# Patient Record
Sex: MALE | Race: WHITE | Employment: OTHER | URBAN - NONMETROPOLITAN AREA
[De-identification: names, ages, dates, MRNs, and addresses within clinical notes are randomized per-mention and may not be internally consistent; named-entity substitution may affect disease eponyms.]

---

## 2016-12-27 LAB
ALBUMIN SERPL-MCNC: 4.3 G/DL
ALP BLD-CCNC: 92 U/L
ALT SERPL-CCNC: 24 U/L
AST SERPL-CCNC: 22 U/L
BILIRUB SERPL-MCNC: 0.5 MG/DL (ref 0.1–1.4)
BUN BLDV-MCNC: 36 MG/DL
CALCIUM SERPL-MCNC: NORMAL MG/DL
CHLORIDE BLD-SCNC: 101 MMOL/L
CHOLESTEROL, TOTAL: 170 MG/DL
CHOLESTEROL/HDL RATIO: NORMAL
CO2: NORMAL MMOL/L
CREAT SERPL-MCNC: 2.2 MG/DL
GFR CALCULATED: NORMAL
GLUCOSE BLD-MCNC: 127 MG/DL
HBA1C MFR BLD: 7.9 %
HDLC SERPL-MCNC: 36 MG/DL (ref 35–70)
LDL CHOLESTEROL CALCULATED: 97 MG/DL (ref 0–160)
POTASSIUM SERPL-SCNC: 5.1 MMOL/L
SODIUM BLD-SCNC: 140 MMOL/L
TOTAL PROTEIN: NORMAL
TRIGL SERPL-MCNC: 184 MG/DL
VLDLC SERPL CALC-MCNC: NORMAL MG/DL

## 2017-01-11 PROBLEM — D64.9 ANEMIA: Status: ACTIVE | Noted: 2017-01-11

## 2017-01-11 PROBLEM — R80.9 PROTEINURIA: Status: ACTIVE | Noted: 2017-01-11

## 2017-01-17 LAB
LEFT VENTRICULAR EJECTION FRACTION HIGH VALUE: 45 %
LEFT VENTRICULAR EJECTION FRACTION MODE: NORMAL
LV EF: 40 %

## 2017-01-19 ENCOUNTER — HOSPITAL ENCOUNTER (OUTPATIENT)
Dept: ULTRASOUND IMAGING | Age: 72
Discharge: HOME | End: 2017-01-19
Attending: FAMILY MEDICINE | Admitting: FAMILY MEDICINE

## 2017-01-19 DIAGNOSIS — N64.59 OTHER SIGNS AND SYMPTOMS IN BREAST: ICD-10-CM

## 2017-01-19 DIAGNOSIS — N64.59 ABNORMAL BREAST EXAM: ICD-10-CM

## 2017-01-19 DIAGNOSIS — N64.4 BREAST PAIN, RIGHT: ICD-10-CM

## 2017-01-26 ENCOUNTER — TELEPHONE (OUTPATIENT)
Dept: CARDIOLOGY CLINIC | Age: 72
End: 2017-01-26

## 2017-01-26 ENCOUNTER — PROCEDURE VISIT (OUTPATIENT)
Dept: CARDIOLOGY CLINIC | Age: 72
End: 2017-01-26

## 2017-01-26 ENCOUNTER — OFFICE VISIT (OUTPATIENT)
Dept: CARDIOLOGY CLINIC | Age: 72
End: 2017-01-26

## 2017-01-26 VITALS
SYSTOLIC BLOOD PRESSURE: 120 MMHG | HEIGHT: 68 IN | HEART RATE: 84 BPM | BODY MASS INDEX: 27.58 KG/M2 | WEIGHT: 182 LBS | DIASTOLIC BLOOD PRESSURE: 62 MMHG

## 2017-01-26 DIAGNOSIS — N18.30 CKD (CHRONIC KIDNEY DISEASE) STAGE 3, GFR 30-59 ML/MIN (HCC): ICD-10-CM

## 2017-01-26 DIAGNOSIS — Z78.9 STATIN INTOLERANCE: ICD-10-CM

## 2017-01-26 DIAGNOSIS — Z95.1 S/P CABG X 3: ICD-10-CM

## 2017-01-26 DIAGNOSIS — N18.32 CHRONIC KIDNEY DISEASE (CKD) STAGE G3B/A3, MODERATELY DECREASED GLOMERULAR FILTRATION RATE (GFR) BETWEEN 30-44 ML/MIN/1.73 SQUARE METER AND ALBUMINURIA CREATININE RATIO GREATER THAN 300 MG/G (HCC): ICD-10-CM

## 2017-01-26 DIAGNOSIS — I25.10 CORONARY ARTERY DISEASE INVOLVING NATIVE CORONARY ARTERY OF NATIVE HEART WITHOUT ANGINA PECTORIS: ICD-10-CM

## 2017-01-26 DIAGNOSIS — I73.9 CLAUDICATION (HCC): Primary | ICD-10-CM

## 2017-01-26 DIAGNOSIS — E78.2 MIXED HYPERLIPIDEMIA: ICD-10-CM

## 2017-01-26 DIAGNOSIS — I48.0 PAF (PAROXYSMAL ATRIAL FIBRILLATION) (HCC): ICD-10-CM

## 2017-01-26 DIAGNOSIS — I10 ESSENTIAL HYPERTENSION: ICD-10-CM

## 2017-01-26 DIAGNOSIS — I73.9 PVD (PERIPHERAL VASCULAR DISEASE) (HCC): ICD-10-CM

## 2017-01-26 DIAGNOSIS — R07.89 OTHER CHEST PAIN: ICD-10-CM

## 2017-01-26 DIAGNOSIS — R07.89 OTHER CHEST PAIN: Primary | ICD-10-CM

## 2017-01-26 PROBLEM — R80.9 PROTEINURIA: Status: RESOLVED | Noted: 2017-01-11 | Resolved: 2017-01-26

## 2017-01-26 PROBLEM — D64.9 ANEMIA: Status: RESOLVED | Noted: 2017-01-11 | Resolved: 2017-01-26

## 2017-01-26 PROCEDURE — G8598 ASA/ANTIPLAT THER USED: HCPCS | Performed by: INTERNAL MEDICINE

## 2017-01-26 PROCEDURE — G8484 FLU IMMUNIZE NO ADMIN: HCPCS | Performed by: INTERNAL MEDICINE

## 2017-01-26 PROCEDURE — 93000 ELECTROCARDIOGRAM COMPLETE: CPT | Performed by: INTERNAL MEDICINE

## 2017-01-26 PROCEDURE — 99214 OFFICE O/P EST MOD 30 MIN: CPT | Performed by: INTERNAL MEDICINE

## 2017-01-26 PROCEDURE — 1123F ACP DISCUSS/DSCN MKR DOCD: CPT | Performed by: INTERNAL MEDICINE

## 2017-01-26 PROCEDURE — G8420 CALC BMI NORM PARAMETERS: HCPCS | Performed by: INTERNAL MEDICINE

## 2017-01-26 PROCEDURE — 3017F COLORECTAL CA SCREEN DOC REV: CPT | Performed by: INTERNAL MEDICINE

## 2017-01-26 PROCEDURE — 1036F TOBACCO NON-USER: CPT | Performed by: INTERNAL MEDICINE

## 2017-01-26 PROCEDURE — 4040F PNEUMOC VAC/ADMIN/RCVD: CPT | Performed by: INTERNAL MEDICINE

## 2017-01-26 PROCEDURE — 93924 LWR XTR VASC STDY BILAT: CPT | Performed by: INTERNAL MEDICINE

## 2017-01-26 PROCEDURE — G8427 DOCREV CUR MEDS BY ELIG CLIN: HCPCS | Performed by: INTERNAL MEDICINE

## 2017-01-26 RX ORDER — AMLODIPINE BESYLATE 5 MG/1
5 TABLET ORAL DAILY
COMMUNITY
End: 2017-06-26 | Stop reason: SDUPTHER

## 2017-01-27 ENCOUNTER — TELEPHONE (OUTPATIENT)
Dept: CARDIOLOGY CLINIC | Age: 72
End: 2017-01-27

## 2017-01-27 DIAGNOSIS — I73.9 PVD (PERIPHERAL VASCULAR DISEASE) (HCC): Primary | ICD-10-CM

## 2017-01-27 DIAGNOSIS — I25.10 CORONARY ARTERY DISEASE INVOLVING NATIVE CORONARY ARTERY OF NATIVE HEART WITHOUT ANGINA PECTORIS: ICD-10-CM

## 2017-01-27 DIAGNOSIS — I73.9 CLAUDICATION (HCC): ICD-10-CM

## 2017-01-31 ENCOUNTER — TELEPHONE (OUTPATIENT)
Dept: CARDIOLOGY CLINIC | Age: 72
End: 2017-01-31

## 2017-02-10 ENCOUNTER — HOSPITAL ENCOUNTER (OUTPATIENT)
Dept: OTHER | Age: 72
Discharge: HOME | End: 2017-02-10
Attending: INTERNAL MEDICINE | Admitting: INTERNAL MEDICINE

## 2017-02-10 ENCOUNTER — TELEPHONE (OUTPATIENT)
Dept: CARDIOLOGY CLINIC | Age: 72
End: 2017-02-10

## 2017-02-10 LAB
ANION GAP SERPL CALCULATED.3IONS-SCNC: 5 MMOL/L (ref 4–16)
BUN BLDV-MCNC: 41 MG/DL (ref 6–23)
CALCIUM SERPL-MCNC: 9.1 MG/DL (ref 8.3–10.6)
CHLORIDE BLD-SCNC: 103 MMOL/L (ref 99–110)
CO2: 33 MMOL/L (ref 21–32)
CREAT SERPL-MCNC: 2.3 MG/DL (ref 0.9–1.3)
GFR AFRICAN AMERICAN: 34 ML/MIN/1.73M2
GFR NON-AFRICAN AMERICAN: 28 ML/MIN/1.73M2
GLUCOSE BLD-MCNC: 139 MG/DL (ref 70–140)
POTASSIUM SERPL-SCNC: 4.7 MMOL/L (ref 3.5–5.1)
SODIUM BLD-SCNC: 141 MMOL/L (ref 135–145)

## 2017-02-13 ENCOUNTER — HOSPITAL ENCOUNTER (OUTPATIENT)
Dept: OTHER | Age: 72
Discharge: HOME | End: 2017-02-13
Attending: INTERNAL MEDICINE | Admitting: INTERNAL MEDICINE

## 2017-02-13 LAB
ANION GAP SERPL CALCULATED.3IONS-SCNC: 11 MMOL/L (ref 4–16)
BUN BLDV-MCNC: 47 MG/DL (ref 6–23)
CALCIUM SERPL-MCNC: 9.1 MG/DL (ref 8.3–10.6)
CHLORIDE BLD-SCNC: 99 MMOL/L (ref 99–110)
CO2: 29 MMOL/L (ref 21–32)
CREAT SERPL-MCNC: 2.5 MG/DL (ref 0.9–1.3)
GFR AFRICAN AMERICAN: 31 ML/MIN/1.73M2
GFR NON-AFRICAN AMERICAN: 26 ML/MIN/1.73M2
GLUCOSE BLD-MCNC: 285 MG/DL (ref 70–140)
POTASSIUM SERPL-SCNC: 4.5 MMOL/L (ref 3.5–5.1)
SODIUM BLD-SCNC: 139 MMOL/L (ref 135–145)

## 2017-02-15 ENCOUNTER — TELEPHONE (OUTPATIENT)
Dept: CARDIOLOGY CLINIC | Age: 72
End: 2017-02-15

## 2017-02-17 ENCOUNTER — OFFICE VISIT (OUTPATIENT)
Dept: CARDIOLOGY CLINIC | Age: 72
End: 2017-02-17

## 2017-02-17 VITALS
WEIGHT: 190 LBS | HEART RATE: 72 BPM | BODY MASS INDEX: 28.79 KG/M2 | SYSTOLIC BLOOD PRESSURE: 126 MMHG | RESPIRATION RATE: 16 BRPM | HEIGHT: 68 IN | DIASTOLIC BLOOD PRESSURE: 60 MMHG

## 2017-02-17 DIAGNOSIS — I42.9 CARDIOMYOPATHY (HCC): ICD-10-CM

## 2017-02-17 DIAGNOSIS — I48.0 PAF (PAROXYSMAL ATRIAL FIBRILLATION) (HCC): ICD-10-CM

## 2017-02-17 DIAGNOSIS — I73.9 PVD (PERIPHERAL VASCULAR DISEASE) (HCC): ICD-10-CM

## 2017-02-17 DIAGNOSIS — R06.02 SOB (SHORTNESS OF BREATH) ON EXERTION: ICD-10-CM

## 2017-02-17 DIAGNOSIS — E78.2 MIXED HYPERLIPIDEMIA: ICD-10-CM

## 2017-02-17 DIAGNOSIS — E11.8 TYPE 2 DIABETES MELLITUS WITH COMPLICATION, WITHOUT LONG-TERM CURRENT USE OF INSULIN (HCC): ICD-10-CM

## 2017-02-17 DIAGNOSIS — Z95.1 S/P CABG X 3: ICD-10-CM

## 2017-02-17 DIAGNOSIS — N18.30 CKD (CHRONIC KIDNEY DISEASE) STAGE 3, GFR 30-59 ML/MIN (HCC): ICD-10-CM

## 2017-02-17 DIAGNOSIS — Z78.9 STATIN INTOLERANCE: ICD-10-CM

## 2017-02-17 DIAGNOSIS — I25.10 CORONARY ARTERY DISEASE INVOLVING NATIVE CORONARY ARTERY OF NATIVE HEART WITHOUT ANGINA PECTORIS: Primary | ICD-10-CM

## 2017-02-17 PROCEDURE — G8598 ASA/ANTIPLAT THER USED: HCPCS | Performed by: INTERNAL MEDICINE

## 2017-02-17 PROCEDURE — 99214 OFFICE O/P EST MOD 30 MIN: CPT | Performed by: INTERNAL MEDICINE

## 2017-02-17 PROCEDURE — G8420 CALC BMI NORM PARAMETERS: HCPCS | Performed by: INTERNAL MEDICINE

## 2017-02-17 PROCEDURE — 4040F PNEUMOC VAC/ADMIN/RCVD: CPT | Performed by: INTERNAL MEDICINE

## 2017-02-17 PROCEDURE — 3017F COLORECTAL CA SCREEN DOC REV: CPT | Performed by: INTERNAL MEDICINE

## 2017-02-17 PROCEDURE — 1123F ACP DISCUSS/DSCN MKR DOCD: CPT | Performed by: INTERNAL MEDICINE

## 2017-02-17 PROCEDURE — G8427 DOCREV CUR MEDS BY ELIG CLIN: HCPCS | Performed by: INTERNAL MEDICINE

## 2017-02-17 PROCEDURE — G8484 FLU IMMUNIZE NO ADMIN: HCPCS | Performed by: INTERNAL MEDICINE

## 2017-02-17 PROCEDURE — 1036F TOBACCO NON-USER: CPT | Performed by: INTERNAL MEDICINE

## 2017-02-17 PROCEDURE — 3045F PR MOST RECENT HEMOGLOBIN A1C LEVEL 7.0-9.0%: CPT | Performed by: INTERNAL MEDICINE

## 2017-02-23 ENCOUNTER — HOSPITAL ENCOUNTER (OUTPATIENT)
Dept: OTHER | Age: 72
Discharge: HOME | End: 2017-02-23
Attending: INTERNAL MEDICINE | Admitting: INTERNAL MEDICINE

## 2017-02-23 LAB
ANION GAP SERPL CALCULATED.3IONS-SCNC: 11 MMOL/L (ref 4–16)
BUN BLDV-MCNC: 43 MG/DL (ref 6–23)
CALCIUM SERPL-MCNC: 9.3 MG/DL (ref 8.3–10.6)
CHLORIDE BLD-SCNC: 104 MMOL/L (ref 99–110)
CO2: 26 MMOL/L (ref 21–32)
CREAT SERPL-MCNC: 2 MG/DL (ref 0.9–1.3)
GFR AFRICAN AMERICAN: 40 ML/MIN/1.73M2
GFR NON-AFRICAN AMERICAN: 33 ML/MIN/1.73M2
GLUCOSE BLD-MCNC: 167 MG/DL (ref 70–140)
POTASSIUM SERPL-SCNC: 4.3 MMOL/L (ref 3.5–5.1)
SODIUM BLD-SCNC: 141 MMOL/L (ref 135–145)

## 2017-03-08 PROBLEM — N18.4 CHRONIC KIDNEY DISEASE (CKD) STAGE G4/A3, SEVERELY DECREASED GLOMERULAR FILTRATION RATE (GFR) BETWEEN 15-29 ML/MIN/1.73 SQUARE METER AND ALBUMINURIA CREATININE RATIO GREATER THAN 300 MG/G (HCC): Status: ACTIVE | Noted: 2017-03-08

## 2017-03-08 PROBLEM — I25.10 CAD (CORONARY ARTERY DISEASE): Status: ACTIVE | Noted: 2017-03-08

## 2017-03-08 PROBLEM — E11.9 DM (DIABETES MELLITUS) (HCC): Status: ACTIVE | Noted: 2017-03-08

## 2017-03-15 ENCOUNTER — TELEPHONE (OUTPATIENT)
Dept: CARDIOLOGY CLINIC | Age: 72
End: 2017-03-15

## 2017-03-30 ENCOUNTER — OFFICE VISIT (OUTPATIENT)
Dept: CARDIOLOGY CLINIC | Age: 72
End: 2017-03-30

## 2017-03-30 VITALS
HEART RATE: 78 BPM | RESPIRATION RATE: 16 BRPM | WEIGHT: 192 LBS | SYSTOLIC BLOOD PRESSURE: 128 MMHG | BODY MASS INDEX: 29.1 KG/M2 | OXYGEN SATURATION: 98 % | DIASTOLIC BLOOD PRESSURE: 54 MMHG | HEIGHT: 68 IN

## 2017-03-30 DIAGNOSIS — I25.10 CORONARY ARTERY DISEASE INVOLVING NATIVE CORONARY ARTERY OF NATIVE HEART WITHOUT ANGINA PECTORIS: ICD-10-CM

## 2017-03-30 DIAGNOSIS — Z95.1 S/P CABG X 3: ICD-10-CM

## 2017-03-30 DIAGNOSIS — E78.2 MIXED HYPERLIPIDEMIA: ICD-10-CM

## 2017-03-30 DIAGNOSIS — I42.9 CARDIOMYOPATHY (HCC): ICD-10-CM

## 2017-03-30 DIAGNOSIS — I48.0 PAF (PAROXYSMAL ATRIAL FIBRILLATION) (HCC): Primary | ICD-10-CM

## 2017-03-30 DIAGNOSIS — Z78.9 STATIN INTOLERANCE: ICD-10-CM

## 2017-03-30 DIAGNOSIS — N18.4 CHRONIC KIDNEY DISEASE (CKD) STAGE G4/A3, SEVERELY DECREASED GLOMERULAR FILTRATION RATE (GFR) BETWEEN 15-29 ML/MIN/1.73 SQUARE METER AND ALBUMINURIA CREATININE RATIO GREATER THAN 300 MG/G (HCC): ICD-10-CM

## 2017-03-30 PROCEDURE — G8484 FLU IMMUNIZE NO ADMIN: HCPCS | Performed by: INTERNAL MEDICINE

## 2017-03-30 PROCEDURE — G8427 DOCREV CUR MEDS BY ELIG CLIN: HCPCS | Performed by: INTERNAL MEDICINE

## 2017-03-30 PROCEDURE — G8420 CALC BMI NORM PARAMETERS: HCPCS | Performed by: INTERNAL MEDICINE

## 2017-03-30 PROCEDURE — 4040F PNEUMOC VAC/ADMIN/RCVD: CPT | Performed by: INTERNAL MEDICINE

## 2017-03-30 PROCEDURE — 1036F TOBACCO NON-USER: CPT | Performed by: INTERNAL MEDICINE

## 2017-03-30 PROCEDURE — 1123F ACP DISCUSS/DSCN MKR DOCD: CPT | Performed by: INTERNAL MEDICINE

## 2017-03-30 PROCEDURE — 99214 OFFICE O/P EST MOD 30 MIN: CPT | Performed by: INTERNAL MEDICINE

## 2017-03-30 PROCEDURE — G8598 ASA/ANTIPLAT THER USED: HCPCS | Performed by: INTERNAL MEDICINE

## 2017-03-30 PROCEDURE — 3017F COLORECTAL CA SCREEN DOC REV: CPT | Performed by: INTERNAL MEDICINE

## 2017-05-22 RX ORDER — APIXABAN 2.5 MG/1
TABLET, FILM COATED ORAL
Qty: 60 TABLET | Refills: 6 | Status: SHIPPED | OUTPATIENT
Start: 2017-05-22 | End: 2017-11-27 | Stop reason: SDUPTHER

## 2017-05-29 ENCOUNTER — HOSPITAL ENCOUNTER (OUTPATIENT)
Dept: GENERAL RADIOLOGY | Age: 72
Discharge: HOME | End: 2017-05-29
Attending: FAMILY MEDICINE | Admitting: FAMILY MEDICINE

## 2017-05-29 ENCOUNTER — HOSPITAL ENCOUNTER (OUTPATIENT)
Dept: OTHER | Age: 72
Discharge: HOME | End: 2017-05-29
Attending: INTERNAL MEDICINE | Admitting: INTERNAL MEDICINE

## 2017-05-29 DIAGNOSIS — R07.89 OTHER CHEST PAIN: ICD-10-CM

## 2017-05-29 LAB
ALBUMIN SERPL-MCNC: 3.8 GM/DL (ref 3.4–5)
ANION GAP SERPL CALCULATED.3IONS-SCNC: 14 MMOL/L (ref 4–16)
BUN BLDV-MCNC: 34 MG/DL (ref 6–23)
CALCIUM SERPL-MCNC: 9.7 MG/DL (ref 8.3–10.6)
CHLORIDE BLD-SCNC: 104 MMOL/L (ref 99–110)
CO2: 24 MMOL/L (ref 21–32)
CREAT SERPL-MCNC: 1.8 MG/DL (ref 0.9–1.3)
GFR AFRICAN AMERICAN: 45 ML/MIN/1.73M2
GFR NON-AFRICAN AMERICAN: 37 ML/MIN/1.73M2
GLUCOSE BLD-MCNC: 231 MG/DL (ref 70–140)
PHOSPHORUS: 4.2 MG/DL (ref 2.5–4.9)
POTASSIUM SERPL-SCNC: 4.3 MMOL/L (ref 3.5–5.1)
SODIUM BLD-SCNC: 142 MMOL/L (ref 135–145)

## 2017-05-30 LAB
CREATININE URINE: 117.8 MG/DL (ref 39–259)
PROT/CREAT RATIO, UR: 0.6
URINE TOTAL PROTEIN: 71.7 MG/DL

## 2017-05-31 LAB — PARATHYROID HORMONE INTACT: 24

## 2017-06-15 PROBLEM — I25.10 CAD (CORONARY ARTERY DISEASE): Status: ACTIVE | Noted: 2017-06-15

## 2017-06-15 PROBLEM — N18.32 CHRONIC KIDNEY DISEASE (CKD) STAGE G3B/A3, MODERATELY DECREASED GLOMERULAR FILTRATION RATE (GFR) BETWEEN 30-44 ML/MIN/1.73 SQUARE METER AND ALBUMINURIA CREATININE RATIO GREATER THAN 300 MG/G (HCC): Status: ACTIVE | Noted: 2017-06-15

## 2017-06-26 RX ORDER — AMLODIPINE BESYLATE 5 MG/1
5 TABLET ORAL 2 TIMES DAILY
Qty: 30 TABLET | Refills: 1 | Status: SHIPPED | OUTPATIENT
Start: 2017-06-26 | End: 2017-07-31 | Stop reason: SDUPTHER

## 2017-07-06 ENCOUNTER — TELEPHONE (OUTPATIENT)
Dept: CARDIOLOGY CLINIC | Age: 72
End: 2017-07-06

## 2017-07-06 ENCOUNTER — OFFICE VISIT (OUTPATIENT)
Dept: CARDIOLOGY CLINIC | Age: 72
End: 2017-07-06

## 2017-07-06 VITALS
HEART RATE: 72 BPM | DIASTOLIC BLOOD PRESSURE: 66 MMHG | HEIGHT: 68 IN | RESPIRATION RATE: 16 BRPM | WEIGHT: 196 LBS | SYSTOLIC BLOOD PRESSURE: 128 MMHG | BODY MASS INDEX: 29.7 KG/M2

## 2017-07-06 DIAGNOSIS — I10 ESSENTIAL HYPERTENSION: ICD-10-CM

## 2017-07-06 DIAGNOSIS — Z95.1 S/P CABG X 3: ICD-10-CM

## 2017-07-06 DIAGNOSIS — Z78.9 STATIN INTOLERANCE: ICD-10-CM

## 2017-07-06 DIAGNOSIS — E78.2 MIXED HYPERLIPIDEMIA: ICD-10-CM

## 2017-07-06 DIAGNOSIS — I48.0 PAF (PAROXYSMAL ATRIAL FIBRILLATION) (HCC): ICD-10-CM

## 2017-07-06 DIAGNOSIS — R06.02 SOB (SHORTNESS OF BREATH) ON EXERTION: Primary | ICD-10-CM

## 2017-07-06 DIAGNOSIS — E78.5 DYSLIPIDEMIA: ICD-10-CM

## 2017-07-06 DIAGNOSIS — R00.2 PALPITATIONS: ICD-10-CM

## 2017-07-06 DIAGNOSIS — I25.10 CORONARY ARTERY DISEASE INVOLVING NATIVE CORONARY ARTERY OF NATIVE HEART WITHOUT ANGINA PECTORIS: ICD-10-CM

## 2017-07-06 DIAGNOSIS — I42.9 CARDIOMYOPATHY (HCC): ICD-10-CM

## 2017-07-06 PROCEDURE — G8419 CALC BMI OUT NRM PARAM NOF/U: HCPCS | Performed by: INTERNAL MEDICINE

## 2017-07-06 PROCEDURE — 99214 OFFICE O/P EST MOD 30 MIN: CPT | Performed by: INTERNAL MEDICINE

## 2017-07-06 PROCEDURE — G8598 ASA/ANTIPLAT THER USED: HCPCS | Performed by: INTERNAL MEDICINE

## 2017-07-06 PROCEDURE — 1123F ACP DISCUSS/DSCN MKR DOCD: CPT | Performed by: INTERNAL MEDICINE

## 2017-07-06 PROCEDURE — G8427 DOCREV CUR MEDS BY ELIG CLIN: HCPCS | Performed by: INTERNAL MEDICINE

## 2017-07-06 PROCEDURE — 1036F TOBACCO NON-USER: CPT | Performed by: INTERNAL MEDICINE

## 2017-07-06 PROCEDURE — 4040F PNEUMOC VAC/ADMIN/RCVD: CPT | Performed by: INTERNAL MEDICINE

## 2017-07-06 PROCEDURE — 3017F COLORECTAL CA SCREEN DOC REV: CPT | Performed by: INTERNAL MEDICINE

## 2017-07-06 RX ORDER — SENNA PLUS 8.6 MG/1
1 TABLET ORAL DAILY
COMMUNITY
End: 2017-09-28

## 2017-07-10 ENCOUNTER — TELEPHONE (OUTPATIENT)
Dept: CARDIOLOGY CLINIC | Age: 72
End: 2017-07-10

## 2017-07-10 ENCOUNTER — HOSPITAL ENCOUNTER (OUTPATIENT)
Dept: OTHER | Age: 72
Discharge: HOME | End: 2017-07-10
Attending: INTERNAL MEDICINE | Admitting: INTERNAL MEDICINE

## 2017-07-10 LAB
ANION GAP SERPL CALCULATED.3IONS-SCNC: 18 MMOL/L (ref 4–16)
BACTERIA: NEGATIVE /HPF
BILIRUBIN URINE: NEGATIVE MG/DL
BLOOD, URINE: NEGATIVE
BUN BLDV-MCNC: 37 MG/DL (ref 6–23)
CALCIUM SERPL-MCNC: 10.3 MG/DL (ref 8.3–10.6)
CHLORIDE BLD-SCNC: 102 MMOL/L (ref 99–110)
CLARITY: CLEAR
CO2: 24 MMOL/L (ref 21–32)
COLOR: YELLOW
CREAT SERPL-MCNC: 2 MG/DL (ref 0.9–1.3)
CREATININE URINE: 100.7 MG/DL (ref 39–259)
GFR AFRICAN AMERICAN: 40 ML/MIN/1.73M2
GFR NON-AFRICAN AMERICAN: 33 ML/MIN/1.73M2
GLUCOSE BLD-MCNC: 172 MG/DL (ref 70–140)
GLUCOSE, URINE: 50 MG/DL
KETONES, URINE: NEGATIVE MG/DL
LEUKOCYTE ESTERASE, URINE: NEGATIVE
NITRITE URINE, QUANTITATIVE: NEGATIVE
PH, URINE: 5 (ref 5–8)
PHOSPHORUS: 3.9 MG/DL (ref 2.5–4.9)
POTASSIUM SERPL-SCNC: 4 MMOL/L (ref 3.5–5.1)
PROT/CREAT RATIO, UR: 0.5
PROTEIN UA: 100 MG/DL
RBC URINE: 1 /HPF (ref 0–3)
SODIUM BLD-SCNC: 144 MMOL/L (ref 135–145)
SPECIFIC GRAVITY UA: 1.01 (ref 1–1.03)
URINE TOTAL PROTEIN: 52.1 MG/DL
UROBILINOGEN, URINE: NORMAL MG/DL (ref 0.2–1)
WBC UA: 1 /HPF (ref 0–2)

## 2017-07-11 ENCOUNTER — TELEPHONE (OUTPATIENT)
Dept: CARDIOLOGY CLINIC | Age: 72
End: 2017-07-11

## 2017-07-31 RX ORDER — AMLODIPINE BESYLATE 5 MG/1
TABLET ORAL
Qty: 30 TABLET | Refills: 6 | Status: SHIPPED | OUTPATIENT
Start: 2017-07-31 | End: 2017-11-01 | Stop reason: SDUPTHER

## 2017-08-21 ENCOUNTER — TELEPHONE (OUTPATIENT)
Dept: CARDIOLOGY CLINIC | Age: 72
End: 2017-08-21

## 2017-09-14 ENCOUNTER — OFFICE VISIT (OUTPATIENT)
Dept: CARDIOLOGY CLINIC | Age: 72
End: 2017-09-14

## 2017-09-14 VITALS
HEIGHT: 68 IN | DIASTOLIC BLOOD PRESSURE: 70 MMHG | SYSTOLIC BLOOD PRESSURE: 138 MMHG | BODY MASS INDEX: 29.7 KG/M2 | WEIGHT: 196 LBS | RESPIRATION RATE: 16 BRPM | HEART RATE: 72 BPM

## 2017-09-14 DIAGNOSIS — I73.9 PVD (PERIPHERAL VASCULAR DISEASE) (HCC): ICD-10-CM

## 2017-09-14 DIAGNOSIS — E08.22 DIABETES MELLITUS DUE TO UNDERLYING CONDITION WITH STAGE 3 CHRONIC KIDNEY DISEASE, UNSPECIFIED LONG TERM INSULIN USE STATUS: ICD-10-CM

## 2017-09-14 DIAGNOSIS — S22.23XK CLOSED STERNAL MANUBRIAL DISSOCIATION FRACTURE WITH NONUNION: ICD-10-CM

## 2017-09-14 DIAGNOSIS — I48.0 PAF (PAROXYSMAL ATRIAL FIBRILLATION) (HCC): Primary | ICD-10-CM

## 2017-09-14 DIAGNOSIS — N18.30 CKD (CHRONIC KIDNEY DISEASE) STAGE 3, GFR 30-59 ML/MIN (HCC): ICD-10-CM

## 2017-09-14 DIAGNOSIS — N18.3 DIABETES MELLITUS DUE TO UNDERLYING CONDITION WITH STAGE 3 CHRONIC KIDNEY DISEASE, UNSPECIFIED LONG TERM INSULIN USE STATUS: ICD-10-CM

## 2017-09-14 DIAGNOSIS — I25.10 CORONARY ARTERY DISEASE INVOLVING NATIVE CORONARY ARTERY OF NATIVE HEART WITHOUT ANGINA PECTORIS: ICD-10-CM

## 2017-09-14 DIAGNOSIS — Z78.9 STATIN INTOLERANCE: ICD-10-CM

## 2017-09-14 DIAGNOSIS — I10 ESSENTIAL HYPERTENSION: ICD-10-CM

## 2017-09-14 DIAGNOSIS — N18.4 CHRONIC KIDNEY DISEASE (CKD) STAGE G4/A3, SEVERELY DECREASED GLOMERULAR FILTRATION RATE (GFR) BETWEEN 15-29 ML/MIN/1.73 SQUARE METER AND ALBUMINURIA CREATININE RATIO GREATER THAN 300 MG/G (HCC): ICD-10-CM

## 2017-09-14 PROBLEM — N18.32 CHRONIC KIDNEY DISEASE (CKD) STAGE G3B/A3, MODERATELY DECREASED GLOMERULAR FILTRATION RATE (GFR) BETWEEN 30-44 ML/MIN/1.73 SQUARE METER AND ALBUMINURIA CREATININE RATIO GREATER THAN 300 MG/G (HCC): Status: RESOLVED | Noted: 2017-06-15 | Resolved: 2017-09-14

## 2017-09-14 PROCEDURE — 3017F COLORECTAL CA SCREEN DOC REV: CPT | Performed by: INTERNAL MEDICINE

## 2017-09-14 PROCEDURE — 99214 OFFICE O/P EST MOD 30 MIN: CPT | Performed by: INTERNAL MEDICINE

## 2017-09-14 PROCEDURE — G8427 DOCREV CUR MEDS BY ELIG CLIN: HCPCS | Performed by: INTERNAL MEDICINE

## 2017-09-14 PROCEDURE — 93000 ELECTROCARDIOGRAM COMPLETE: CPT | Performed by: INTERNAL MEDICINE

## 2017-09-14 PROCEDURE — 4040F PNEUMOC VAC/ADMIN/RCVD: CPT | Performed by: INTERNAL MEDICINE

## 2017-09-14 PROCEDURE — 1036F TOBACCO NON-USER: CPT | Performed by: INTERNAL MEDICINE

## 2017-09-14 PROCEDURE — G8417 CALC BMI ABV UP PARAM F/U: HCPCS | Performed by: INTERNAL MEDICINE

## 2017-09-14 PROCEDURE — G8598 ASA/ANTIPLAT THER USED: HCPCS | Performed by: INTERNAL MEDICINE

## 2017-09-14 PROCEDURE — 3046F HEMOGLOBIN A1C LEVEL >9.0%: CPT | Performed by: INTERNAL MEDICINE

## 2017-09-14 PROCEDURE — 1123F ACP DISCUSS/DSCN MKR DOCD: CPT | Performed by: INTERNAL MEDICINE

## 2017-09-15 ENCOUNTER — HOSPITAL ENCOUNTER (OUTPATIENT)
Dept: GENERAL RADIOLOGY | Age: 72
Discharge: HOME | End: 2017-09-15
Attending: INTERNAL MEDICINE | Admitting: INTERNAL MEDICINE

## 2017-09-15 DIAGNOSIS — N18.4 CHRONIC KIDNEY DISEASE, STAGE IV (SEVERE) (HCC): ICD-10-CM

## 2017-09-15 DIAGNOSIS — I48.0 AF (PAROXYSMAL ATRIAL FIBRILLATION) (HCC): ICD-10-CM

## 2017-09-25 ENCOUNTER — HOSPITAL ENCOUNTER (OUTPATIENT)
Dept: OTHER | Age: 72
Discharge: HOME | End: 2017-09-25
Attending: FAMILY MEDICINE | Admitting: FAMILY MEDICINE

## 2017-09-25 LAB
ALBUMIN SERPL-MCNC: 4.1 GM/DL (ref 3.4–5)
ALP BLD-CCNC: 102 IU/L (ref 40–129)
ALT SERPL-CCNC: 9 U/L (ref 10–40)
ANION GAP SERPL CALCULATED.3IONS-SCNC: 13 MMOL/L (ref 4–16)
AST SERPL-CCNC: 13 IU/L (ref 15–37)
BACTERIA: ABNORMAL /HPF
BILIRUB SERPL-MCNC: 0.6 MG/DL (ref 0–1)
BILIRUBIN URINE: NEGATIVE MG/DL
BLOOD, URINE: NEGATIVE
BUN BLDV-MCNC: 33 MG/DL (ref 6–23)
CALCIUM SERPL-MCNC: 9.8 MG/DL (ref 8.3–10.6)
CAST TYPE: ABNORMAL /HPF
CHLORIDE BLD-SCNC: 106 MMOL/L (ref 99–110)
CHOLESTEROL: 146 MG/DL
CLARITY: CLEAR
CO2: 26 MMOL/L (ref 21–32)
COLOR: YELLOW
CREAT SERPL-MCNC: 1.9 MG/DL (ref 0.9–1.3)
CREATININE URINE: 102.5 MG/DL (ref 39–259)
CRYSTAL TYPE: ABNORMAL /HPF
EPITHELIAL CELLS, UA: ABNORMAL /HPF
ESTIMATED AVERAGE GLUCOSE: 166 MG/DL
GFR AFRICAN AMERICAN: 42 ML/MIN/1.73M2
GFR NON-AFRICAN AMERICAN: 35 ML/MIN/1.73M2
GLUCOSE BLD-MCNC: 128 MG/DL (ref 70–140)
GLUCOSE, URINE: NEGATIVE MG/DL
HBA1C MFR BLD: 7.4 % (ref 4.2–6.3)
HDLC SERPL-MCNC: 31 MG/DL
KETONES, URINE: NEGATIVE MG/DL
LDL CHOLESTEROL DIRECT: 78 MG/DL
LEUKOCYTE ESTERASE, URINE: NEGATIVE
MICROALBUMIN/CREAT 24H UR: 23.2 MG/DL
MICROALBUMIN/CREAT UR-RTO: 226.3 MG/G CREAT (ref 0–30)
MUCUS: NEGATIVE HPF
NITRITE URINE, QUANTITATIVE: NEGATIVE
PH, URINE: 5 (ref 5–8)
PHOSPHORUS: 4 MG/DL (ref 2.5–4.9)
POTASSIUM SERPL-SCNC: 4.2 MMOL/L (ref 3.5–5.1)
PROTEIN UA: ABNORMAL MG/DL
RBC URINE: ABNORMAL /HPF (ref 0–3)
SODIUM BLD-SCNC: 145 MMOL/L (ref 135–145)
SPECIFIC GRAVITY UA: 1.02 (ref 1–1.03)
TOTAL PROTEIN: 6.8 GM/DL (ref 6.4–8.2)
TRIGL SERPL-MCNC: 198 MG/DL
UROBILINOGEN, URINE: 0.2 MG/DL (ref 0.2–1)
VOLUME, (UVOL): 12 ML (ref 10–12)
WBC UA: ABNORMAL /HPF (ref 0–2)

## 2017-09-28 ENCOUNTER — HOSPITAL ENCOUNTER (OUTPATIENT)
Dept: PREADMISSION TESTING | Age: 72
Discharge: HOME | End: 2017-09-28
Attending: SURGERY | Admitting: SURGERY

## 2017-09-28 VITALS
DIASTOLIC BLOOD PRESSURE: 71 MMHG | BODY MASS INDEX: 31.74 KG/M2 | HEART RATE: 78 BPM | RESPIRATION RATE: 16 BRPM | OXYGEN SATURATION: 99 % | TEMPERATURE: 97.9 F | HEIGHT: 66 IN | WEIGHT: 197.5 LBS | SYSTOLIC BLOOD PRESSURE: 149 MMHG

## 2017-09-28 LAB
ANION GAP SERPL CALCULATED.3IONS-SCNC: 13 MMOL/L (ref 4–16)
APTT: 29.1 SECONDS (ref 21.2–33)
BACTERIA: ABNORMAL /HPF
BASOPHILS ABSOLUTE: 0.1 K/CU MM
BASOPHILS RELATIVE PERCENT: 1.4 % (ref 0–1)
BILIRUBIN URINE: NEGATIVE MG/DL
BLOOD, URINE: NEGATIVE
BUN BLDV-MCNC: 37 MG/DL (ref 6–23)
CHLORIDE BLD-SCNC: 100 MMOL/L (ref 99–110)
CLARITY: CLEAR
CO2: 25 MMOL/L (ref 21–32)
COLOR: YELLOW
CREAT SERPL-MCNC: 2 MG/DL (ref 0.9–1.3)
DIFFERENTIAL TYPE: ABNORMAL
EKG ATRIAL RATE: 82 BPM
EKG DIAGNOSIS: NORMAL
EKG P AXIS: 89 DEGREES
EKG P-R INTERVAL: 162 MS
EKG Q-T INTERVAL: 402 MS
EKG QRS DURATION: 124 MS
EKG QTC CALCULATION (BAZETT): 469 MS
EKG R AXIS: -43 DEGREES
EKG T AXIS: -21 DEGREES
EKG VENTRICULAR RATE: 82 BPM
EOSINOPHILS ABSOLUTE: 0.2 K/CU MM
EOSINOPHILS RELATIVE PERCENT: 3.5 % (ref 0–3)
GFR AFRICAN AMERICAN: 40 ML/MIN/1.73M2
GFR NON-AFRICAN AMERICAN: 33 ML/MIN/1.73M2
GLUCOSE BLD-MCNC: 268 MG/DL (ref 70–140)
GLUCOSE, URINE: 150 MG/DL
HCT VFR BLD CALC: 34.8 % (ref 42–52)
HEMOGLOBIN: 12.2 GM/DL (ref 13.5–18)
HYALINE CASTS: 0 /LPF
IMMATURE NEUTROPHIL %: 0.5 % (ref 0–0.43)
INR BLD: 1.03 INDEX
KETONES, URINE: NEGATIVE MG/DL
LEUKOCYTE ESTERASE, URINE: NEGATIVE
LYMPHOCYTES ABSOLUTE: 1.3 K/CU MM
LYMPHOCYTES RELATIVE PERCENT: 19.2 % (ref 24–44)
MCH RBC QN AUTO: 32.1 PG (ref 27–31)
MCHC RBC AUTO-ENTMCNC: 35.1 % (ref 32–36)
MCV RBC AUTO: 91.6 FL (ref 78–100)
MONOCYTES ABSOLUTE: 0.7 K/CU MM
MONOCYTES RELATIVE PERCENT: 10 % (ref 0–4)
MUCUS: ABNORMAL HPF
NITRITE URINE, QUANTITATIVE: NEGATIVE
NUCLEATED RBC %: 0 %
PDW BLD-RTO: 13.8 % (ref 11.7–14.9)
PH, URINE: 5 (ref 5–8)
PLATELET # BLD: 160 K/CU MM (ref 140–440)
PMV BLD AUTO: 8.9 FL (ref 7.5–11.1)
POTASSIUM SERPL-SCNC: 4.3 MMOL/L (ref 3.5–5.1)
PROTEIN UA: 100 MG/DL
PROTHROMBIN TIME: 11.7 SECONDS (ref 9.12–12.5)
RBC # BLD: 3.8 M/CU MM (ref 4.6–6.2)
RBC URINE: <1 /HPF (ref 0–3)
SEGMENTED NEUTROPHILS ABSOLUTE COUNT: 4.3 K/CU MM
SEGMENTED NEUTROPHILS RELATIVE PERCENT: 65.4 % (ref 36–66)
SODIUM BLD-SCNC: 138 MMOL/L (ref 135–145)
SPECIFIC GRAVITY UA: 1.01 (ref 1–1.03)
SQUAMOUS EPITHELIAL: <1 /HPF
TOTAL IMMATURE NEUTOROPHIL: 0.03 K/CU MM
TOTAL NUCLEATED RBC: 0 K/CU MM
TRICHOMONAS: ABNORMAL /HPF
UROBILINOGEN, URINE: NORMAL MG/DL (ref 0.2–1)
WBC # BLD: 6.6 K/CU MM (ref 4–10.5)
WBC UA: <1 /HPF (ref 0–2)

## 2017-09-28 RX ORDER — CEFAZOLIN SODIUM 2 G/100ML
2 INJECTION, SOLUTION INTRAVENOUS ONCE
Status: CANCELLED | OUTPATIENT
Start: 2017-10-03

## 2017-09-28 ASSESSMENT — PAIN SCALES - GENERAL: PAINLEVEL_OUTOF10: 0

## 2017-11-01 RX ORDER — AMLODIPINE BESYLATE 5 MG/1
TABLET ORAL
Qty: 60 TABLET | Refills: 6 | Status: SHIPPED | OUTPATIENT
Start: 2017-11-01 | End: 2018-06-11 | Stop reason: SDUPTHER

## 2017-11-27 RX ORDER — APIXABAN 2.5 MG/1
TABLET, FILM COATED ORAL
Qty: 60 TABLET | Refills: 6 | Status: SHIPPED | OUTPATIENT
Start: 2017-11-27 | End: 2018-07-23 | Stop reason: SDUPTHER

## 2017-12-18 ENCOUNTER — TELEPHONE (OUTPATIENT)
Dept: CARDIOLOGY CLINIC | Age: 72
End: 2017-12-18

## 2017-12-18 NOTE — TELEPHONE ENCOUNTER
Chart is reviewed. Patient had Holter in January 2017 had frequent PACs. If he is asymptomatic I would not repeat it and continue current medications and follow-up as scheduled.

## 2017-12-18 NOTE — TELEPHONE ENCOUNTER
Patient's home care nurse called to report he has been noticing his heart skipping beats off and on. She states his vitals are good:  HR 69 and irregular, /64. Patient has noted occasional symptoms of dizziness, weakness and feeling like a lump in his throat. Nurse states he is very compliant to his medications; home care nurse sets up his meds for him each week. Patient has a follow up OV already scheduled w/Dr. Valentin Brock on 1/4/18 @ 9:00 am, which is the first day that Dr. Valentin Brock is back in this office. Will let Dr. Michelle Nolan know that patient's nurse called to report these symptoms (covering for Dr. Valentin Brock) in case patient needs any testing prior to appt or any other recommendations.

## 2018-01-01 ENCOUNTER — HOSPITAL ENCOUNTER (OUTPATIENT)
Dept: OTHER | Age: 73
Discharge: HOME | End: 2018-01-01
Attending: INTERNAL MEDICINE | Admitting: INTERNAL MEDICINE

## 2018-01-01 LAB
ANION GAP SERPL CALCULATED.3IONS-SCNC: 13 MMOL/L (ref 4–16)
BUN BLDV-MCNC: 30 MG/DL (ref 6–23)
CALCIUM SERPL-MCNC: 9.3 MG/DL (ref 8.3–10.6)
CHLORIDE BLD-SCNC: 105 MMOL/L (ref 99–110)
CO2: 27 MMOL/L (ref 21–32)
CREAT SERPL-MCNC: 1.8 MG/DL (ref 0.9–1.3)
CREATININE URINE: 93 MG/DL (ref 39–259)
GFR AFRICAN AMERICAN: 45 ML/MIN/1.73M2
GFR NON-AFRICAN AMERICAN: 37 ML/MIN/1.73M2
GLUCOSE BLD-MCNC: 187 MG/DL (ref 70–99)
MAGNESIUM: 1.9 MG/DL (ref 1.8–2.4)
PHOSPHORUS: 4.2 MG/DL (ref 2.5–4.9)
POTASSIUM SERPL-SCNC: 5 MMOL/L (ref 3.5–5.1)
PROT/CREAT RATIO, UR: 0.5
SODIUM BLD-SCNC: 145 MMOL/L (ref 135–145)
URINE TOTAL PROTEIN: 47.8 MG/DL

## 2018-01-04 ENCOUNTER — OFFICE VISIT (OUTPATIENT)
Dept: CARDIOLOGY CLINIC | Age: 73
End: 2018-01-04

## 2018-01-04 VITALS
RESPIRATION RATE: 16 BRPM | WEIGHT: 197 LBS | HEART RATE: 88 BPM | SYSTOLIC BLOOD PRESSURE: 146 MMHG | DIASTOLIC BLOOD PRESSURE: 72 MMHG | BODY MASS INDEX: 29.86 KG/M2 | HEIGHT: 68 IN

## 2018-01-04 DIAGNOSIS — E08.22 DIABETES MELLITUS DUE TO UNDERLYING CONDITION WITH STAGE 3 CHRONIC KIDNEY DISEASE, UNSPECIFIED LONG TERM INSULIN USE STATUS: ICD-10-CM

## 2018-01-04 DIAGNOSIS — E78.2 MIXED HYPERLIPIDEMIA: ICD-10-CM

## 2018-01-04 DIAGNOSIS — I25.10 CORONARY ARTERY DISEASE INVOLVING NATIVE CORONARY ARTERY OF NATIVE HEART WITHOUT ANGINA PECTORIS: ICD-10-CM

## 2018-01-04 DIAGNOSIS — N18.4 CHRONIC KIDNEY DISEASE (CKD) STAGE G4/A3, SEVERELY DECREASED GLOMERULAR FILTRATION RATE (GFR) BETWEEN 15-29 ML/MIN/1.73 SQUARE METER AND ALBUMINURIA CREATININE RATIO GREATER THAN 300 MG/G (HCC): ICD-10-CM

## 2018-01-04 DIAGNOSIS — I10 ESSENTIAL HYPERTENSION: ICD-10-CM

## 2018-01-04 DIAGNOSIS — E78.5 DYSLIPIDEMIA: ICD-10-CM

## 2018-01-04 DIAGNOSIS — Z78.9 STATIN INTOLERANCE: ICD-10-CM

## 2018-01-04 DIAGNOSIS — S22.23XK CLOSED STERNAL MANUBRIAL DISSOCIATION FRACTURE WITH NONUNION: ICD-10-CM

## 2018-01-04 DIAGNOSIS — N18.3 DIABETES MELLITUS DUE TO UNDERLYING CONDITION WITH STAGE 3 CHRONIC KIDNEY DISEASE, UNSPECIFIED LONG TERM INSULIN USE STATUS: ICD-10-CM

## 2018-01-04 DIAGNOSIS — I48.0 PAF (PAROXYSMAL ATRIAL FIBRILLATION) (HCC): ICD-10-CM

## 2018-01-04 DIAGNOSIS — Z95.1 S/P CABG X 3: Primary | ICD-10-CM

## 2018-01-04 PROCEDURE — 1036F TOBACCO NON-USER: CPT | Performed by: INTERNAL MEDICINE

## 2018-01-04 PROCEDURE — 1123F ACP DISCUSS/DSCN MKR DOCD: CPT | Performed by: INTERNAL MEDICINE

## 2018-01-04 PROCEDURE — 99214 OFFICE O/P EST MOD 30 MIN: CPT | Performed by: INTERNAL MEDICINE

## 2018-01-04 PROCEDURE — G8417 CALC BMI ABV UP PARAM F/U: HCPCS | Performed by: INTERNAL MEDICINE

## 2018-01-04 PROCEDURE — G8484 FLU IMMUNIZE NO ADMIN: HCPCS | Performed by: INTERNAL MEDICINE

## 2018-01-04 PROCEDURE — G8598 ASA/ANTIPLAT THER USED: HCPCS | Performed by: INTERNAL MEDICINE

## 2018-01-04 PROCEDURE — G8427 DOCREV CUR MEDS BY ELIG CLIN: HCPCS | Performed by: INTERNAL MEDICINE

## 2018-01-04 PROCEDURE — 3017F COLORECTAL CA SCREEN DOC REV: CPT | Performed by: INTERNAL MEDICINE

## 2018-01-04 PROCEDURE — 4040F PNEUMOC VAC/ADMIN/RCVD: CPT | Performed by: INTERNAL MEDICINE

## 2018-01-04 RX ORDER — CARVEDILOL 12.5 MG/1
12.5 TABLET ORAL 2 TIMES DAILY WITH MEALS
Qty: 60 TABLET | Refills: 3 | Status: SHIPPED | OUTPATIENT
Start: 2018-01-04 | End: 2018-05-07 | Stop reason: SDUPTHER

## 2018-01-04 RX ORDER — TRAZODONE HYDROCHLORIDE 50 MG/1
50 TABLET ORAL NIGHTLY
COMMUNITY
End: 2018-07-16 | Stop reason: DRUGHIGH

## 2018-01-04 NOTE — PROGRESS NOTES
CARDIOLOGY  NOTE  Chief Complaint: Chest pain ,  He has history of coronary artery disease and afib     HPI:   Breana Montague is a 67y.o. year old who has history as noted below. He does not walk much . He says he gets palpitations once in a while . Few weeks back it was a lot. He has tenderness at the surgical site and scar area of the sternum . He still has back pain which limits him. No shortness of breath. He  is limited by leg pain. He has lot of discomfort when he rolls in bed or laughs as he hears and feels a pop in his chest. Leg pain and muscle pain is better after holding statins . Over all he is much better than before surgery and since his last visit. His grand daughter had a sudden death while sleeping.     Current Outpatient Prescriptions   Medication Sig Dispense Refill    traZODone (DESYREL) 50 MG tablet Take 50 mg by mouth nightly      Carboxymeth-Glycerin-Polysorb (REFRESH OPTIVE ADVANCED) 0.5-1-0.5 % SOLN Apply to eye      carvedilol (COREG) 12.5 MG tablet Take 1 tablet by mouth 2 times daily (with meals) 60 tablet 3    alirocumab (PRALUENT) 75 MG/ML SOPN injection pen Inject 1 mL into the skin every 14 days 2 pen 3    ELIQUIS 2.5 MG TABS tablet TAKE ONE TABLET TWO TIMES A DAY 60 tablet 6    amLODIPine (NORVASC) 5 MG tablet TAKE 1 TABLET BY MOUTH 2 TIMES DAILY 60 tablet 6    chlorthalidone (HYGROTON) 25 MG tablet TAKE 1 TABLET BY MOUTH DAILY 30 tablet 3    Sennosides (SENOKOT PO) Take by mouth daily Over The Counter      Acetaminophen (TYLENOL ARTHRITIS PAIN PO) Take 650 mg by mouth as needed      ALBUTEROL IN Inhale into the lungs as needed      Multiple Vitamins-Minerals (ICAPS PO) Take by mouth every morning Over The Counter      UNABLE TO FIND Apply topically as needed Over The Counter \"Icy Hot Menthol Cream\"      SITagliptin (JANUVIA) 50 MG tablet Take 50 mg by mouth every morning       losartan (COZAAR) 100 MG tablet Take 1 tablet by mouth daily Body mass index is 29.95 kg/m². Vitals:    01/04/18 0905   BP: (!) 146/72   Pulse: 88   Resp: 16     Repeat BP was 138/66    General Appearance:  No distress, conversant  Constitutional:  Well developed, Well nourished, No acute distress, Non-toxic appearance. HENT:  Normocephalic, Atraumatic, Bilateral external ears normal, Oropharynx moist, No oral exudates, Nose normal. Neck- Normal range of motion, No tenderness, Supple, No stridor,no apical-carotid delay  Eyes:  PERRL, EOMI, Conjunctiva normal, No discharge. Respiratory:  Normal breath sounds, No respiratory distress, No wheezing, No chest tenderness. ,no use of accessory muscles,   Cardiovascular: (PMI) apex non displaced,no lifts no thrills,S1 and S2 audible, No added heart sounds, No signs of ankle edema, or volume overload, No evidence of JVD, , right side chest pain is reproducible gets worse on pushing forward  GI:  Bowel sounds normal, Soft, No tenderness, No masses, No gross visceromegaly   :  No costovertebral angle tenderness   Musculoskeletal:  No edema, no tenderness, no deformities.  Back- no tenderness  Integument:  Well hydrated, no rash   Lymphatic:  No lymphadenopathy noted   Neurologic:  Alert & oriented x 3, CN 2-12 normal, normal motor function, normal sensory function, no focal deficits noted   Psychiatric:  Speech and behavior appropriate       Medical decision making and Data review:  DATA:  Lab Results   Component Value Date    TROPONINT <0.010 09/24/2016     BNP:    Lab Results   Component Value Date    PROBNP 5,743 (H) 02/11/2016     PT/INR:  No results found for: [facility]  Lab Results   Component Value Date    LABA1C 7.0 (H) 10/04/2017    LABA1C 7.4 (H) 09/25/2017     Lab Results   Component Value Date    CHOL 146 09/25/2017    TRIG 198 (H) 09/25/2017    HDL 31 (L) 09/25/2017    LDLCALC 97 12/27/2016    LDLDIRECT 78 09/25/2017     Lab Results   Component Value Date    ALT 9 (L) 09/25/2017    AST 13 (L) 09/25/2017     Echo Sept 2016:  Left ventricle function is mildly reduced with EF 45%, mild tricuspid regurgitation. Left ventricular hypertrophy    Holter 2017:  sinus rhythm, some supraventricular ectopy and PVCs recorded, no A. Fib    Arterial doppler Jan 2017          Native arteries in the examined lower extremity(ies) are patent, with no    elevated velocities. No aneurysms are noted.    Bilateral ABIs are normal.           All labs, medications and tests reviewed by myself including data and history from outside source , patient and available family . Assessment & Plan:    1. S/P CABG x 3    2. Coronary artery disease involving native coronary artery of native heart without angina pectoris    3. PAF (paroxysmal atrial fibrillation) ([facility] [address]    4. Essential hypertension    5. 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 Diabetes mellitus due to underlying condition with stage 3 chronic kidney disease, unspecified long term insulin use status ([facility])    7. Statin intolerance    8. Mixed hyperlipidemia    9. Dyslipidemia    10. Chronic kidney disease (CKD) stage G4/A3, severely decreased glomerular filtration rate (GFR) between 15-29 mL/min/1.73 square meter and albuminuria creatinine ratio greater than 300 mg/g ([facility])           CAD (coronary artery disease)  He is s/p CABG in 10/16 with 1. MVR with # 28 CG ring and  CABG X 3 withLIMA to LAD,SVG to PDA,SVG to Cx. 3.LA appendage resection and  With Epi and endocardial MAZE . He has no angina , continue aspirin. He has some sternal site pain probabaly due to sternum not healing we will get cxr for sternal fracture treat conservative Rx     PAF (paroxysmal atrial fibrillation) ([facility])   We can stop amiodarone He is s/p MAZE and atrial appendage removal . He is on  eliquis. Holter did not show any evidence of atrial fibrillation 48 hours that he was waiting it. Lizy Jennings  He still has some palpitations which could be ventricular ectopy/PVC   He is status post atrial appendage resection. That still leaves him at risk of embolic events if he goes into A. Fib so continue eliquis at 2.5 mg whicgh is lower dose but he has had nose bleeds in past and her is s/p appendage resection so I am ok with lower dose . Increase coreg to 12.5 mg bid    aldactone gave him breast pain. Essential hypertension  Blood pressure is well controlled  On repeat it was lower but first read was higher today . He is tolerating his meds . He checks it and keeps a log . Doing well. 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 He feels it pop when he sleeps or when he is laughing or taking deep breath . Seen CT surgery , recommended conservative management     PVD (peripheral vascular disease) ([address]  Ultrasound of legs in Jan 2017 showed no significant blockage , we will ask him to see Ortho/ spine / rehab physical therapy      Dyslipidemia :  Gilberto Walton had lab work recently,  Lipid profile was reviewed with patient. He is getting a lot of leg pain which got better after holding pitavastatin. He could not tolerate lipitor . He cannot tolerate statins , He is tolerating  Repatha, check lpid panel       Counseled extensively and medication compliance urged. We discussed that for the  prevention of ASCVD our  goal is aggressive risk modification. Patient is encouraged to get an exercise bike and do some cardiovascular exercise Various goals were discussed and questions answered. Continue current medications. Appropriate prescriptions are addressed and refills ordered. Questions answered and patient verbalizes understanding. Call for any problems, questions, or concerns. Continue all other medications of all above medical condition listed as is. Return in about 6 months (around 7/4/2018).

## 2018-01-08 ENCOUNTER — TELEPHONE (OUTPATIENT)
Dept: CARDIOLOGY CLINIC | Age: 73
End: 2018-01-08

## 2018-01-08 ENCOUNTER — HOSPITAL ENCOUNTER (OUTPATIENT)
Dept: OTHER | Age: 73
Discharge: HOME | End: 2018-01-08
Attending: INTERNAL MEDICINE | Admitting: INTERNAL MEDICINE

## 2018-01-08 DIAGNOSIS — I25.118 CORONARY ARTERY DISEASE OF NATIVE HEART WITH STABLE ANGINA PECTORIS, UNSPECIFIED VESSEL OR LESION TYPE (HCC): Primary | ICD-10-CM

## 2018-01-08 LAB
ALBUMIN SERPL-MCNC: 4 GM/DL (ref 3.4–5)
ALP BLD-CCNC: 120 IU/L (ref 40–129)
ALT SERPL-CCNC: 10 U/L (ref 10–40)
ANION GAP SERPL CALCULATED.3IONS-SCNC: 11 MMOL/L (ref 4–16)
AST SERPL-CCNC: 12 IU/L (ref 15–37)
BILIRUB SERPL-MCNC: 0.5 MG/DL (ref 0–1)
BUN BLDV-MCNC: 31 MG/DL (ref 6–23)
CALCIUM SERPL-MCNC: 9.8 MG/DL (ref 8.3–10.6)
CHLORIDE BLD-SCNC: 104 MMOL/L (ref 99–110)
CHOLESTEROL: 156 MG/DL
CO2: 28 MMOL/L (ref 21–32)
CREAT SERPL-MCNC: 1.9 MG/DL (ref 0.9–1.3)
GFR AFRICAN AMERICAN: 42 ML/MIN/1.73M2
GFR NON-AFRICAN AMERICAN: 35 ML/MIN/1.73M2
GLUCOSE BLD-MCNC: 139 MG/DL (ref 70–99)
HDLC SERPL-MCNC: 36 MG/DL
LDL CHOLESTEROL DIRECT: 95 MG/DL
POTASSIUM SERPL-SCNC: 5.3 MMOL/L (ref 3.5–5.1)
SODIUM BLD-SCNC: 143 MMOL/L (ref 135–145)
TOTAL PROTEIN: 6.7 GM/DL (ref 6.4–8.2)
TRIGL SERPL-MCNC: 168 MG/DL

## 2018-01-08 RX ORDER — LOSARTAN POTASSIUM 100 MG/1
50 TABLET ORAL DAILY
Qty: 90 TABLET | Refills: 3 | Status: SHIPPED | OUTPATIENT
Start: 2018-01-08 | End: 2018-07-16 | Stop reason: DRUGHIGH

## 2018-01-08 NOTE — TELEPHONE ENCOUNTER
I called the patient due to the BMP results explained to the patient that his potasium is a bit high and Dr. Rashaun Salazar wants to decrease the losartan to 50mg daily, I advised the patient that he cut the pill in half he has 100 mg tablets. Advised the patient that I mailed an order for him to repeat the labs in 4-5 days. Patient verbalized understanding.       Notes Recorded by Gio Hernandez MD on 1/8/2018 at 3:10 PM EST  Potassium is elevated, reduce losartan to 50 mg daily, repeat BMP in 4-5 days     Ref Range & Units 10:08 7d ago    Sodium 135 - 145 MMOL/L 143  145     Potassium 3.5 - 5.1 MMOL/L 5.3   5.0     Chloride 99 - 110 mMol/L 104  105     CO2 21 - 32 MMOL/L 28  27     BUN 6 - 23 MG/DL 31   30      CREATININE 0.9 - 1.3 MG/DL 1.9   1.8      Glucose 70 - 99 MG/   187      Calcium 8.3 - 10.6 MG/DL 9.8  9.3     Alb 3.4 - 5.0 GM/DL 4.0      Total Protein 6.4 - 8.2 GM/DL 6.7      Total Bilirubin 0.0 - 1.0 MG/DL 0.5      ALT 10 - 40 U/L 10      AST 15 - 37 IU/L 12       Alkaline Phosphatase 40 - 129 IU/L 120      GFR Non-African American >60 mL/min/1.73m2 35   37      GFR African American >60 mL/min/1.73m2 42   45      Anion Gap 4 - 16 11  13

## 2018-01-10 PROBLEM — R80.9 PROTEINURIA: Status: ACTIVE | Noted: 2018-01-10

## 2018-01-15 ENCOUNTER — HOSPITAL ENCOUNTER (OUTPATIENT)
Dept: OTHER | Age: 73
Discharge: HOME | End: 2018-01-15
Attending: INTERNAL MEDICINE | Admitting: INTERNAL MEDICINE

## 2018-01-15 LAB
ANION GAP SERPL CALCULATED.3IONS-SCNC: 15 MMOL/L (ref 4–16)
BUN BLDV-MCNC: 33 MG/DL (ref 6–23)
CALCIUM SERPL-MCNC: 9.5 MG/DL (ref 8.3–10.6)
CHLORIDE BLD-SCNC: 102 MMOL/L (ref 99–110)
CO2: 24 MMOL/L (ref 21–32)
CREAT SERPL-MCNC: 1.8 MG/DL (ref 0.9–1.3)
GFR AFRICAN AMERICAN: 45 ML/MIN/1.73M2
GFR NON-AFRICAN AMERICAN: 37 ML/MIN/1.73M2
GLUCOSE BLD-MCNC: 209 MG/DL (ref 70–99)
POTASSIUM SERPL-SCNC: 5 MMOL/L (ref 3.5–5.1)
SODIUM BLD-SCNC: 141 MMOL/L (ref 135–145)

## 2018-01-29 ENCOUNTER — HOSPITAL ENCOUNTER (OUTPATIENT)
Dept: OTHER | Age: 73
Discharge: HOME | End: 2018-01-29
Attending: FAMILY MEDICINE | Admitting: FAMILY MEDICINE

## 2018-01-29 LAB
ANION GAP SERPL CALCULATED.3IONS-SCNC: 8 MMOL/L (ref 4–16)
BASOPHILS ABSOLUTE: 0.1 K/CU MM
BASOPHILS RELATIVE PERCENT: 0.4 % (ref 0–1)
BUN BLDV-MCNC: 38 MG/DL (ref 6–23)
CALCIUM SERPL-MCNC: 9.5 MG/DL (ref 8.3–10.6)
CHLORIDE BLD-SCNC: 101 MMOL/L (ref 99–110)
CHOLESTEROL: 124 MG/DL
CO2: 33 MMOL/L (ref 21–32)
CREAT SERPL-MCNC: 1.9 MG/DL (ref 0.9–1.3)
DIFFERENTIAL TYPE: ABNORMAL
EOSINOPHILS ABSOLUTE: 0 K/CU MM
EOSINOPHILS RELATIVE PERCENT: 0.2 % (ref 0–3)
ESTIMATED AVERAGE GLUCOSE: 154 MG/DL
GFR AFRICAN AMERICAN: 42 ML/MIN/1.73M2
GFR NON-AFRICAN AMERICAN: 35 ML/MIN/1.73M2
GLUCOSE BLD-MCNC: 186 MG/DL (ref 70–99)
HBA1C MFR BLD: 7 % (ref 4.2–6.3)
HCT VFR BLD CALC: 39.3 % (ref 42–52)
HDLC SERPL-MCNC: 41 MG/DL
HEMOGLOBIN: 13.2 GM/DL (ref 13.5–18)
IMMATURE NEUTROPHIL %: 0.7 % (ref 0–0.43)
LDL CHOLESTEROL DIRECT: 56 MG/DL
LYMPHOCYTES ABSOLUTE: 1 K/CU MM
LYMPHOCYTES RELATIVE PERCENT: 8.2 % (ref 24–44)
MCH RBC QN AUTO: 30.8 PG (ref 27–31)
MCHC RBC AUTO-ENTMCNC: 33.6 % (ref 32–36)
MCV RBC AUTO: 91.8 FL (ref 78–100)
MONOCYTES ABSOLUTE: 1.1 K/CU MM
MONOCYTES RELATIVE PERCENT: 8.7 % (ref 0–4)
PDW BLD-RTO: 14.5 % (ref 11.7–14.9)
PLATELET # BLD: 172 K/CU MM (ref 140–440)
PMV BLD AUTO: 9.4 FL (ref 7.5–11.1)
POTASSIUM SERPL-SCNC: 4.5 MMOL/L (ref 3.5–5.1)
RBC # BLD: 4.28 M/CU MM (ref 4.6–6.2)
SEGMENTED NEUTROPHILS ABSOLUTE COUNT: 10 K/CU MM
SEGMENTED NEUTROPHILS RELATIVE PERCENT: 81.8 % (ref 36–66)
SODIUM BLD-SCNC: 142 MMOL/L (ref 135–145)
TOTAL IMMATURE NEUTOROPHIL: 0.08 K/CU MM
TRIGL SERPL-MCNC: 139 MG/DL
WBC # BLD: 12.2 K/CU MM (ref 4–10.5)

## 2018-02-27 ENCOUNTER — TELEPHONE (OUTPATIENT)
Dept: CARDIOLOGY CLINIC | Age: 73
End: 2018-02-27

## 2018-03-05 ENCOUNTER — TELEPHONE (OUTPATIENT)
Dept: CARDIOLOGY CLINIC | Age: 73
End: 2018-03-05

## 2018-03-05 NOTE — TELEPHONE ENCOUNTER
Called back and spoke w/patient's home health nurse Adine Denver); advised patient Dr. Estela Lamar approved patient may hold his Eliquis for 48 hours prior to extraction and should restart as soon as possible after this procedure ( as soon as dentist/oral surgeon approves). Kiya verbalized understanding.

## 2018-03-27 ENCOUNTER — TELEPHONE (OUTPATIENT)
Dept: CARDIOLOGY CLINIC | Age: 73
End: 2018-03-27

## 2018-05-07 RX ORDER — CARVEDILOL 12.5 MG/1
12.5 TABLET ORAL 2 TIMES DAILY WITH MEALS
Qty: 60 TABLET | Refills: 5 | Status: SHIPPED | OUTPATIENT
Start: 2018-05-07 | End: 2018-08-02 | Stop reason: ALTCHOICE

## 2018-05-29 ENCOUNTER — HOSPITAL ENCOUNTER (OUTPATIENT)
Dept: OTHER | Age: 73
Discharge: HOME | End: 2018-05-29
Attending: FAMILY MEDICINE | Admitting: FAMILY MEDICINE

## 2018-05-29 LAB
ALBUMIN SERPL-MCNC: 3.9 GM/DL (ref 3.4–5)
ALP BLD-CCNC: 109 IU/L (ref 40–129)
ALT SERPL-CCNC: 7 U/L (ref 10–40)
ANION GAP SERPL CALCULATED.3IONS-SCNC: 10 MMOL/L (ref 4–16)
AST SERPL-CCNC: 10 IU/L (ref 15–37)
BASOPHILS ABSOLUTE: 0.1 K/CU MM
BASOPHILS RELATIVE PERCENT: 1 % (ref 0–1)
BILIRUB SERPL-MCNC: 0.3 MG/DL (ref 0–1)
BUN BLDV-MCNC: 45 MG/DL (ref 6–23)
CALCIUM SERPL-MCNC: 9.1 MG/DL (ref 8.3–10.6)
CHLORIDE BLD-SCNC: 105 MMOL/L (ref 99–110)
CHOLESTEROL: 115 MG/DL
CO2: 29 MMOL/L (ref 21–32)
CREAT SERPL-MCNC: 2.1 MG/DL (ref 0.9–1.3)
DIFFERENTIAL TYPE: ABNORMAL
EOSINOPHILS ABSOLUTE: 0.2 K/CU MM
EOSINOPHILS RELATIVE PERCENT: 4.8 % (ref 0–3)
ESTIMATED AVERAGE GLUCOSE: 131 MG/DL
GFR AFRICAN AMERICAN: 38 ML/MIN/1.73M2
GFR NON-AFRICAN AMERICAN: 31 ML/MIN/1.73M2
GLUCOSE BLD-MCNC: 178 MG/DL (ref 70–99)
HBA1C MFR BLD: 6.2 % (ref 4.2–6.3)
HCT VFR BLD CALC: 37.9 % (ref 42–52)
HDLC SERPL-MCNC: 28 MG/DL
HEMOGLOBIN: 12.6 GM/DL (ref 13.5–18)
IMMATURE NEUTROPHIL %: 0.4 % (ref 0–0.43)
LDL CHOLESTEROL DIRECT: 59 MG/DL
LYMPHOCYTES ABSOLUTE: 1 K/CU MM
LYMPHOCYTES RELATIVE PERCENT: 19.5 % (ref 24–44)
MCH RBC QN AUTO: 31 PG (ref 27–31)
MCHC RBC AUTO-ENTMCNC: 33.2 % (ref 32–36)
MCV RBC AUTO: 93.3 FL (ref 78–100)
MONOCYTES ABSOLUTE: 0.5 K/CU MM
MONOCYTES RELATIVE PERCENT: 10.3 % (ref 0–4)
PDW BLD-RTO: 13.7 % (ref 11.7–14.9)
PLATELET # BLD: 130 K/CU MM (ref 140–440)
PMV BLD AUTO: 9.2 FL (ref 7.5–11.1)
POTASSIUM SERPL-SCNC: 4.3 MMOL/L (ref 3.5–5.1)
RBC # BLD: 4.06 M/CU MM (ref 4.6–6.2)
SEGMENTED NEUTROPHILS ABSOLUTE COUNT: 3.2 K/CU MM
SEGMENTED NEUTROPHILS RELATIVE PERCENT: 64 % (ref 36–66)
SODIUM BLD-SCNC: 144 MMOL/L (ref 135–145)
TOTAL CK: 47 IU/L (ref 38–174)
TOTAL IMMATURE NEUTOROPHIL: 0.02 K/CU MM
TOTAL PROTEIN: 6.1 GM/DL (ref 6.4–8.2)
TRIGL SERPL-MCNC: 173 MG/DL
WBC # BLD: 5 K/CU MM (ref 4–10.5)

## 2018-06-11 RX ORDER — AMLODIPINE BESYLATE 5 MG/1
TABLET ORAL
Qty: 60 TABLET | Refills: 6 | Status: SHIPPED | OUTPATIENT
Start: 2018-06-11 | End: 2018-08-23 | Stop reason: DRUGHIGH

## 2018-07-16 ENCOUNTER — NURSE ONLY (OUTPATIENT)
Dept: CARDIOLOGY CLINIC | Age: 73
End: 2018-07-16

## 2018-07-16 ENCOUNTER — OFFICE VISIT (OUTPATIENT)
Dept: CARDIOLOGY CLINIC | Age: 73
End: 2018-07-16

## 2018-07-16 VITALS
BODY MASS INDEX: 29.4 KG/M2 | WEIGHT: 194 LBS | DIASTOLIC BLOOD PRESSURE: 70 MMHG | HEIGHT: 68 IN | RESPIRATION RATE: 16 BRPM | SYSTOLIC BLOOD PRESSURE: 136 MMHG | HEART RATE: 76 BPM

## 2018-07-16 DIAGNOSIS — Z95.1 S/P CABG X 3: Primary | ICD-10-CM

## 2018-07-16 DIAGNOSIS — R00.2 PALPITATIONS: ICD-10-CM

## 2018-07-16 DIAGNOSIS — I10 ESSENTIAL HYPERTENSION: ICD-10-CM

## 2018-07-16 DIAGNOSIS — E11.8 TYPE 2 DIABETES MELLITUS WITH COMPLICATION, WITHOUT LONG-TERM CURRENT USE OF INSULIN (HCC): ICD-10-CM

## 2018-07-16 DIAGNOSIS — I48.0 PAF (PAROXYSMAL ATRIAL FIBRILLATION) (HCC): ICD-10-CM

## 2018-07-16 DIAGNOSIS — I48.0 PAF (PAROXYSMAL ATRIAL FIBRILLATION) (HCC): Primary | ICD-10-CM

## 2018-07-16 PROCEDURE — 1123F ACP DISCUSS/DSCN MKR DOCD: CPT | Performed by: INTERNAL MEDICINE

## 2018-07-16 PROCEDURE — 2022F DILAT RTA XM EVC RTNOPTHY: CPT | Performed by: INTERNAL MEDICINE

## 2018-07-16 PROCEDURE — 93225 XTRNL ECG REC<48 HRS REC: CPT | Performed by: INTERNAL MEDICINE

## 2018-07-16 PROCEDURE — 1036F TOBACCO NON-USER: CPT | Performed by: INTERNAL MEDICINE

## 2018-07-16 PROCEDURE — G8417 CALC BMI ABV UP PARAM F/U: HCPCS | Performed by: INTERNAL MEDICINE

## 2018-07-16 PROCEDURE — 99214 OFFICE O/P EST MOD 30 MIN: CPT | Performed by: INTERNAL MEDICINE

## 2018-07-16 PROCEDURE — 3017F COLORECTAL CA SCREEN DOC REV: CPT | Performed by: INTERNAL MEDICINE

## 2018-07-16 PROCEDURE — 93000 ELECTROCARDIOGRAM COMPLETE: CPT | Performed by: INTERNAL MEDICINE

## 2018-07-16 PROCEDURE — 1101F PT FALLS ASSESS-DOCD LE1/YR: CPT | Performed by: INTERNAL MEDICINE

## 2018-07-16 PROCEDURE — 4040F PNEUMOC VAC/ADMIN/RCVD: CPT | Performed by: INTERNAL MEDICINE

## 2018-07-16 PROCEDURE — 3044F HG A1C LEVEL LT 7.0%: CPT | Performed by: INTERNAL MEDICINE

## 2018-07-16 PROCEDURE — G8427 DOCREV CUR MEDS BY ELIG CLIN: HCPCS | Performed by: INTERNAL MEDICINE

## 2018-07-16 PROCEDURE — G8598 ASA/ANTIPLAT THER USED: HCPCS | Performed by: INTERNAL MEDICINE

## 2018-07-16 RX ORDER — TRAZODONE HYDROCHLORIDE 100 MG/1
100 TABLET ORAL NIGHTLY
Status: ON HOLD | COMMUNITY
End: 2022-01-01 | Stop reason: HOSPADM

## 2018-07-16 RX ORDER — LOSARTAN POTASSIUM 100 MG/1
50 TABLET ORAL DAILY
Qty: 30 TABLET | Refills: 3 | Status: SHIPPED | OUTPATIENT
Start: 2018-07-16 | End: 2018-12-05 | Stop reason: DRUGHIGH

## 2018-07-16 RX ORDER — ONDANSETRON 4 MG/1
4 TABLET, ORALLY DISINTEGRATING ORAL EVERY 8 HOURS PRN
COMMUNITY
End: 2021-01-14 | Stop reason: ALTCHOICE

## 2018-07-16 RX ORDER — LOSARTAN POTASSIUM 100 MG/1
100 TABLET ORAL DAILY
COMMUNITY
End: 2018-07-16 | Stop reason: SDUPTHER

## 2018-07-16 NOTE — ASSESSMENT & PLAN NOTE
Patient reports palpitations and irregular heartbeat and missed beats. Obtain a 48-hour Holter monitor to evaluate it further.

## 2018-07-16 NOTE — PATIENT INSTRUCTIONS
Decrease Losartan to 50 mg daily and obtain 48 hour Holter to evaluate dizzy spells. Continue other current cardiovascular medications which have been reviewed and discussed individually with you. Primary/secondary prevention is the goal by aggressive risk modification, healthy and therapeutic life style changes for cardiovascular risk reduction. Various goals are discussed and questions answered. Appropriate prescriptions if needed on this visit are addressed. After visit summery is provided. Questions answered and patient verbalizes understanding. Follow up in office in 2-3 weeks, sooner if needed.

## 2018-07-16 NOTE — PROGRESS NOTES
(WITH MEALS) 5/7/18  Yes Elaine Lea MD   chlorthalidone (HYGROTON) 25 MG tablet TAKE 1 TABLET BY MOUTH DAILY 2/6/18  Yes Kemal Zendejas MD   Menthol-Camphor ([address]) 16-11 % CREA Apply topically   Yes Historical Provider, MD   Carboxymeth-Glycerin-Polysorb (REFRESH OPTIVE ADVANCED) 0.5-1-0.5 % SOLN Apply to eye   Yes Historical Provider, MD   ELIQUIS 2.5 MG TABS tablet TAKE ONE TABLET TWO TIMES A DAY 11/27/17  Yes Elaine Lea MD   Sennosides (SENOKOT PO) Take by mouth daily Over The Counter   Yes Historical Provider, MD   Acetaminophen (TYLENOL ARTHRITIS PAIN PO) Take 650 mg by mouth as needed   Yes Historical Provider, MD   ALBUTEROL IN Inhale into the lungs as needed   Yes Historical Provider, MD   Multiple Vitamins-Minerals (ICAPS PO) Take by mouth every morning Over The Counter   Yes Historical Provider, MD   SITagliptin (JANUVIA) 50 MG tablet Take 50 mg by mouth every morning    Yes Historical Provider, MD   NITROSTAT 0.4 MG SL tablet Place 0.4 mg under the tongue every 5 minutes as needed  9/28/16  Yes Historical Provider, MD   aspirin EC 81 MG EC tablet Take 1 tablet by mouth daily 10/11/16  Yes Claudeen Levan, MD   insulin lispro (HUMALOG) 100 UNIT/ML injection vial Inject 0-12 Units into the skin 3 times daily (with meals)  Patient taking differently: Inject into the skin Per Sliding Scale 10/11/16  Yes Amirah Phillip MD     /70 (Site: Right Arm, Position: Standing, Cuff Size: Medium Adult)   Pulse 76   Resp 16   Ht 5' 8\" (1.727 m)   Wt 194 lb (88 kg)   BMI 29.50 kg/m²    Body mass index is 29.5 kg/m².   Wt Readings from Last 3 Encounters:   07/16/18 194 lb (88 kg)   06/13/18 196 lb (88.9 kg)   01/10/18 198 lb 6.4 oz (90 kg)     Head and Neck: Normal. Wears glasses  Carotids: No bruits  Jugular venous pressure: Not elevated  Heart[de-identified] Normal without murmurs or gallop  Peripheral Pulses: 1+ equal   Extremities: No edema  Chest: Non-union of sternum noted

## 2018-07-23 RX ORDER — APIXABAN 2.5 MG/1
TABLET, FILM COATED ORAL
Qty: 60 TABLET | Refills: 6 | Status: SHIPPED | OUTPATIENT
Start: 2018-07-23 | End: 2019-02-18 | Stop reason: SDUPTHER

## 2018-07-23 NOTE — TELEPHONE ENCOUNTER
[facility] to [facility]:  Eliquis 2.5 mg BID #[id number] Rx6 pended and routed to Dr. Olivia Bales for approval.

## 2018-07-30 PROCEDURE — 93227 XTRNL ECG REC<48 HR R&I: CPT | Performed by: INTERNAL MEDICINE

## 2018-08-02 ENCOUNTER — OFFICE VISIT (OUTPATIENT)
Dept: CARDIOLOGY CLINIC | Age: 73
End: 2018-08-02

## 2018-08-02 VITALS
BODY MASS INDEX: 29.25 KG/M2 | DIASTOLIC BLOOD PRESSURE: 62 MMHG | WEIGHT: 193 LBS | HEART RATE: 72 BPM | HEIGHT: 68 IN | SYSTOLIC BLOOD PRESSURE: 132 MMHG | RESPIRATION RATE: 16 BRPM

## 2018-08-02 DIAGNOSIS — I25.10 CORONARY ARTERY DISEASE INVOLVING NATIVE CORONARY ARTERY OF NATIVE HEART WITHOUT ANGINA PECTORIS: Primary | ICD-10-CM

## 2018-08-02 DIAGNOSIS — I10 ESSENTIAL HYPERTENSION: ICD-10-CM

## 2018-08-02 DIAGNOSIS — R06.02 SOB (SHORTNESS OF BREATH) ON EXERTION: ICD-10-CM

## 2018-08-02 DIAGNOSIS — I42.9 CARDIOMYOPATHY, UNSPECIFIED TYPE (HCC): ICD-10-CM

## 2018-08-02 DIAGNOSIS — Z95.1 S/P CABG X 3: ICD-10-CM

## 2018-08-02 DIAGNOSIS — E78.2 MIXED HYPERLIPIDEMIA: ICD-10-CM

## 2018-08-02 DIAGNOSIS — Z78.9 STATIN INTOLERANCE: ICD-10-CM

## 2018-08-02 DIAGNOSIS — I48.0 PAF (PAROXYSMAL ATRIAL FIBRILLATION) (HCC): ICD-10-CM

## 2018-08-02 DIAGNOSIS — S22.23XK CLOSED STERNAL MANUBRIAL DISSOCIATION FRACTURE WITH NONUNION: ICD-10-CM

## 2018-08-02 DIAGNOSIS — R00.2 PALPITATIONS: ICD-10-CM

## 2018-08-02 PROCEDURE — G8427 DOCREV CUR MEDS BY ELIG CLIN: HCPCS | Performed by: INTERNAL MEDICINE

## 2018-08-02 PROCEDURE — 4040F PNEUMOC VAC/ADMIN/RCVD: CPT | Performed by: INTERNAL MEDICINE

## 2018-08-02 PROCEDURE — 1101F PT FALLS ASSESS-DOCD LE1/YR: CPT | Performed by: INTERNAL MEDICINE

## 2018-08-02 PROCEDURE — G8598 ASA/ANTIPLAT THER USED: HCPCS | Performed by: INTERNAL MEDICINE

## 2018-08-02 PROCEDURE — 1123F ACP DISCUSS/DSCN MKR DOCD: CPT | Performed by: INTERNAL MEDICINE

## 2018-08-02 PROCEDURE — 1036F TOBACCO NON-USER: CPT | Performed by: INTERNAL MEDICINE

## 2018-08-02 PROCEDURE — G8417 CALC BMI ABV UP PARAM F/U: HCPCS | Performed by: INTERNAL MEDICINE

## 2018-08-02 PROCEDURE — 3017F COLORECTAL CA SCREEN DOC REV: CPT | Performed by: INTERNAL MEDICINE

## 2018-08-02 PROCEDURE — 99214 OFFICE O/P EST MOD 30 MIN: CPT | Performed by: INTERNAL MEDICINE

## 2018-08-02 RX ORDER — METOPROLOL SUCCINATE 25 MG/1
50 TABLET, EXTENDED RELEASE ORAL DAILY
Qty: 30 TABLET | Refills: 3 | Status: SHIPPED | OUTPATIENT
Start: 2018-08-02 | End: 2018-08-02 | Stop reason: SDUPTHER

## 2018-08-02 RX ORDER — METOPROLOL SUCCINATE 25 MG/1
50 TABLET, EXTENDED RELEASE ORAL DAILY
Qty: 60 TABLET | Refills: 3 | Status: SHIPPED | OUTPATIENT
Start: 2018-08-02 | End: 2018-12-14 | Stop reason: SDUPTHER

## 2018-08-02 RX ORDER — NITROGLYCERIN 0.4 MG/1
0.4 TABLET SUBLINGUAL EVERY 5 MIN PRN
Qty: 25 TABLET | Refills: 2 | Status: CANCELLED | OUTPATIENT
Start: 2018-08-02

## 2018-08-02 RX ORDER — FUROSEMIDE 20 MG/1
20 TABLET ORAL 2 TIMES DAILY
Qty: 60 TABLET | Refills: 3 | Status: SHIPPED | OUTPATIENT
Start: 2018-08-02 | End: 2018-08-21

## 2018-08-02 NOTE — PROGRESS NOTES
daily Over The Counter      Acetaminophen (TYLENOL ARTHRITIS PAIN PO) Take 650 mg by mouth as needed      ALBUTEROL IN Inhale into the lungs as needed      Multiple Vitamins-Minerals (ICAPS PO) Take by mouth every morning Over The Counter      SITagliptin (JANUVIA) 50 MG tablet Take 50 mg by mouth every morning       NITROSTAT 0.4 MG SL tablet Place 0.4 mg under the tongue every 5 minutes as needed       aspirin EC 81 MG EC tablet Take 1 tablet by mouth daily 30 tablet 3    insulin lispro (HUMALOG) 100 UNIT/ML injection vial Inject 0-12 Units into the skin 3 times daily (with meals) (Patient taking differently: Inject into the skin Per Sliding Scale) 1 vial 3     No current facility-administered medications for this visit. Allergies:   Aldactone [spironolactone]; Crestor [rosuvastatin calcium]; Lipitor; and Zocor [simvastatin - high dose]    Patient History:  Past Medical History:   Diagnosis Date    Abnormal nuclear stress test 9/15/2016    Acid reflux     Arrhythmia     Arthritis     \"Back, Hands, Fingers\"    CAD (coronary artery disease)     09/2000 non-critical; 2004 no signigicante change, Sees Dr. Rivka Bain CHF (congestive heart failure) ([facility]     Chronic back pain     CKD (chronic kidney disease) stage 3, GFR 30-59 ml/min 12/03/2015    Sees Dr. Misbah Pierce COPD (chronic obstructive pulmonary disease) ([facility] [address] 11/16/15    Diabetes mellitus ([facility] [address] Dx 1's    Glaucoma     \"Both Eyes\"    H/O 24 hour EKG monitoring 7/161/8,01/26/2017    Conclusion abnormal 48 hours of cardiac monitor finding consistent with sinus rhythm with physiological heart rate variation frequent complex ventricular ectopy. Patient did not report any symptoms for correlation    H/O cardiac catheterization 12/27/2009    Patent coronary arteries with ectasia and minimal coronary luminal irregularities and preserved left ventricular function.     H/O complete electrocardiogram 04/05/2012    H/O Doppler ultrasound Exam:  /62   Pulse 72   Resp 16   Ht 5' 8\" (1.727 m)   Wt 193 lb (87.5 kg)   BMI 29.35 kg/m²   Wt Readings from Last 3 Encounters:   08/02/18 193 lb (87.5 kg)   07/16/18 194 lb (88 kg)   06/13/18 196 lb (88.9 kg)     Body mass index is 29.35 kg/m². Vitals:    08/02/18 0922   BP: 132/62   Pulse: 72   Resp: 16     Repeat BP was 138/66    General Appearance:  No distress, conversant  Constitutional:  Well developed, Well nourished, No acute distress, Non-toxic appearance. HENT:  Normocephalic, Atraumatic, Bilateral external ears normal, Oropharynx moist, No oral exudates, Nose normal. Neck- Normal range of motion, No tenderness, Supple, No stridor,no apical-carotid delay  Eyes:  PERRL, EOMI, Conjunctiva normal, No discharge. Respiratory:  Normal breath sounds, No respiratory distress, No wheezing, No chest tenderness. ,no use of accessory muscles,   Cardiovascular: (PMI) apex non displaced,no lifts no thrills,S1 and S2 audible, No added heart sounds, No signs of ankle edema, or volume overload, No evidence of JVD, , right side chest pain is reproducible gets worse on pushing forward  GI:  Bowel sounds normal, Soft, No tenderness, No masses, No gross visceromegaly   :  No costovertebral angle tenderness   Musculoskeletal:  No edema, no tenderness, no deformities.  Back- no tenderness  Integument:  Well hydrated, no rash   Lymphatic:  No lymphadenopathy noted   Neurologic:  Alert & oriented x 3, CN 2-12 normal, normal motor function, normal sensory function, no focal deficits noted   Psychiatric:  Speech and behavior appropriate       Medical decision making and Data review:  DATA:  Lab Results   Component Value Date    TROPONINT <0.010 09/24/2016     BNP:    Lab Results   Component Value Date    PROBNP 5,743 (H) 02/11/2016     PT/INR:  No results found for: [employer]  Lab Results   Component Value Date    LABA1C 6.2 05/29/2018    LABA1C 7.0 (H) 01/29/2018     Lab Results   Component Value Date    CHOL 115 05/29/2018    TRIG 173 (H) 05/29/2018    HDL 28 (L) 05/29/2018    LDLCALC 97 12/27/2016    LDLDIRECT 59 05/29/2018     Lab Results   Component Value Date    ALT 7 (L) 05/29/2018    AST 10 (L) 05/29/2018     Echo Sept 2016:  Left ventricle function is mildly reduced with EF 45%, mild tricuspid regurgitation. Left ventricular hypertrophy    Holter 2017:  sinus rhythm, some supraventricular ectopy and PVCs recorded, no A. Fib    Arterial doppler Jan 2017          Native arteries in the examined lower extremity(ies) are patent, with no    elevated velocities. No aneurysms are noted.    Bilateral ABIs are normal.         Holter 7/16/18  2 days of cardiac monitoring consistent with sinus rhythm at average heart rate of 72 bpm.  Fastest rate of 124 occurred at 7:59 AM on July 18 the lowest rate of 53 occluded 9:26 AM on July 17.  Rare PVCs and frequent 12,242 PVCs are noted with ventricular bigeminy and trigeminy.  3 beat run of nonsustained ventricular tachycardia noted on July 18, 2018 at 8 AM.  Conclusion abnormal 48 hours of cardiac monitor finding consistent with sinus rhythm with physiological heart rate variation and frequent complex ventricular ectopy.  Patient did not report any symptoms for correlation. All labs, medications and tests reviewed by myself including data and history from outside source , patient and available family . Assessment & Plan:    1. Coronary artery disease involving native coronary artery of native heart without angina pectoris    2. 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 S/P CABG x 3    4. Essential hypertension    5. Statin intolerance    6. PAF (paroxysmal atrial fibrillation) ([facility])    7. Palpitations    8. Mixed hyperlipidemia    9. SOB (shortness of breath) on exertion    10.  Cardiomyopathy, unspecified type ([address]     Cardiomyopathy ([address]  He is short of breath , change to toprol xl and also add lasix 20 mg daily , check bnp ,     CAD (coronary artery of ASCVD our  goal is aggressive risk modification. Patient is encouraged to get an exercise bike and do some cardiovascular exercise Various goals were discussed and questions answered. Continue current medications. Appropriate prescriptions are addressed and refills ordered. Questions answered and patient verbalizes understanding. Call for any problems, questions, or concerns. Continue all other medications of all above medical condition listed as is. No Follow-up on file.

## 2018-08-07 ENCOUNTER — HOSPITAL ENCOUNTER (OUTPATIENT)
Dept: OTHER | Age: 73
Discharge: HOME | End: 2018-08-07
Attending: INTERNAL MEDICINE | Admitting: INTERNAL MEDICINE

## 2018-08-07 ENCOUNTER — TELEPHONE (OUTPATIENT)
Dept: CARDIOLOGY CLINIC | Age: 73
End: 2018-08-07

## 2018-08-07 LAB
ANION GAP SERPL CALCULATED.3IONS-SCNC: 18 MMOL/L (ref 4–16)
BUN BLDV-MCNC: 57 MG/DL (ref 6–23)
CALCIUM SERPL-MCNC: 9.4 MG/DL (ref 8.3–10.6)
CHLORIDE BLD-SCNC: 99 MMOL/L (ref 99–110)
CO2: 26 MMOL/L (ref 21–32)
CREAT SERPL-MCNC: 2.4 MG/DL (ref 0.9–1.3)
GFR AFRICAN AMERICAN: 32 ML/MIN/1.73M2
GFR NON-AFRICAN AMERICAN: 27 ML/MIN/1.73M2
GLUCOSE BLD-MCNC: 216 MG/DL (ref 70–99)
POTASSIUM SERPL-SCNC: 4.3 MMOL/L (ref 3.5–5.1)
PRO-BNP: 776 PG/ML
SODIUM BLD-SCNC: 143 MMOL/L (ref 135–145)

## 2018-08-09 ENCOUNTER — PROCEDURE VISIT (OUTPATIENT)
Dept: CARDIOLOGY CLINIC | Age: 73
End: 2018-08-09

## 2018-08-09 DIAGNOSIS — Z78.9 STATIN INTOLERANCE: ICD-10-CM

## 2018-08-09 DIAGNOSIS — I10 ESSENTIAL HYPERTENSION: ICD-10-CM

## 2018-08-09 DIAGNOSIS — S22.23XK CLOSED STERNAL MANUBRIAL DISSOCIATION FRACTURE WITH NONUNION: ICD-10-CM

## 2018-08-09 DIAGNOSIS — Z95.1 S/P CABG X 3: ICD-10-CM

## 2018-08-09 DIAGNOSIS — R00.2 PALPITATIONS: ICD-10-CM

## 2018-08-09 DIAGNOSIS — I25.10 CORONARY ARTERY DISEASE INVOLVING NATIVE CORONARY ARTERY OF NATIVE HEART WITHOUT ANGINA PECTORIS: ICD-10-CM

## 2018-08-09 DIAGNOSIS — R06.02 SOB (SHORTNESS OF BREATH) ON EXERTION: ICD-10-CM

## 2018-08-09 DIAGNOSIS — I42.9 CARDIOMYOPATHY, UNSPECIFIED TYPE (HCC): ICD-10-CM

## 2018-08-09 DIAGNOSIS — I48.0 PAF (PAROXYSMAL ATRIAL FIBRILLATION) (HCC): ICD-10-CM

## 2018-08-09 DIAGNOSIS — E78.2 MIXED HYPERLIPIDEMIA: ICD-10-CM

## 2018-08-09 DIAGNOSIS — R07.9 CHEST PAIN, UNSPECIFIED TYPE: Primary | ICD-10-CM

## 2018-08-09 LAB
LV EF: 58 %
LVEF MODALITY: NORMAL

## 2018-08-09 PROCEDURE — 93015 CV STRESS TEST SUPVJ I&R: CPT | Performed by: INTERNAL MEDICINE

## 2018-08-09 PROCEDURE — 78452 HT MUSCLE IMAGE SPECT MULT: CPT | Performed by: INTERNAL MEDICINE

## 2018-08-09 PROCEDURE — A9500 TC99M SESTAMIBI: HCPCS | Performed by: INTERNAL MEDICINE

## 2018-08-10 ENCOUNTER — TELEPHONE (OUTPATIENT)
Dept: CARDIOLOGY CLINIC | Age: 73
End: 2018-08-10

## 2018-08-22 ENCOUNTER — TELEPHONE (OUTPATIENT)
Dept: CARDIOLOGY CLINIC | Age: 73
End: 2018-08-22

## 2018-08-22 NOTE — TELEPHONE ENCOUNTER
Pts home health nurse called again saying pts bp is 94/46 and then was 100/50. Pt did take all of his medications today including his amlodipine this morning, because at that point it was 112/58. Pt is feeling weak and light headed.  Please call Teofilo at [phone] and advise what to do
SITagliptin (JANUVIA) 50 MG tablet Take 50 mg by mouth every morning    NITROSTAT 0.4 MG SL tablet Place 0.4 mg under the tongue every 5 minutes as needed    aspirin EC 81 MG EC tablet Take 1 tablet by mouth daily   insulin lispro (HUMALOG) 100 UNIT/ML injection vial Inject 0-12 Units into the skin 3 times daily (with meals)

## 2018-08-23 RX ORDER — AMLODIPINE BESYLATE 2.5 MG/1
2.5 TABLET ORAL DAILY
COMMUNITY
End: 2019-08-05 | Stop reason: SDUPTHER

## 2018-08-27 ENCOUNTER — TELEPHONE (OUTPATIENT)
Dept: CARDIOLOGY CLINIC | Age: 73
End: 2018-08-27

## 2018-09-24 ENCOUNTER — HOSPITAL ENCOUNTER (OUTPATIENT)
Age: 73
Setting detail: SPECIMEN
Discharge: HOME | End: 2018-09-24
Payer: MEDICARE

## 2018-09-24 LAB
ALBUMIN SERPL-MCNC: 4 GM/DL (ref 3.4–5)
ALP BLD-CCNC: 102 IU/L (ref 40–129)
ALT SERPL-CCNC: 9 U/L (ref 10–40)
ANION GAP SERPL CALCULATED.3IONS-SCNC: 13 MMOL/L (ref 4–16)
AST SERPL-CCNC: 12 IU/L (ref 15–37)
BILIRUB SERPL-MCNC: 0.5 MG/DL (ref 0–1)
BUN BLDV-MCNC: 48 MG/DL (ref 6–23)
CALCIUM SERPL-MCNC: 9.5 MG/DL (ref 8.3–10.6)
CHLORIDE BLD-SCNC: 100 MMOL/L (ref 99–110)
CHOLESTEROL, FASTING: 145 MG/DL
CO2: 33 MMOL/L (ref 21–32)
CREAT SERPL-MCNC: 2.6 MG/DL (ref 0.9–1.3)
ESTIMATED AVERAGE GLUCOSE: 143 MG/DL
GFR AFRICAN AMERICAN: 29 ML/MIN/1.73M2
GFR NON-AFRICAN AMERICAN: 24 ML/MIN/1.73M2
GLUCOSE BLD-MCNC: 97 MG/DL (ref 70–99)
HBA1C MFR BLD: 6.6 % (ref 4.2–6.3)
HDLC SERPL-MCNC: 31 MG/DL
LDL CHOLESTEROL DIRECT: 77 MG/DL
POTASSIUM SERPL-SCNC: 4.8 MMOL/L (ref 3.5–5.1)
PROSTATE SPECIFIC ANTIGEN: 1 NG/ML (ref 0–4)
SODIUM BLD-SCNC: 146 MMOL/L (ref 135–145)
TOTAL PROTEIN: 6.5 GM/DL (ref 6.4–8.2)
TRIGLYCERIDE, FASTING: 235 MG/DL

## 2018-09-24 PROCEDURE — 80061 LIPID PANEL: CPT

## 2018-09-24 PROCEDURE — G0103 PSA SCREENING: HCPCS

## 2018-09-24 PROCEDURE — 36415 COLL VENOUS BLD VENIPUNCTURE: CPT

## 2018-09-24 PROCEDURE — 83036 HEMOGLOBIN GLYCOSYLATED A1C: CPT

## 2018-09-24 PROCEDURE — 80053 COMPREHEN METABOLIC PANEL: CPT

## 2018-10-01 RX ORDER — METOPROLOL SUCCINATE 25 MG/1
50 TABLET, EXTENDED RELEASE ORAL DAILY
Qty: 60 TABLET | Refills: 6 | Status: SHIPPED | OUTPATIENT
Start: 2018-10-01 | End: 2019-04-15 | Stop reason: SDUPTHER

## 2018-11-26 ENCOUNTER — HOSPITAL ENCOUNTER (OUTPATIENT)
Age: 73
Setting detail: SPECIMEN
Discharge: HOME | End: 2018-11-26
Payer: MEDICARE

## 2018-11-26 LAB
ANION GAP SERPL CALCULATED.3IONS-SCNC: 16 MMOL/L (ref 4–16)
BACTERIA: ABNORMAL /HPF
BILIRUBIN URINE: NEGATIVE MG/DL
BLOOD, URINE: NEGATIVE
BUN BLDV-MCNC: 61 MG/DL (ref 6–23)
CALCIUM SERPL-MCNC: 10.2 MG/DL (ref 8.3–10.6)
CAST TYPE: ABNORMAL /HPF
CHLORIDE BLD-SCNC: 96 MMOL/L (ref 99–110)
CLARITY: CLEAR
CO2: 31 MMOL/L (ref 21–32)
COLOR: YELLOW
CREAT SERPL-MCNC: 2.6 MG/DL (ref 0.9–1.3)
CREATININE URINE: 55.6 MG/DL (ref 39–259)
CRYSTAL TYPE: ABNORMAL /HPF
EPITHELIAL CELLS, UA: ABNORMAL /HPF
GFR AFRICAN AMERICAN: 29 ML/MIN/1.73M2
GFR NON-AFRICAN AMERICAN: 24 ML/MIN/1.73M2
GLUCOSE BLD-MCNC: 216 MG/DL (ref 70–99)
GLUCOSE, URINE: NEGATIVE MG/DL
KETONES, URINE: NEGATIVE MG/DL
LEUKOCYTE ESTERASE, URINE: NEGATIVE
MUCUS: NEGATIVE HPF
NITRITE URINE, QUANTITATIVE: NEGATIVE
PH, URINE: 5.5 (ref 5–8)
PHOSPHORUS: 4.7 MG/DL (ref 2.5–4.9)
POTASSIUM SERPL-SCNC: 4.7 MMOL/L (ref 3.5–5.1)
PROT/CREAT RATIO, UR: 0.2
PROTEIN UA: NEGATIVE MG/DL
RBC URINE: ABNORMAL /HPF (ref 0–3)
SODIUM BLD-SCNC: 143 MMOL/L (ref 135–145)
SPECIFIC GRAVITY UA: 1.01 (ref 1–1.03)
URINE TOTAL PROTEIN: 10.8 MG/DL
UROBILINOGEN, URINE: 0.2 MG/DL (ref 0.2–1)
VOLUME, (UVOL): 12 ML (ref 10–12)
WBC UA: ABNORMAL /HPF (ref 0–2)

## 2018-11-26 PROCEDURE — 82570 ASSAY OF URINE CREATININE: CPT

## 2018-11-26 PROCEDURE — 87186 SC STD MICRODIL/AGAR DIL: CPT

## 2018-11-26 PROCEDURE — 80048 BASIC METABOLIC PNL TOTAL CA: CPT

## 2018-11-26 PROCEDURE — 87086 URINE CULTURE/COLONY COUNT: CPT

## 2018-11-26 PROCEDURE — 81001 URINALYSIS AUTO W/SCOPE: CPT

## 2018-11-26 PROCEDURE — 84100 ASSAY OF PHOSPHORUS: CPT

## 2018-11-26 PROCEDURE — 36415 COLL VENOUS BLD VENIPUNCTURE: CPT

## 2018-11-26 PROCEDURE — 87088 URINE BACTERIA CULTURE: CPT

## 2018-11-26 PROCEDURE — 84156 ASSAY OF PROTEIN URINE: CPT

## 2018-11-29 LAB
CULTURE: NORMAL
Lab: NORMAL
ORGANISM: NORMAL
REPORT STATUS: NORMAL
SPECIMEN: NORMAL
TOTAL COLONY COUNT: NORMAL

## 2018-12-14 ENCOUNTER — APPOINTMENT (OUTPATIENT)
Dept: GENERAL RADIOLOGY | Age: 73
End: 2018-12-14
Payer: MEDICARE

## 2018-12-14 ENCOUNTER — HOSPITAL ENCOUNTER (EMERGENCY)
Age: 73
Discharge: HOME | End: 2018-12-15
Attending: EMERGENCY MEDICINE
Payer: MEDICARE

## 2018-12-14 VITALS
DIASTOLIC BLOOD PRESSURE: 76 MMHG | HEIGHT: 68 IN | RESPIRATION RATE: 18 BRPM | SYSTOLIC BLOOD PRESSURE: 170 MMHG | HEART RATE: 87 BPM | WEIGHT: 185 LBS | TEMPERATURE: 97.3 F | BODY MASS INDEX: 28.04 KG/M2 | OXYGEN SATURATION: 98 %

## 2018-12-14 DIAGNOSIS — S82.892A CLOSED FRACTURE OF LEFT ANKLE, INITIAL ENCOUNTER: Primary | ICD-10-CM
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
## 2018-12-14 PROCEDURE — 73610 X-RAY EXAM OF ANKLE: CPT

## 2018-12-14 PROCEDURE — 96374 THER/PROPH/DIAG INJ IV PUSH: CPT

## 2018-12-14 PROCEDURE — 6360000002 HC RX W HCPCS: Performed by: EMERGENCY MEDICINE

## 2018-12-14 PROCEDURE — 99284 EMERGENCY DEPT VISIT MOD MDM: CPT

## 2018-12-14 PROCEDURE — 2580000003 HC RX 258: Performed by: EMERGENCY MEDICINE

## 2018-12-14 PROCEDURE — 73560 X-RAY EXAM OF KNEE 1 OR 2: CPT

## 2018-12-14 PROCEDURE — 4500000028 HC INTERMEDIATE PROCEDURE

## 2018-12-14 RX ORDER — HYDROCODONE BITARTRATE AND ACETAMINOPHEN 5; 325 MG/1; MG/1
1-2 TABLET ORAL EVERY 6 HOURS PRN
Qty: 10 TABLET | Refills: 0 | Status: SHIPPED | OUTPATIENT
Start: 2018-12-14 | End: 2018-12-17

## 2018-12-14 RX ORDER — MORPHINE SULFATE 4 MG/ML
4 INJECTION, SOLUTION INTRAMUSCULAR; INTRAVENOUS ONCE
Status: COMPLETED | OUTPATIENT
Start: 2018-12-14 | End: 2018-12-14

## 2018-12-14 RX ORDER — SODIUM CHLORIDE 0.9 % (FLUSH) 0.9 %
10 SYRINGE (ML) INJECTION 2 TIMES DAILY
Status: DISCONTINUED | OUTPATIENT
Start: 2018-12-14 | End: 2018-12-15 | Stop reason: HOSPADM

## 2018-12-14 RX ORDER — IPRATROPIUM BROMIDE AND ALBUTEROL SULFATE 2.5; .5 MG/3ML; MG/3ML
1 SOLUTION RESPIRATORY (INHALATION) ONCE
Status: DISCONTINUED | OUTPATIENT
Start: 2018-12-14 | End: 2018-12-14

## 2018-12-14 RX ADMIN — SODIUM CHLORIDE, PRESERVATIVE FREE 10 ML: 5 INJECTION INTRAVENOUS at 22:07

## 2018-12-14 RX ADMIN — MORPHINE SULFATE 4 MG: 4 INJECTION, SOLUTION INTRAMUSCULAR; INTRAVENOUS at 22:03

## 2018-12-14 ASSESSMENT — PAIN SCALES - GENERAL
PAINLEVEL_OUTOF10: 10
PAINLEVEL_OUTOF10: 7
PAINLEVEL_OUTOF10: 3

## 2018-12-14 ASSESSMENT — PAIN DESCRIPTION - ORIENTATION: ORIENTATION: LEFT

## 2018-12-14 ASSESSMENT — PAIN DESCRIPTION - PAIN TYPE: TYPE: ACUTE PAIN

## 2018-12-14 ASSESSMENT — PAIN DESCRIPTION - LOCATION: LOCATION: ANKLE

## 2018-12-15 NOTE — ED PROVIDER NOTES
myocardial perfusion, EF 58%. Normal study.  H/O complete electrocardiogram 04/05/2012    H/O Doppler ultrasound 09/13/2007    Bormal study suggestive of lack of evidence of any significant peripheral arterial disease. Patient'a symptoms are very suggestive of non-ischemic and non-vascular etiology. When compared to the previous study of 2005, ther is no significant change.  H/O Doppler ultrasound (Lower extremity) 01/30/2017    Native arteries in the examined lower extremity(ies) are patent, with no elevated velocities. No aneurysms are noted.  H/O echocardiogram 09/21/2010    Concentric left ventricular hypertrophy with preserved systolic function. Mild atrial dilatation. Mild tricuspid regurgitation.  H/O percutaneous left heart catheterization 09/23/2016    Multivessel CAD with 100 % occluded RCA. 80 % to 90%  circumflex diffusely diseased. 80% mid LAD focal lesion.  Heat syncope 9/15/2016    Venetie IRA (hard of hearing)     Bilateral Ears    Hx of echocardiogram 10/27/2016    EF 45%. Moderate left pleural effusion,mild tricuspid regurg,EF45%    Hyperlipidemia     Hypertension     Macular degeneration     Bilateral Eyes    Other activity(E029.9) 04/05/2012    48 Holter Monitor. Normal sinus rhythm with frequent low-grade supraventricular ectopy, without clinically significant arrhythmias.     PAF [facility]oxysmal atrial fibrillation) ([facility])     PVD (peripheral vascular disease) ([address]     S/P CABG x 3 10/25/2016    10/3/16 LIMA-LAD, SVG-PDA, SVG-Cx + Maze+Appendage resection Dr Xavier Carrasco 2000's    Nose    SOB (shortness of breath) on exertion 8/12/2014    Teeth missing     Upper And Lower    Ventricular ectopy     supraventricular and ventricular ectopy    Wears dentures     Full Upper    Wears glasses      Past Surgical History:   Procedure Laterality Date    CARDIAC SURGERY  10/03/2016    CABG (3 Bypasses)    COLONOSCOPY  Last Done In 2000's    Polyps Removed In Past    metoprolol succinate (TOPROL XL) 25 MG extended release tablet TAKE 2 TABLETS BY MOUTH DAILY 60 tablet 6    amLODIPine (NORVASC) 2.5 MG tablet Take 2.5 mg by mouth daily      chlorthalidone (HYGROTON) 25 MG tablet TAKE 1 TABLET BY MOUTH DAILY 30 tablet 5    ELIQUIS 2.5 MG TABS tablet TAKE ONE TABLET TWO TIMES A DAY 60 tablet 6    ondansetron (ZOFRAN-ODT) 4 MG disintegrating tablet Take 4 mg by mouth every 8 hours as needed for Nausea or Vomiting      traZODone (DESYREL) 100 MG tablet Take 100 mg by mouth nightly      alirocumab (PRALUENT) 75 MG/ML SOPN injection pen Inject 1 mL into the skin every 14 days 2 pen 5    torsemide (DEMADEX) 20 MG tablet Take 1 tablet by mouth daily 90 tablet 5    Menthol-Camphor (ICY HOT ADVANCED RELIEF) 16-11 % CREA Apply topically      Carboxymeth-Glycerin-Polysorb (REFRESH OPTIVE ADVANCED) 0.5-1-0.5 % SOLN Apply to eye      Sennosides (SENOKOT PO) Take by mouth daily Over The Counter      Acetaminophen (TYLENOL ARTHRITIS PAIN PO) Take 650 mg by mouth as needed      ALBUTEROL IN Inhale into the lungs as needed      Multiple Vitamins-Minerals (ICAPS PO) Take by mouth every morning Over The Counter      SITagliptin (JANUVIA) 50 MG tablet Take 50 mg by mouth every morning       aspirin EC 81 MG EC tablet Take 1 tablet by mouth daily 30 tablet 3    insulin lispro (HUMALOG) 100 UNIT/ML injection vial Inject 0-12 Units into the skin 3 times daily (with meals) (Patient taking differently: Inject into the skin Per Sliding Scale) 1 vial 3    sildenafil (VIAGRA) 50 MG tablet Take 1 tablet by mouth as needed for Erectile Dysfunction 1/2- pill po  Prn as directed 6 tablet 3    Insulin Degludec (TRESIBA FLEXTOUCH) 100 UNIT/ML SOPN Inject 40 Units into the skin nightly       Allergies   Allergen Reactions    Aldactone [Spironolactone]      \"Developed Pain In The  Breasts And Knots In The Breasts\"    Crestor [Rosuvastatin Calcium]      \"Leg Cramps\"    Lipitor      \"Leg Cramps\" lateral malleolar fracture. No acute osseous injury to the left knee. EKG (if obtained): (All EKG's are interpreted by myself in the absence of a cardiologist)    Chart review shows recent radiographs:  No results found. MDM:  70-year-old male that presents with concern for left ankle pain, tender over McBurney medial and lateral malleolus with significant swelling over the medial malleolus, mild tenderness to palpation over proximal tibia and fibula, x-ray and ankle ordered. He appears to have an acute nondisplaced fracture of the left lateral malleolus. With the swelling noted on the medial aspect I would suspect likely ligamentous injury as well. He is typically able to get around at home with a walker and with the team. I discussed with him that he would need to be nonweightbearing on that foot if he were to go home, we would splint him and he would follow-up with the orthopedic doctor, otherwise he would have to be admitted if he did not feel comfortable going home with nonweightbearing, he is adamant that he wants to go home, does not want to stay in the hospital. A posterior splint was placed with a stirrup, he has intact sensation, good pulses after splint was placed and I rechecked. He has had improvement of pain after a dose of morphine here. He will trial crutches, he does feel that he will be able to get around with a walker at home with nonweightbearing on that left foot and will be able to follow-up with the orthopedic doctor, he does not wish to stay in the hospital. I discussed return precautions with him and also discussed with him that if he is not able to get around nonweightbearing then he would need to return for admission to hospital. He is agreeable to this. Clinical Impression:  1. Closed fracture of left ankle, initial encounter      Disposition referral (if applicable):   Negar Simmons MD  [address] [facility] [place] [id number] [phone]    Schedule an

## 2018-12-15 NOTE — ED NOTES
Patient arrives per private vehicle with a complaint of left ankle pain from a fall at home. Patient states that he went to lock the front door and is unsure why but he fell. Patients left ankle is swollen with deformity, patient is unable to ambulate at this time.        Brennon Benz RN  12/14/18 [id number]

## 2018-12-17 ENCOUNTER — TELEPHONE (OUTPATIENT)
Dept: ORTHOPEDIC SURGERY | Age: 73
End: 2018-12-17

## 2018-12-25 ENCOUNTER — TELEPHONE (OUTPATIENT)
Dept: ORTHOPEDIC SURGERY | Age: 73
End: 2018-12-25

## 2018-12-26 ENCOUNTER — OFFICE VISIT (OUTPATIENT)
Dept: ORTHOPEDIC SURGERY | Age: 73
End: 2018-12-26
Payer: MEDICARE

## 2018-12-26 VITALS — OXYGEN SATURATION: 98 % | TEMPERATURE: 98.5 F | HEART RATE: 85 BPM

## 2018-12-26 DIAGNOSIS — S82.892D CLOSED FRACTURE OF LEFT ANKLE WITH ROUTINE HEALING: Primary | ICD-10-CM

## 2018-12-26 DIAGNOSIS — R52 PAIN: ICD-10-CM

## 2018-12-26 DIAGNOSIS — S92.335A CLOSED NONDISPLACED FRACTURE OF THIRD METATARSAL BONE OF LEFT FOOT, INITIAL ENCOUNTER: ICD-10-CM

## 2018-12-26 PROCEDURE — 99203 OFFICE O/P NEW LOW 30 MIN: CPT | Performed by: PHYSICIAN ASSISTANT

## 2018-12-26 PROCEDURE — 27786 TREATMENT OF ANKLE FRACTURE: CPT | Performed by: PHYSICIAN ASSISTANT

## 2018-12-26 PROCEDURE — 28470 CLTX METATARSAL FX WO MNP EA: CPT | Performed by: PHYSICIAN ASSISTANT

## 2018-12-26 RX ORDER — NITROGLYCERIN 0.4 MG/1
0.4 TABLET SUBLINGUAL EVERY 5 MIN PRN
COMMUNITY

## 2018-12-26 NOTE — PROGRESS NOTES
Rosangela Ying is a 69 yo male who is here after a fall on 12/14/18. He sipped outside his back door. He is having pain lateral left ankle and anterior left ankle. He was seen in the er on 12/14/18 and treated with a splint and ace wrap. He has been using a wheelchair. He has been taking norco for pain. He has a red sore anterior left foot from the splint rubbing.

## 2018-12-26 NOTE — PROGRESS NOTES
ORTHOPEDIC PROGRESS NOTE    2018    Patient name: Barry Smart  : 1945    SUBJECTIVE   The patient was seen and examined. Barry Smart is a 68 y.o. male with complaints of left ankle and foot pain. He was seen and evaluated in the ED where x-rays were obtained showing left lateral malleolus fracture, nondisplaced. He was put into a splint and told to follow up outpatient. He has attempted to stay off of it as much as possible. At baseline he ambulates with assisted device including a walker due to [employer]" according to the patient. Date of injury was on 18. OBJECTIVE     Vitals:    18 1257   Pulse: 85   Temp: 98.5 °F (36.9 °C)   SpO2: 98%       Physical Exam:   GEN: A&O. NAD. MS: LLE- sensation intact; CR <2 seconds; ankle painful laterally with moderate swelling; midfoot is painful and swollen with plantar ecchymosis; NV intact; calf soft and non-tender; able to wiggle all toes and tolerates gentle active PF/DF ankle. X-rays: x-rays of ankle show nondisplaced lateral malleolus fracture. X-rays of foot show left 3rd metatarsal base fracture, nondisplaced. No widening of lisfranc region. ASSESSMENT     68 y.o. male with left lateral malleolus fracture and left 3rd metatarsal base fracture. PLAN     1. Activity, PT/OT: CAM boot; protected weight bearing with walker; come out of boot for hygiene and ROM. Patient sized for tall boot in office today and boot applied properly. Instructions given to patient. 2. Goal is non-operative management. 3. Ice, elevation and Tylenol as needed. 4. Follow up: 2 weeks with Dr. Deon Whiteside with x-rays of both left foot and ankle.      Electronically signed by:Kristal Sanchez, 2018 1:55 PM

## 2019-01-09 ENCOUNTER — OFFICE VISIT (OUTPATIENT)
Dept: ORTHOPEDIC SURGERY | Age: 74
End: 2019-01-09

## 2019-01-09 VITALS
HEART RATE: 94 BPM | BODY MASS INDEX: 23.46 KG/M2 | RESPIRATION RATE: 14 BRPM | OXYGEN SATURATION: 99 % | WEIGHT: 146 LBS | HEIGHT: 66 IN

## 2019-01-09 DIAGNOSIS — S92.335D CLOSED NONDISPLACED FRACTURE OF THIRD METATARSAL BONE OF LEFT FOOT WITH ROUTINE HEALING, SUBSEQUENT ENCOUNTER: ICD-10-CM

## 2019-01-09 DIAGNOSIS — S82.892D CLOSED FRACTURE OF LEFT ANKLE WITH ROUTINE HEALING: ICD-10-CM

## 2019-01-09 DIAGNOSIS — R52 PAIN: Primary | ICD-10-CM

## 2019-01-09 PROCEDURE — 99024 POSTOP FOLLOW-UP VISIT: CPT | Performed by: PHYSICIAN ASSISTANT

## 2019-01-23 ENCOUNTER — OFFICE VISIT (OUTPATIENT)
Dept: ORTHOPEDIC SURGERY | Age: 74
End: 2019-01-23

## 2019-01-23 VITALS — HEART RATE: 103 BPM | WEIGHT: 146 LBS | BODY MASS INDEX: 23.46 KG/M2 | OXYGEN SATURATION: 97 % | HEIGHT: 66 IN

## 2019-01-23 DIAGNOSIS — S92.345A CLOSED NONDISPLACED FRACTURE OF FOURTH METATARSAL BONE OF LEFT FOOT, INITIAL ENCOUNTER: ICD-10-CM

## 2019-01-23 DIAGNOSIS — S92.325A CLOSED NONDISPLACED FRACTURE OF SECOND METATARSAL BONE OF LEFT FOOT, INITIAL ENCOUNTER: ICD-10-CM

## 2019-01-23 DIAGNOSIS — S82.892D CLOSED FRACTURE OF LEFT ANKLE WITH ROUTINE HEALING: Primary | ICD-10-CM

## 2019-01-23 DIAGNOSIS — S92.335A CLOSED NONDISPLACED FRACTURE OF THIRD METATARSAL BONE OF LEFT FOOT, INITIAL ENCOUNTER: ICD-10-CM

## 2019-01-23 PROCEDURE — 99024 POSTOP FOLLOW-UP VISIT: CPT | Performed by: PHYSICIAN ASSISTANT

## 2019-01-23 RX ORDER — PERPHENAZINE 16 MG/1
TABLET, FILM COATED ORAL
Refills: 4 | Status: ON HOLD | COMMUNITY
Start: 2019-01-21 | End: 2022-01-01

## 2019-01-23 RX ORDER — LANCING DEVICE
EACH MISCELLANEOUS
Refills: 3 | Status: ON HOLD | COMMUNITY
Start: 2019-01-21 | End: 2022-01-01

## 2019-01-28 ENCOUNTER — HOSPITAL ENCOUNTER (OUTPATIENT)
Age: 74
Setting detail: SPECIMEN
Discharge: HOME | End: 2019-01-28
Payer: MEDICARE

## 2019-01-28 LAB
ALBUMIN SERPL-MCNC: 3.8 GM/DL (ref 3.4–5)
ALP BLD-CCNC: 100 IU/L (ref 40–129)
ALT SERPL-CCNC: 11 U/L (ref 10–40)
ANION GAP SERPL CALCULATED.3IONS-SCNC: 19 MMOL/L (ref 4–16)
AST SERPL-CCNC: 9 IU/L (ref 15–37)
BILIRUB SERPL-MCNC: 0.3 MG/DL (ref 0–1)
BUN BLDV-MCNC: 63 MG/DL (ref 6–23)
CALCIUM SERPL-MCNC: 9.8 MG/DL (ref 8.3–10.6)
CHLORIDE BLD-SCNC: 101 MMOL/L (ref 99–110)
CO2: 23 MMOL/L (ref 21–32)
CREAT SERPL-MCNC: 2.8 MG/DL (ref 0.9–1.3)
ESTIMATED AVERAGE GLUCOSE: 143 MG/DL
GFR AFRICAN AMERICAN: 27 ML/MIN/1.73M2
GFR NON-AFRICAN AMERICAN: 22 ML/MIN/1.73M2
GLUCOSE FASTING: 116 MG/DL (ref 70–99)
HBA1C MFR BLD: 6.6 % (ref 4.2–6.3)
POTASSIUM SERPL-SCNC: 4.5 MMOL/L (ref 3.5–5.1)
SODIUM BLD-SCNC: 143 MMOL/L (ref 135–145)
TOTAL PROTEIN: 5.8 GM/DL (ref 6.4–8.2)

## 2019-01-28 PROCEDURE — 80061 LIPID PANEL: CPT

## 2019-01-28 PROCEDURE — 83036 HEMOGLOBIN GLYCOSYLATED A1C: CPT

## 2019-01-28 PROCEDURE — 80053 COMPREHEN METABOLIC PANEL: CPT

## 2019-01-29 LAB
CHOLESTEROL, FASTING: 120 MG/DL
HDLC SERPL-MCNC: 30 MG/DL
LDL CHOLESTEROL DIRECT: 73 MG/DL
TRIGLYCERIDE, FASTING: 136 MG/DL

## 2019-01-31 ENCOUNTER — OFFICE VISIT (OUTPATIENT)
Dept: CARDIOLOGY CLINIC | Age: 74
End: 2019-01-31
Payer: MEDICARE

## 2019-01-31 VITALS
HEART RATE: 84 BPM | BODY MASS INDEX: 29.1 KG/M2 | WEIGHT: 192 LBS | SYSTOLIC BLOOD PRESSURE: 136 MMHG | HEIGHT: 68 IN | RESPIRATION RATE: 14 BRPM | DIASTOLIC BLOOD PRESSURE: 82 MMHG

## 2019-01-31 DIAGNOSIS — I42.9 CARDIOMYOPATHY, UNSPECIFIED TYPE (HCC): Primary | ICD-10-CM

## 2019-01-31 DIAGNOSIS — I48.0 PAF (PAROXYSMAL ATRIAL FIBRILLATION) (HCC): ICD-10-CM

## 2019-01-31 DIAGNOSIS — R00.2 PALPITATIONS: ICD-10-CM

## 2019-01-31 DIAGNOSIS — I10 ESSENTIAL HYPERTENSION: ICD-10-CM

## 2019-01-31 DIAGNOSIS — I25.10 CORONARY ARTERY DISEASE INVOLVING NATIVE CORONARY ARTERY OF NATIVE HEART WITHOUT ANGINA PECTORIS: ICD-10-CM

## 2019-01-31 DIAGNOSIS — Z78.9 STATIN INTOLERANCE: ICD-10-CM

## 2019-01-31 DIAGNOSIS — Z95.1 S/P CABG X 3: ICD-10-CM

## 2019-01-31 DIAGNOSIS — E11.8 TYPE 2 DIABETES MELLITUS WITH COMPLICATION, WITHOUT LONG-TERM CURRENT USE OF INSULIN (HCC): ICD-10-CM

## 2019-01-31 DIAGNOSIS — E78.5 DYSLIPIDEMIA: ICD-10-CM

## 2019-01-31 PROCEDURE — 4040F PNEUMOC VAC/ADMIN/RCVD: CPT | Performed by: INTERNAL MEDICINE

## 2019-01-31 PROCEDURE — 3044F HG A1C LEVEL LT 7.0%: CPT | Performed by: INTERNAL MEDICINE

## 2019-01-31 PROCEDURE — 1036F TOBACCO NON-USER: CPT | Performed by: INTERNAL MEDICINE

## 2019-01-31 PROCEDURE — G8484 FLU IMMUNIZE NO ADMIN: HCPCS | Performed by: INTERNAL MEDICINE

## 2019-01-31 PROCEDURE — 1101F PT FALLS ASSESS-DOCD LE1/YR: CPT | Performed by: INTERNAL MEDICINE

## 2019-01-31 PROCEDURE — G8598 ASA/ANTIPLAT THER USED: HCPCS | Performed by: INTERNAL MEDICINE

## 2019-01-31 PROCEDURE — 3017F COLORECTAL CA SCREEN DOC REV: CPT | Performed by: INTERNAL MEDICINE

## 2019-01-31 PROCEDURE — 2022F DILAT RTA XM EVC RTNOPTHY: CPT | Performed by: INTERNAL MEDICINE

## 2019-01-31 PROCEDURE — G8417 CALC BMI ABV UP PARAM F/U: HCPCS | Performed by: INTERNAL MEDICINE

## 2019-01-31 PROCEDURE — 1123F ACP DISCUSS/DSCN MKR DOCD: CPT | Performed by: INTERNAL MEDICINE

## 2019-01-31 PROCEDURE — G8427 DOCREV CUR MEDS BY ELIG CLIN: HCPCS | Performed by: INTERNAL MEDICINE

## 2019-01-31 PROCEDURE — 93000 ELECTROCARDIOGRAM COMPLETE: CPT | Performed by: INTERNAL MEDICINE

## 2019-01-31 PROCEDURE — 99214 OFFICE O/P EST MOD 30 MIN: CPT | Performed by: INTERNAL MEDICINE

## 2019-01-31 RX ORDER — HYDROCODONE BITARTRATE AND ACETAMINOPHEN 5; 325 MG/1; MG/1
1 TABLET ORAL EVERY 6 HOURS PRN
COMMUNITY
End: 2019-05-09

## 2019-02-06 ENCOUNTER — PROCEDURE VISIT (OUTPATIENT)
Dept: CARDIOLOGY CLINIC | Age: 74
End: 2019-02-06
Payer: MEDICARE

## 2019-02-06 DIAGNOSIS — E11.8 TYPE 2 DIABETES MELLITUS WITH COMPLICATION, WITHOUT LONG-TERM CURRENT USE OF INSULIN (HCC): ICD-10-CM

## 2019-02-06 DIAGNOSIS — R00.2 PALPITATIONS: ICD-10-CM

## 2019-02-06 DIAGNOSIS — E78.5 DYSLIPIDEMIA: ICD-10-CM

## 2019-02-06 DIAGNOSIS — I25.10 CORONARY ARTERY DISEASE INVOLVING NATIVE CORONARY ARTERY OF NATIVE HEART WITHOUT ANGINA PECTORIS: ICD-10-CM

## 2019-02-06 DIAGNOSIS — I10 ESSENTIAL HYPERTENSION: ICD-10-CM

## 2019-02-06 DIAGNOSIS — I48.0 PAF (PAROXYSMAL ATRIAL FIBRILLATION) (HCC): ICD-10-CM

## 2019-02-06 DIAGNOSIS — Z95.1 S/P CABG X 3: ICD-10-CM

## 2019-02-06 DIAGNOSIS — I42.9 CARDIOMYOPATHY, UNSPECIFIED TYPE (HCC): Primary | ICD-10-CM

## 2019-02-06 DIAGNOSIS — Z78.9 STATIN INTOLERANCE: ICD-10-CM

## 2019-02-06 LAB
LV EF: 58 %
LVEF MODALITY: NORMAL

## 2019-02-06 PROCEDURE — 93306 TTE W/DOPPLER COMPLETE: CPT | Performed by: INTERNAL MEDICINE

## 2019-02-08 ENCOUNTER — TELEPHONE (OUTPATIENT)
Dept: CARDIOLOGY CLINIC | Age: 74
End: 2019-02-08

## 2019-02-18 RX ORDER — APIXABAN 2.5 MG/1
TABLET, FILM COATED ORAL
Qty: 60 TABLET | Refills: 6 | Status: SHIPPED | OUTPATIENT
Start: 2019-02-18 | End: 2019-09-16 | Stop reason: SDUPTHER

## 2019-02-20 ENCOUNTER — OFFICE VISIT (OUTPATIENT)
Dept: ORTHOPEDIC SURGERY | Age: 74
End: 2019-02-20
Payer: MEDICARE

## 2019-02-20 VITALS — HEART RATE: 104 BPM | WEIGHT: 187 LBS | BODY MASS INDEX: 28.43 KG/M2 | OXYGEN SATURATION: 97 %

## 2019-02-20 DIAGNOSIS — S92.335D CLOSED NONDISPLACED FRACTURE OF THIRD METATARSAL BONE OF LEFT FOOT WITH ROUTINE HEALING, SUBSEQUENT ENCOUNTER: ICD-10-CM

## 2019-02-20 DIAGNOSIS — S92.325A CLOSED NONDISPLACED FRACTURE OF SECOND METATARSAL BONE OF LEFT FOOT, INITIAL ENCOUNTER: Primary | ICD-10-CM

## 2019-02-20 DIAGNOSIS — S82.892D CLOSED FRACTURE OF LEFT ANKLE WITH ROUTINE HEALING: ICD-10-CM

## 2019-02-20 DIAGNOSIS — S92.345D CLOSED NONDISPLACED FRACTURE OF FOURTH METATARSAL BONE OF LEFT FOOT WITH ROUTINE HEALING, SUBSEQUENT ENCOUNTER: ICD-10-CM

## 2019-02-20 DIAGNOSIS — S82.832D CLOSED FRACTURE OF DISTAL END OF LEFT FIBULA WITH ROUTINE HEALING, UNSPECIFIED FRACTURE MORPHOLOGY, SUBSEQUENT ENCOUNTER: ICD-10-CM

## 2019-02-20 PROCEDURE — 73630 X-RAY EXAM OF FOOT: CPT | Performed by: PHYSICIAN ASSISTANT

## 2019-02-20 PROCEDURE — 99024 POSTOP FOLLOW-UP VISIT: CPT | Performed by: PHYSICIAN ASSISTANT

## 2019-02-20 PROCEDURE — 73610 X-RAY EXAM OF ANKLE: CPT | Performed by: PHYSICIAN ASSISTANT

## 2019-04-01 ENCOUNTER — HOSPITAL ENCOUNTER (OUTPATIENT)
Age: 74
Setting detail: SPECIMEN
Discharge: HOME | End: 2019-04-01
Payer: MEDICARE

## 2019-04-01 LAB
ANION GAP SERPL CALCULATED.3IONS-SCNC: 10 MMOL/L (ref 4–16)
BACTERIA: ABNORMAL /HPF
BILIRUBIN URINE: NEGATIVE MG/DL
BLOOD, URINE: NEGATIVE
BUN BLDV-MCNC: 50 MG/DL (ref 6–23)
CALCIUM SERPL-MCNC: 10.3 MG/DL (ref 8.3–10.6)
CAST TYPE: NEGATIVE /HPF
CHLORIDE BLD-SCNC: 98 MMOL/L (ref 99–110)
CLARITY: ABNORMAL
CO2: 33 MMOL/L (ref 21–32)
COLOR: YELLOW
CREAT SERPL-MCNC: 2.7 MG/DL (ref 0.9–1.3)
CRYSTAL TYPE: NEGATIVE /HPF
EPITHELIAL CELLS, UA: ABNORMAL /HPF
GFR AFRICAN AMERICAN: 28 ML/MIN/1.73M2
GFR NON-AFRICAN AMERICAN: 23 ML/MIN/1.73M2
GLUCOSE BLD-MCNC: 189 MG/DL (ref 70–99)
GLUCOSE, URINE: NEGATIVE MG/DL
KETONES, URINE: NEGATIVE MG/DL
LEUKOCYTE ESTERASE, URINE: NEGATIVE
MAGNESIUM: 1.8 MG/DL (ref 1.8–2.4)
NITRITE URINE, QUANTITATIVE: NEGATIVE
PH, URINE: 6 (ref 5–8)
PHOSPHORUS: 4.5 MG/DL (ref 2.7–4.5)
POTASSIUM SERPL-SCNC: 4.6 MMOL/L (ref 3.5–5.1)
PROTEIN UA: NEGATIVE MG/DL
RBC URINE: NEGATIVE /HPF (ref 0–3)
SODIUM BLD-SCNC: 141 MMOL/L (ref 135–145)
SPECIFIC GRAVITY UA: 1.01 (ref 1–1.03)
UROBILINOGEN, URINE: 0.2 MG/DL (ref 0.2–1)
WBC UA: NEGATIVE /HPF (ref 0–2)

## 2019-04-01 PROCEDURE — 87086 URINE CULTURE/COLONY COUNT: CPT

## 2019-04-01 PROCEDURE — 81001 URINALYSIS AUTO W/SCOPE: CPT

## 2019-04-01 PROCEDURE — 84100 ASSAY OF PHOSPHORUS: CPT

## 2019-04-01 PROCEDURE — 83970 ASSAY OF PARATHORMONE: CPT

## 2019-04-01 PROCEDURE — 83735 ASSAY OF MAGNESIUM: CPT

## 2019-04-01 PROCEDURE — 80048 BASIC METABOLIC PNL TOTAL CA: CPT

## 2019-04-03 LAB
CULTURE: NORMAL
Lab: NORMAL
PARATHYROID HORMONE INTACT: 18 PG/ML (ref 15–65)
PARATHYROID HORMONE INTACT: NORMAL PG/ML (ref 15–65)
SPECIMEN: NORMAL

## 2019-04-15 RX ORDER — METOPROLOL SUCCINATE 25 MG/1
50 TABLET, EXTENDED RELEASE ORAL DAILY
Qty: 60 TABLET | Refills: 6 | Status: SHIPPED | OUTPATIENT
Start: 2019-04-15 | End: 2019-11-11 | Stop reason: SDUPTHER

## 2019-04-15 RX ORDER — METOPROLOL SUCCINATE 25 MG/1
50 TABLET, EXTENDED RELEASE ORAL DAILY
Qty: 60 TABLET | Refills: 6 | Status: SHIPPED | OUTPATIENT
Start: 2019-04-15 | End: 2019-05-09

## 2019-05-09 ENCOUNTER — OFFICE VISIT (OUTPATIENT)
Dept: CARDIOLOGY CLINIC | Age: 74
End: 2019-05-09
Payer: MEDICARE

## 2019-05-09 VITALS
HEART RATE: 68 BPM | BODY MASS INDEX: 29.25 KG/M2 | SYSTOLIC BLOOD PRESSURE: 124 MMHG | WEIGHT: 193 LBS | RESPIRATION RATE: 14 BRPM | HEIGHT: 68 IN | DIASTOLIC BLOOD PRESSURE: 66 MMHG

## 2019-05-09 DIAGNOSIS — I10 ESSENTIAL HYPERTENSION: ICD-10-CM

## 2019-05-09 DIAGNOSIS — Z95.1 S/P CABG X 3: ICD-10-CM

## 2019-05-09 DIAGNOSIS — I25.10 CORONARY ARTERY DISEASE INVOLVING NATIVE CORONARY ARTERY OF NATIVE HEART WITHOUT ANGINA PECTORIS: ICD-10-CM

## 2019-05-09 DIAGNOSIS — D64.9 ANEMIA, UNSPECIFIED TYPE: ICD-10-CM

## 2019-05-09 DIAGNOSIS — I42.9 CARDIOMYOPATHY, UNSPECIFIED TYPE (HCC): Primary | ICD-10-CM

## 2019-05-09 DIAGNOSIS — R06.02 SOB (SHORTNESS OF BREATH) ON EXERTION: ICD-10-CM

## 2019-05-09 DIAGNOSIS — Z78.9 STATIN INTOLERANCE: ICD-10-CM

## 2019-05-09 DIAGNOSIS — E11.8 TYPE 2 DIABETES MELLITUS WITH COMPLICATION, WITHOUT LONG-TERM CURRENT USE OF INSULIN (HCC): ICD-10-CM

## 2019-05-09 DIAGNOSIS — R00.2 PALPITATIONS: ICD-10-CM

## 2019-05-09 DIAGNOSIS — I48.0 PAF (PAROXYSMAL ATRIAL FIBRILLATION) (HCC): ICD-10-CM

## 2019-05-09 PROCEDURE — 2022F DILAT RTA XM EVC RTNOPTHY: CPT | Performed by: INTERNAL MEDICINE

## 2019-05-09 PROCEDURE — 1123F ACP DISCUSS/DSCN MKR DOCD: CPT | Performed by: INTERNAL MEDICINE

## 2019-05-09 PROCEDURE — G8417 CALC BMI ABV UP PARAM F/U: HCPCS | Performed by: INTERNAL MEDICINE

## 2019-05-09 PROCEDURE — G8598 ASA/ANTIPLAT THER USED: HCPCS | Performed by: INTERNAL MEDICINE

## 2019-05-09 PROCEDURE — 99214 OFFICE O/P EST MOD 30 MIN: CPT | Performed by: INTERNAL MEDICINE

## 2019-05-09 PROCEDURE — 3044F HG A1C LEVEL LT 7.0%: CPT | Performed by: INTERNAL MEDICINE

## 2019-05-09 PROCEDURE — 4040F PNEUMOC VAC/ADMIN/RCVD: CPT | Performed by: INTERNAL MEDICINE

## 2019-05-09 PROCEDURE — 3017F COLORECTAL CA SCREEN DOC REV: CPT | Performed by: INTERNAL MEDICINE

## 2019-05-09 PROCEDURE — 1036F TOBACCO NON-USER: CPT | Performed by: INTERNAL MEDICINE

## 2019-05-09 PROCEDURE — G8427 DOCREV CUR MEDS BY ELIG CLIN: HCPCS | Performed by: INTERNAL MEDICINE

## 2019-05-09 NOTE — PROGRESS NOTES
CARDIOLOGY  NOTE  Chief Complaint: Chest pain ,  He has history of coronary artery disease and afib     HPI:   Natalee Rodas is a 76y.o. year old who has history as noted below. Amor says he is short of breath all the time He is walking with a walker now. He feels his heart racing once a while . which makes him dizzy 48 hr holter In July 2018 showed NSVT and SVT but no afib. Stress test in 2018  showed no ischemia   He had CABG and Mitral valve ring in Oct 2016 , post op course he developed sternal non healing so he had sternal rings placed in 2017 . He has no chest pain ow but he feels winded when he walks . His kidney numbers are also gettign worse . Most creatinine is up to 2.8 He is intolerant of statins so he is on repatha . Leg pain and muscle pain is better after holding statins . He is not on diuretics   His grand daughter had a sudden death while sleeping. Current Outpatient Medications   Medication Sig Dispense Refill    metoprolol succinate (TOPROL XL) 25 MG extended release tablet TAKE 2 TABLETS BY MOUTH DAILY 60 tablet 6    ELIQUIS 2.5 MG TABS tablet TAKE ONE TABLET TWO TIMES A DAY 60 tablet 6    chlorthalidone (HYGROTON) 25 MG tablet TAKE 1 TABLET BY MOUTH DAILY 30 tablet 5    CONTOUR NEXT TEST strip   4    COMFORT EZ PEN NEEDLES 31G X 8 MM MISC   3    alirocumab (PRALUENT) 75 MG/ML SOPN injection pen Inject 1 mL into the skin every 14 days 2 mL 5    losartan (COZAAR) 100 MG tablet Take 1 tablet by mouth daily 30 tablet 5    nitroGLYCERIN (NITROSTAT) 0.4 MG SL tablet Place 0.4 mg under the tongue every 5 minutes as needed for Chest pain up to max of 3 total doses. If no relief after 1 dose, call 911.       Glycerin-Polysorbate 80 (REFRESH DRY EYE THERAPY OP) Apply to eye      amLODIPine (NORVASC) 2.5 MG tablet Take 2.5 mg by mouth daily      ondansetron (ZOFRAN-ODT) 4 MG disintegrating tablet Take 4 mg by mouth every 8 hours as needed for Nausea or Vomiting  traZODone (DESYREL) 100 MG tablet Take 100 mg by mouth nightly      Insulin Degludec (TRESIBA FLEXTOUCH) 100 UNIT/ML SOPN Inject 40 Units into the skin nightly      torsemide (DEMADEX) 20 MG tablet Take 1 tablet by mouth daily 90 tablet 5    Menthol-Camphor (ICY HOT ADVANCED RELIEF) 16-11 % CREA Apply topically      Sennosides (SENOKOT PO) Take by mouth daily Over The Counter      Acetaminophen (TYLENOL ARTHRITIS PAIN PO) Take 650 mg by mouth as needed      ALBUTEROL IN Inhale into the lungs as needed      Multiple Vitamins-Minerals (ICAPS PO) Take by mouth every morning Over The Counter      SITagliptin (JANUVIA) 50 MG tablet Take 50 mg by mouth every morning       aspirin EC 81 MG EC tablet Take 1 tablet by mouth daily 30 tablet 3    insulin lispro (HUMALOG) 100 UNIT/ML injection vial Inject 0-12 Units into the skin 3 times daily (with meals) (Patient taking differently: Inject into the skin Per Sliding Scale) 1 vial 3     No current facility-administered medications for this visit. Allergies:   Aldactone [spironolactone]; Crestor [rosuvastatin calcium]; Lipitor; and Zocor [simvastatin - high dose]    Patient History:  Past Medical History:   Diagnosis Date    Acid reflux     Arrhythmia     Arthritis     \"Back, Hands, Fingers\"    CAD (coronary artery disease)     09/2000 non-critical; 2004 no signigicante change, Sees Dr. Juana Montgomery CHF [facility]congestive heart failure) ([facility]     Chronic back pain     CKD (chronic kidney disease) stage 3, GFR 30-59 ml/min ([facility] 12/03/2015    Sees Dr. Basilio Doan    COPD (chronic obstructive pulmonary disease) ([facility] 11/16/15    Diabetes mellitus ([facility] Dx 1990's    Glaucoma     \"Both Eyes\"    H/O 24 hour EKG monitoring 7/161/8,01/26/2017    Conclusion abnormal 48 hours of cardiac monitor finding consistent with sinus rhythm with physiological heart rate variation frequent complex ventricular ectopy.   Patient did not report any symptoms for correlation    H/O cardiac catheterization 12/27/2009    Patent coronary arteries with ectasia and minimal coronary luminal irregularities and preserved left ventricular function.  H/O cardiovascular stress test 08/09/2018    Lexiscan Cardiolite. ECG portion of test is negative for ischemia, normal ventricular contractility, no infarct or ischemia noted, normal stress myocardial perfusion, EF 58%. Normal study.  H/O complete electrocardiogram 04/05/2012    H/O Doppler ultrasound 09/13/2007    Bormal study suggestive of lack of evidence of any significant peripheral arterial disease. Patient'a symptoms are very suggestive of non-ischemic and non-vascular etiology. When compared to the previous study of 2005, ther is no significant change.  H/O Doppler ultrasound (Lower extremity) 01/30/2017    Native arteries in the examined lower extremity(ies) are patent, with no elevated velocities. No aneurysms are noted.  H/O percutaneous left heart catheterization 09/23/2016    Multivessel CAD with 100 % occluded RCA. 80 % to 90%  circumflex diffusely diseased. 80% mid LAD focal lesion.  Heat syncope 9/15/2016    [place] (hard of hearing)     Bilateral Ears    Hx of echocardiogram 2/6/19; 10/27/2016    2/6/19  LV function and size normal, mild concentric LVH, no regional wall motion abnormalities, normal diastolic filling pattern for age, mildly dilated LA, sclerotic but non-stenotic aortic valve, mitral annular calcification, RVSP= 27 mmHg, EF 55-60%.  Hyperlipidemia     Hypertension     Macular degeneration     Bilateral Eyes    Other activity(E029.9) 04/05/2012    48 Holter Monitor. Normal sinus rhythm with frequent low-grade supraventricular ectopy, without clinically significant arrhythmias.     PAF (paroxysmal atrial fibrillation) ([facility])     PVD (peripheral vascular disease) ([facility])     S/P CABG x 3 10/25/2016    10/3/16 LIMA-LAD, SVG-PDA, SVG-Cx + Maze+Appendage resection Dr Olvera Ends 2000's    Nose heartburn  · Genitourinary: No dysuria, trouble voiding, or hematuria  · Musculoskeletal:  None. Leg weakness and pain  · Integumentary: No rash or pruritis  · Neurological: No TIA or stroke symptoms  · Psychiatric: No anxiety or depression  · Endocrine: No malaise, fatigue or temperature intolerance  · Hematologic/Lymphatic: No bleeding problems, blood clots or swollen lymph nodes, had nose bleeds in past   · Allergic/Immunologic: No nasal congestion or hives    Objective:      Physical Exam:  /66 (Site: Left Upper Arm, Position: Sitting, Cuff Size: Medium Adult)   Pulse 68   Resp 14   Ht 5' 8\" (1.727 m)   Wt 193 lb (87.5 kg)   BMI 29.35 kg/m²   Wt Readings from Last 3 Encounters:   05/09/19 193 lb (87.5 kg)   02/20/19 187 lb (84.8 kg)   01/31/19 192 lb (87.1 kg)     Body mass index is 29.35 kg/m². Vitals:    05/09/19 1432   BP: 124/66   Pulse: 68   Resp: 14     Repeat BP was 138/66    General Appearance:  No distress, conversant  Constitutional:  Well developed, Well nourished, No acute distress, Non-toxic appearance. HENT:  Normocephalic, Atraumatic, Bilateral external ears normal, Oropharynx moist, No oral exudates, Nose normal. Neck- Normal range of motion, No tenderness, Supple, No stridor,no apical-carotid delay  Eyes:  PERRL, EOMI, Conjunctiva normal, No discharge. Respiratory:  Normal breath sounds, No respiratory distress, No wheezing, No chest tenderness. ,no use of accessory muscles,   Cardiovascular: (PMI) apex non displaced,no lifts no thrills,S1 and S2 audible, No added heart sounds, No signs of ankle edema, or volume overload, No evidence of JVD, , right side chest pain is reproducible gets worse on pushing forward  GI:  Bowel sounds normal, Soft, No tenderness, No masses, No gross visceromegaly   :  No costovertebral angle tenderness   Musculoskeletal:  No edema, no tenderness, no deformities.  Back- no tenderness, left foot in brace   Integument:  Well hydrated, no rash Lymphatic:  No lymphadenopathy noted   Neurologic:  Alert & oriented x 3, CN 2-12 normal, normal motor function, normal sensory function, no focal deficits noted   Psychiatric:  Speech and behavior appropriate       Medical decision making and Data review:  DATA:  Lab Results   Component Value Date    TROPONINT <0.010 09/24/2016     BNP:    Lab Results   Component Value Date    PROBNP 776.0 (H) 08/07/2018     PT/INR:  No results found for: WISE s.r.l  Lab Results   Component Value Date    LABA1C 6.6 (H) 01/28/2019    LABA1C 6.6 (H) 09/24/2018     Lab Results   Component Value Date    CHOL 115 05/29/2018    TRIG 173 (H) 05/29/2018    HDL 30 (L) 01/28/2019    LDLCALC 97 12/27/2016    LDLDIRECT 73 01/28/2019     Lab Results   Component Value Date    ALT 11 01/28/2019    AST 9 (L) 01/28/2019     Echo Sept 2016:  Left ventricle function is mildly reduced with EF 45%, mild tricuspid regurgitation. Left ventricular hypertrophy    Holter 2017:  sinus rhythm, some supraventricular ectopy and PVCs recorded, no A. Fib    Arterial doppler Jan 2017          Native arteries in the examined lower extremity(ies) are patent, with no    elevated velocities. No aneurysms are noted.    Bilateral ABIs are normal.         Holter 7/16/18  2 days of cardiac monitoring consistent with sinus rhythm at average heart rate of 72 bpm.  Fastest rate of 124 occurred at 7:59 AM on July 18 the lowest rate of 53 occluded 9:26 AM on July 17.  Rare PVCs and frequent 12,242 PVCs are noted with ventricular bigeminy and trigeminy.  3 beat run of nonsustained ventricular tachycardia noted on July 18, 2018 at 8 AM.  Conclusion abnormal 48 hours of cardiac monitor finding consistent with sinus rhythm with physiological heart rate variation and frequent complex ventricular ectopy.  Patient did not report any symptoms for correlation.     Stress test 8/2018     Adali Eddie physician Dr. Mehnaz Cleveland portion of stress test is negative for ischemia by diagnostic criteria.    Normal EF 58 % with normal ventricular contractility.    No infarct or ischemia noted.    Normal stress myocardial perfusion.    This is a normal study.         Echo 2/6/19   Summary   LV function and size are normal, Ejection Fraction 55-60 %.   Mild concentric left ventricular hypertrophy.   No regional wall motion abnormalities were detected.   Normal diastolic filling pattern for age.  Tara Wade dilated left atrium.   Sclerotic, but non-stenotic aortic valve.   Mitral annular calcification is present.  RVSP= 27 mmHg.   No evidence of pericardial effusion.       All labs, medications and tests reviewed by myself including data and history from outside source , patient and available family . Assessment & Plan:    1. Cardiomyopathy, unspecified type ([address]    2. Coronary artery disease involving native coronary artery of native heart without angina pectoris    3. Anemia, unspecified type    4. PAF (paroxysmal atrial fibrillation) (HCC)    5. Palpitations    6. S/P CABG x 3    7. SOB (shortness of breath) on exertion    8. Statin intolerance    9. Type 2 diabetes mellitus with complication, without long-term current use of insulin ([address]    10. Essential hypertension     Cardiomyopathy ([address]  He is short of breath , EF is normal  Will increase toresemide 20 mg daily for 5 days  And he will se Dr Josias JOHN (coronary artery disease)  He is s/p CABG in 10/16 with 1. MVR with # 28 CG ring and  CABG X 3 withLIMA to LAD,SVG to PDA,SVG to Cx. 3.LA appendage resection and  With Epicardial  and endocardial MAZE . He has no angina , continue aspirin. He has some sternal site pain probabaly due to sternum not healing , stress test in August 2018 showed no ischemia . EF is noramal     PAF (paroxysmal atrial fibrillation) ([address]  He is off  amiodarone He is s/p MAZE and atrial appendage removal . He is on  eliquis.   Holter did not show any evidence of atrial fibrillation 48 hours He still has some palpitations which could be ventricular ectopy/PVC.  aldactone gave him breast pain. He is status post atrial appendage resection. That still leaves him at risk of embolic events if he goes into A. Fib so continue eliquis at 2.5 mg whicgh is lower dose but he has had nose bleeds in past and her is s/p appendage resection so I am ok with lower dose . denies any bleeding issues    Essential hypertension  Blood pressure is well controlled  He says he checks it at home and it is normal. He is tolerating his meds . He checks it and keeps a log . Doing well. 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 Seen CT surgery no further interventions at this time     PVD (peripheral vascular disease) ([facility]  Ultrasound of legs in Jan 2017 showed no significant blockage , we will ask him to see Ortho/ spine / rehab physical therapy      Dyslipidemia :  Lorie Vance had lab work recently,  Lipid profile was reviewed with patient. He is getting a lot of leg pain which got better after holding pitavastatin. He could not tolerate lipitor . He cannot tolerate statins , He is tolerating  Repatha, Ldl 70  mmHg  Range . He has been on it for 1 year      Counseled extensively and medication compliance urged. We discussed that for the  prevention of ASCVD our  goal is aggressive risk modification. Patient is encouraged to get an exercise bike and do some cardiovascular exercise Various goals were discussed and questions answered. Continue current medications. Appropriate prescriptions are addressed and refills ordered. Questions answered and patient verbalizes understanding. Call for any problems, questions, or concerns. Continue all other medications of all above medical condition listed as is. Return in about 6 months (around 11/9/2019).

## 2019-05-29 ENCOUNTER — HOSPITAL ENCOUNTER (OUTPATIENT)
Age: 74
Setting detail: SPECIMEN
Discharge: HOME | End: 2019-05-29
Payer: MEDICARE

## 2019-05-29 LAB
ALBUMIN SERPL-MCNC: 3.8 GM/DL (ref 3.4–5)
ALP BLD-CCNC: 105 IU/L (ref 40–129)
ALT SERPL-CCNC: 8 U/L (ref 10–40)
ANION GAP SERPL CALCULATED.3IONS-SCNC: 5 MMOL/L (ref 4–16)
AST SERPL-CCNC: 10 IU/L (ref 15–37)
BILIRUB SERPL-MCNC: 0.4 MG/DL (ref 0–1)
BUN BLDV-MCNC: 50 MG/DL (ref 6–23)
CALCIUM SERPL-MCNC: 9.3 MG/DL (ref 8.3–10.6)
CHLORIDE BLD-SCNC: 102 MMOL/L (ref 99–110)
CHOLESTEROL: 124 MG/DL
CO2: 35 MMOL/L (ref 21–32)
CREAT SERPL-MCNC: 2.6 MG/DL (ref 0.9–1.3)
ESTIMATED AVERAGE GLUCOSE: 160 MG/DL
GFR AFRICAN AMERICAN: 29 ML/MIN/1.73M2
GFR NON-AFRICAN AMERICAN: 24 ML/MIN/1.73M2
GLUCOSE BLD-MCNC: 165 MG/DL (ref 70–99)
HBA1C MFR BLD: 7.2 % (ref 4.2–6.3)
HDLC SERPL-MCNC: 30 MG/DL
LDL CHOLESTEROL DIRECT: 75 MG/DL
POTASSIUM SERPL-SCNC: 4.4 MMOL/L (ref 3.5–5.1)
PROSTATE SPECIFIC ANTIGEN: 1.09 NG/ML (ref 0–4)
SODIUM BLD-SCNC: 142 MMOL/L (ref 135–145)
TOTAL PROTEIN: 6.3 GM/DL (ref 6.4–8.2)
TRIGL SERPL-MCNC: 181 MG/DL

## 2019-05-29 PROCEDURE — 83721 ASSAY OF BLOOD LIPOPROTEIN: CPT

## 2019-05-29 PROCEDURE — 80061 LIPID PANEL: CPT

## 2019-05-29 PROCEDURE — G0103 PSA SCREENING: HCPCS

## 2019-05-29 PROCEDURE — 83036 HEMOGLOBIN GLYCOSYLATED A1C: CPT

## 2019-05-29 PROCEDURE — 80053 COMPREHEN METABOLIC PANEL: CPT

## 2019-08-05 RX ORDER — AMLODIPINE BESYLATE 2.5 MG/1
2.5 TABLET ORAL DAILY
Qty: 30 TABLET | Refills: 5 | Status: SHIPPED | OUTPATIENT
Start: 2019-08-05 | End: 2019-08-05 | Stop reason: SDUPTHER

## 2019-08-05 RX ORDER — AMLODIPINE BESYLATE 2.5 MG/1
2.5 TABLET ORAL DAILY
Qty: 30 TABLET | Refills: 5 | Status: SHIPPED | OUTPATIENT
Start: 2019-08-05 | End: 2020-07-27

## 2019-08-05 RX ORDER — AMLODIPINE BESYLATE 5 MG/1
TABLET ORAL
Qty: 60 TABLET | Refills: 6 | OUTPATIENT
Start: 2019-08-05

## 2019-09-05 ENCOUNTER — OFFICE VISIT (OUTPATIENT)
Dept: CARDIOLOGY CLINIC | Age: 74
End: 2019-09-05
Payer: MEDICARE

## 2019-09-05 VITALS
HEIGHT: 68 IN | HEART RATE: 60 BPM | DIASTOLIC BLOOD PRESSURE: 74 MMHG | BODY MASS INDEX: 29.25 KG/M2 | WEIGHT: 193 LBS | SYSTOLIC BLOOD PRESSURE: 130 MMHG | RESPIRATION RATE: 16 BRPM

## 2019-09-05 DIAGNOSIS — Z95.1 S/P CABG X 3: ICD-10-CM

## 2019-09-05 DIAGNOSIS — N18.4 CHRONIC KIDNEY DISEASE (CKD) STAGE G4/A3, SEVERELY DECREASED GLOMERULAR FILTRATION RATE (GFR) BETWEEN 15-29 ML/MIN/1.73 SQUARE METER AND ALBUMINURIA CREATININE RATIO GREATER THAN 300 MG/G (HCC): Primary | ICD-10-CM

## 2019-09-05 DIAGNOSIS — I25.10 CORONARY ARTERY DISEASE INVOLVING NATIVE CORONARY ARTERY OF NATIVE HEART WITHOUT ANGINA PECTORIS: ICD-10-CM

## 2019-09-05 DIAGNOSIS — I42.9 CARDIOMYOPATHY, UNSPECIFIED TYPE (HCC): ICD-10-CM

## 2019-09-05 DIAGNOSIS — I10 ESSENTIAL HYPERTENSION: ICD-10-CM

## 2019-09-05 DIAGNOSIS — I48.0 PAF (PAROXYSMAL ATRIAL FIBRILLATION) (HCC): ICD-10-CM

## 2019-09-05 DIAGNOSIS — E11.8 TYPE 2 DIABETES MELLITUS WITH COMPLICATION, WITHOUT LONG-TERM CURRENT USE OF INSULIN (HCC): ICD-10-CM

## 2019-09-05 DIAGNOSIS — E78.5 DYSLIPIDEMIA: ICD-10-CM

## 2019-09-05 DIAGNOSIS — Z78.9 STATIN INTOLERANCE: ICD-10-CM

## 2019-09-05 PROCEDURE — G8427 DOCREV CUR MEDS BY ELIG CLIN: HCPCS | Performed by: INTERNAL MEDICINE

## 2019-09-05 PROCEDURE — G8598 ASA/ANTIPLAT THER USED: HCPCS | Performed by: INTERNAL MEDICINE

## 2019-09-05 PROCEDURE — G8417 CALC BMI ABV UP PARAM F/U: HCPCS | Performed by: INTERNAL MEDICINE

## 2019-09-05 PROCEDURE — 4040F PNEUMOC VAC/ADMIN/RCVD: CPT | Performed by: INTERNAL MEDICINE

## 2019-09-05 PROCEDURE — 3017F COLORECTAL CA SCREEN DOC REV: CPT | Performed by: INTERNAL MEDICINE

## 2019-09-05 PROCEDURE — 93000 ELECTROCARDIOGRAM COMPLETE: CPT | Performed by: INTERNAL MEDICINE

## 2019-09-05 PROCEDURE — 3045F PR MOST RECENT HEMOGLOBIN A1C LEVEL 7.0-9.0%: CPT | Performed by: INTERNAL MEDICINE

## 2019-09-05 PROCEDURE — 1036F TOBACCO NON-USER: CPT | Performed by: INTERNAL MEDICINE

## 2019-09-05 PROCEDURE — 1123F ACP DISCUSS/DSCN MKR DOCD: CPT | Performed by: INTERNAL MEDICINE

## 2019-09-05 PROCEDURE — 99214 OFFICE O/P EST MOD 30 MIN: CPT | Performed by: INTERNAL MEDICINE

## 2019-09-05 PROCEDURE — 2022F DILAT RTA XM EVC RTNOPTHY: CPT | Performed by: INTERNAL MEDICINE

## 2019-09-05 RX ORDER — ACETAMINOPHEN 500 MG
1000 TABLET ORAL EVERY 6 HOURS PRN
Qty: 120 TABLET | Refills: 1 | Status: SHIPPED | OUTPATIENT
Start: 2019-09-05 | End: 2020-07-07

## 2019-09-05 NOTE — PROGRESS NOTES
CARDIOLOGY  NOTE  Chief Complaint: Chest pain ,  He has history of coronary artery disease and afib     HPI:   Selena Arriaga is a 76y.o. year old who has history as noted below. HE comes in for chest pain episodes on left side. HE says every thing hurst when he lays down   He says he I has had several episodes of chest pain at rest. He is under a lot of stress son had cancer had kidney removed. He feels his heart racing once a while . Stress test in  August 2018  showed no ischemia . He says the chest pain on the left side , it comes in spasms , He says it occurs at rest. HE is tired all the time and feels fatigued  He had CABG and Mitral valve ring in Oct 2016 , post op course he developed sternal non healing so he had sternal rings placed in 2017 . He has no chest pain ow but he feels winded when he walks . His kidney numbers are also gettign worse . Most creatinine is up to 2.8 He is intolerant of statins so he is on repatha . Leg pain and muscle pain is better after holding statins . He is not on diuretics   His grand daughter had a sudden death while sleeping. Current Outpatient Medications   Medication Sig Dispense Refill    amLODIPine (NORVASC) 2.5 MG tablet Take 1 tablet by mouth daily 30 tablet 5    chlorthalidone (HYGROTON) 25 MG tablet TAKE 1 TABLET BY MOUTH DAILY 30 tablet 5    losartan (COZAAR) 100 MG tablet Take 1 tablet by mouth daily 30 tablet 5    metoprolol succinate (TOPROL XL) 25 MG extended release tablet TAKE 2 TABLETS BY MOUTH DAILY 60 tablet 6    ELIQUIS 2.5 MG TABS tablet TAKE ONE TABLET TWO TIMES A DAY 60 tablet 6    CONTOUR NEXT TEST strip   4    COMFORT EZ PEN NEEDLES 31G X 8 MM MISC   3    alirocumab (PRALUENT) 75 MG/ML SOPN injection pen Inject 1 mL into the skin every 14 days 2 mL 5    nitroGLYCERIN (NITROSTAT) 0.4 MG SL tablet Place 0.4 mg under the tongue every 5 minutes as needed for Chest pain up to max of 3 total doses.  If no relief after 24 hour EKG monitoring 7/161/8,01/26/2017    Conclusion abnormal 48 hours of cardiac monitor finding consistent with sinus rhythm with physiological heart rate variation frequent complex ventricular ectopy. Patient did not report any symptoms for correlation    H/O cardiac catheterization 12/27/2009    Patent coronary arteries with ectasia and minimal coronary luminal irregularities and preserved left ventricular function.  H/O cardiovascular stress test 08/09/2018    Lexiscan Cardiolite. ECG portion of test is negative for ischemia, normal ventricular contractility, no infarct or ischemia noted, normal stress myocardial perfusion, EF 58%. Normal study.  H/O complete electrocardiogram 04/05/2012    H/O Doppler ultrasound 09/13/2007    Bormal study suggestive of lack of evidence of any significant peripheral arterial disease. Patient'a symptoms are very suggestive of non-ischemic and non-vascular etiology. When compared to the previous study of 2005, ther is no significant change.  H/O Doppler ultrasound (Lower extremity) 01/30/2017    Native arteries in the examined lower extremity(ies) are patent, with no elevated velocities. No aneurysms are noted.  H/O percutaneous left heart catheterization 09/23/2016    Multivessel CAD with 100 % occluded RCA. 80 % to 90%  circumflex diffusely diseased. 80% mid LAD focal lesion.  Heat syncope 9/15/2016    [place] (hard of hearing)     Bilateral Ears    Hx of echocardiogram 2/6/19; 10/27/2016    2/6/19  LV function and size normal, mild concentric LVH, no regional wall motion abnormalities, normal diastolic filling pattern for age, mildly dilated LA, sclerotic but non-stenotic aortic valve, mitral annular calcification, RVSP= 27 mmHg, EF 55-60%.  Hyperlipidemia     Hypertension     Macular degeneration     Bilateral Eyes    Other activity(E029.9) 04/05/2012    48 Holter Monitor.  Normal sinus rhythm with frequent low-grade supraventricular ectopy, visceromegaly   :  No costovertebral angle tenderness   Musculoskeletal:  No edema, positive  Tenderness on left side of chest , no deformities. Back- no tenderness, left foot in brace   Integument:  Well hydrated, no rash   Lymphatic:  No lymphadenopathy noted   Neurologic:  Alert & oriented x 3, CN 2-12 normal, normal motor function, normal sensory function, no focal deficits noted   Psychiatric:  Speech and behavior appropriate       Medical decision making and Data review:  DATA:  Lab Results   Component Value Date    TROPONINT <0.010 09/24/2016     BNP:    Lab Results   Component Value Date    PROBNP 776.0 (H) 08/07/2018     PT/INR:  No results found for: [employer]  Lab Results   Component Value Date    LABA1C 7.2 (H) 05/29/2019    LABA1C 6.6 (H) 01/28/2019     Lab Results   Component Value Date    CHOL 124 05/29/2019    TRIG 181 (H) 05/29/2019    HDL 30 (L) 05/29/2019    LDLCALC 97 12/27/2016    LDLDIRECT 75 05/29/2019     Lab Results   Component Value Date    ALT 8 (L) 05/29/2019    AST 10 (L) 05/29/2019     Echo Sept 2016:  Left ventricle function is mildly reduced with EF 45%, mild tricuspid regurgitation. Left ventricular hypertrophy    Holter 2017:  sinus rhythm, some supraventricular ectopy and PVCs recorded, no A. Fib    Arterial doppler Jan 2017          Native arteries in the examined lower extremity(ies) are patent, with no    elevated velocities.  No aneurysms are noted.    Bilateral ABIs are normal.         Holter 7/16/18  2 days of cardiac monitoring consistent with sinus rhythm at average heart rate of 72 bpm.  Fastest rate of 124 occurred at 7:59 AM on July 18 the lowest rate of 53 occluded 9:26 AM on July 17.  Rare PVCs and frequent 12,242 PVCs are noted with ventricular bigeminy and trigeminy.  3 beat run of nonsustained ventricular tachycardia noted on July 18, 2018 at 8 AM.  Conclusion abnormal 48 hours of cardiac monitor finding consistent with sinus rhythm with physiological heart rate palpation on left side   continue aspirin. He has some sternal site pain probabaly due to sternum not healing , stress test in August 2018 showed no ischemia . EF is normal     PAF (paroxysmal atrial fibrillation) ([address]  He is off  amiodarone He is s/p MAZE and atrial appendage removal . He is on  eliquis. Holter did not show any evidence of atrial fibrillation 48 hours He still has some palpitations which could be ventricular ectopy/PVC.  aldactone gave him breast pain. He is status post atrial appendage resection. That still leaves him at risk of embolic events if he goes into A. Fib so continue eliquis at 2.5 mg whicgh is lower dose but he has had nose bleeds in past and her is s/p appendage resection so I am ok with lower dose . denies any bleeding issues    Essential hypertension  Blood pressure is well controlled  He says he checks it at home and it is normal. He is tolerating his meds . He checks it and keeps a log . Doing well. 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 Seen CT surgery no further interventions at this time     Leg pain   Ultrasound of legs in Jan 2017 showed no significant blockage , spjne and arthritis related? Dyslipidemia :  Mikala Penn had lab work recently,  Lipid profile was reviewed with patient. He is getting a lot of leg pain which got better after holding pitavastatin. He could not tolerate lipitor . He cannot tolerate statins , He is tolerating  Repatha, Ldl 70  mmHg  Range . He has been on it for 2 year      Counseled extensively and medication compliance urged. We discussed that for the  prevention of ASCVD our  goal is aggressive risk modification. Patient is encouraged to get an exercise bike and do some cardiovascular exercise Various goals were discussed and questions answered.  Continue current medications. Appropriate prescriptions are addressed and refills ordered. Questions answered and patient verbalizes understanding. Call for any problems, questions, or concerns. Continue all other medications of all above medical condition listed as is. Return in about 6 months (around 3/5/2020).

## 2019-09-16 RX ORDER — APIXABAN 2.5 MG/1
TABLET, FILM COATED ORAL
Qty: 60 TABLET | Refills: 6 | Status: SHIPPED | OUTPATIENT
Start: 2019-09-16 | End: 2020-04-13 | Stop reason: SDUPTHER

## 2019-09-27 ENCOUNTER — TELEPHONE (OUTPATIENT)
Dept: CARDIOLOGY CLINIC | Age: 74
End: 2019-09-27

## 2019-09-27 NOTE — TELEPHONE ENCOUNTER
Received notification via Cover My Med[employer] that patient's Praluent prescription has been approved, effective 6/29/19-9/. Notified pharmacist at [facility] that approval has been obtained. See copy of approval letter attached.

## 2019-09-30 ENCOUNTER — HOSPITAL ENCOUNTER (OUTPATIENT)
Age: 74
Setting detail: SPECIMEN
Discharge: HOME | End: 2019-09-30
Payer: MEDICARE

## 2019-09-30 LAB
ALBUMIN SERPL-MCNC: 3.8 GM/DL (ref 3.4–5)
ALP BLD-CCNC: 100 IU/L (ref 40–129)
ALT SERPL-CCNC: 8 U/L (ref 10–40)
ANION GAP SERPL CALCULATED.3IONS-SCNC: 16 MMOL/L (ref 4–16)
AST SERPL-CCNC: 9 IU/L (ref 15–37)
BILIRUB SERPL-MCNC: 0.3 MG/DL (ref 0–1)
BUN BLDV-MCNC: 67 MG/DL (ref 6–23)
CALCIUM SERPL-MCNC: 9.4 MG/DL (ref 8.3–10.6)
CHLORIDE BLD-SCNC: 102 MMOL/L (ref 99–110)
CHOLESTEROL, FASTING: 126 MG/DL
CO2: 25 MMOL/L (ref 21–32)
CREAT SERPL-MCNC: 3.2 MG/DL (ref 0.9–1.3)
ESTIMATED AVERAGE GLUCOSE: 154 MG/DL
GFR AFRICAN AMERICAN: 23 ML/MIN/1.73M2
GFR NON-AFRICAN AMERICAN: 19 ML/MIN/1.73M2
GLUCOSE FASTING: 126 MG/DL (ref 70–99)
HBA1C MFR BLD: 7 % (ref 4.2–6.3)
HDLC SERPL-MCNC: 28 MG/DL
LDL CHOLESTEROL DIRECT: 65 MG/DL
POTASSIUM SERPL-SCNC: 4.8 MMOL/L (ref 3.5–5.1)
SODIUM BLD-SCNC: 143 MMOL/L (ref 135–145)
TOTAL PROTEIN: 6.3 GM/DL (ref 6.4–8.2)
TRIGLYCERIDE, FASTING: 171 MG/DL

## 2019-09-30 PROCEDURE — 83036 HEMOGLOBIN GLYCOSYLATED A1C: CPT

## 2019-09-30 PROCEDURE — 80061 LIPID PANEL: CPT

## 2019-09-30 PROCEDURE — 80053 COMPREHEN METABOLIC PANEL: CPT

## 2019-11-12 RX ORDER — METOPROLOL SUCCINATE 25 MG/1
50 TABLET, EXTENDED RELEASE ORAL DAILY
Qty: 60 TABLET | Refills: 6 | Status: SHIPPED | OUTPATIENT
Start: 2019-11-12 | End: 2020-12-28 | Stop reason: SDUPTHER

## 2019-11-12 RX ORDER — METOPROLOL SUCCINATE 25 MG/1
50 TABLET, EXTENDED RELEASE ORAL DAILY
Qty: 60 TABLET | Refills: 6 | Status: SHIPPED | OUTPATIENT
Start: 2019-11-12 | End: 2019-12-04

## 2019-11-25 ENCOUNTER — HOSPITAL ENCOUNTER (OUTPATIENT)
Age: 74
Setting detail: SPECIMEN
Discharge: HOME | End: 2019-11-25
Payer: MEDICARE

## 2019-11-25 LAB
ANION GAP SERPL CALCULATED.3IONS-SCNC: 14 MMOL/L (ref 4–16)
BACTERIA: ABNORMAL /HPF
BILIRUBIN URINE: NEGATIVE MG/DL
BLOOD, URINE: NEGATIVE
BUN BLDV-MCNC: 59 MG/DL (ref 6–23)
CALCIUM SERPL-MCNC: 9.8 MG/DL (ref 8.3–10.6)
CAST TYPE: ABNORMAL /HPF
CHLORIDE BLD-SCNC: 102 MMOL/L (ref 99–110)
CLARITY: CLEAR
CO2: 27 MMOL/L (ref 21–32)
COLOR: YELLOW
CREAT SERPL-MCNC: 3.1 MG/DL (ref 0.9–1.3)
CREATININE URINE: 88.9 MG/DL (ref 39–259)
CRYSTAL TYPE: ABNORMAL /HPF
EPITHELIAL CELLS, UA: ABNORMAL /HPF
GFR AFRICAN AMERICAN: 24 ML/MIN/1.73M2
GFR NON-AFRICAN AMERICAN: 20 ML/MIN/1.73M2
GLUCOSE BLD-MCNC: 121 MG/DL (ref 70–99)
GLUCOSE, URINE: NEGATIVE MG/DL
KETONES, URINE: NEGATIVE MG/DL
LEUKOCYTE ESTERASE, URINE: ABNORMAL
MAGNESIUM: 1.9 MG/DL (ref 1.8–2.4)
MUCUS: NEGATIVE HPF
NITRITE URINE, QUANTITATIVE: NEGATIVE
PH, URINE: 5 (ref 5–8)
PHOSPHORUS: 4.7 MG/DL (ref 2.5–4.9)
POTASSIUM SERPL-SCNC: 4.4 MMOL/L (ref 3.5–5.1)
PROT/CREAT RATIO, UR: NORMAL
PROTEIN UA: NEGATIVE MG/DL
RBC URINE: ABNORMAL /HPF (ref 0–3)
SODIUM BLD-SCNC: 143 MMOL/L (ref 135–145)
SPECIFIC GRAVITY UA: 1.02 (ref 1–1.03)
URINE TOTAL PROTEIN: 10.3 MG/DL
UROBILINOGEN, URINE: 0.2 MG/DL (ref 0.2–1)
VOLUME, (UVOL): 12 ML (ref 10–12)
WBC UA: ABNORMAL /HPF (ref 0–2)

## 2019-11-25 PROCEDURE — 82570 ASSAY OF URINE CREATININE: CPT

## 2019-11-25 PROCEDURE — 80048 BASIC METABOLIC PNL TOTAL CA: CPT

## 2019-11-25 PROCEDURE — 84100 ASSAY OF PHOSPHORUS: CPT

## 2019-11-25 PROCEDURE — 81001 URINALYSIS AUTO W/SCOPE: CPT

## 2019-11-25 PROCEDURE — 83735 ASSAY OF MAGNESIUM: CPT

## 2019-11-25 PROCEDURE — 84156 ASSAY OF PROTEIN URINE: CPT

## 2020-02-03 ENCOUNTER — HOSPITAL ENCOUNTER (OUTPATIENT)
Age: 75
Setting detail: SPECIMEN
Discharge: HOME | End: 2020-02-03
Payer: MEDICARE

## 2020-02-03 LAB
ALBUMIN SERPL-MCNC: 3.7 GM/DL (ref 3.4–5)
ALP BLD-CCNC: 110 IU/L (ref 40–129)
ALT SERPL-CCNC: 12 U/L (ref 10–40)
ANION GAP SERPL CALCULATED.3IONS-SCNC: 4 MMOL/L (ref 4–16)
AST SERPL-CCNC: 11 IU/L (ref 15–37)
BILIRUB SERPL-MCNC: 0.4 MG/DL (ref 0–1)
BUN BLDV-MCNC: 46 MG/DL (ref 6–23)
CALCIUM SERPL-MCNC: 9.2 MG/DL (ref 8.3–10.6)
CHLORIDE BLD-SCNC: 107 MMOL/L (ref 99–110)
CHOLESTEROL: 124 MG/DL
CO2: 30 MMOL/L (ref 21–32)
CREAT SERPL-MCNC: 2.4 MG/DL (ref 0.9–1.3)
ESTIMATED AVERAGE GLUCOSE: 163 MG/DL
GFR AFRICAN AMERICAN: 32 ML/MIN/1.73M2
GFR NON-AFRICAN AMERICAN: 27 ML/MIN/1.73M2
GLUCOSE BLD-MCNC: 146 MG/DL (ref 70–99)
HBA1C MFR BLD: 7.3 % (ref 4.2–6.3)
HDLC SERPL-MCNC: 28 MG/DL
LDL CHOLESTEROL DIRECT: 69 MG/DL
POTASSIUM SERPL-SCNC: 5.2 MMOL/L (ref 3.5–5.1)
SODIUM BLD-SCNC: 141 MMOL/L (ref 135–145)
TOTAL PROTEIN: 6 GM/DL (ref 6.4–8.2)
TRIGL SERPL-MCNC: 181 MG/DL

## 2020-02-03 PROCEDURE — 83036 HEMOGLOBIN GLYCOSYLATED A1C: CPT

## 2020-02-03 PROCEDURE — 80053 COMPREHEN METABOLIC PANEL: CPT

## 2020-02-03 PROCEDURE — 83721 ASSAY OF BLOOD LIPOPROTEIN: CPT

## 2020-02-03 PROCEDURE — 80061 LIPID PANEL: CPT

## 2020-03-25 PROBLEM — E78.5 HYPERLIPIDEMIA: Status: RESOLVED | Noted: 2020-03-25 | Resolved: 2020-03-24

## 2020-03-25 PROBLEM — E11.9 DM (DIABETES MELLITUS) (HCC): Status: RESOLVED | Noted: 2020-03-25 | Resolved: 2020-03-24

## 2020-04-28 ENCOUNTER — HOSPITAL ENCOUNTER (OUTPATIENT)
Age: 75
Setting detail: SPECIMEN
Discharge: HOME | End: 2020-04-28
Payer: MEDICARE

## 2020-04-28 LAB
ANION GAP SERPL CALCULATED.3IONS-SCNC: 9 MMOL/L (ref 4–16)
BUN BLDV-MCNC: 61 MG/DL (ref 6–23)
CALCIUM SERPL-MCNC: 8.7 MG/DL (ref 8.3–10.6)
CHLORIDE BLD-SCNC: 99 MMOL/L (ref 99–110)
CO2: 30 MMOL/L (ref 21–32)
CREAT SERPL-MCNC: 2.7 MG/DL (ref 0.9–1.3)
GFR AFRICAN AMERICAN: 28 ML/MIN/1.73M2
GFR NON-AFRICAN AMERICAN: 23 ML/MIN/1.73M2
GLUCOSE FASTING: 298 MG/DL (ref 70–99)
MAGNESIUM: 1.8 MG/DL (ref 1.8–2.4)
PHOSPHORUS: 3.7 MG/DL (ref 2.5–4.9)
POTASSIUM SERPL-SCNC: 4.8 MMOL/L (ref 3.5–5.1)
SODIUM BLD-SCNC: 138 MMOL/L (ref 135–145)

## 2020-04-28 PROCEDURE — 83735 ASSAY OF MAGNESIUM: CPT

## 2020-04-28 PROCEDURE — 80048 BASIC METABOLIC PNL TOTAL CA: CPT

## 2020-04-28 PROCEDURE — 84100 ASSAY OF PHOSPHORUS: CPT

## 2020-05-04 ENCOUNTER — HOSPITAL ENCOUNTER (OUTPATIENT)
Age: 75
Setting detail: SPECIMEN
Discharge: HOME | End: 2020-05-04
Payer: MEDICARE

## 2020-05-04 LAB
ANION GAP SERPL CALCULATED.3IONS-SCNC: 11 MMOL/L (ref 4–16)
BUN BLDV-MCNC: 67 MG/DL (ref 6–23)
CALCIUM SERPL-MCNC: 8.8 MG/DL (ref 8.3–10.6)
CHLORIDE BLD-SCNC: 98 MMOL/L (ref 99–110)
CO2: 29 MMOL/L (ref 21–32)
CREAT SERPL-MCNC: 3 MG/DL (ref 0.9–1.3)
GFR AFRICAN AMERICAN: 25 ML/MIN/1.73M2
GFR NON-AFRICAN AMERICAN: 21 ML/MIN/1.73M2
GLUCOSE BLD-MCNC: 288 MG/DL (ref 70–99)
POTASSIUM SERPL-SCNC: 4.9 MMOL/L (ref 3.5–5.1)
SODIUM BLD-SCNC: 138 MMOL/L (ref 135–145)

## 2020-05-04 PROCEDURE — 80048 BASIC METABOLIC PNL TOTAL CA: CPT

## 2020-05-11 ENCOUNTER — HOSPITAL ENCOUNTER (OUTPATIENT)
Age: 75
Setting detail: SPECIMEN
Discharge: HOME | End: 2020-05-11
Payer: MEDICARE

## 2020-05-11 LAB
ANION GAP SERPL CALCULATED.3IONS-SCNC: 14 MMOL/L (ref 4–16)
BACTERIA: ABNORMAL /HPF
BILIRUBIN URINE: NEGATIVE MG/DL
BLOOD, URINE: NEGATIVE
BUN BLDV-MCNC: 57 MG/DL (ref 6–23)
CALCIUM SERPL-MCNC: 9.1 MG/DL (ref 8.3–10.6)
CAST TYPE: ABNORMAL /HPF
CHLORIDE BLD-SCNC: 99 MMOL/L (ref 99–110)
CLARITY: CLEAR
CO2: 27 MMOL/L (ref 21–32)
COLOR: YELLOW
CREAT SERPL-MCNC: 2.5 MG/DL (ref 0.9–1.3)
CREATININE URINE: 60.8 MG/DL (ref 39–259)
CRYSTAL TYPE: NEGATIVE /HPF
EPITHELIAL CELLS, UA: 3 /HPF
GFR AFRICAN AMERICAN: 31 ML/MIN/1.73M2
GFR NON-AFRICAN AMERICAN: 25 ML/MIN/1.73M2
GLUCOSE BLD-MCNC: 226 MG/DL (ref 70–99)
GLUCOSE, URINE: NEGATIVE MG/DL
KETONES, URINE: NEGATIVE MG/DL
LEUKOCYTE ESTERASE, URINE: NEGATIVE
NITRITE URINE, QUANTITATIVE: NEGATIVE
PH, URINE: 5 (ref 5–8)
POTASSIUM SERPL-SCNC: 4.7 MMOL/L (ref 3.5–5.1)
PROT/CREAT RATIO, UR: 0.2
PROTEIN UA: NEGATIVE MG/DL
RBC URINE: 1 /HPF (ref 0–3)
SODIUM BLD-SCNC: 140 MMOL/L (ref 135–145)
SPECIFIC GRAVITY UA: 1.02 (ref 1–1.03)
URINE TOTAL PROTEIN: 10.3 MG/DL
UROBILINOGEN, URINE: 0.2 MG/DL (ref 0.2–1)
WBC UA: 2 /HPF (ref 0–2)

## 2020-05-11 PROCEDURE — 82570 ASSAY OF URINE CREATININE: CPT

## 2020-05-11 PROCEDURE — 87086 URINE CULTURE/COLONY COUNT: CPT

## 2020-05-11 PROCEDURE — 84156 ASSAY OF PROTEIN URINE: CPT

## 2020-05-11 PROCEDURE — 80048 BASIC METABOLIC PNL TOTAL CA: CPT

## 2020-05-11 PROCEDURE — 81001 URINALYSIS AUTO W/SCOPE: CPT

## 2020-05-12 LAB
CULTURE: NORMAL
Lab: NORMAL
SPECIMEN: NORMAL

## 2020-06-11 ENCOUNTER — OFFICE VISIT (OUTPATIENT)
Dept: CARDIOLOGY CLINIC | Age: 75
End: 2020-06-11
Payer: MEDICARE

## 2020-06-11 VITALS
SYSTOLIC BLOOD PRESSURE: 110 MMHG | RESPIRATION RATE: 18 BRPM | WEIGHT: 199 LBS | DIASTOLIC BLOOD PRESSURE: 68 MMHG | BODY MASS INDEX: 30.16 KG/M2 | HEIGHT: 68 IN | HEART RATE: 80 BPM | TEMPERATURE: 97.8 F

## 2020-06-11 PROCEDURE — G8427 DOCREV CUR MEDS BY ELIG CLIN: HCPCS | Performed by: INTERNAL MEDICINE

## 2020-06-11 PROCEDURE — 1123F ACP DISCUSS/DSCN MKR DOCD: CPT | Performed by: INTERNAL MEDICINE

## 2020-06-11 PROCEDURE — 3017F COLORECTAL CA SCREEN DOC REV: CPT | Performed by: INTERNAL MEDICINE

## 2020-06-11 PROCEDURE — 3051F HG A1C>EQUAL 7.0%<8.0%: CPT | Performed by: INTERNAL MEDICINE

## 2020-06-11 PROCEDURE — G8417 CALC BMI ABV UP PARAM F/U: HCPCS | Performed by: INTERNAL MEDICINE

## 2020-06-11 PROCEDURE — 1036F TOBACCO NON-USER: CPT | Performed by: INTERNAL MEDICINE

## 2020-06-11 PROCEDURE — 4040F PNEUMOC VAC/ADMIN/RCVD: CPT | Performed by: INTERNAL MEDICINE

## 2020-06-11 PROCEDURE — 2022F DILAT RTA XM EVC RTNOPTHY: CPT | Performed by: INTERNAL MEDICINE

## 2020-06-11 PROCEDURE — 99214 OFFICE O/P EST MOD 30 MIN: CPT | Performed by: INTERNAL MEDICINE

## 2020-06-11 NOTE — PROGRESS NOTES
CARDIOLOGY  NOTE  Chief Complaint: Chest pain ,  He has history of coronary artery disease and afib     HPI:   Hershal Goldmann is a 76y.o. year old who has history as noted below. He now walks with a walker. Continues to have intermittent fluttering in his chest and some chest pain when he lays down. Denies any more episodes of epistaxis ever since we reduced his Eliquis dose to 2.5 mg twice daily  Mr. Jaspreet Andrews is post CABG unfortunately he has non-healed sternum which continues to cause intermittent pain but his  stress test in  August 2018  showed no ischemia . He says the chest pain on the left side , it comes in spasms , He says it occurs at rest. HE is tired all the time and feels fatigued  He had CABG and Mitral valve ring in Oct 2016 , post op course he developed sternal non healing so he had sternal rings placed in 2017 .  he feels winded when he walks . His kidney numbers are also gettign worse . Most creatinine is up to 2.8 He is intolerant of statins so he is on repatha . Leg pain and muscle pain is better after holding statins . He is not on diuretics   His grand daughter had a sudden death while sleeping.       Current Outpatient Medications   Medication Sig Dispense Refill    loratadine (CLARITIN) 10 MG tablet Take 10 mg by mouth daily      omeprazole (PRILOSEC) 40 MG delayed release capsule Take 40 mg by mouth daily      guaiFENesin (MUCINEX) 600 MG extended release tablet Take 1,200 mg by mouth 2 times daily      apixaban (ELIQUIS) 2.5 MG TABS tablet TAKE ONE TABLET TWO TIMES A DAY 60 tablet 6    alirocumab (PRALUENT) 75 MG/ML SOAJ injection pen Inject 1 mL into the skin every 14 days 2 pen 6    cyclobenzaprine (FLEXERIL) 10 MG tablet Take 10 mg by mouth 3 times daily as needed for Muscle spasms      torsemide (DEMADEX) 20 MG tablet TAKE 1 TABLET BY MOUTH DAILY 90 tablet 5    metoprolol succinate (TOPROL XL) 25 MG extended release tablet Take 2 tablets by mouth daily 60 tablet 6    acetaminophen (TYLENOL) 500 MG tablet Take 2 tablets by mouth every 6 hours as needed for Pain 120 tablet 1    amLODIPine (NORVASC) 2.5 MG tablet Take 1 tablet by mouth daily 30 tablet 5    losartan (COZAAR) 100 MG tablet Take 1 tablet by mouth daily 30 tablet 5    CONTOUR NEXT TEST strip   4    COMFORT EZ PEN NEEDLES 31G X 8 MM MISC   3    nitroGLYCERIN (NITROSTAT) 0.4 MG SL tablet Place 0.4 mg under the tongue every 5 minutes as needed for Chest pain up to max of 3 total doses. If no relief after 1 dose, call 911.  Glycerin-Polysorbate 80 (REFRESH DRY EYE THERAPY OP) Apply to eye      ondansetron (ZOFRAN-ODT) 4 MG disintegrating tablet Take 4 mg by mouth every 8 hours as needed for Nausea or Vomiting      traZODone (DESYREL) 100 MG tablet Take 100 mg by mouth nightly      Insulin Degludec (TRESIBA FLEXTOUCH) 100 UNIT/ML SOPN Inject 40 Units into the skin nightly      Menthol-Camphor (ICY HOT ADVANCED RELIEF) 16-11 % CREA Apply topically      Sennosides (SENOKOT PO) Take by mouth daily Over The Counter      Acetaminophen (TYLENOL ARTHRITIS PAIN PO) Take 650 mg by mouth as needed      ALBUTEROL IN Inhale into the lungs as needed      Multiple Vitamins-Minerals (ICAPS PO) Take by mouth every morning Over The Counter      SITagliptin (JANUVIA) 50 MG tablet Take 50 mg by mouth every morning       aspirin EC 81 MG EC tablet Take 1 tablet by mouth daily 30 tablet 3    insulin lispro (HUMALOG) 100 UNIT/ML injection vial Inject 0-12 Units into the skin 3 times daily (with meals) (Patient taking differently: Inject into the skin Per Sliding Scale) 1 vial 3     No current facility-administered medications for this visit. Allergies:   Aldactone [spironolactone]; Crestor [rosuvastatin calcium];  Lipitor; and Zocor [simvastatin - high dose]    Patient History:  Past Medical History:   Diagnosis Date    Acid reflux     Arrhythmia     Arthritis     \"Back, Hands, Fingers\"    CAD (coronary artery disease)     09/2000 non-critical; 2004 no signigicante change, Sees Dr. Renetta King CHF (congestive heart failure) [facility]     Chronic back pain     CKD (chronic kidney disease) stage 3, GFR 30-59 ml/min ([facility] [address] 12/03/2015    Sees Dr. Angela Vargas COPD (chronic obstructive pulmonary disease) ([facility] 11/16/15    Diabetes mellitus ([facility] [facility] Dx 1990's    Glaucoma     \"Both Eyes\"    H/O 24 hour EKG monitoring 7/161/8,01/26/2017    Conclusion abnormal 48 hours of cardiac monitor finding consistent with sinus rhythm with physiological heart rate variation frequent complex ventricular ectopy. Patient did not report any symptoms for correlation    H/O cardiac catheterization 12/27/2009    Patent coronary arteries with ectasia and minimal coronary luminal irregularities and preserved left ventricular function.  H/O cardiovascular stress test 08/09/2018    Lexiscan Cardiolite. ECG portion of test is negative for ischemia, normal ventricular contractility, no infarct or ischemia noted, normal stress myocardial perfusion, EF 58%. Normal study.  H/O complete electrocardiogram 04/05/2012    H/O Doppler ultrasound 09/13/2007    Bormal study suggestive of lack of evidence of any significant peripheral arterial disease. Patient'a symptoms are very suggestive of non-ischemic and non-vascular etiology. When compared to the previous study of 2005, ther is no significant change.  H/O Doppler ultrasound (Lower extremity) 01/30/2017    Native arteries in the examined lower extremity(ies) are patent, with no elevated velocities. No aneurysms are noted.  H/O percutaneous left heart catheterization 09/23/2016    Multivessel CAD with 100 % occluded RCA. 80 % to 90%  circumflex diffusely diseased. 80% mid LAD focal lesion.     Heat syncope 9/15/2016    [place] (hard of hearing)     Bilateral Ears    Hx of echocardiogram 2/6/19; 10/27/2016    2/6/19  LV function and size normal, mild concentric LVH, no regional wall stridor,no apical-carotid delay  Eyes:  PERRL, EOMI, Conjunctiva normal, No discharge. Respiratory:  Crackles on right side Normal breath sounds, No respiratory distress, No wheezing, No chest tenderness. ,no use of accessory muscles,   Cardiovascular: (PMI) apex non displaced,no lifts no thrills,S1 and S2 audible, No added heart sounds, No signs of ankle edema, or volume overload, No evidence of JVD, , right side chest pain is reproducible gets worse on pushing forward  GI:  Bowel sounds normal, Soft, No tenderness, No masses, No gross visceromegaly   :  No costovertebral angle tenderness   Musculoskeletal:  No edema, positive  Tenderness on left side of chest , no deformities. Back- no tenderness, left foot in brace   Integument:  Well hydrated, no rash   Lymphatic:  No lymphadenopathy noted   Neurologic:  Alert & oriented x 3, CN 2-12 normal, normal motor function, normal sensory function, no focal deficits noted   Psychiatric:  Speech and behavior appropriate       Medical decision making and Data review:  DATA:  Lab Results   Component Value Date    TROPONINT <0.010 09/24/2016     BNP:    Lab Results   Component Value Date    PROBNP 776.0 (H) 08/07/2018     PT/INR:  No results found for: [email or web address]  Lab Results   Component Value Date    LABA1C 7.3 (H) 02/03/2020    LABA1C 7.0 (H) 09/30/2019     Lab Results   Component Value Date    CHOL 124 02/03/2020    TRIG 181 (H) 02/03/2020    HDL 28 (L) 02/03/2020    LDLCALC 97 12/27/2016    LDLDIRECT 69 02/03/2020     Lab Results   Component Value Date    ALT 12 02/03/2020    AST 11 (L) 02/03/2020     Echo Sept 2016:  Left ventricle function is mildly reduced with EF 45%, mild tricuspid regurgitation. Left ventricular hypertrophy    Holter 2017:  sinus rhythm, some supraventricular ectopy and PVCs recorded, no A. Fib    Arterial doppler Jan 2017          Native arteries in the examined lower extremity(ies) are patent, with no    elevated velocities. No aneurysms are noted. significant blockage , spjne and arthritis related? There was an element of statin induced myopathy as well     Dyslipidemia :  Anat Blandon had lab work recently,  Lipid profile was reviewed with patient. He is getting a lot of leg pain which got better after holding pitavastatin. He could not tolerate lipitor . He cannot tolerate statins , He is tolerating  Repatha, Ldl 70  mmHg  Range . He has been on it for 2 year      Counseled extensively and medication compliance urged. We discussed that for the  prevention of ASCVD our  goal is aggressive risk modification. Patient is encouraged to get an exercise bike and do some cardiovascular exercise Various goals were discussed and questions answered. Continue current medications. Appropriate prescriptions are addressed and refills ordered. Questions answered and patient verbalizes understanding. Call for any problems, questions, or concerns. Continue all other medications of all above medical condition listed as is. Return in about 6 months (around 12/11/2020).

## 2020-06-29 RX ORDER — ALIROCUMAB 75 MG/ML
INJECTION, SOLUTION SUBCUTANEOUS
Qty: 2 ML | Refills: 6 | Status: SHIPPED | OUTPATIENT
Start: 2020-06-29 | End: 2021-01-06

## 2020-07-07 RX ORDER — PSEUDOEPHED/ACETAMINOPH/DIPHEN 30MG-500MG
TABLET ORAL
Qty: 120 TABLET | Refills: 1 | Status: SHIPPED | OUTPATIENT
Start: 2020-07-07 | End: 2021-01-01

## 2020-07-08 ENCOUNTER — HOSPITAL ENCOUNTER (OUTPATIENT)
Age: 75
Setting detail: SPECIMEN
Discharge: HOME | End: 2020-07-08
Payer: MEDICARE

## 2020-07-08 LAB
ANION GAP SERPL CALCULATED.3IONS-SCNC: 10 MMOL/L (ref 4–16)
BUN BLDV-MCNC: 67 MG/DL (ref 6–23)
CALCIUM SERPL-MCNC: 8.7 MG/DL (ref 8.3–10.6)
CHLORIDE BLD-SCNC: 103 MMOL/L (ref 99–110)
CO2: 27 MMOL/L (ref 21–32)
CREAT SERPL-MCNC: 3.2 MG/DL (ref 0.9–1.3)
GFR AFRICAN AMERICAN: 23 ML/MIN/1.73M2
GFR NON-AFRICAN AMERICAN: 19 ML/MIN/1.73M2
GLUCOSE BLD-MCNC: 237 MG/DL (ref 70–99)
POTASSIUM SERPL-SCNC: 4.8 MMOL/L (ref 3.5–5.1)
SODIUM BLD-SCNC: 140 MMOL/L (ref 135–145)

## 2020-07-08 PROCEDURE — 80048 BASIC METABOLIC PNL TOTAL CA: CPT

## 2020-07-13 ENCOUNTER — HOSPITAL ENCOUNTER (OUTPATIENT)
Age: 75
Setting detail: SPECIMEN
Discharge: HOME | End: 2020-07-13
Payer: MEDICARE

## 2020-07-13 LAB
ANION GAP SERPL CALCULATED.3IONS-SCNC: 11 MMOL/L (ref 4–16)
BUN BLDV-MCNC: 60 MG/DL (ref 6–23)
CALCIUM SERPL-MCNC: 8.7 MG/DL (ref 8.3–10.6)
CHLORIDE BLD-SCNC: 102 MMOL/L (ref 99–110)
CO2: 24 MMOL/L (ref 21–32)
CREAT SERPL-MCNC: 2.5 MG/DL (ref 0.9–1.3)
GFR AFRICAN AMERICAN: 31 ML/MIN/1.73M2
GFR NON-AFRICAN AMERICAN: 25 ML/MIN/1.73M2
GLUCOSE BLD-MCNC: 285 MG/DL (ref 70–99)
POTASSIUM SERPL-SCNC: 5.2 MMOL/L (ref 3.5–5.1)
SODIUM BLD-SCNC: 137 MMOL/L (ref 135–145)

## 2020-07-13 PROCEDURE — 80048 BASIC METABOLIC PNL TOTAL CA: CPT

## 2020-07-27 RX ORDER — AMLODIPINE BESYLATE 2.5 MG/1
2.5 TABLET ORAL DAILY
Qty: 30 TABLET | Refills: 5 | Status: SHIPPED | OUTPATIENT
Start: 2020-07-27 | End: 2021-01-06

## 2020-07-29 ENCOUNTER — HOSPITAL ENCOUNTER (OUTPATIENT)
Dept: GENERAL RADIOLOGY | Age: 75
Discharge: HOME | End: 2020-07-29
Payer: MEDICARE

## 2020-07-29 ENCOUNTER — HOSPITAL ENCOUNTER (OUTPATIENT)
Age: 75
Discharge: HOME | End: 2020-07-29
Payer: MEDICARE

## 2020-07-29 PROCEDURE — 71046 X-RAY EXAM CHEST 2 VIEWS: CPT

## 2020-08-03 ENCOUNTER — HOSPITAL ENCOUNTER (OUTPATIENT)
Age: 75
Setting detail: SPECIMEN
Discharge: HOME | End: 2020-08-03
Payer: MEDICARE

## 2020-08-03 LAB
ALBUMIN SERPL-MCNC: 3.6 GM/DL (ref 3.4–5)
ALP BLD-CCNC: 105 IU/L (ref 40–129)
ALT SERPL-CCNC: 10 U/L (ref 10–40)
ANION GAP SERPL CALCULATED.3IONS-SCNC: 11 MMOL/L (ref 4–16)
AST SERPL-CCNC: 11 IU/L (ref 15–37)
BILIRUB SERPL-MCNC: 0.5 MG/DL (ref 0–1)
BUN BLDV-MCNC: 53 MG/DL (ref 6–23)
CALCIUM SERPL-MCNC: 8.7 MG/DL (ref 8.3–10.6)
CHLORIDE BLD-SCNC: 104 MMOL/L (ref 99–110)
CHOLESTEROL: 107 MG/DL
CO2: 27 MMOL/L (ref 21–32)
CREAT SERPL-MCNC: 2.7 MG/DL (ref 0.9–1.3)
ESTIMATED AVERAGE GLUCOSE: 166 MG/DL
GFR AFRICAN AMERICAN: 28 ML/MIN/1.73M2
GFR NON-AFRICAN AMERICAN: 23 ML/MIN/1.73M2
GLUCOSE BLD-MCNC: 131 MG/DL (ref 70–99)
HBA1C MFR BLD: 7.4 % (ref 4.2–6.3)
HDLC SERPL-MCNC: 27 MG/DL
LDL CHOLESTEROL DIRECT: 59 MG/DL
POTASSIUM SERPL-SCNC: 4.9 MMOL/L (ref 3.5–5.1)
SODIUM BLD-SCNC: 142 MMOL/L (ref 135–145)
TOTAL PROTEIN: 5.8 GM/DL (ref 6.4–8.2)
TRIGL SERPL-MCNC: 167 MG/DL

## 2020-08-03 PROCEDURE — 83721 ASSAY OF BLOOD LIPOPROTEIN: CPT

## 2020-08-03 PROCEDURE — 80053 COMPREHEN METABOLIC PANEL: CPT

## 2020-08-03 PROCEDURE — 80061 LIPID PANEL: CPT

## 2020-08-03 PROCEDURE — 83036 HEMOGLOBIN GLYCOSYLATED A1C: CPT

## 2020-11-02 ENCOUNTER — HOSPITAL ENCOUNTER (OUTPATIENT)
Age: 75
Setting detail: SPECIMEN
Discharge: HOME | End: 2020-11-02
Payer: MEDICARE

## 2020-11-02 LAB
ALBUMIN SERPL-MCNC: 3.9 GM/DL (ref 3.4–5)
ALP BLD-CCNC: 101 IU/L (ref 40–129)
ALT SERPL-CCNC: 12 U/L (ref 10–40)
ANION GAP SERPL CALCULATED.3IONS-SCNC: 14 MMOL/L (ref 4–16)
AST SERPL-CCNC: 13 IU/L (ref 15–37)
BACTERIA: ABNORMAL /HPF
BILIRUB SERPL-MCNC: 0.4 MG/DL (ref 0–1)
BILIRUBIN URINE: NEGATIVE MG/DL
BLOOD, URINE: NEGATIVE
BUN BLDV-MCNC: 52 MG/DL (ref 6–23)
CALCIUM SERPL-MCNC: 8.8 MG/DL (ref 8.3–10.6)
CAST TYPE: ABNORMAL /HPF
CHLORIDE BLD-SCNC: 98 MMOL/L (ref 99–110)
CLARITY: CLEAR
CO2: 27 MMOL/L (ref 21–32)
COLOR: YELLOW
CREAT SERPL-MCNC: 2.6 MG/DL (ref 0.9–1.3)
CREATININE URINE: 50.6 MG/DL (ref 39–259)
CRYSTAL TYPE: ABNORMAL /HPF
EPITHELIAL CELLS, UA: ABNORMAL /HPF
GFR AFRICAN AMERICAN: 29 ML/MIN/1.73M2
GFR NON-AFRICAN AMERICAN: 24 ML/MIN/1.73M2
GLUCOSE BLD-MCNC: 308 MG/DL (ref 70–99)
GLUCOSE, URINE: NEGATIVE MG/DL
KETONES, URINE: NEGATIVE MG/DL
LEUKOCYTE ESTERASE, URINE: NEGATIVE
MAGNESIUM: 1.6 MG/DL (ref 1.8–2.4)
MUCUS: NEGATIVE HPF
NITRITE URINE, QUANTITATIVE: NEGATIVE
PH, URINE: 5 (ref 5–8)
PHOSPHORUS: 3.8 MG/DL (ref 2.5–4.9)
POTASSIUM SERPL-SCNC: 4.9 MMOL/L (ref 3.5–5.1)
PROT/CREAT RATIO, UR: 0.3
PROTEIN UA: NEGATIVE MG/DL
RBC URINE: ABNORMAL /HPF (ref 0–3)
SODIUM BLD-SCNC: 139 MMOL/L (ref 135–145)
SPECIFIC GRAVITY UA: 1.01 (ref 1–1.03)
TOTAL PROTEIN: 6.1 GM/DL (ref 6.4–8.2)
URINE TOTAL PROTEIN: 13.8 MG/DL
UROBILINOGEN, URINE: 0.2 MG/DL (ref 0.2–1)
VOLUME, (UVOL): 12 ML (ref 10–12)
WBC UA: ABNORMAL /HPF (ref 0–2)

## 2020-11-02 PROCEDURE — 81001 URINALYSIS AUTO W/SCOPE: CPT

## 2020-11-02 PROCEDURE — 84100 ASSAY OF PHOSPHORUS: CPT

## 2020-11-02 PROCEDURE — 83735 ASSAY OF MAGNESIUM: CPT

## 2020-11-02 PROCEDURE — 84156 ASSAY OF PROTEIN URINE: CPT

## 2020-11-02 PROCEDURE — 82570 ASSAY OF URINE CREATININE: CPT

## 2020-11-02 PROCEDURE — 80053 COMPREHEN METABOLIC PANEL: CPT

## 2020-11-04 ENCOUNTER — TELEPHONE (OUTPATIENT)
Dept: CARDIOLOGY CLINIC | Age: 75
End: 2020-11-04

## 2020-11-04 RX ORDER — ALIROCUMAB 75 MG/ML
75 INJECTION, SOLUTION SUBCUTANEOUS
Qty: 1.96 ML | Refills: 0 | COMMUNITY
Start: 2020-11-04 | End: 2021-02-01

## 2020-11-05 ENCOUNTER — TELEPHONE (OUTPATIENT)
Dept: CARDIOLOGY CLINIC | Age: 75
End: 2020-11-05

## 2020-11-05 NOTE — TELEPHONE ENCOUNTER
Patient needs renewal of prior auth for Praluent. Demographics submitted via CoverMyMeds (KEY: [id number]). Clinical questions answered; waiting for response.

## 2020-11-05 NOTE — TELEPHONE ENCOUNTER
Received notification from [employer] that patient's Praluent has been approved, effective 8/7/2020-11/5/2021, Case ID# [id number]. See copy of approval letter attached. Notified [facility] that approval has been obtained.

## 2020-12-07 NOTE — TELEPHONE ENCOUNTER
Patient's home care nurse called stating that the patient needs his Eliquis prescription to be for 90 days to the [facility].

## 2020-12-28 RX ORDER — METOPROLOL SUCCINATE 25 MG/1
50 TABLET, EXTENDED RELEASE ORAL DAILY
Qty: 180 TABLET | Refills: 3 | Status: SHIPPED | OUTPATIENT
Start: 2020-12-28 | End: 2021-01-14

## 2020-12-28 NOTE — TELEPHONE ENCOUNTER
Patient requested refill for Metoprolol and Eliquis.  Rx's routed to Dr. Alethea Saini for approval.

## 2021-01-01 ENCOUNTER — HOSPITAL ENCOUNTER (OUTPATIENT)
Age: 76
Setting detail: SPECIMEN
Discharge: HOME | End: 2021-04-26
Payer: MEDICARE

## 2021-01-01 ENCOUNTER — HOSPITAL ENCOUNTER (OUTPATIENT)
Age: 76
Setting detail: SPECIMEN
Discharge: HOME | End: 2021-03-29
Payer: MEDICARE

## 2021-01-01 ENCOUNTER — HOSPITAL ENCOUNTER (OUTPATIENT)
Age: 76
Setting detail: SPECIMEN
Discharge: HOME | End: 2021-10-26
Payer: MEDICARE

## 2021-01-01 ENCOUNTER — HOSPITAL ENCOUNTER (OUTPATIENT)
Age: 76
Setting detail: SPECIMEN
Discharge: HOME | End: 2021-10-04
Payer: MEDICARE

## 2021-01-01 ENCOUNTER — HOSPITAL ENCOUNTER (OUTPATIENT)
Age: 76
Setting detail: SPECIMEN
Discharge: HOME | End: 2021-06-02
Payer: MEDICARE

## 2021-01-01 ENCOUNTER — OFFICE VISIT (OUTPATIENT)
Dept: CARDIOLOGY CLINIC | Age: 76
End: 2021-01-01
Payer: MEDICARE

## 2021-01-01 ENCOUNTER — TELEPHONE (OUTPATIENT)
Dept: CARDIOLOGY CLINIC | Age: 76
End: 2021-01-01

## 2021-01-01 ENCOUNTER — HOSPITAL ENCOUNTER (OUTPATIENT)
Age: 76
Setting detail: SPECIMEN
Discharge: HOME | End: 2021-06-28
Payer: MEDICARE

## 2021-01-01 ENCOUNTER — HOSPITAL ENCOUNTER (OUTPATIENT)
Age: 76
Setting detail: SPECIMEN
Discharge: HOME | End: 2021-08-23
Payer: MEDICARE

## 2021-01-01 ENCOUNTER — HOSPITAL ENCOUNTER (OUTPATIENT)
Age: 76
Setting detail: SPECIMEN
Discharge: HOME | End: 2021-07-19
Payer: MEDICARE

## 2021-01-01 ENCOUNTER — HOSPITAL ENCOUNTER (OUTPATIENT)
Age: 76
Setting detail: SPECIMEN
Discharge: HOME | End: 2021-08-09
Payer: MEDICARE

## 2021-01-01 VITALS
SYSTOLIC BLOOD PRESSURE: 140 MMHG | HEIGHT: 68 IN | OXYGEN SATURATION: 96 % | BODY MASS INDEX: 29.67 KG/M2 | HEART RATE: 88 BPM | WEIGHT: 195.8 LBS | DIASTOLIC BLOOD PRESSURE: 66 MMHG | RESPIRATION RATE: 20 BRPM

## 2021-01-01 DIAGNOSIS — N18.4 CHRONIC KIDNEY DISEASE (CKD) STAGE G4/A3, SEVERELY DECREASED GLOMERULAR FILTRATION RATE (GFR) BETWEEN 15-29 ML/MIN/1.73 SQUARE METER AND ALBUMINURIA CREATININE RATIO GREATER THAN 300 MG/G (HCC): ICD-10-CM

## 2021-01-01 DIAGNOSIS — R06.02 SOB (SHORTNESS OF BREATH) ON EXERTION: ICD-10-CM

## 2021-01-01 DIAGNOSIS — I25.10 CORONARY ARTERY DISEASE INVOLVING NATIVE CORONARY ARTERY OF NATIVE HEART WITHOUT ANGINA PECTORIS: ICD-10-CM

## 2021-01-01 DIAGNOSIS — Z95.1 S/P CABG X 3: ICD-10-CM

## 2021-01-01 DIAGNOSIS — Z86.010 HISTORY OF COLONIC POLYPS: ICD-10-CM

## 2021-01-01 DIAGNOSIS — I42.9 CARDIOMYOPATHY, UNSPECIFIED TYPE (HCC): ICD-10-CM

## 2021-01-01 DIAGNOSIS — E78.5 DYSLIPIDEMIA: ICD-10-CM

## 2021-01-01 DIAGNOSIS — I10 ESSENTIAL HYPERTENSION: ICD-10-CM

## 2021-01-01 DIAGNOSIS — Z78.9 STATIN INTOLERANCE: ICD-10-CM

## 2021-01-01 DIAGNOSIS — R00.2 PALPITATIONS: ICD-10-CM

## 2021-01-01 DIAGNOSIS — I48.0 PAF (PAROXYSMAL ATRIAL FIBRILLATION) (HCC): Primary | ICD-10-CM

## 2021-01-01 LAB
ALBUMIN ELP: 3.7 GM/DL (ref 3.2–5.6)
ALBUMIN SERPL-MCNC: 3.4 GM/DL (ref 3.4–5)
ALBUMIN SERPL-MCNC: 3.6 GM/DL (ref 3.4–5)
ALBUMIN SERPL-MCNC: 3.7 GM/DL (ref 3.4–5)
ALBUMIN SERPL-MCNC: 3.7 GM/DL (ref 3.4–5)
ALBUMIN SERPL-MCNC: 3.8 GM/DL (ref 3.4–5)
ALBUMIN SERPL-MCNC: 3.9 GM/DL (ref 3.4–5)
ALBUMIN SERPL-MCNC: 3.9 GM/DL (ref 3.4–5)
ALP BLD-CCNC: 107 IU/L (ref 40–129)
ALP BLD-CCNC: 111 IU/L (ref 40–129)
ALP BLD-CCNC: 113 IU/L (ref 40–129)
ALP BLD-CCNC: 116 IU/L (ref 40–129)
ALP BLD-CCNC: 120 IU/L (ref 40–129)
ALP BLD-CCNC: 130 IU/L (ref 40–129)
ALP BLD-CCNC: 94 IU/L (ref 40–129)
ALPHA-1-GLOBULIN: 0.3 GM/DL (ref 0.1–0.4)
ALPHA-2-GLOBULIN: 0.7 GM/DL (ref 0.4–1.2)
ALT SERPL-CCNC: 10 U/L (ref 10–40)
ALT SERPL-CCNC: 11 U/L (ref 10–40)
ALT SERPL-CCNC: 11 U/L (ref 10–40)
ALT SERPL-CCNC: 12 U/L (ref 10–40)
ALT SERPL-CCNC: 12 U/L (ref 10–40)
ALT SERPL-CCNC: 13 U/L (ref 10–40)
ALT SERPL-CCNC: 18 U/L (ref 10–40)
ANION GAP SERPL CALCULATED.3IONS-SCNC: 10 MMOL/L (ref 4–16)
ANION GAP SERPL CALCULATED.3IONS-SCNC: 10 MMOL/L (ref 4–16)
ANION GAP SERPL CALCULATED.3IONS-SCNC: 11 MMOL/L (ref 4–16)
ANION GAP SERPL CALCULATED.3IONS-SCNC: 14 MMOL/L (ref 4–16)
ANION GAP SERPL CALCULATED.3IONS-SCNC: 3 MMOL/L (ref 4–16)
ANION GAP SERPL CALCULATED.3IONS-SCNC: 6 MMOL/L (ref 4–16)
ANION GAP SERPL CALCULATED.3IONS-SCNC: 7 MMOL/L (ref 4–16)
ANION GAP SERPL CALCULATED.3IONS-SCNC: 8 MMOL/L (ref 4–16)
ANION GAP SERPL CALCULATED.3IONS-SCNC: 9 MMOL/L (ref 4–16)
AST SERPL-CCNC: 11 IU/L (ref 15–37)
AST SERPL-CCNC: 11 IU/L (ref 15–37)
AST SERPL-CCNC: 12 IU/L (ref 15–37)
AST SERPL-CCNC: 13 IU/L (ref 15–37)
AST SERPL-CCNC: 14 IU/L (ref 15–37)
BACTERIA: NEGATIVE /HPF
BACTERIA: NEGATIVE /HPF
BASOPHILS ABSOLUTE: 0.1 K/CU MM
BASOPHILS RELATIVE PERCENT: 1.6 % (ref 0–1)
BETA GLOBULIN: 1.2 GM/DL (ref 0.5–1.3)
BILIRUB SERPL-MCNC: 0.3 MG/DL (ref 0–1)
BILIRUBIN URINE: NEGATIVE MG/DL
BILIRUBIN URINE: NEGATIVE MG/DL
BLOOD, URINE: NEGATIVE
BLOOD, URINE: NEGATIVE
BUN BLDV-MCNC: 31 MG/DL (ref 6–23)
BUN BLDV-MCNC: 34 MG/DL (ref 6–23)
BUN BLDV-MCNC: 37 MG/DL (ref 6–23)
BUN BLDV-MCNC: 38 MG/DL (ref 6–23)
BUN BLDV-MCNC: 42 MG/DL (ref 6–23)
BUN BLDV-MCNC: 43 MG/DL (ref 6–23)
BUN BLDV-MCNC: 45 MG/DL (ref 6–23)
BUN BLDV-MCNC: 48 MG/DL (ref 6–23)
BUN BLDV-MCNC: 48 MG/DL (ref 6–23)
CALCIUM SERPL-MCNC: 8.4 MG/DL (ref 8.3–10.6)
CALCIUM SERPL-MCNC: 8.7 MG/DL (ref 8.3–10.6)
CALCIUM SERPL-MCNC: 8.9 MG/DL (ref 8.3–10.6)
CALCIUM SERPL-MCNC: 8.9 MG/DL (ref 8.3–10.6)
CALCIUM SERPL-MCNC: 9 MG/DL (ref 8.3–10.6)
CALCIUM SERPL-MCNC: 9 MG/DL (ref 8.3–10.6)
CALCIUM SERPL-MCNC: 9.1 MG/DL (ref 8.3–10.6)
CALCIUM SERPL-MCNC: 9.3 MG/DL (ref 8.3–10.6)
CALCIUM SERPL-MCNC: 9.5 MG/DL (ref 8.3–10.6)
CAST TYPE: ABNORMAL /HPF
CAST TYPE: ABNORMAL /HPF
CHLORIDE BLD-SCNC: 101 MMOL/L (ref 99–110)
CHLORIDE BLD-SCNC: 101 MMOL/L (ref 99–110)
CHLORIDE BLD-SCNC: 102 MMOL/L (ref 99–110)
CHLORIDE BLD-SCNC: 104 MMOL/L (ref 99–110)
CHLORIDE BLD-SCNC: 96 MMOL/L (ref 99–110)
CHLORIDE BLD-SCNC: 97 MMOL/L (ref 99–110)
CHLORIDE BLD-SCNC: 99 MMOL/L (ref 99–110)
CHOLESTEROL: 140 MG/DL
CHOLESTEROL: 151 MG/DL
CLARITY: CLEAR
CLARITY: CLEAR
CO2: 23 MMOL/L (ref 21–32)
CO2: 26 MMOL/L (ref 21–32)
CO2: 28 MMOL/L (ref 21–32)
CO2: 28 MMOL/L (ref 21–32)
CO2: 29 MMOL/L (ref 21–32)
CO2: 30 MMOL/L (ref 21–32)
CO2: 30 MMOL/L (ref 21–32)
CO2: 32 MMOL/L (ref 21–32)
CO2: 36 MMOL/L (ref 21–32)
COLOR: YELLOW
COLOR: YELLOW
CREAT SERPL-MCNC: 2.2 MG/DL (ref 0.9–1.3)
CREAT SERPL-MCNC: 2.4 MG/DL (ref 0.9–1.3)
CREAT SERPL-MCNC: 2.4 MG/DL (ref 0.9–1.3)
CREAT SERPL-MCNC: 2.5 MG/DL (ref 0.9–1.3)
CREAT SERPL-MCNC: 2.9 MG/DL (ref 0.9–1.3)
CREAT SERPL-MCNC: 2.9 MG/DL (ref 0.9–1.3)
CREATININE URINE: 54.6 MG/DL (ref 39–259)
CREATININE URINE: 60.6 MG/DL (ref 39–259)
CRYSTAL TYPE: ABNORMAL /HPF
CRYSTAL TYPE: NEGATIVE /HPF
DIFFERENTIAL TYPE: ABNORMAL
EOSINOPHILS ABSOLUTE: 0.2 K/CU MM
EOSINOPHILS RELATIVE PERCENT: 3.9 % (ref 0–3)
EPITHELIAL CELLS, UA: ABNORMAL /HPF
EPITHELIAL CELLS, UA: NEGATIVE /HPF
ESTIMATED AVERAGE GLUCOSE: 169 MG/DL
ESTIMATED AVERAGE GLUCOSE: 197 MG/DL
GAMMA GLOBULIN: 0.9 GM/DL (ref 0.5–1.6)
GFR AFRICAN AMERICAN: 26 ML/MIN/1.73M2
GFR AFRICAN AMERICAN: 26 ML/MIN/1.73M2
GFR AFRICAN AMERICAN: 31 ML/MIN/1.73M2
GFR AFRICAN AMERICAN: 32 ML/MIN/1.73M2
GFR AFRICAN AMERICAN: 32 ML/MIN/1.73M2
GFR AFRICAN AMERICAN: 35 ML/MIN/1.73M2
GFR NON-AFRICAN AMERICAN: 21 ML/MIN/1.73M2
GFR NON-AFRICAN AMERICAN: 21 ML/MIN/1.73M2
GFR NON-AFRICAN AMERICAN: 25 ML/MIN/1.73M2
GFR NON-AFRICAN AMERICAN: 26 ML/MIN/1.73M2
GFR NON-AFRICAN AMERICAN: 26 ML/MIN/1.73M2
GFR NON-AFRICAN AMERICAN: 29 ML/MIN/1.73M2
GLUCOSE BLD-MCNC: 115 MG/DL (ref 70–99)
GLUCOSE BLD-MCNC: 144 MG/DL (ref 70–99)
GLUCOSE BLD-MCNC: 174 MG/DL (ref 70–99)
GLUCOSE BLD-MCNC: 175 MG/DL (ref 70–99)
GLUCOSE BLD-MCNC: 236 MG/DL (ref 70–99)
GLUCOSE BLD-MCNC: 293 MG/DL (ref 70–99)
GLUCOSE BLD-MCNC: 356 MG/DL (ref 70–99)
GLUCOSE FASTING: 274 MG/DL (ref 70–99)
GLUCOSE FASTING: 95 MG/DL (ref 70–99)
GLUCOSE, URINE: NEGATIVE MG/DL
GLUCOSE, URINE: NEGATIVE MG/DL
HBA1C MFR BLD: 7.5 % (ref 4.2–6.3)
HBA1C MFR BLD: 8.5 % (ref 4.2–6.3)
HCT VFR BLD CALC: 38.6 % (ref 42–52)
HDLC SERPL-MCNC: 30 MG/DL
HDLC SERPL-MCNC: 41 MG/DL
HEMOGLOBIN: 12.6 GM/DL (ref 13.5–18)
IMMATURE NEUTROPHIL %: 0.9 % (ref 0–0.43)
KETONES, URINE: NEGATIVE MG/DL
KETONES, URINE: NEGATIVE MG/DL
LDL CHOLESTEROL CALCULATED: 63 MG/DL
LDL CHOLESTEROL DIRECT: 82 MG/DL
LEUKOCYTE ESTERASE, URINE: NEGATIVE
LEUKOCYTE ESTERASE, URINE: NEGATIVE
LYMPHOCYTES ABSOLUTE: 1 K/CU MM
LYMPHOCYTES RELATIVE PERCENT: 17.8 % (ref 24–44)
Lab: NORMAL
MAGNESIUM: 2.5 MG/DL (ref 1.8–2.4)
MAGNESIUM: 2.6 MG/DL (ref 1.8–2.4)
MCH RBC QN AUTO: 30 PG (ref 27–31)
MCHC RBC AUTO-ENTMCNC: 32.6 % (ref 32–36)
MCV RBC AUTO: 91.9 FL (ref 78–100)
MONOCYTES ABSOLUTE: 0.6 K/CU MM
MONOCYTES RELATIVE PERCENT: 9.7 % (ref 0–4)
MUCUS: NEGATIVE HPF
NITRITE URINE, QUANTITATIVE: NEGATIVE
NITRITE URINE, QUANTITATIVE: NEGATIVE
PDW BLD-RTO: 15.1 % (ref 11.7–14.9)
PH, URINE: 6 (ref 5–8)
PH, URINE: 7 (ref 5–8)
PHOSPHORUS: 3.8 MG/DL (ref 2.5–4.9)
PHOSPHORUS: 4.5 MG/DL (ref 2.5–4.9)
PLATELET # BLD: 148 K/CU MM (ref 140–440)
PMV BLD AUTO: 8.9 FL (ref 7.5–11.1)
POTASSIUM SERPL-SCNC: 4.6 MMOL/L (ref 3.5–5.1)
POTASSIUM SERPL-SCNC: 4.7 MMOL/L (ref 3.5–5.1)
POTASSIUM SERPL-SCNC: 4.8 MMOL/L (ref 3.5–5.1)
POTASSIUM SERPL-SCNC: 4.9 MMOL/L (ref 3.5–5.1)
POTASSIUM SERPL-SCNC: 4.9 MMOL/L (ref 3.5–5.1)
POTASSIUM SERPL-SCNC: 5 MMOL/L (ref 3.5–5.1)
POTASSIUM SERPL-SCNC: 5.3 MMOL/L (ref 3.5–5.1)
PROT/CREAT RATIO, UR: 1
PROT/CREAT RATIO, UR: 1.4
PROTEIN UA: 30 MG/DL
PROTEIN UA: 30 MG/DL
RBC # BLD: 4.2 M/CU MM (ref 4.6–6.2)
RBC URINE: ABNORMAL /HPF (ref 0–3)
RBC URINE: NEGATIVE /HPF (ref 0–3)
SEGMENTED NEUTROPHILS ABSOLUTE COUNT: 3.8 K/CU MM
SEGMENTED NEUTROPHILS RELATIVE PERCENT: 66.1 % (ref 36–66)
SODIUM BLD-SCNC: 135 MMOL/L (ref 135–145)
SODIUM BLD-SCNC: 135 MMOL/L (ref 135–145)
SODIUM BLD-SCNC: 137 MMOL/L (ref 135–145)
SODIUM BLD-SCNC: 137 MMOL/L (ref 135–145)
SODIUM BLD-SCNC: 138 MMOL/L (ref 135–145)
SODIUM BLD-SCNC: 139 MMOL/L (ref 135–145)
SODIUM BLD-SCNC: 140 MMOL/L (ref 135–145)
SODIUM BLD-SCNC: 140 MMOL/L (ref 135–145)
SODIUM BLD-SCNC: 143 MMOL/L (ref 135–145)
SPECIFIC GRAVITY UA: 1.01 (ref 1–1.03)
SPECIFIC GRAVITY UA: 1.02 (ref 1–1.03)
SPEP INTERPRETATION: NORMAL
T4 FREE: 1.26 NG/DL (ref 0.9–1.8)
TEST NAME: NORMAL
TISSUE TRANSGLUTAMINASE ANTIBODY: 2 U/ML (ref 0–5)
TOTAL IMMATURE NEUTOROPHIL: 0.05 K/CU MM
TOTAL PROTEIN: 5.4 GM/DL (ref 6.4–8.2)
TOTAL PROTEIN: 5.9 GM/DL (ref 6.4–8.2)
TOTAL PROTEIN: 6.1 GM/DL (ref 6.4–8.2)
TOTAL PROTEIN: 6.1 GM/DL (ref 6.4–8.2)
TOTAL PROTEIN: 6.2 GM/DL (ref 6.4–8.2)
TOTAL PROTEIN: 6.3 GM/DL (ref 6.4–8.2)
TOTAL PROTEIN: 6.7 GM/DL (ref 6.4–8.2)
TOTAL PROTEIN: 6.7 GM/DL (ref 6.4–8.2)
TRANSGLUTAMINASE IGA: 3 U/ML (ref 0–3)
TRIGL SERPL-MCNC: 148 MG/DL
TRIGL SERPL-MCNC: 233 MG/DL
TSH HIGH SENSITIVITY: 3.82 UIU/ML (ref 0.27–4.2)
URINE TOTAL PROTEIN: 63.1 MG/DL
URINE TOTAL PROTEIN: 73.9 MG/DL
UROBILINOGEN, URINE: 0.2 MG/DL (ref 0.2–1)
UROBILINOGEN, URINE: 0.2 MG/DL (ref 0.2–1)
VOLUME, (UVOL): 12 ML (ref 10–12)
WBC # BLD: 5.7 K/CU MM (ref 4–10.5)
WBC UA: 1 /HPF (ref 0–2)
WBC UA: ABNORMAL /HPF (ref 0–2)

## 2021-01-01 PROCEDURE — 83735 ASSAY OF MAGNESIUM: CPT

## 2021-01-01 PROCEDURE — 99214 OFFICE O/P EST MOD 30 MIN: CPT | Performed by: INTERNAL MEDICINE

## 2021-01-01 PROCEDURE — 84156 ASSAY OF PROTEIN URINE: CPT

## 2021-01-01 PROCEDURE — 80053 COMPREHEN METABOLIC PANEL: CPT

## 2021-01-01 PROCEDURE — 80048 BASIC METABOLIC PNL TOTAL CA: CPT

## 2021-01-01 PROCEDURE — 83036 HEMOGLOBIN GLYCOSYLATED A1C: CPT

## 2021-01-01 PROCEDURE — 80061 LIPID PANEL: CPT

## 2021-01-01 PROCEDURE — 84100 ASSAY OF PHOSPHORUS: CPT

## 2021-01-01 PROCEDURE — G8427 DOCREV CUR MEDS BY ELIG CLIN: HCPCS | Performed by: INTERNAL MEDICINE

## 2021-01-01 PROCEDURE — 85025 COMPLETE CBC W/AUTO DIFF WBC: CPT

## 2021-01-01 PROCEDURE — 82570 ASSAY OF URINE CREATININE: CPT

## 2021-01-01 PROCEDURE — 84165 PROTEIN E-PHORESIS SERUM: CPT

## 2021-01-01 PROCEDURE — 84443 ASSAY THYROID STIM HORMONE: CPT

## 2021-01-01 PROCEDURE — 1036F TOBACCO NON-USER: CPT | Performed by: INTERNAL MEDICINE

## 2021-01-01 PROCEDURE — 1123F ACP DISCUSS/DSCN MKR DOCD: CPT | Performed by: INTERNAL MEDICINE

## 2021-01-01 PROCEDURE — 81001 URINALYSIS AUTO W/SCOPE: CPT

## 2021-01-01 PROCEDURE — 83516 IMMUNOASSAY NONANTIBODY: CPT

## 2021-01-01 PROCEDURE — 84439 ASSAY OF FREE THYROXINE: CPT

## 2021-01-01 PROCEDURE — G8417 CALC BMI ABV UP PARAM F/U: HCPCS | Performed by: INTERNAL MEDICINE

## 2021-01-01 PROCEDURE — 83721 ASSAY OF BLOOD LIPOPROTEIN: CPT

## 2021-01-01 PROCEDURE — 4040F PNEUMOC VAC/ADMIN/RCVD: CPT | Performed by: INTERNAL MEDICINE

## 2021-01-01 RX ORDER — ALIROCUMAB 75 MG/ML
75 INJECTION, SOLUTION SUBCUTANEOUS
Qty: 2 ML | Refills: 6 | Status: SHIPPED | OUTPATIENT
Start: 2021-01-01

## 2021-01-04 ENCOUNTER — HOSPITAL ENCOUNTER (OUTPATIENT)
Age: 76
Setting detail: SPECIMEN
Discharge: HOME | End: 2021-01-04
Payer: MEDICARE

## 2021-01-04 LAB
ALBUMIN SERPL-MCNC: 3.8 GM/DL (ref 3.4–5)
ALP BLD-CCNC: 109 IU/L (ref 40–129)
ALT SERPL-CCNC: 12 U/L (ref 10–40)
ANION GAP SERPL CALCULATED.3IONS-SCNC: 4 MMOL/L (ref 4–16)
AST SERPL-CCNC: 14 IU/L (ref 15–37)
BILIRUB SERPL-MCNC: 0.5 MG/DL (ref 0–1)
BUN BLDV-MCNC: 45 MG/DL (ref 6–23)
CALCIUM SERPL-MCNC: 9.1 MG/DL (ref 8.3–10.6)
CHLORIDE BLD-SCNC: 99 MMOL/L (ref 99–110)
CO2: 38 MMOL/L (ref 21–32)
CREAT SERPL-MCNC: 2.8 MG/DL (ref 0.9–1.3)
CREATININE URINE: 54.4 MG/DL (ref 39–259)
GFR AFRICAN AMERICAN: 27 ML/MIN/1.73M2
GFR NON-AFRICAN AMERICAN: 22 ML/MIN/1.73M2
GLUCOSE BLD-MCNC: 229 MG/DL (ref 70–99)
MAGNESIUM: 2.4 MG/DL (ref 1.8–2.4)
PHOSPHORUS: 4.3 MG/DL (ref 2.5–4.9)
POTASSIUM SERPL-SCNC: 5.1 MMOL/L (ref 3.5–5.1)
PROT/CREAT RATIO, UR: 0.5
SODIUM BLD-SCNC: 141 MMOL/L (ref 135–145)
TOTAL PROTEIN: 6.1 GM/DL (ref 6.4–8.2)
URINE TOTAL PROTEIN: 25.6 MG/DL

## 2021-01-04 PROCEDURE — 83735 ASSAY OF MAGNESIUM: CPT

## 2021-01-04 PROCEDURE — 82570 ASSAY OF URINE CREATININE: CPT

## 2021-01-04 PROCEDURE — 84156 ASSAY OF PROTEIN URINE: CPT

## 2021-01-04 PROCEDURE — 80053 COMPREHEN METABOLIC PANEL: CPT

## 2021-01-04 PROCEDURE — 84100 ASSAY OF PHOSPHORUS: CPT

## 2021-01-14 ENCOUNTER — OFFICE VISIT (OUTPATIENT)
Dept: CARDIOLOGY CLINIC | Age: 76
End: 2021-01-14
Payer: MEDICARE

## 2021-01-14 VITALS
BODY MASS INDEX: 30.46 KG/M2 | HEIGHT: 68 IN | TEMPERATURE: 97.2 F | SYSTOLIC BLOOD PRESSURE: 132 MMHG | DIASTOLIC BLOOD PRESSURE: 88 MMHG | RESPIRATION RATE: 18 BRPM | HEART RATE: 84 BPM | WEIGHT: 201 LBS

## 2021-01-14 DIAGNOSIS — E11.8 TYPE 2 DIABETES MELLITUS WITH COMPLICATION (HCC): ICD-10-CM

## 2021-01-14 DIAGNOSIS — I48.0 PAF (PAROXYSMAL ATRIAL FIBRILLATION) (HCC): ICD-10-CM

## 2021-01-14 DIAGNOSIS — R00.2 PALPITATIONS: ICD-10-CM

## 2021-01-14 DIAGNOSIS — E11.8 TYPE 2 DIABETES MELLITUS WITH COMPLICATION, WITHOUT LONG-TERM CURRENT USE OF INSULIN (HCC): ICD-10-CM

## 2021-01-14 DIAGNOSIS — Z95.1 S/P CABG X 3: ICD-10-CM

## 2021-01-14 DIAGNOSIS — I10 ESSENTIAL HYPERTENSION: ICD-10-CM

## 2021-01-14 DIAGNOSIS — R06.02 SOB (SHORTNESS OF BREATH) ON EXERTION: ICD-10-CM

## 2021-01-14 DIAGNOSIS — Z78.9 STATIN INTOLERANCE: ICD-10-CM

## 2021-01-14 DIAGNOSIS — E78.5 DYSLIPIDEMIA: ICD-10-CM

## 2021-01-14 DIAGNOSIS — I25.10 CORONARY ARTERY DISEASE INVOLVING NATIVE CORONARY ARTERY OF NATIVE HEART WITHOUT ANGINA PECTORIS: ICD-10-CM

## 2021-01-14 DIAGNOSIS — I42.9 CARDIOMYOPATHY, UNSPECIFIED TYPE (HCC): Primary | ICD-10-CM

## 2021-01-14 PROCEDURE — 99214 OFFICE O/P EST MOD 30 MIN: CPT | Performed by: INTERNAL MEDICINE

## 2021-01-14 PROCEDURE — 93000 ELECTROCARDIOGRAM COMPLETE: CPT | Performed by: INTERNAL MEDICINE

## 2021-01-14 PROCEDURE — G8427 DOCREV CUR MEDS BY ELIG CLIN: HCPCS | Performed by: INTERNAL MEDICINE

## 2021-01-14 PROCEDURE — 3046F HEMOGLOBIN A1C LEVEL >9.0%: CPT | Performed by: INTERNAL MEDICINE

## 2021-01-14 PROCEDURE — 4040F PNEUMOC VAC/ADMIN/RCVD: CPT | Performed by: INTERNAL MEDICINE

## 2021-01-14 PROCEDURE — G8417 CALC BMI ABV UP PARAM F/U: HCPCS | Performed by: INTERNAL MEDICINE

## 2021-01-14 PROCEDURE — 1123F ACP DISCUSS/DSCN MKR DOCD: CPT | Performed by: INTERNAL MEDICINE

## 2021-01-14 PROCEDURE — 3017F COLORECTAL CA SCREEN DOC REV: CPT | Performed by: INTERNAL MEDICINE

## 2021-01-14 PROCEDURE — G8484 FLU IMMUNIZE NO ADMIN: HCPCS | Performed by: INTERNAL MEDICINE

## 2021-01-14 PROCEDURE — 2022F DILAT RTA XM EVC RTNOPTHY: CPT | Performed by: INTERNAL MEDICINE

## 2021-01-14 PROCEDURE — 1036F TOBACCO NON-USER: CPT | Performed by: INTERNAL MEDICINE

## 2021-01-14 RX ORDER — METOPROLOL SUCCINATE 100 MG/1
50 TABLET, EXTENDED RELEASE ORAL DAILY
Qty: 90 TABLET | Refills: 3 | Status: ON HOLD | OUTPATIENT
Start: 2021-01-14 | End: 2022-01-01 | Stop reason: HOSPADM

## 2021-01-14 NOTE — PROGRESS NOTES
[id number] Score for Atrial Fibrillation Stroke Risk   Risk   Factors  Component Value   C CHF No 0   H HTN Yes 1   A2 Age >= 76 Yes,  (69 y.o.) 2   D DM Yes 1   S2 Prior Stroke/TIA No 0   V Vascular Disease No 0   A Age 74-69 No,  (69 y.o.) 0   Sc Sex male 0    [id number]  Score  4   Score last updated 1/14/21 24:16 AM EST    Click here for a link to the UpToDate guideline \"Atrial Fibrillation: Anticoagulation therapy to prevent embolization    Disclaimer: Risk Score calculation is dependent on accuracy of patient problem list and past encounter diagnosis.

## 2021-01-14 NOTE — PROGRESS NOTES
by mouth daily      omeprazole (PRILOSEC) 40 MG delayed release capsule Take 40 mg by mouth daily      cyclobenzaprine (FLEXERIL) 10 MG tablet Take 10 mg by mouth nightly Takes one tablet at hs      torsemide (DEMADEX) 20 MG tablet TAKE 1 TABLET BY MOUTH DAILY 90 tablet 5    nitroGLYCERIN (NITROSTAT) 0.4 MG SL tablet Place 0.4 mg under the tongue every 5 minutes as needed for Chest pain up to max of 3 total doses. If no relief after 1 dose, call 911.  traZODone (DESYREL) 100 MG tablet Take 100 mg by mouth nightly      Insulin Degludec (TRESIBA FLEXTOUCH) 100 UNIT/ML SOPN Inject 40 Units into the skin nightly      Menthol-Camphor (ICY HOT ADVANCED RELIEF) 16-11 % CREA Apply topically      Sennosides (SENOKOT PO) Take by mouth daily Over The Counter      Acetaminophen (TYLENOL ARTHRITIS PAIN PO) Take 650 mg by mouth as needed      ALBUTEROL IN Inhale into the lungs as needed      Multiple Vitamins-Minerals (ICAPS PO) Take by mouth every morning Over The Counter      SITagliptin (JANUVIA) 50 MG tablet Take 50 mg by mouth every morning       aspirin EC 81 MG EC tablet Take 1 tablet by mouth daily 30 tablet 3    insulin lispro (HUMALOG) 100 UNIT/ML injection vial Inject 0-12 Units into the skin 3 times daily (with meals) (Patient taking differently: Inject into the skin Per Sliding Scale) 1 vial 3    guaiFENesin (MUCINEX) 600 MG extended release tablet Take 600 mg by mouth 2 times daily       CONTOUR NEXT TEST strip   4    COMFORT EZ PEN NEEDLES 31G X 8 MM MISC   3    Glycerin-Polysorbate 80 (REFRESH DRY EYE THERAPY OP) Apply to eye       No current facility-administered medications for this visit.         Allergies:   Aldactone [spironolactone], Crestor [rosuvastatin calcium], Lipitor, and Zocor [simvastatin - high dose]    Patient History:  Past Medical History:   Diagnosis Date    Acid reflux     Arrhythmia     Arthritis     \"Back, Hands, Fingers\"    CAD (coronary artery disease)     09/2000 non-critical; 2004 no signigicante change, Sees Dr. Kathleen Newman CHF (congestive heart failure) ([facility]     Chronic back pain     CKD (chronic kidney disease) stage 3, GFR 30-59 ml/min 12/03/2015    Sees Dr. Lauri Duncan    COPD (chronic obstructive pulmonary disease) ([facility] 11/16/15    Diabetes mellitus ([facility] [address] Dx 1990's    Glaucoma     \"Both Eyes\"    H/O 24 hour EKG monitoring 7/161/8,01/26/2017    Conclusion abnormal 48 hours of cardiac monitor finding consistent with sinus rhythm with physiological heart rate variation frequent complex ventricular ectopy. Patient did not report any symptoms for correlation    H/O cardiac catheterization 12/27/2009    Patent coronary arteries with ectasia and minimal coronary luminal irregularities and preserved left ventricular function.  H/O cardiovascular stress test 08/09/2018    Lexiscan Cardiolite. ECG portion of test is negative for ischemia, normal ventricular contractility, no infarct or ischemia noted, normal stress myocardial perfusion, EF 58%. Normal study.  H/O complete electrocardiogram 04/05/2012    H/O Doppler ultrasound 09/13/2007    Bormal study suggestive of lack of evidence of any significant peripheral arterial disease. Patient'a symptoms are very suggestive of non-ischemic and non-vascular etiology. When compared to the previous study of 2005, ther is no significant change.  H/O Doppler ultrasound (Lower extremity) 01/30/2017    Native arteries in the examined lower extremity(ies) are patent, with no elevated velocities. No aneurysms are noted.  H/O percutaneous left heart catheterization 09/23/2016    Multivessel CAD with 100 % occluded RCA. 80 % to 90%  circumflex diffusely diseased. 80% mid LAD focal lesion.     Heat syncope 9/15/2016    [place] (hard of hearing)     Bilateral Ears    Hx of echocardiogram 2/6/19; 10/27/2016    2/6/19  LV function and size normal, mild concentric LVH, no regional wall motion abnormalities, normal diastolic filling pattern for age, mildly dilated LA, sclerotic but non-stenotic aortic valve, mitral annular calcification, RVSP= 27 mmHg, EF 55-60%.  Hyperlipidemia     Hypertension     Macular degeneration     Bilateral Eyes    Other activity(E029.9) 04/05/2012    48 Holter Monitor. Normal sinus rhythm with frequent low-grade supraventricular ectopy, without clinically significant arrhythmias.  PAF (paroxysmal atrial fibrillation) (HCC)     PVD (peripheral vascular disease) ([address]     S/P CABG x 3 10/25/2016    10/3/16 LIMA-LAD, SVG-PDA, SVG-Cx + Maze+Appendage resection Dr Aby Mariano 2000's    Nose    SOB (shortness of breath) on exertion 8/12/2014    Teeth missing     Upper And Lower    Ventricular ectopy     supraventricular and ventricular ectopy    Wears dentures     Full Upper    Wears glasses      Past Surgical History:   Procedure Laterality Date    CARDIAC SURGERY  10/03/2016    CABG (3 Bypasses)    COLONOSCOPY  Last Done In 2000's    Polyps Removed In Past    CORONARY ARTERY BYPASS GRAFT  10/03/2016    Coronary Artery Bypass Graft x 3. LIMA to LAD. SVG to PDA. SVG to Cx. MVR with CG ring. LA appendage resection.  Epi and endocardial MAZE.    CYST REMOVAL  2000's    Back    DENTAL SURGERY      Teeth Extracted In Past    EYE SURGERY Bilateral 2015    Cataracts With Lens Implants    FRACTURE SURGERY Left 01/11/2016    Broken Left Hip With Hardware    OTHER SURGICAL HISTORY  10/03/2017    sternal plate    SKIN CANCER EXCISION  2000's    Nose    THORACENTESIS Left 10/06/2016    [facility]     Family History   Problem Relation Age of Onset    Early Death Mother 48        Liver Cancer    Cancer Mother         Liver Cancer    Cancer Father         Prostate Cancer    Diabetes Father     Other Father         \"Black Lung\"    Cancer Sister         Breast Cancer    Breast Cancer Sister     Cancer Son         Prostate Cancer     Social History     Tobacco Use    Smoking status: Former Smoker     Packs/day: 2.00     Years: 30.00     Pack years: 60.00     Types: Cigarettes     Start date: 65     Quit date:      Years since quittin.0    Smokeless tobacco: Never Used   Substance Use Topics    Alcohol use: No        Review of Systems:   · Constitutional: No Fever or Weight Loss   · Eyes: No Decreased Vision  · ENT: No Headaches, Hearing Loss or Vertigo  · Cardiovascular: as per note above , right-sided breast pain  · Respiratory: No cough or wheezing and as per note above. · Gastrointestinal: No abdominal pain, appetite loss, blood in stools, constipation, diarrhea or heartburn  · Genitourinary: No dysuria, trouble voiding, or hematuria  · Musculoskeletal:  None. Leg weakness and pain  · Integumentary: No rash or pruritis  · Neurological: No TIA or stroke symptoms  · Psychiatric: No anxiety or depression  · Endocrine: No malaise, fatigue or temperature intolerance  · Hematologic/Lymphatic: No bleeding problems, blood clots or swollen lymph nodes, had nose bleeds in past   · Allergic/Immunologic: No nasal congestion or hives    Objective:      Physical Exam:  /88   Pulse 84   Temp 97.2 °F (36.2 °C)   Resp 18   Ht 5' 8\" (1.727 m)   Wt 201 lb (91.2 kg)   BMI 30.56 kg/m²   Wt Readings from Last 3 Encounters:   21 201 lb (91.2 kg)   20 204 lb (92.5 kg)   20 199 lb (90.3 kg)     Body mass index is 30.56 kg/m². Vitals:    21 1108   BP: 132/88   Pulse: 84   Resp: 18   Temp: 97.2 °F (36.2 °C)     Repeat BP was 138/66    General Appearance:  No distress, conversant  Constitutional:  Well developed, Well nourished, No acute distress, Non-toxic appearance. HENT:  Normocephalic, Atraumatic, Bilateral external ears normal, Oropharynx moist, No oral exudates, Nose normal. Neck- Normal range of motion, No tenderness, Supple, No stridor,no apical-carotid delay  Eyes:  PERRL, EOMI, Conjunctiva normal, No discharge.    Respiratory:  Crackles on right side Normal breath sounds, No respiratory distress, No wheezing, No chest tenderness. ,no use of accessory muscles,   Cardiovascular: (PMI) apex non displaced,no lifts no thrills,S1 and S2 audible, No added heart sounds, No signs of ankle edema, or volume overload, No evidence of JVD, , right side chest pain is reproducible gets worse on pushing forward  GI:  Bowel sounds normal, Soft, No tenderness, No masses, No gross visceromegaly   :  No costovertebral angle tenderness   Musculoskeletal:  No edema, positive  Tenderness on left side of chest , no deformities. Back- no tenderness, left foot in brace   Integument:  Well hydrated, no rash   Lymphatic:  No lymphadenopathy noted   Neurologic:  Alert & oriented x 3, CN 2-12 normal, normal motor function, normal sensory function, no focal deficits noted   Psychiatric:  Speech and behavior appropriate       Medical decision making and Data review:  DATA:  Lab Results   Component Value Date    TROPONINT <0.010 09/24/2016     BNP:    Lab Results   Component Value Date    PROBNP 776.0 (H) 08/07/2018     PT/INR:  No results found for: BufferBox  Lab Results   Component Value Date    LABA1C 7.4 (H) 08/03/2020    LABA1C 7.3 (H) 02/03/2020     Lab Results   Component Value Date    CHOL 107 08/03/2020    TRIG 167 (H) 08/03/2020    HDL 27 (L) 08/03/2020    LDLCALC 97 12/27/2016    LDLDIRECT 59 08/03/2020     Lab Results   Component Value Date    ALT 12 01/04/2021    AST 14 (L) 01/04/2021     Echo Sept 2016:  Left ventricle function is mildly reduced with EF 45%, mild tricuspid regurgitation. Left ventricular hypertrophy    Holter 2017:  sinus rhythm, some supraventricular ectopy and PVCs recorded, no A. Fib    Arterial doppler Jan 2017          Native arteries in the examined lower extremity(ies) are patent, with no    elevated velocities.  No aneurysms are noted.    Bilateral ABIs are normal.         Holter 7/16/18  2 days of cardiac monitoring consistent with sinus rhythm at average heart rate of 72 bpm.  Fastest rate of 124 occurred at 7:59 AM on July 18 the lowest rate of 53 occluded 9:26 AM on July 17.  Rare PVCs and frequent 12,242 PVCs are noted with ventricular bigeminy and trigeminy.  3 beat run of nonsustained ventricular tachycardia noted on July 18, 2018 at 8 AM.  Conclusion abnormal 48 hours of cardiac monitor finding consistent with sinus rhythm with physiological heart rate variation and frequent complex ventricular ectopy.  Patient did not report any symptoms for correlation. Stress test 8/2018     Tangela Dotson portion of stress test is negative for ischemia by diagnostic criteria.    Normal EF 58 % with normal ventricular contractility.    No infarct or ischemia noted.    Normal stress myocardial perfusion.    This is a normal study.         Echo 2/6/19   Summary   LV function and size are normal, Ejection Fraction 55-60 %.   Mild concentric left ventricular hypertrophy.   No regional wall motion abnormalities were detected.   Normal diastolic filling pattern for age.  Rachel Tha dilated left atrium.   Sclerotic, but non-stenotic aortic valve.   Mitral annular calcification is present.  RVSP= 27 mmHg.   No evidence of pericardial effusion.       All labs, medications and tests reviewed by myself including data and history from outside source , patient and available family . Assessment & Plan:    1. Cardiomyopathy, unspecified type ([address]    2. Essential hypertension    3. PAF (paroxysmal atrial fibrillation) ([facility]    4. Palpitations    5. S/P CABG x 3    6. SOB (shortness of breath) on exertion    7. Statin intolerance    8. Type 2 diabetes mellitus with complication ([facility])    9. Coronary artery disease involving native coronary artery of native heart without angina pectoris    10. Dyslipidemia    11.  Type 2 diabetes mellitus with complication, without long-term current use of insulin ([facility])       CAD (coronary artery disease)  He is s/p CABG in 10/16 with 1. MVR with # 28 CG ring and  CABG X 3 withLIMA to LAD,SVG to PDA,SVG to Cx. 3.LA appendage resection and  With Epicardial  and endocardial MAZE . Sternal nonhealing with intermittent chest pain stress test in August 2018 showed no ischemia . EF is normal do not think it is angina    Cardiomyopathy ([address]  Chemic cardiomyopathy with improved EF of 55 to 60% he is short of breath , EF is normal  He is on torsemide , creatinine is 2.7  range Dr Ann Santiago . He has gained 45 lbs since Jan 2019 . Aldactone caused breast pain in the past       PAF (paroxysmal atrial fibrillation) ([address]  Longstanding history of intermittent palpitations even before his CABG. He is off  amiodarone He is s/p MAZE and atrial appendage removal . He is on  eliquis. Post CABG Holter in 2018  did not show any evidence of atrial fibrillation 48 hours He still has some palpitations which could be ventricular ectopy/PVC.   He is status post atrial appendage resection. That still leaves him at risk of embolic events if he goes into A. Fib so continue eliquis at 2.5 mg whicgh is lower dose but he has had nose bleeds in past and her is s/p appendage resection so I am ok with lower dose . denies any bleeding issues. holter showing some svt and nsvt runs . Will increase metoprolol and if he continues to have palpitations we will plan 30-day monitor    Essential hypertension  Blood pressure is well controlled  He says he checks it at home and it is normal. He is tolerating his meds . He checks it and keeps a log . Doing well. 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  Seen CT surgery no further interventions at this time stress test in 2018 due to the symptoms were negative I do not think we need to pursue it further at this time    Leg pain /back pain  unfortunately looks like he is

## 2021-01-25 ENCOUNTER — HOSPITAL ENCOUNTER (OUTPATIENT)
Age: 76
Setting detail: SPECIMEN
Discharge: HOME | End: 2021-01-25
Payer: MEDICARE

## 2021-01-25 LAB
C-REACTIVE PROTEIN, HIGH SENSITIVITY: 11.1 MG/L
ERYTHROCYTE SEDIMENTATION RATE: 29 MM/HR (ref 0–20)
HCT VFR BLD CALC: 40.8 % (ref 42–52)
HEMOGLOBIN: 13.3 GM/DL (ref 13.5–18)
MCH RBC QN AUTO: 30.9 PG (ref 27–31)
MCHC RBC AUTO-ENTMCNC: 32.6 % (ref 32–36)
MCV RBC AUTO: 94.9 FL (ref 78–100)
PDW BLD-RTO: 13.7 % (ref 11.7–14.9)
PLATELET # BLD: 167 K/CU MM (ref 140–440)
PMV BLD AUTO: 9.1 FL (ref 7.5–11.1)
RBC # BLD: 4.3 M/CU MM (ref 4.6–6.2)
WBC # BLD: 6.1 K/CU MM (ref 4–10.5)

## 2021-01-25 PROCEDURE — 85027 COMPLETE CBC AUTOMATED: CPT

## 2021-01-25 PROCEDURE — 86140 C-REACTIVE PROTEIN: CPT

## 2021-01-25 PROCEDURE — 85652 RBC SED RATE AUTOMATED: CPT

## 2021-02-01 ENCOUNTER — HOSPITAL ENCOUNTER (OUTPATIENT)
Age: 76
Setting detail: SPECIMEN
Discharge: HOME | End: 2021-02-01
Payer: MEDICARE

## 2021-02-01 LAB
ALBUMIN SERPL-MCNC: 4.1 GM/DL (ref 3.4–5)
ALP BLD-CCNC: 117 IU/L (ref 40–129)
ALT SERPL-CCNC: 26 U/L (ref 10–40)
ANION GAP SERPL CALCULATED.3IONS-SCNC: 20 MMOL/L (ref 4–16)
AST SERPL-CCNC: 19 IU/L (ref 15–37)
BACTERIA: ABNORMAL /HPF
BILIRUB SERPL-MCNC: 0.4 MG/DL (ref 0–1)
BILIRUBIN URINE: NEGATIVE MG/DL
BLOOD, URINE: ABNORMAL
BUN BLDV-MCNC: 40 MG/DL (ref 6–23)
CALCIUM SERPL-MCNC: 9.6 MG/DL (ref 8.3–10.6)
CAST TYPE: NEGATIVE /HPF
CHLORIDE BLD-SCNC: 101 MMOL/L (ref 99–110)
CHOLESTEROL, FASTING: 144 MG/DL
CLARITY: ABNORMAL
CO2: 20 MMOL/L (ref 21–32)
COLOR: YELLOW
CREAT SERPL-MCNC: 2.4 MG/DL (ref 0.9–1.3)
CREATININE URINE: 55.3 MG/DL (ref 39–259)
CRYSTAL TYPE: NEGATIVE /HPF
EPITHELIAL CELLS, UA: ABNORMAL /HPF
ESTIMATED AVERAGE GLUCOSE: 160 MG/DL
GFR AFRICAN AMERICAN: 32 ML/MIN/1.73M2
GFR NON-AFRICAN AMERICAN: 27 ML/MIN/1.73M2
GLUCOSE FASTING: 117 MG/DL (ref 70–99)
GLUCOSE, URINE: NEGATIVE MG/DL
HBA1C MFR BLD: 7.2 % (ref 4.2–6.3)
HDLC SERPL-MCNC: 33 MG/DL
KETONES, URINE: NEGATIVE MG/DL
LDL CHOLESTEROL DIRECT: 79 MG/DL
LEUKOCYTE ESTERASE, URINE: NEGATIVE
NITRITE URINE, QUANTITATIVE: NEGATIVE
PH, URINE: 5.5 (ref 5–8)
POTASSIUM SERPL-SCNC: 4.3 MMOL/L (ref 3.5–5.1)
PROT/CREAT RATIO, UR: 0.6
PROTEIN UA: ABNORMAL MG/DL
RBC URINE: NEGATIVE /HPF (ref 0–3)
SODIUM BLD-SCNC: 141 MMOL/L (ref 135–145)
SPECIFIC GRAVITY UA: 1.02 (ref 1–1.03)
TOTAL PROTEIN: 6.8 GM/DL (ref 6.4–8.2)
TRIGLYCERIDE, FASTING: 220 MG/DL
URINE TOTAL PROTEIN: 35.9 MG/DL
UROBILINOGEN, URINE: 0.2 MG/DL (ref 0.2–1)
WBC UA: NEGATIVE /HPF (ref 0–2)

## 2021-02-01 PROCEDURE — 83036 HEMOGLOBIN GLYCOSYLATED A1C: CPT

## 2021-02-01 PROCEDURE — 84156 ASSAY OF PROTEIN URINE: CPT

## 2021-02-01 PROCEDURE — 82570 ASSAY OF URINE CREATININE: CPT

## 2021-02-01 PROCEDURE — 81001 URINALYSIS AUTO W/SCOPE: CPT

## 2021-02-01 PROCEDURE — 80061 LIPID PANEL: CPT

## 2021-02-01 PROCEDURE — 80053 COMPREHEN METABOLIC PANEL: CPT

## 2021-02-01 RX ORDER — ALIROCUMAB 75 MG/ML
75 INJECTION, SOLUTION SUBCUTANEOUS
Qty: 2 ML | Refills: 6 | Status: SHIPPED | OUTPATIENT
Start: 2021-02-01 | End: 2021-01-01

## 2021-07-12 NOTE — TELEPHONE ENCOUNTER
Home health nurse called requesting refill on patients eliquis 2.5 mg twice daily, requesting 90 day supply sent to [facility]. Last office visit 1/21/2021 and next office visit scheduled with Dr Mp Augustin 8/5/2021.  Refill pended and routed to Mikala Thompson NP for approval.

## 2021-08-02 NOTE — TELEPHONE ENCOUNTER
Patient requested refill for Praulent.  Rx routed to [facility] for approval.

## 2021-08-05 NOTE — PROGRESS NOTES
CARDIOLOGY  NOTE  Chief Complaint: Chest pain ,  He has history of coronary artery disease and afib   palpitaions  HPI:   Sierra Palomares is a 68y.o. year old who has history as noted below. Kathya Zhao is using a    walker due to severe back pain. He reports intermittent palpitations while he is just sitting there once delaney  While or after he has been walking or exerting . He has longstanding history of  intermittent fluttering in his chest and some chest pain when he lays down. Denies any more episodes of epistaxis ever since we reduced his Eliquis dose to 2.5 mg twice daily. Creatinine is stable between 2.2 to 2.5   Mr. Lissette Smith is post CABG nonunion of sternal fracture  He had CABG and Mitral valve ring in Oct 2016 , post op course he developed sternal non healing so he had sternal rings placed in 2017 .  he feels winded when he walks . He is intolerant of statins so he is on repatha . Leg pain and muscle pain is better after holding statins . He is not on  Any diuretics   His grand daughter had a sudden death while sleeping.   No other history of sudden death in the family    Current Outpatient Medications   Medication Sig Dispense Refill    PRALUENT 75 MG/ML SOAJ injection pen INJECT 1 ML INTO THE SKIN EVERY 14 DAYS 2 mL 6    dapagliflozin (FARXIGA) 5 MG tablet Take 1 tablet by mouth every morning 90 tablet 1    losartan (COZAAR) 25 MG tablet Take 1 tablet by mouth daily 90 tablet 1    apixaban (ELIQUIS) 2.5 MG TABS tablet TAKE ONE TABLET TWO TIMES A  tablet 3    torsemide (DEMADEX) 20 MG tablet Take 1 tablet by mouth 2 times daily 180 tablet 3    acetaminophen (ACETAMINOPHEN EXTRA STRENGTH) 500 MG tablet Take 2 tablets by mouth every 4 hours as needed for Pain Do not take more than 6 tablets per day 240 tablet 1    metoprolol succinate (TOPROL XL) 100 MG extended release tablet Take 0.5 tablets by mouth daily 90 tablet 3    amLODIPine (NORVASC) 10 MG tablet Take 1 tablet by mouth daily 90 tablet 5    Magnesium Oxide 400 MG CAPS Take 400 mg by mouth 2 times daily 180 capsule 3    mupirocin (BACTROBAN) 2 % ointment Apply topically as needed       loratadine (CLARITIN) 10 MG tablet Take 10 mg by mouth daily      omeprazole (PRILOSEC) 40 MG delayed release capsule Take 40 mg by mouth daily      guaiFENesin (MUCINEX) 600 MG extended release tablet Take 600 mg by mouth 2 times daily       cyclobenzaprine (FLEXERIL) 10 MG tablet Take 10 mg by mouth nightly Takes one tablet at hs      CONTOUR NEXT TEST strip   4    COMFORT EZ PEN NEEDLES 31G X 8 MM MISC   3    nitroGLYCERIN (NITROSTAT) 0.4 MG SL tablet Place 0.4 mg under the tongue every 5 minutes as needed for Chest pain up to max of 3 total doses. If no relief after 1 dose, call 911.  Glycerin-Polysorbate 80 (REFRESH DRY EYE THERAPY OP) Apply to eye      traZODone (DESYREL) 100 MG tablet Take 100 mg by mouth nightly      Insulin Degludec (TRESIBA FLEXTOUCH) 100 UNIT/ML SOPN Inject 40 Units into the skin nightly       Menthol-Camphor (ICY HOT ADVANCED RELIEF) 16-11 % CREA Apply topically as needed       Sennosides (SENOKOT PO) Take by mouth as needed Over The Counter       ALBUTEROL IN Inhale into the lungs as needed      Multiple Vitamins-Minerals (ICAPS PO) Take by mouth every morning Over The Counter      SITagliptin (JANUVIA) 50 MG tablet Take 50 mg by mouth every morning       aspirin EC 81 MG EC tablet Take 1 tablet by mouth daily 30 tablet 3    insulin lispro (HUMALOG) 100 UNIT/ML injection vial Inject 0-12 Units into the skin 3 times daily (with meals) (Patient taking differently: Inject into the skin Per Sliding Scale) 1 vial 3     No current facility-administered medications for this visit.        Allergies:   Aldactone [spironolactone], Crestor [rosuvastatin calcium], Lipitor, and Zocor [simvastatin - high dose]    Patient History:  Past Medical History:   Diagnosis Date    Acid reflux     Arrhythmia     Arthritis     \"Back, Hands, Fingers\"    CAD (coronary artery disease)     09/2000 non-critical; 2004 no signigicante change, Sees Dr. Dina Smith CHF (congestive heart failure) (HCC)     Chronic back pain     CKD (chronic kidney disease) stage 3, GFR 30-59 ml/min ([facility] [address] 12/03/2015    Sees Dr. Jacqueline Toney COPD (chronic obstructive pulmonary disease) ([facility] [address] 11/16/15    Diabetes mellitus ([facility] [address] Dx 1990's    Glaucoma     \"Both Eyes\"    H/O 24 hour EKG monitoring 7/161/8,01/26/2017    Conclusion abnormal 48 hours of cardiac monitor finding consistent with sinus rhythm with physiological heart rate variation frequent complex ventricular ectopy. Patient did not report any symptoms for correlation    H/O cardiac catheterization 12/27/2009    Patent coronary arteries with ectasia and minimal coronary luminal irregularities and preserved left ventricular function.  H/O cardiovascular stress test 08/09/2018    Lexiscan Cardiolite. ECG portion of test is negative for ischemia, normal ventricular contractility, no infarct or ischemia noted, normal stress myocardial perfusion, EF 58%. Normal study.  H/O complete electrocardiogram 04/05/2012    H/O Doppler ultrasound 09/13/2007    Bormal study suggestive of lack of evidence of any significant peripheral arterial disease. Patient'a symptoms are very suggestive of non-ischemic and non-vascular etiology. When compared to the previous study of 2005, ther is no significant change.  H/O Doppler ultrasound (Lower extremity) 01/30/2017    Native arteries in the examined lower extremity(ies) are patent, with no elevated velocities. No aneurysms are noted.  H/O percutaneous left heart catheterization 09/23/2016    Multivessel CAD with 100 % occluded RCA. 80 % to 90%  circumflex diffusely diseased. 80% mid LAD focal lesion.     Heat syncope 9/15/2016    [place] (hard of hearing)     Bilateral Ears    Hx of echocardiogram 2/6/19; 10/27/2016    2/6/19  LV function and size normal, mild concentric LVH, no regional wall motion abnormalities, normal diastolic filling pattern for age, mildly dilated LA, sclerotic but non-stenotic aortic valve, mitral annular calcification, RVSP= 27 mmHg, EF 55-60%.  Hyperlipidemia     Hypertension     Macular degeneration     Bilateral Eyes    Other activity(E029.9) 04/05/2012    48 Holter Monitor. Normal sinus rhythm with frequent low-grade supraventricular ectopy, without clinically significant arrhythmias.  PAF (paroxysmal atrial fibrillation) (HCC)     PVD (peripheral vascular disease) ([address]     S/P CABG x 3 10/25/2016    10/3/16 LIMA-LAD, SVG-PDA, SVG-Cx + Maze+Appendage resection Dr Koki Lutz 2000's    Nose    SOB (shortness of breath) on exertion 8/12/2014    Teeth missing     Upper And Lower    Ventricular ectopy     supraventricular and ventricular ectopy    Wears dentures     Full Upper    Wears glasses      Past Surgical History:   Procedure Laterality Date    CARDIAC SURGERY  10/03/2016    CABG (3 Bypasses)    COLONOSCOPY  Last Done In 2000's    Polyps Removed In Past    CORONARY ARTERY BYPASS GRAFT  10/03/2016    Coronary Artery Bypass Graft x 3. LIMA to LAD. SVG to PDA. SVG to Cx. MVR with CG ring. LA appendage resection.  Epi and endocardial MAZE.    CYST REMOVAL  2000's    Back    DENTAL SURGERY      Teeth Extracted In Past    EYE SURGERY Bilateral 2015    Cataracts With Lens Implants    FRACTURE SURGERY Left 01/11/2016    Broken Left Hip With Hardware    OTHER SURGICAL HISTORY  10/03/2017    sternal plate    SKIN CANCER EXCISION  2000's    Nose    THORACENTESIS Left 10/06/2016    [facility]- Dr Rob Martínez     Family History   Problem Relation Age of Onset    Early Death Mother 48        Liver Cancer    Cancer Mother         Liver Cancer    Cancer Father         Prostate Cancer    Diabetes Father     Other Father         \"Black Lung\"    Cancer Sister         Breast Cancer    Breast Cancer Sister    [facility] hospitals PERRL, EOMI, Conjunctiva normal, No discharge. Respiratory:  Crackles on right side Normal breath sounds, No respiratory distress, No wheezing, No chest tenderness. ,no use of accessory muscles,   Cardiovascular: (PMI) apex non displaced,no lifts no thrills,S1 and S2 audible, No added heart sounds, No signs of ankle edema, or volume overload, No evidence of JVD, , right side chest pain is reproducible gets worse on pushing forward  GI:  Bowel sounds normal, Soft, No tenderness, No masses, No gross visceromegaly   :  No costovertebral angle tenderness   Musculoskeletal:  No edema, positive  Tenderness on left side of chest , no deformities. Back- no tenderness, left foot in brace   Integument:  Well hydrated, no rash   Lymphatic:  No lymphadenopathy noted   Neurologic:  Alert & oriented x 3, CN 2-12 normal, normal motor function, normal sensory function, no focal deficits noted   Psychiatric:  Speech and behavior appropriate       Medical decision making and Data review:  DATA:  Lab Results   Component Value Date    TROPONINT <0.010 09/24/2016     BNP:    Lab Results   Component Value Date    PROBNP 776.0 (H) 08/07/2018     PT/INR:  No results found for: [employer]  Lab Results   Component Value Date    LABA1C 7.5 (H) 06/02/2021    LABA1C 7.2 (H) 02/01/2021     Lab Results   Component Value Date    CHOL 140 06/02/2021    TRIG 233 (H) 06/02/2021    HDL 30 (L) 06/02/2021    LDLCALC 63 06/02/2021    LDLDIRECT 79 02/01/2021     Lab Results   Component Value Date    ALT 10 06/28/2021    AST 14 (L) 06/28/2021     Echo Sept 2016:  Left ventricle function is mildly reduced with EF 45%, mild tricuspid regurgitation. Left ventricular hypertrophy    Holter 2017:  sinus rhythm, some supraventricular ectopy and PVCs recorded, no A. Fib    Arterial doppler Jan 2017          Native arteries in the examined lower extremity(ies) are patent, with no    elevated velocities.  No aneurysms are noted.    Bilateral ABIs are normal.     Holter 7/16/18  2 days of cardiac monitoring consistent with sinus rhythm at average heart rate of 72 bpm.  Fastest rate of 124 occurred at 7:59 AM on July 18 the lowest rate of 53 occluded 9:26 AM on July 17.  Rare PVCs and frequent 12,242 PVCs are noted with ventricular bigeminy and trigeminy.  3 beat run of nonsustained ventricular tachycardia noted on July 18, 2018 at 8 AM.  Conclusion abnormal 48 hours of cardiac monitor finding consistent with sinus rhythm with physiological heart rate variation and frequent complex ventricular ectopy.  Patient did not report any symptoms for correlation. Stress test 8/2018     Chloé Dutton portion of stress test is negative for ischemia by diagnostic criteria.    Normal EF 58 % with normal ventricular contractility.    No infarct or ischemia noted.    Normal stress myocardial perfusion.    This is a normal study.         Echo 2/6/19   Summary   LV function and size are normal, Ejection Fraction 55-60 %.   Mild concentric left ventricular hypertrophy.   No regional wall motion abnormalities were detected.   Normal diastolic filling pattern for age.  Latrice José dilated left atrium.   Sclerotic, but non-stenotic aortic valve.   Mitral annular calcification is present.  RVSP= 27 mmHg.   No evidence of pericardial effusion.       All labs, medications and tests reviewed by myself including data and history from outside source , patient and available family . Assessment & Plan:    1. PAF (paroxysmal atrial fibrillation) (HCC)    2. Palpitations    3. History of colonic polyps    4. Statin intolerance    5. SOB (shortness of breath) on exertion    6. Essential hypertension    7. Dyslipidemia    8. S/P CABG x 3    9. Cardiomyopathy, unspecified type ([address]    10.  Chronic kidney disease (CKD) stage G4/A3, severely decreased glomerular filtration rate (GFR) between 15-29 mL/min/1.73 square meter and albuminuria creatinine ratio greater than 300 mg/g ([address]    11. Coronary artery disease involving native coronary artery of native heart without angina pectoris       CAD (coronary artery disease)  He is s/p CABG in 10/16 with 1. MVR with # 28 CG ring and  CABG X 3 withLIMA to LAD,SVG to PDA,SVG to Cx. 3.LA appendage resection and  With Epicardial  and endocardial MAZE . Sternal nonhealing with intermittent chest pain stress test in August 2018 showed no ischemia . EF is normal do not think it is angina    Cardiomyopathy ([address]  IsChemic cardiomyopathy with improved EF of 55 to 60% he is short of breath , EF is normal  He is on torsemide , creatinine is 2.7  range Dr Tory Malaver . He has gained 45 lbs since Jan 2019 . Aldactone caused breast pain in the past       PAF (paroxysmal atrial fibrillation) ([facility] [address]  Longstanding history of intermittent palpitations even before his CABG. He is off  amiodarone He is s/p MAZE and atrial appendage removal . He is on  eliquis. Post CABG Holter in 2018  did not show any evidence of atrial fibrillation 48 hours He still has some palpitations which could be ventricular ectopy/PVC.   He is status post atrial appendage resection. That still leaves him at risk of embolic events if he goes into A. Fib so continue eliquis at 2.5 mg whicgh is lower dose but he has had nose bleeds in past and her is s/p appendage resection so I am ok with lower dose . denies any bleeding issues. holter showing some svt and nsvt runs . Will increase metoprolol and if he continues to have palpitations we will plan 30-day monitor    Essential hypertension  Blood pressure is well controlled  He says he checks it at home and it is normal. He is tolerating his meds . He checks it and keeps a log . Doing well. 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  Seen CT surgery no further interventions at this time stress test in 2018 due to the symptoms were negative I do not think we need to pursue it further at this time    Leg pain /back pain  unfortunately looks like he is declining due to back pain and and limited restriction movement     Dyslipidemia :  Prince mitchell had lab work recently, intolerant of statins causing myopathy and pain tolerating Repatha excellent lipid panel now      Counseled extensively and medication compliance urged. We discussed that for the  prevention of ASCVD our  goal is aggressive risk modification. Patient is encouraged to get an exercise bike and do some cardiovascular exercise Various goals were discussed and questions answered. Continue current medications. Appropriate prescriptions are addressed and refills ordered. Questions answered and patient verbalizes understanding. Call for any problems, questions, or concerns. Continue all other medications of all above medical condition listed as is. Return in about 6 months (around 2/5/2022).

## 2022-01-01 ENCOUNTER — HOSPITAL ENCOUNTER (EMERGENCY)
Age: 77
Discharge: ANOTHER HOSPITAL | End: 2022-02-06
Attending: EMERGENCY MEDICINE
Payer: MEDICARE

## 2022-01-01 ENCOUNTER — APPOINTMENT (OUTPATIENT)
Dept: GENERAL RADIOLOGY | Age: 77
DRG: 870 | End: 2022-01-01
Attending: INTERNAL MEDICINE
Payer: MEDICARE

## 2022-01-01 ENCOUNTER — APPOINTMENT (OUTPATIENT)
Dept: GENERAL RADIOLOGY | Age: 77
DRG: 191 | End: 2022-01-01
Payer: MEDICARE

## 2022-01-01 ENCOUNTER — ANESTHESIA (OUTPATIENT)
Dept: CARDIOVASCULAR ICU | Age: 77
DRG: 870 | End: 2022-01-01
Payer: MEDICARE

## 2022-01-01 ENCOUNTER — HOSPITAL ENCOUNTER (INPATIENT)
Age: 77
LOS: 4 days | Discharge: NURSING, REHAB OR LONG-TERM CARE | DRG: 191 | End: 2022-01-19
Attending: EMERGENCY MEDICINE | Admitting: INTERNAL MEDICINE
Payer: MEDICARE

## 2022-01-01 ENCOUNTER — HOSPITAL ENCOUNTER (INPATIENT)
Age: 77
LOS: 15 days | DRG: 870 | End: 2022-02-21
Attending: INTERNAL MEDICINE | Admitting: INTERNAL MEDICINE
Payer: MEDICARE

## 2022-01-01 ENCOUNTER — HOSPITAL ENCOUNTER (INPATIENT)
Age: 77
LOS: 7 days | Discharge: HOME HEALTH | DRG: 948 | End: 2022-01-26
Attending: INTERNAL MEDICINE | Admitting: INTERNAL MEDICINE
Payer: MEDICARE

## 2022-01-01 ENCOUNTER — APPOINTMENT (OUTPATIENT)
Dept: GENERAL RADIOLOGY | Age: 77
End: 2022-01-01
Payer: MEDICARE

## 2022-01-01 ENCOUNTER — APPOINTMENT (OUTPATIENT)
Dept: CT IMAGING | Age: 77
DRG: 191 | End: 2022-01-01
Payer: MEDICARE

## 2022-01-01 ENCOUNTER — APPOINTMENT (OUTPATIENT)
Dept: INTERVENTIONAL RADIOLOGY/VASCULAR | Age: 77
DRG: 870 | End: 2022-01-01
Attending: INTERNAL MEDICINE
Payer: MEDICARE

## 2022-01-01 ENCOUNTER — ANESTHESIA EVENT (OUTPATIENT)
Dept: CARDIOVASCULAR ICU | Age: 77
DRG: 870 | End: 2022-01-01
Payer: MEDICARE

## 2022-01-01 ENCOUNTER — APPOINTMENT (OUTPATIENT)
Dept: ULTRASOUND IMAGING | Age: 77
DRG: 191 | End: 2022-01-01
Payer: MEDICARE

## 2022-01-01 VITALS
TEMPERATURE: 97.6 F | DIASTOLIC BLOOD PRESSURE: 55 MMHG | HEART RATE: 80 BPM | WEIGHT: 187 LBS | SYSTOLIC BLOOD PRESSURE: 126 MMHG | BODY MASS INDEX: 28.43 KG/M2 | OXYGEN SATURATION: 93 % | RESPIRATION RATE: 10 BRPM

## 2022-01-01 VITALS
OXYGEN SATURATION: 97 % | HEIGHT: 68 IN | BODY MASS INDEX: 28.34 KG/M2 | SYSTOLIC BLOOD PRESSURE: 134 MMHG | WEIGHT: 187 LBS | RESPIRATION RATE: 18 BRPM | TEMPERATURE: 98.6 F | HEART RATE: 85 BPM | DIASTOLIC BLOOD PRESSURE: 49 MMHG

## 2022-01-01 VITALS
HEIGHT: 68 IN | SYSTOLIC BLOOD PRESSURE: 124 MMHG | RESPIRATION RATE: 26 BRPM | DIASTOLIC BLOOD PRESSURE: 46 MMHG | BODY MASS INDEX: 29.14 KG/M2 | OXYGEN SATURATION: 88 % | WEIGHT: 192.24 LBS | TEMPERATURE: 98.2 F

## 2022-01-01 VITALS
HEIGHT: 68 IN | SYSTOLIC BLOOD PRESSURE: 143 MMHG | HEART RATE: 83 BPM | WEIGHT: 187.8 LBS | BODY MASS INDEX: 28.46 KG/M2 | RESPIRATION RATE: 16 BRPM | TEMPERATURE: 98 F | DIASTOLIC BLOOD PRESSURE: 87 MMHG | OXYGEN SATURATION: 95 %

## 2022-01-01 DIAGNOSIS — R53.81 PHYSICAL DEBILITY: Primary | ICD-10-CM

## 2022-01-01 DIAGNOSIS — R09.02 HYPOXIA: ICD-10-CM

## 2022-01-01 DIAGNOSIS — R53.1 GENERALIZED WEAKNESS: ICD-10-CM

## 2022-01-01 DIAGNOSIS — T07.XXXA MULTIPLE CONTUSIONS: ICD-10-CM

## 2022-01-01 DIAGNOSIS — S93.401A SPRAIN OF RIGHT ANKLE, UNSPECIFIED LIGAMENT, INITIAL ENCOUNTER: ICD-10-CM
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
## 2022-01-01 DIAGNOSIS — J12.82 PNEUMONIA DUE TO COVID-19 VIRUS: Primary | ICD-10-CM

## 2022-01-01 DIAGNOSIS — N17.9 AKI (ACUTE KIDNEY INJURY) (HCC): ICD-10-CM

## 2022-01-01 DIAGNOSIS — U07.1 PNEUMONIA DUE TO COVID-19 VIRUS: Primary | ICD-10-CM

## 2022-01-01 LAB
ADENOVIRUS DETECTION BY PCR: NOT DETECTED
ALBUMIN SERPL-MCNC: 2.3 GM/DL (ref 3.4–5)
ALBUMIN SERPL-MCNC: 2.4 GM/DL (ref 3.4–5)
ALBUMIN SERPL-MCNC: 2.5 GM/DL (ref 3.4–5)
ALBUMIN SERPL-MCNC: 2.6 GM/DL (ref 3.4–5)
ALBUMIN SERPL-MCNC: 3 GM/DL (ref 3.4–5)
ALBUMIN SERPL-MCNC: 3.2 GM/DL (ref 3.4–5)
ALBUMIN SERPL-MCNC: 3.2 GM/DL (ref 3.4–5)
ALBUMIN SERPL-MCNC: 3.3 GM/DL (ref 3.4–5)
ALBUMIN SERPL-MCNC: 3.3 GM/DL (ref 3.4–5)
ALBUMIN SERPL-MCNC: 3.5 GM/DL (ref 3.4–5)
ALBUMIN SERPL-MCNC: 3.5 GM/DL (ref 3.4–5)
ALBUMIN SERPL-MCNC: 3.6 GM/DL (ref 3.4–5)
ALBUMIN SERPL-MCNC: 3.7 GM/DL (ref 3.4–5)
ALBUMIN SERPL-MCNC: 3.7 GM/DL (ref 3.4–5)
ALBUMIN SERPL-MCNC: 3.8 GM/DL (ref 3.4–5)
ALP BLD-CCNC: 100 IU/L (ref 40–129)
ALP BLD-CCNC: 100 IU/L (ref 40–129)
ALP BLD-CCNC: 102 IU/L (ref 40–129)
ALP BLD-CCNC: 105 IU/L (ref 40–129)
ALP BLD-CCNC: 110 IU/L (ref 40–129)
ALP BLD-CCNC: 113 IU/L (ref 40–128)
ALP BLD-CCNC: 116 IU/L (ref 40–129)
ALP BLD-CCNC: 117 IU/L (ref 40–128)
ALP BLD-CCNC: 120 IU/L (ref 40–129)
ALP BLD-CCNC: 174 IU/L (ref 40–128)
ALP BLD-CCNC: 192 IU/L (ref 40–128)
ALP BLD-CCNC: 73 IU/L (ref 40–128)
ALP BLD-CCNC: 81 IU/L (ref 40–129)
ALP BLD-CCNC: 91 IU/L (ref 40–129)
ALP BLD-CCNC: 97 IU/L (ref 40–128)
ALT SERPL-CCNC: 12 U/L (ref 10–40)
ALT SERPL-CCNC: 12 U/L (ref 10–40)
ALT SERPL-CCNC: 13 U/L (ref 10–40)
ALT SERPL-CCNC: 13 U/L (ref 10–40)
ALT SERPL-CCNC: 14 U/L (ref 10–40)
ALT SERPL-CCNC: 15 U/L (ref 10–40)
ALT SERPL-CCNC: 15 U/L (ref 10–40)
ALT SERPL-CCNC: 22 U/L (ref 10–40)
ALT SERPL-CCNC: 24 U/L (ref 10–40)
ALT SERPL-CCNC: 28 U/L (ref 10–40)
ALT SERPL-CCNC: 43 U/L (ref 10–40)
ALT SERPL-CCNC: 48 U/L (ref 10–40)
ALT SERPL-CCNC: 8 U/L (ref 10–40)
ALT SERPL-CCNC: <5 U/L (ref 10–40)
ANION GAP SERPL CALCULATED.3IONS-SCNC: 10 MMOL/L (ref 4–16)
ANION GAP SERPL CALCULATED.3IONS-SCNC: 11 MMOL/L (ref 4–16)
ANION GAP SERPL CALCULATED.3IONS-SCNC: 11 MMOL/L (ref 4–16)
ANION GAP SERPL CALCULATED.3IONS-SCNC: 12 MMOL/L (ref 4–16)
ANION GAP SERPL CALCULATED.3IONS-SCNC: 14 MMOL/L (ref 4–16)
ANION GAP SERPL CALCULATED.3IONS-SCNC: 15 MMOL/L (ref 4–16)
ANION GAP SERPL CALCULATED.3IONS-SCNC: 16 MMOL/L (ref 4–16)
ANION GAP SERPL CALCULATED.3IONS-SCNC: 17 MMOL/L (ref 4–16)
ANION GAP SERPL CALCULATED.3IONS-SCNC: 18 MMOL/L (ref 4–16)
ANION GAP SERPL CALCULATED.3IONS-SCNC: 21 MMOL/L (ref 4–16)
ANION GAP SERPL CALCULATED.3IONS-SCNC: 24 MMOL/L (ref 4–16)
ANION GAP SERPL CALCULATED.3IONS-SCNC: 27 MMOL/L (ref 4–16)
ANION GAP SERPL CALCULATED.3IONS-SCNC: 30 MMOL/L (ref 4–16)
ANION GAP SERPL CALCULATED.3IONS-SCNC: 32 MMOL/L (ref 4–16)
ANION GAP SERPL CALCULATED.3IONS-SCNC: 9 MMOL/L (ref 4–16)
ANION GAP SERPL CALCULATED.3IONS-SCNC: 9 MMOL/L (ref 4–16)
ANISOCYTOSIS: ABNORMAL
AST SERPL-CCNC: 10 IU/L (ref 15–37)
AST SERPL-CCNC: 10 IU/L (ref 15–37)
AST SERPL-CCNC: 11 IU/L (ref 15–37)
AST SERPL-CCNC: 14 IU/L (ref 15–37)
AST SERPL-CCNC: 14 IU/L (ref 15–37)
AST SERPL-CCNC: 15 IU/L (ref 15–37)
AST SERPL-CCNC: 19 IU/L (ref 15–37)
AST SERPL-CCNC: 19 IU/L (ref 15–37)
AST SERPL-CCNC: 20 IU/L (ref 15–37)
AST SERPL-CCNC: 22 IU/L (ref 15–37)
AST SERPL-CCNC: 22 IU/L (ref 15–37)
AST SERPL-CCNC: 29 IU/L (ref 15–37)
AST SERPL-CCNC: 30 IU/L (ref 15–37)
AST SERPL-CCNC: 59 IU/L (ref 15–37)
AST SERPL-CCNC: 9 IU/L (ref 15–37)
BACTERIA: ABNORMAL /HPF
BACTERIA: NEGATIVE /HPF
BACTERIA: NEGATIVE /HPF
BANDED NEUTROPHILS ABSOLUTE COUNT: 0.42 K/CU MM
BANDED NEUTROPHILS ABSOLUTE COUNT: 0.81 K/CU MM
BANDED NEUTROPHILS ABSOLUTE COUNT: 0.86 K/CU MM
BANDED NEUTROPHILS ABSOLUTE COUNT: 0.92 K/CU MM
BANDED NEUTROPHILS RELATIVE PERCENT: 15 % (ref 5–11)
BANDED NEUTROPHILS RELATIVE PERCENT: 3 % (ref 5–11)
BANDED NEUTROPHILS RELATIVE PERCENT: 6 % (ref 5–11)
BANDED NEUTROPHILS RELATIVE PERCENT: 8 % (ref 5–11)
BASE EXCESS MIXED: 0.3 (ref 0–1.2)
BASE EXCESS MIXED: 2.6 (ref 0–1.2)
BASE EXCESS MIXED: 5.8 (ref 0–1.2)
BASE EXCESS: 11 (ref 0–3.3)
BASE EXCESS: 2 (ref 0–3.3)
BASE EXCESS: 4 (ref 0–3.3)
BASE EXCESS: 4 (ref 0–3.3)
BASE EXCESS: 5 (ref 0–3.3)
BASE EXCESS: 6 (ref 0–3.3)
BASE EXCESS: 7 (ref 0–3.3)
BASE EXCESS: 9 (ref 0–3.3)
BASE EXCESS: ABNORMAL (ref 0–3.3)
BASOPHILS ABSOLUTE: 0 K/CU MM
BASOPHILS ABSOLUTE: 0.1 K/CU MM
BASOPHILS RELATIVE PERCENT: 0 % (ref 0–1)
BASOPHILS RELATIVE PERCENT: 0.1 % (ref 0–1)
BASOPHILS RELATIVE PERCENT: 0.1 % (ref 0–1)
BASOPHILS RELATIVE PERCENT: 0.2 % (ref 0–1)
BASOPHILS RELATIVE PERCENT: 0.7 % (ref 0–1)
BASOPHILS RELATIVE PERCENT: 0.8 % (ref 0–1)
BASOPHILS RELATIVE PERCENT: 0.9 % (ref 0–1)
BASOPHILS RELATIVE PERCENT: 1.1 % (ref 0–1)
BASOPHILS RELATIVE PERCENT: 1.1 % (ref 0–1)
BILIRUB SERPL-MCNC: 0.3 MG/DL (ref 0–1)
BILIRUB SERPL-MCNC: 0.4 MG/DL (ref 0–1)
BILIRUB SERPL-MCNC: 0.5 MG/DL (ref 0–1)
BILIRUB SERPL-MCNC: 0.6 MG/DL (ref 0–1)
BILIRUB SERPL-MCNC: 0.7 MG/DL (ref 0–1)
BILIRUBIN URINE: NEGATIVE MG/DL
BLOOD, URINE: ABNORMAL
BLOOD, URINE: NEGATIVE
BLOOD, URINE: NEGATIVE
BORDETELLA PARAPERTUSSIS BY PCR: NOT DETECTED
BORDETELLA PERTUSSIS PCR: NOT DETECTED
BUN BLDV-MCNC: 114 MG/DL (ref 6–23)
BUN BLDV-MCNC: 140 MG/DL (ref 6–23)
BUN BLDV-MCNC: 150 MG/DL (ref 6–23)
BUN BLDV-MCNC: 153 MG/DL (ref 6–23)
BUN BLDV-MCNC: 154 MG/DL (ref 6–23)
BUN BLDV-MCNC: 161 MG/DL (ref 6–23)
BUN BLDV-MCNC: 173 MG/DL (ref 6–23)
BUN BLDV-MCNC: 39 MG/DL (ref 6–23)
BUN BLDV-MCNC: 41 MG/DL (ref 6–23)
BUN BLDV-MCNC: 41 MG/DL (ref 6–23)
BUN BLDV-MCNC: 42 MG/DL (ref 6–23)
BUN BLDV-MCNC: 44 MG/DL (ref 6–23)
BUN BLDV-MCNC: 47 MG/DL (ref 6–23)
BUN BLDV-MCNC: 49 MG/DL (ref 6–23)
BUN BLDV-MCNC: 59 MG/DL (ref 6–23)
BUN BLDV-MCNC: 70 MG/DL (ref 6–23)
BUN BLDV-MCNC: 71 MG/DL (ref 6–23)
BUN BLDV-MCNC: 78 MG/DL (ref 6–23)
BUN BLDV-MCNC: 82 MG/DL (ref 6–23)
BUN BLDV-MCNC: 97 MG/DL (ref 6–23)
CALCIUM IONIZED: 3.12 MG/DL (ref 4.48–5.28)
CALCIUM SERPL-MCNC: 5.6 MG/DL (ref 8.3–10.6)
CALCIUM SERPL-MCNC: 6 MG/DL (ref 8.3–10.6)
CALCIUM SERPL-MCNC: 6 MG/DL (ref 8.3–10.6)
CALCIUM SERPL-MCNC: 6.3 MG/DL (ref 8.3–10.6)
CALCIUM SERPL-MCNC: 7.2 MG/DL (ref 8.3–10.6)
CALCIUM SERPL-MCNC: 7.4 MG/DL (ref 8.3–10.6)
CALCIUM SERPL-MCNC: 7.5 MG/DL (ref 8.3–10.6)
CALCIUM SERPL-MCNC: 7.6 MG/DL (ref 8.3–10.6)
CALCIUM SERPL-MCNC: 7.9 MG/DL (ref 8.3–10.6)
CALCIUM SERPL-MCNC: 7.9 MG/DL (ref 8.3–10.6)
CALCIUM SERPL-MCNC: 8 MG/DL (ref 8.3–10.6)
CALCIUM SERPL-MCNC: 8.4 MG/DL (ref 8.3–10.6)
CALCIUM SERPL-MCNC: 9 MG/DL (ref 8.3–10.6)
CALCIUM SERPL-MCNC: 9.1 MG/DL (ref 8.3–10.6)
CALCIUM SERPL-MCNC: 9.1 MG/DL (ref 8.3–10.6)
CALCIUM SERPL-MCNC: 9.2 MG/DL (ref 8.3–10.6)
CARBON MONOXIDE, BLOOD: 1.2 % (ref 0–5)
CARBON MONOXIDE, BLOOD: 1.5 % (ref 0–5)
CARBON MONOXIDE, BLOOD: 1.6 % (ref 0–5)
CARBON MONOXIDE, BLOOD: 1.6 % (ref 0–5)
CARBON MONOXIDE, BLOOD: 1.7 % (ref 0–5)
CARBON MONOXIDE, BLOOD: 1.7 % (ref 0–5)
CARBON MONOXIDE, BLOOD: 1.8 % (ref 0–5)
CARBON MONOXIDE, BLOOD: 1.8 % (ref 0–5)
CARBON MONOXIDE, BLOOD: 1.9 % (ref 0–5)
CARBON MONOXIDE, BLOOD: 2.1 % (ref 0–5)
CAST TYPE: NEGATIVE /HPF
CAST TYPE: NORMAL /HPF
CHLAMYDOPHILA PNEUMONIA PCR: NOT DETECTED
CHLORIDE BLD-SCNC: 100 MMOL/L (ref 99–110)
CHLORIDE BLD-SCNC: 100 MMOL/L (ref 99–110)
CHLORIDE BLD-SCNC: 101 MMOL/L (ref 99–110)
CHLORIDE BLD-SCNC: 102 MMOL/L (ref 99–110)
CHLORIDE BLD-SCNC: 103 MMOL/L (ref 99–110)
CHLORIDE BLD-SCNC: 106 MMOL/L (ref 99–110)
CHLORIDE BLD-SCNC: 76 MMOL/L (ref 99–110)
CHLORIDE BLD-SCNC: 77 MMOL/L (ref 99–110)
CHLORIDE BLD-SCNC: 83 MMOL/L (ref 99–110)
CHLORIDE BLD-SCNC: 86 MMOL/L (ref 99–110)
CHLORIDE BLD-SCNC: 87 MMOL/L (ref 99–110)
CHLORIDE BLD-SCNC: 89 MMOL/L (ref 99–110)
CHLORIDE BLD-SCNC: 89 MMOL/L (ref 99–110)
CHLORIDE BLD-SCNC: 92 MMOL/L (ref 99–110)
CHLORIDE BLD-SCNC: 93 MMOL/L (ref 99–110)
CHLORIDE BLD-SCNC: 93 MMOL/L (ref 99–110)
CHLORIDE BLD-SCNC: 94 MMOL/L (ref 99–110)
CHLORIDE BLD-SCNC: 95 MMOL/L (ref 99–110)
CHLORIDE BLD-SCNC: 95 MMOL/L (ref 99–110)
CHLORIDE BLD-SCNC: 96 MMOL/L (ref 99–110)
CLARITY: ABNORMAL
CLARITY: ABNORMAL
CLARITY: CLEAR
CO2 CONTENT: 15.2 MMOL/L (ref 19–24)
CO2 CONTENT: 15.7 MMOL/L (ref 19–24)
CO2 CONTENT: 17.8 MMOL/L (ref 19–24)
CO2 CONTENT: 18.2 MMOL/L (ref 19–24)
CO2 CONTENT: 18.2 MMOL/L (ref 19–24)
CO2 CONTENT: 19.6 MMOL/L (ref 19–24)
CO2 CONTENT: 19.9 MMOL/L (ref 19–24)
CO2 CONTENT: 20.3 MMOL/L (ref 19–24)
CO2 CONTENT: 22.2 MMOL/L (ref 19–24)
CO2 CONTENT: 29 MMOL/L (ref 19–24)
CO2 CONTENT: 29.2 MMOL/L (ref 19–24)
CO2: 13 MMOL/L (ref 21–32)
CO2: 14 MMOL/L (ref 21–32)
CO2: 15 MMOL/L (ref 21–32)
CO2: 18 MMOL/L (ref 21–32)
CO2: 18 MMOL/L (ref 21–32)
CO2: 19 MMOL/L (ref 21–32)
CO2: 22 MMOL/L (ref 21–32)
CO2: 24 MMOL/L (ref 21–32)
CO2: 25 MMOL/L (ref 21–32)
CO2: 26 MMOL/L (ref 21–32)
CO2: 26 MMOL/L (ref 21–32)
CO2: 27 MMOL/L (ref 21–32)
CO2: 27 MMOL/L (ref 21–32)
CO2: 28 MMOL/L (ref 21–32)
CO2: 29 MMOL/L (ref 21–32)
COLOR: YELLOW
COMMENT: ABNORMAL
CORONAVIRUS 229E PCR: NOT DETECTED
CORONAVIRUS HKU1 PCR: NOT DETECTED
CORONAVIRUS NL63 PCR: NOT DETECTED
CORONAVIRUS OC43 PCR: NOT DETECTED
CREAT SERPL-MCNC: 2.3 MG/DL (ref 0.9–1.3)
CREAT SERPL-MCNC: 2.4 MG/DL (ref 0.9–1.3)
CREAT SERPL-MCNC: 2.5 MG/DL (ref 0.9–1.3)
CREAT SERPL-MCNC: 2.5 MG/DL (ref 0.9–1.3)
CREAT SERPL-MCNC: 2.6 MG/DL (ref 0.9–1.3)
CREAT SERPL-MCNC: 2.7 MG/DL (ref 0.9–1.3)
CREAT SERPL-MCNC: 2.8 MG/DL (ref 0.9–1.3)
CREAT SERPL-MCNC: 2.9 MG/DL (ref 0.9–1.3)
CREAT SERPL-MCNC: 3.6 MG/DL (ref 0.9–1.3)
CREAT SERPL-MCNC: 4.4 MG/DL (ref 0.9–1.3)
CREAT SERPL-MCNC: 4.9 MG/DL (ref 0.9–1.3)
CREAT SERPL-MCNC: 4.9 MG/DL (ref 0.9–1.3)
CREAT SERPL-MCNC: 5 MG/DL (ref 0.9–1.3)
CREAT SERPL-MCNC: 5 MG/DL (ref 0.9–1.3)
CREAT SERPL-MCNC: 5.3 MG/DL (ref 0.9–1.3)
CREAT SERPL-MCNC: 5.6 MG/DL (ref 0.9–1.3)
CREATININE URINE: 168.2 MG/DL (ref 39–259)
CRYSTAL TYPE: NEGATIVE /HPF
CRYSTAL TYPE: NEGATIVE /HPF
CULTURE: ABNORMAL
CULTURE: NORMAL
D DIMER: 2080 NG/ML(DDU)
D DIMER: 2682 NG/ML(DDU)
DIFFERENTIAL TYPE: ABNORMAL
DIGOXIN LEVEL: 0.6 NG/ML (ref 0.8–2)
DIGOXIN LEVEL: 0.6 NG/ML (ref 0.8–2)
DOSE AMOUNT: ABNORMAL
DOSE AMOUNT: ABNORMAL
DOSE AMOUNT: NORMAL
DOSE AMOUNT: NORMAL
DOSE TIME: ABNORMAL
DOSE TIME: ABNORMAL
DOSE TIME: NORMAL
DOSE TIME: NORMAL
EKG ATRIAL RATE: 125 BPM
EKG ATRIAL RATE: 81 BPM
EKG ATRIAL RATE: 83 BPM
EKG DIAGNOSIS: NORMAL
EKG P AXIS: 63 DEGREES
EKG P AXIS: 79 DEGREES
EKG P-R INTERVAL: 122 MS
EKG P-R INTERVAL: 146 MS
EKG P-R INTERVAL: 152 MS
EKG Q-T INTERVAL: 340 MS
EKG Q-T INTERVAL: 404 MS
EKG Q-T INTERVAL: 410 MS
EKG QRS DURATION: 116 MS
EKG QRS DURATION: 116 MS
EKG QRS DURATION: 130 MS
EKG QTC CALCULATION (BAZETT): 474 MS
EKG QTC CALCULATION (BAZETT): 476 MS
EKG QTC CALCULATION (BAZETT): 490 MS
EKG R AXIS: -37 DEGREES
EKG R AXIS: -46 DEGREES
EKG R AXIS: -49 DEGREES
EKG T AXIS: -16 DEGREES
EKG T AXIS: -8 DEGREES
EKG T AXIS: -9 DEGREES
EKG VENTRICULAR RATE: 125 BPM
EKG VENTRICULAR RATE: 81 BPM
EKG VENTRICULAR RATE: 83 BPM
EOSINOPHILS ABSOLUTE: 0 K/CU MM
EOSINOPHILS ABSOLUTE: 0.1 K/CU MM
EOSINOPHILS ABSOLUTE: 0.2 K/CU MM
EOSINOPHILS ABSOLUTE: 0.3 K/CU MM
EOSINOPHILS ABSOLUTE: 0.3 K/CU MM
EOSINOPHILS RELATIVE PERCENT: 0 % (ref 0–3)
EOSINOPHILS RELATIVE PERCENT: 0.2 % (ref 0–3)
EOSINOPHILS RELATIVE PERCENT: 0.2 % (ref 0–3)
EOSINOPHILS RELATIVE PERCENT: 0.9 % (ref 0–3)
EOSINOPHILS RELATIVE PERCENT: 2.5 % (ref 0–3)
EOSINOPHILS RELATIVE PERCENT: 2.7 % (ref 0–3)
EOSINOPHILS RELATIVE PERCENT: 3.5 % (ref 0–3)
EOSINOPHILS RELATIVE PERCENT: 4 % (ref 0–3)
EOSINOPHILS RELATIVE PERCENT: 4.1 % (ref 0–3)
EPITHELIAL CELLS, UA: ABNORMAL /HPF
EPITHELIAL CELLS, UA: NORMAL /HPF
ESTIMATED AVERAGE GLUCOSE: 177 MG/DL
ESTIMATED AVERAGE GLUCOSE: 209 MG/DL
GFR AFRICAN AMERICAN: 12 ML/MIN/1.73M2
GFR AFRICAN AMERICAN: 13 ML/MIN/1.73M2
GFR AFRICAN AMERICAN: 14 ML/MIN/1.73M2
GFR AFRICAN AMERICAN: 16 ML/MIN/1.73M2
GFR AFRICAN AMERICAN: 20 ML/MIN/1.73M2
GFR AFRICAN AMERICAN: 26 ML/MIN/1.73M2
GFR AFRICAN AMERICAN: 27 ML/MIN/1.73M2
GFR AFRICAN AMERICAN: 28 ML/MIN/1.73M2
GFR AFRICAN AMERICAN: 29 ML/MIN/1.73M2
GFR AFRICAN AMERICAN: 31 ML/MIN/1.73M2
GFR AFRICAN AMERICAN: 31 ML/MIN/1.73M2
GFR AFRICAN AMERICAN: 32 ML/MIN/1.73M2
GFR AFRICAN AMERICAN: 34 ML/MIN/1.73M2
GFR NON-AFRICAN AMERICAN: 10 ML/MIN/1.73M2
GFR NON-AFRICAN AMERICAN: 11 ML/MIN/1.73M2
GFR NON-AFRICAN AMERICAN: 12 ML/MIN/1.73M2
GFR NON-AFRICAN AMERICAN: 12 ML/MIN/1.73M2
GFR NON-AFRICAN AMERICAN: 13 ML/MIN/1.73M2
GFR NON-AFRICAN AMERICAN: 17 ML/MIN/1.73M2
GFR NON-AFRICAN AMERICAN: 21 ML/MIN/1.73M2
GFR NON-AFRICAN AMERICAN: 22 ML/MIN/1.73M2
GFR NON-AFRICAN AMERICAN: 23 ML/MIN/1.73M2
GFR NON-AFRICAN AMERICAN: 24 ML/MIN/1.73M2
GFR NON-AFRICAN AMERICAN: 25 ML/MIN/1.73M2
GFR NON-AFRICAN AMERICAN: 25 ML/MIN/1.73M2
GFR NON-AFRICAN AMERICAN: 26 ML/MIN/1.73M2
GFR NON-AFRICAN AMERICAN: 28 ML/MIN/1.73M2
GLUCOSE BLD-MCNC: 108 MG/DL (ref 70–99)
GLUCOSE BLD-MCNC: 110 MG/DL (ref 70–99)
GLUCOSE BLD-MCNC: 111 MG/DL (ref 70–99)
GLUCOSE BLD-MCNC: 112 MG/DL (ref 70–99)
GLUCOSE BLD-MCNC: 117 MG/DL (ref 70–99)
GLUCOSE BLD-MCNC: 118 MG/DL (ref 70–99)
GLUCOSE BLD-MCNC: 122 MG/DL (ref 70–99)
GLUCOSE BLD-MCNC: 124 MG/DL (ref 70–99)
GLUCOSE BLD-MCNC: 126 MG/DL (ref 70–99)
GLUCOSE BLD-MCNC: 129 MG/DL (ref 70–99)
GLUCOSE BLD-MCNC: 130 MG/DL (ref 70–99)
GLUCOSE BLD-MCNC: 133 MG/DL (ref 70–99)
GLUCOSE BLD-MCNC: 135 MG/DL (ref 70–99)
GLUCOSE BLD-MCNC: 136 MG/DL (ref 70–99)
GLUCOSE BLD-MCNC: 139 MG/DL (ref 70–99)
GLUCOSE BLD-MCNC: 141 MG/DL (ref 70–99)
GLUCOSE BLD-MCNC: 147 MG/DL (ref 70–99)
GLUCOSE BLD-MCNC: 148 MG/DL (ref 70–99)
GLUCOSE BLD-MCNC: 153 MG/DL (ref 70–99)
GLUCOSE BLD-MCNC: 153 MG/DL (ref 70–99)
GLUCOSE BLD-MCNC: 156 MG/DL (ref 70–99)
GLUCOSE BLD-MCNC: 157 MG/DL (ref 70–99)
GLUCOSE BLD-MCNC: 158 MG/DL (ref 70–99)
GLUCOSE BLD-MCNC: 159 MG/DL (ref 70–99)
GLUCOSE BLD-MCNC: 161 MG/DL (ref 70–99)
GLUCOSE BLD-MCNC: 164 MG/DL (ref 70–99)
GLUCOSE BLD-MCNC: 164 MG/DL (ref 70–99)
GLUCOSE BLD-MCNC: 166 MG/DL (ref 70–99)
GLUCOSE BLD-MCNC: 168 MG/DL (ref 70–99)
GLUCOSE BLD-MCNC: 170 MG/DL (ref 70–99)
GLUCOSE BLD-MCNC: 170 MG/DL (ref 70–99)
GLUCOSE BLD-MCNC: 171 MG/DL (ref 70–99)
GLUCOSE BLD-MCNC: 174 MG/DL (ref 70–99)
GLUCOSE BLD-MCNC: 174 MG/DL (ref 70–99)
GLUCOSE BLD-MCNC: 175 MG/DL (ref 70–99)
GLUCOSE BLD-MCNC: 176 MG/DL (ref 70–99)
GLUCOSE BLD-MCNC: 176 MG/DL (ref 70–99)
GLUCOSE BLD-MCNC: 180 MG/DL (ref 70–99)
GLUCOSE BLD-MCNC: 180 MG/DL (ref 70–99)
GLUCOSE BLD-MCNC: 181 MG/DL (ref 70–99)
GLUCOSE BLD-MCNC: 182 MG/DL (ref 70–99)
GLUCOSE BLD-MCNC: 182 MG/DL (ref 70–99)
GLUCOSE BLD-MCNC: 183 MG/DL (ref 70–99)
GLUCOSE BLD-MCNC: 184 MG/DL (ref 70–99)
GLUCOSE BLD-MCNC: 184 MG/DL (ref 70–99)
GLUCOSE BLD-MCNC: 187 MG/DL (ref 70–99)
GLUCOSE BLD-MCNC: 188 MG/DL (ref 70–99)
GLUCOSE BLD-MCNC: 189 MG/DL (ref 70–99)
GLUCOSE BLD-MCNC: 192 MG/DL (ref 70–99)
GLUCOSE BLD-MCNC: 193 MG/DL (ref 70–99)
GLUCOSE BLD-MCNC: 193 MG/DL (ref 70–99)
GLUCOSE BLD-MCNC: 194 MG/DL (ref 70–99)
GLUCOSE BLD-MCNC: 196 MG/DL (ref 70–99)
GLUCOSE BLD-MCNC: 196 MG/DL (ref 70–99)
GLUCOSE BLD-MCNC: 197 MG/DL (ref 70–99)
GLUCOSE BLD-MCNC: 199 MG/DL (ref 70–99)
GLUCOSE BLD-MCNC: 200 MG/DL (ref 70–99)
GLUCOSE BLD-MCNC: 203 MG/DL (ref 70–99)
GLUCOSE BLD-MCNC: 210 MG/DL (ref 70–99)
GLUCOSE BLD-MCNC: 210 MG/DL (ref 70–99)
GLUCOSE BLD-MCNC: 211 MG/DL (ref 70–99)
GLUCOSE BLD-MCNC: 211 MG/DL (ref 70–99)
GLUCOSE BLD-MCNC: 213 MG/DL (ref 70–99)
GLUCOSE BLD-MCNC: 215 MG/DL (ref 70–99)
GLUCOSE BLD-MCNC: 215 MG/DL (ref 70–99)
GLUCOSE BLD-MCNC: 219 MG/DL (ref 70–99)
GLUCOSE BLD-MCNC: 219 MG/DL (ref 70–99)
GLUCOSE BLD-MCNC: 220 MG/DL (ref 70–99)
GLUCOSE BLD-MCNC: 221 MG/DL (ref 70–99)
GLUCOSE BLD-MCNC: 226 MG/DL (ref 70–99)
GLUCOSE BLD-MCNC: 228 MG/DL (ref 70–99)
GLUCOSE BLD-MCNC: 234 MG/DL (ref 70–99)
GLUCOSE BLD-MCNC: 235 MG/DL (ref 70–99)
GLUCOSE BLD-MCNC: 235 MG/DL (ref 70–99)
GLUCOSE BLD-MCNC: 237 MG/DL (ref 70–99)
GLUCOSE BLD-MCNC: 237 MG/DL (ref 70–99)
GLUCOSE BLD-MCNC: 240 MG/DL (ref 70–99)
GLUCOSE BLD-MCNC: 241 MG/DL (ref 70–99)
GLUCOSE BLD-MCNC: 243 MG/DL (ref 70–99)
GLUCOSE BLD-MCNC: 248 MG/DL (ref 70–99)
GLUCOSE BLD-MCNC: 249 MG/DL (ref 70–99)
GLUCOSE BLD-MCNC: 252 MG/DL (ref 70–99)
GLUCOSE BLD-MCNC: 252 MG/DL (ref 70–99)
GLUCOSE BLD-MCNC: 253 MG/DL (ref 70–99)
GLUCOSE BLD-MCNC: 253 MG/DL (ref 70–99)
GLUCOSE BLD-MCNC: 254 MG/DL (ref 70–99)
GLUCOSE BLD-MCNC: 254 MG/DL (ref 70–99)
GLUCOSE BLD-MCNC: 255 MG/DL (ref 70–99)
GLUCOSE BLD-MCNC: 257 MG/DL (ref 70–99)
GLUCOSE BLD-MCNC: 258 MG/DL (ref 70–99)
GLUCOSE BLD-MCNC: 259 MG/DL (ref 70–99)
GLUCOSE BLD-MCNC: 260 MG/DL (ref 70–99)
GLUCOSE BLD-MCNC: 260 MG/DL (ref 70–99)
GLUCOSE BLD-MCNC: 264 MG/DL (ref 70–99)
GLUCOSE BLD-MCNC: 264 MG/DL (ref 70–99)
GLUCOSE BLD-MCNC: 266 MG/DL (ref 70–99)
GLUCOSE BLD-MCNC: 271 MG/DL (ref 70–99)
GLUCOSE BLD-MCNC: 273 MG/DL (ref 70–99)
GLUCOSE BLD-MCNC: 274 MG/DL (ref 70–99)
GLUCOSE BLD-MCNC: 282 MG/DL (ref 70–99)
GLUCOSE BLD-MCNC: 288 MG/DL (ref 70–99)
GLUCOSE BLD-MCNC: 290 MG/DL (ref 70–99)
GLUCOSE BLD-MCNC: 307 MG/DL (ref 70–99)
GLUCOSE BLD-MCNC: 308 MG/DL (ref 70–99)
GLUCOSE BLD-MCNC: 313 MG/DL (ref 70–99)
GLUCOSE BLD-MCNC: 315 MG/DL (ref 70–99)
GLUCOSE BLD-MCNC: 324 MG/DL (ref 70–99)
GLUCOSE BLD-MCNC: 329 MG/DL (ref 70–99)
GLUCOSE BLD-MCNC: 330 MG/DL (ref 70–99)
GLUCOSE BLD-MCNC: 339 MG/DL (ref 70–99)
GLUCOSE BLD-MCNC: 342 MG/DL (ref 70–99)
GLUCOSE BLD-MCNC: 348 MG/DL (ref 70–99)
GLUCOSE BLD-MCNC: 348 MG/DL (ref 70–99)
GLUCOSE BLD-MCNC: 352 MG/DL (ref 70–99)
GLUCOSE BLD-MCNC: 355 MG/DL (ref 70–99)
GLUCOSE BLD-MCNC: 357 MG/DL (ref 70–99)
GLUCOSE BLD-MCNC: 358 MG/DL (ref 70–99)
GLUCOSE BLD-MCNC: 359 MG/DL (ref 70–99)
GLUCOSE BLD-MCNC: 372 MG/DL (ref 70–99)
GLUCOSE BLD-MCNC: 378 MG/DL (ref 70–99)
GLUCOSE BLD-MCNC: 380 MG/DL (ref 70–99)
GLUCOSE BLD-MCNC: 382 MG/DL (ref 70–99)
GLUCOSE BLD-MCNC: 408 MG/DL (ref 70–99)
GLUCOSE BLD-MCNC: 439 MG/DL (ref 70–99)
GLUCOSE BLD-MCNC: 444 MG/DL
GLUCOSE BLD-MCNC: 444 MG/DL (ref 70–99)
GLUCOSE BLD-MCNC: 448 MG/DL (ref 70–99)
GLUCOSE BLD-MCNC: 464 MG/DL (ref 70–99)
GLUCOSE BLD-MCNC: 480 MG/DL (ref 70–99)
GLUCOSE BLD-MCNC: 512 MG/DL (ref 70–99)
GLUCOSE BLD-MCNC: 513 MG/DL (ref 70–99)
GLUCOSE BLD-MCNC: 567 MG/DL (ref 70–99)
GLUCOSE BLD-MCNC: 569 MG/DL (ref 70–99)
GLUCOSE BLD-MCNC: 579 MG/DL (ref 70–99)
GLUCOSE BLD-MCNC: 596 MG/DL (ref 70–99)
GLUCOSE BLD-MCNC: 67 MG/DL (ref 70–99)
GLUCOSE BLD-MCNC: 710 MG/DL (ref 70–99)
GLUCOSE BLD-MCNC: 72 MG/DL (ref 70–99)
GLUCOSE BLD-MCNC: 74 MG/DL (ref 70–99)
GLUCOSE BLD-MCNC: 78 MG/DL (ref 70–99)
GLUCOSE BLD-MCNC: 81 MG/DL (ref 70–99)
GLUCOSE BLD-MCNC: 86 MG/DL (ref 70–99)
GLUCOSE BLD-MCNC: 89 MG/DL (ref 70–99)
GLUCOSE BLD-MCNC: 90 MG/DL (ref 70–99)
GLUCOSE BLD-MCNC: 91 MG/DL (ref 70–99)
GLUCOSE BLD-MCNC: 97 MG/DL (ref 70–99)
GLUCOSE BLD-MCNC: 99 MG/DL (ref 70–99)
GLUCOSE BLD-MCNC: >600 MG/DL (ref 70–99)
GLUCOSE, URINE: >1000 MG/DL
GLUCOSE, URINE: NEGATIVE MG/DL
GLUCOSE, URINE: NORMAL MG/DL
HBA1C MFR BLD: 7.8 % (ref 4.2–6.3)
HBA1C MFR BLD: 8.9 % (ref 4.2–6.3)
HCO3 ARTERIAL: 14.3 MMOL/L (ref 18–23)
HCO3 ARTERIAL: 14.9 MMOL/L (ref 18–23)
HCO3 ARTERIAL: 17.1 MMOL/L (ref 18–23)
HCO3 ARTERIAL: 17.3 MMOL/L (ref 18–23)
HCO3 ARTERIAL: 17.3 MMOL/L (ref 18–23)
HCO3 ARTERIAL: 18.8 MMOL/L (ref 18–23)
HCO3 ARTERIAL: 19 MMOL/L (ref 18–23)
HCO3 ARTERIAL: 19.6 MMOL/L (ref 18–23)
HCO3 ARTERIAL: 21.4 MMOL/L (ref 18–23)
HCO3 ARTERIAL: 28.2 MMOL/L (ref 18–23)
HCO3 VENOUS: 27.7 MMOL/L (ref 19–25)
HCT VFR BLD CALC: 22.1 % (ref 42–52)
HCT VFR BLD CALC: 23.6 % (ref 42–52)
HCT VFR BLD CALC: 23.6 % (ref 42–52)
HCT VFR BLD CALC: 23.7 % (ref 42–52)
HCT VFR BLD CALC: 24.4 % (ref 42–52)
HCT VFR BLD CALC: 24.5 % (ref 42–52)
HCT VFR BLD CALC: 29.5 % (ref 42–52)
HCT VFR BLD CALC: 31.9 % (ref 42–52)
HCT VFR BLD CALC: 34.4 % (ref 42–52)
HCT VFR BLD CALC: 34.5 % (ref 42–52)
HCT VFR BLD CALC: 35 % (ref 42–52)
HCT VFR BLD CALC: 35.9 % (ref 42–52)
HCT VFR BLD CALC: 37.2 % (ref 42–52)
HCT VFR BLD CALC: 37.5 % (ref 42–52)
HCT VFR BLD CALC: 37.8 % (ref 42–52)
HCT VFR BLD CALC: 38.1 % (ref 42–52)
HCT VFR BLD CALC: 38.4 % (ref 42–52)
HEMOGLOBIN: 10.2 GM/DL (ref 13.5–18)
HEMOGLOBIN: 11.1 GM/DL (ref 13.5–18)
HEMOGLOBIN: 11.3 GM/DL (ref 13.5–18)
HEMOGLOBIN: 11.3 GM/DL (ref 13.5–18)
HEMOGLOBIN: 11.6 GM/DL (ref 13.5–18)
HEMOGLOBIN: 12.1 GM/DL (ref 13.5–18)
HEMOGLOBIN: 12.2 GM/DL (ref 13.5–18)
HEMOGLOBIN: 12.3 GM/DL (ref 13.5–18)
HEMOGLOBIN: 12.3 GM/DL (ref 13.5–18)
HEMOGLOBIN: 12.4 GM/DL (ref 13.5–18)
HEMOGLOBIN: 7.2 GM/DL (ref 13.5–18)
HEMOGLOBIN: 7.3 GM/DL (ref 13.5–18)
HEMOGLOBIN: 7.5 GM/DL (ref 13.5–18)
HEMOGLOBIN: 7.8 GM/DL (ref 13.5–18)
HEMOGLOBIN: 7.9 GM/DL (ref 13.5–18)
HEMOGLOBIN: 8 GM/DL (ref 13.5–18)
HEMOGLOBIN: 9.3 GM/DL (ref 13.5–18)
HIGH SENSITIVE C-REACTIVE PROTEIN: 101.7 MG/L
HIGH SENSITIVE C-REACTIVE PROTEIN: 150.6 MG/L
HIGH SENSITIVE C-REACTIVE PROTEIN: 19 MG/L
HUMAN METAPNEUMOVIRUS PCR: NOT DETECTED
IMMATURE NEUTROPHIL %: 0.1 % (ref 0–0.43)
IMMATURE NEUTROPHIL %: 0.3 % (ref 0–0.43)
IMMATURE NEUTROPHIL %: 0.3 % (ref 0–0.43)
IMMATURE NEUTROPHIL %: 0.4 % (ref 0–0.43)
IMMATURE NEUTROPHIL %: 0.4 % (ref 0–0.43)
IMMATURE NEUTROPHIL %: 0.5 % (ref 0–0.43)
IMMATURE NEUTROPHIL %: 0.8 % (ref 0–0.43)
IMMATURE NEUTROPHIL %: 0.8 % (ref 0–0.43)
IMMATURE NEUTROPHIL %: 1.3 % (ref 0–0.43)
IMMATURE NEUTROPHIL %: 1.3 % (ref 0–0.43)
IMMATURE NEUTROPHIL %: 1.5 % (ref 0–0.43)
IMMATURE NEUTROPHIL %: 4.8 % (ref 0–0.43)
INFLUENZA A BY PCR: NOT DETECTED
INFLUENZA A H1 (2009) PCR: NOT DETECTED
INFLUENZA A H1 PANDEMIC PCR: NOT DETECTED
INFLUENZA A H3 PCR: NOT DETECTED
INFLUENZA B BY PCR: NOT DETECTED
IONIZED CA: 0.78 MMOL/L (ref 1.12–1.32)
KETONES, URINE: 15 MG/DL
KETONES, URINE: NEGATIVE MG/DL
KETONES, URINE: NEGATIVE MG/DL
LACTATE: 1.8 MMOL/L (ref 0.4–2)
LEGIONELLA URINARY AG: NEGATIVE
LEUKOCYTE ESTERASE, URINE: ABNORMAL
LEUKOCYTE ESTERASE, URINE: NEGATIVE
LEUKOCYTE ESTERASE, URINE: NEGATIVE
LV EF: 53 %
LVEF MODALITY: NORMAL
LYMPHOCYTES ABSOLUTE: 0.2 K/CU MM
LYMPHOCYTES ABSOLUTE: 0.4 K/CU MM
LYMPHOCYTES ABSOLUTE: 0.4 K/CU MM
LYMPHOCYTES ABSOLUTE: 0.5 K/CU MM
LYMPHOCYTES ABSOLUTE: 0.6 K/CU MM
LYMPHOCYTES ABSOLUTE: 0.9 K/CU MM
LYMPHOCYTES ABSOLUTE: 1 K/CU MM
LYMPHOCYTES ABSOLUTE: 1.1 K/CU MM
LYMPHOCYTES ABSOLUTE: 1.1 K/CU MM
LYMPHOCYTES ABSOLUTE: 1.3 K/CU MM
LYMPHOCYTES ABSOLUTE: 1.4 K/CU MM
LYMPHOCYTES RELATIVE PERCENT: 14.5 % (ref 24–44)
LYMPHOCYTES RELATIVE PERCENT: 14.6 % (ref 24–44)
LYMPHOCYTES RELATIVE PERCENT: 16 % (ref 24–44)
LYMPHOCYTES RELATIVE PERCENT: 17.4 % (ref 24–44)
LYMPHOCYTES RELATIVE PERCENT: 20.2 % (ref 24–44)
LYMPHOCYTES RELATIVE PERCENT: 20.2 % (ref 24–44)
LYMPHOCYTES RELATIVE PERCENT: 3 % (ref 24–44)
LYMPHOCYTES RELATIVE PERCENT: 4 % (ref 24–44)
LYMPHOCYTES RELATIVE PERCENT: 4 % (ref 24–44)
LYMPHOCYTES RELATIVE PERCENT: 5 % (ref 24–44)
LYMPHOCYTES RELATIVE PERCENT: 5.2 % (ref 24–44)
LYMPHOCYTES RELATIVE PERCENT: 5.5 % (ref 24–44)
LYMPHOCYTES RELATIVE PERCENT: 5.5 % (ref 24–44)
LYMPHOCYTES RELATIVE PERCENT: 7.8 % (ref 24–44)
LYMPHOCYTES RELATIVE PERCENT: 9 % (ref 24–44)
LYMPHOCYTES RELATIVE PERCENT: 9.2 % (ref 24–44)
Lab: ABNORMAL
Lab: ABNORMAL
Lab: NORMAL
MAGNESIUM: 2.3 MG/DL (ref 1.8–2.4)
MAGNESIUM: 2.4 MG/DL (ref 1.8–2.4)
MAGNESIUM: 2.5 MG/DL (ref 1.8–2.4)
MAGNESIUM: 2.6 MG/DL (ref 1.8–2.4)
MCH RBC QN AUTO: 27.9 PG (ref 27–31)
MCH RBC QN AUTO: 28 PG (ref 27–31)
MCH RBC QN AUTO: 28.2 PG (ref 27–31)
MCH RBC QN AUTO: 28.5 PG (ref 27–31)
MCH RBC QN AUTO: 28.5 PG (ref 27–31)
MCH RBC QN AUTO: 28.7 PG (ref 27–31)
MCH RBC QN AUTO: 28.7 PG (ref 27–31)
MCH RBC QN AUTO: 28.8 PG (ref 27–31)
MCH RBC QN AUTO: 28.8 PG (ref 27–31)
MCH RBC QN AUTO: 29.1 PG (ref 27–31)
MCH RBC QN AUTO: 29.1 PG (ref 27–31)
MCH RBC QN AUTO: 29.2 PG (ref 27–31)
MCH RBC QN AUTO: 29.3 PG (ref 27–31)
MCH RBC QN AUTO: 29.4 PG (ref 27–31)
MCH RBC QN AUTO: 29.5 PG (ref 27–31)
MCHC RBC AUTO-ENTMCNC: 30.9 % (ref 32–36)
MCHC RBC AUTO-ENTMCNC: 31.5 % (ref 32–36)
MCHC RBC AUTO-ENTMCNC: 31.6 % (ref 32–36)
MCHC RBC AUTO-ENTMCNC: 31.8 % (ref 32–36)
MCHC RBC AUTO-ENTMCNC: 32 % (ref 32–36)
MCHC RBC AUTO-ENTMCNC: 32.2 % (ref 32–36)
MCHC RBC AUTO-ENTMCNC: 32.3 % (ref 32–36)
MCHC RBC AUTO-ENTMCNC: 32.5 % (ref 32–36)
MCHC RBC AUTO-ENTMCNC: 32.6 % (ref 32–36)
MCHC RBC AUTO-ENTMCNC: 32.8 % (ref 32–36)
MCHC RBC AUTO-ENTMCNC: 32.8 % (ref 32–36)
MCHC RBC AUTO-ENTMCNC: 33.5 % (ref 32–36)
MCV RBC AUTO: 85.2 FL (ref 78–100)
MCV RBC AUTO: 85.7 FL (ref 78–100)
MCV RBC AUTO: 86.9 FL (ref 78–100)
MCV RBC AUTO: 87.8 FL (ref 78–100)
MCV RBC AUTO: 88.1 FL (ref 78–100)
MCV RBC AUTO: 88.4 FL (ref 78–100)
MCV RBC AUTO: 88.6 FL (ref 78–100)
MCV RBC AUTO: 88.9 FL (ref 78–100)
MCV RBC AUTO: 89.3 FL (ref 78–100)
MCV RBC AUTO: 89.6 FL (ref 78–100)
MCV RBC AUTO: 89.8 FL (ref 78–100)
MCV RBC AUTO: 89.9 FL (ref 78–100)
MCV RBC AUTO: 90.1 FL (ref 78–100)
MCV RBC AUTO: 90.3 FL (ref 78–100)
MCV RBC AUTO: 90.4 FL (ref 78–100)
MCV RBC AUTO: 90.6 FL (ref 78–100)
MCV RBC AUTO: 91.4 FL (ref 78–100)
METHEMOGLOBIN ARTERIAL: 0.8 %
METHEMOGLOBIN ARTERIAL: 1 %
METHEMOGLOBIN ARTERIAL: 1.2 %
METHEMOGLOBIN ARTERIAL: 1.2 %
METHEMOGLOBIN ARTERIAL: 1.3 %
METHEMOGLOBIN ARTERIAL: 1.4 %
METHEMOGLOBIN ARTERIAL: 1.5 %
METHEMOGLOBIN ARTERIAL: 1.6 %
MONOCYTES ABSOLUTE: 0.3 K/CU MM
MONOCYTES ABSOLUTE: 0.4 K/CU MM
MONOCYTES ABSOLUTE: 0.5 K/CU MM
MONOCYTES ABSOLUTE: 0.7 K/CU MM
MONOCYTES ABSOLUTE: 0.8 K/CU MM
MONOCYTES ABSOLUTE: 0.9 K/CU MM
MONOCYTES ABSOLUTE: 1.4 K/CU MM
MONOCYTES RELATIVE PERCENT: 10.7 % (ref 0–4)
MONOCYTES RELATIVE PERCENT: 11.9 % (ref 0–4)
MONOCYTES RELATIVE PERCENT: 11.9 % (ref 0–4)
MONOCYTES RELATIVE PERCENT: 13.3 % (ref 0–4)
MONOCYTES RELATIVE PERCENT: 3 % (ref 0–4)
MONOCYTES RELATIVE PERCENT: 3.9 % (ref 0–4)
MONOCYTES RELATIVE PERCENT: 4.2 % (ref 0–4)
MONOCYTES RELATIVE PERCENT: 5 % (ref 0–4)
MONOCYTES RELATIVE PERCENT: 6 % (ref 0–4)
MONOCYTES RELATIVE PERCENT: 6 % (ref 0–4)
MONOCYTES RELATIVE PERCENT: 6.4 % (ref 0–4)
MONOCYTES RELATIVE PERCENT: 6.4 % (ref 0–4)
MONOCYTES RELATIVE PERCENT: 9.1 % (ref 0–4)
MONOCYTES RELATIVE PERCENT: 9.4 % (ref 0–4)
MUCUS: ABNORMAL HPF
MYCOPLASMA PNEUMONIAE PCR: NOT DETECTED
NITRITE URINE, QUANTITATIVE: NEGATIVE
NUCLEATED RBC %: 0 %
NUCLEATED RBC %: 0.2 %
O2 SAT, VEN: 73.5 % (ref 50–70)
O2 SATURATION: 74.9 % (ref 96–97)
O2 SATURATION: 79.9 % (ref 96–97)
O2 SATURATION: 89.8 % (ref 96–97)
O2 SATURATION: 90.4 % (ref 96–97)
O2 SATURATION: 90.5 % (ref 96–97)
O2 SATURATION: 91.5 % (ref 96–97)
O2 SATURATION: 91.6 % (ref 96–97)
O2 SATURATION: 94.9 % (ref 96–97)
O2 SATURATION: 96.4 % (ref 96–97)
O2 SATURATION: 96.9 % (ref 96–97)
PARAINFLUENZA 1 PCR: NOT DETECTED
PARAINFLUENZA 2 PCR: NOT DETECTED
PARAINFLUENZA 3 PCR: NOT DETECTED
PARAINFLUENZA 4 PCR: NOT DETECTED
PCO2 ARTERIAL: 23 MMHG (ref 32–45)
PCO2 ARTERIAL: 24 MMHG (ref 32–45)
PCO2 ARTERIAL: 25 MMHG (ref 32–45)
PCO2 ARTERIAL: 27 MMHG (ref 32–45)
PCO2 ARTERIAL: 27 MMHG (ref 32–45)
PCO2 ARTERIAL: 28 MMHG (ref 32–45)
PCO2 ARTERIAL: 28 MMHG (ref 32–45)
PCO2 ARTERIAL: 29 MMHG (ref 32–45)
PCO2 ARTERIAL: 30 MMHG (ref 32–45)
PCO2 ARTERIAL: 33 MMHG (ref 32–45)
PCO2, VEN: 43.6 MMHG (ref 38–52)
PDW BLD-RTO: 14.2 % (ref 11.7–14.9)
PDW BLD-RTO: 14.2 % (ref 11.7–14.9)
PDW BLD-RTO: 14.3 % (ref 11.7–14.9)
PDW BLD-RTO: 14.4 % (ref 11.7–14.9)
PDW BLD-RTO: 14.4 % (ref 11.7–14.9)
PDW BLD-RTO: 14.7 % (ref 11.7–14.9)
PDW BLD-RTO: 14.8 % (ref 11.7–14.9)
PDW BLD-RTO: 14.9 % (ref 11.7–14.9)
PDW BLD-RTO: 15.1 % (ref 11.7–14.9)
PDW BLD-RTO: 15.2 % (ref 11.7–14.9)
PDW BLD-RTO: 15.3 % (ref 11.7–14.9)
PDW BLD-RTO: 15.4 % (ref 11.7–14.9)
PDW BLD-RTO: 15.4 % (ref 11.7–14.9)
PDW BLD-RTO: 15.8 % (ref 11.7–14.9)
PH BLOOD: 7.3 (ref 7.34–7.45)
PH BLOOD: 7.35 (ref 7.34–7.45)
PH BLOOD: 7.37 (ref 7.34–7.45)
PH BLOOD: 7.4 (ref 7.34–7.45)
PH BLOOD: 7.44 (ref 7.34–7.45)
PH BLOOD: 7.45 (ref 7.34–7.45)
PH BLOOD: 7.48 (ref 7.34–7.45)
PH BLOOD: 7.52 (ref 7.34–7.45)
PH BLOOD: 7.54 (ref 7.34–7.45)
PH BLOOD: 7.54 (ref 7.34–7.45)
PH VENOUS: 7.41 (ref 7.32–7.42)
PH, URINE: 5 (ref 5–8)
PH, URINE: 5.5 (ref 5–8)
PH, URINE: 6 (ref 5–8)
PHOSPHORUS: 4.5 MG/DL (ref 2.5–4.9)
PHOSPHORUS: 7.5 MG/DL (ref 2.5–4.9)
PHOSPHORUS: 8.7 MG/DL (ref 2.5–4.9)
PLATELET # BLD: 143 K/CU MM (ref 140–440)
PLATELET # BLD: 154 K/CU MM (ref 140–440)
PLATELET # BLD: 160 K/CU MM (ref 140–440)
PLATELET # BLD: 166 K/CU MM (ref 140–440)
PLATELET # BLD: 167 K/CU MM (ref 140–440)
PLATELET # BLD: 170 K/CU MM (ref 140–440)
PLATELET # BLD: 172 K/CU MM (ref 140–440)
PLATELET # BLD: 179 K/CU MM (ref 140–440)
PLATELET # BLD: 183 K/CU MM (ref 140–440)
PLATELET # BLD: 186 K/CU MM (ref 140–440)
PLATELET # BLD: 196 K/CU MM (ref 140–440)
PLATELET # BLD: 205 K/CU MM (ref 140–440)
PLATELET # BLD: 210 K/CU MM (ref 140–440)
PLATELET # BLD: 217 K/CU MM (ref 140–440)
PLATELET # BLD: 223 K/CU MM (ref 140–440)
PLATELET # BLD: 242 K/CU MM (ref 140–440)
PLATELET # BLD: 423 K/CU MM (ref 140–440)
PLT MORPHOLOGY: ADEQUATE
PMV BLD AUTO: 10.2 FL (ref 7.5–11.1)
PMV BLD AUTO: 10.6 FL (ref 7.5–11.1)
PMV BLD AUTO: 11 FL (ref 7.5–11.1)
PMV BLD AUTO: 11.1 FL (ref 7.5–11.1)
PMV BLD AUTO: 11.1 FL (ref 7.5–11.1)
PMV BLD AUTO: 11.2 FL (ref 7.5–11.1)
PMV BLD AUTO: 11.2 FL (ref 7.5–11.1)
PMV BLD AUTO: 12 FL (ref 7.5–11.1)
PMV BLD AUTO: 8.7 FL (ref 7.5–11.1)
PMV BLD AUTO: 8.8 FL (ref 7.5–11.1)
PMV BLD AUTO: 8.9 FL (ref 7.5–11.1)
PMV BLD AUTO: 9 FL (ref 7.5–11.1)
PMV BLD AUTO: 9.3 FL (ref 7.5–11.1)
PMV BLD AUTO: 9.5 FL (ref 7.5–11.1)
PMV BLD AUTO: 9.7 FL (ref 7.5–11.1)
PO2 ARTERIAL: 123 MMHG (ref 75–100)
PO2 ARTERIAL: 180 MMHG (ref 75–100)
PO2 ARTERIAL: 37 MMHG (ref 75–100)
PO2 ARTERIAL: 40 MMHG (ref 75–100)
PO2 ARTERIAL: 56 MMHG (ref 75–100)
PO2 ARTERIAL: 60 MMHG (ref 75–100)
PO2 ARTERIAL: 62 MMHG (ref 75–100)
PO2 ARTERIAL: 64 MMHG (ref 75–100)
PO2 ARTERIAL: 65 MMHG (ref 75–100)
PO2 ARTERIAL: 80 MMHG (ref 75–100)
PO2, VEN: 38.8 MMHG (ref 28–48)
POTASSIUM SERPL-SCNC: 3.4 MMOL/L (ref 3.5–5.1)
POTASSIUM SERPL-SCNC: 3.6 MMOL/L (ref 3.5–5.1)
POTASSIUM SERPL-SCNC: 3.7 MMOL/L (ref 3.5–5.1)
POTASSIUM SERPL-SCNC: 4.1 MMOL/L (ref 3.5–5.1)
POTASSIUM SERPL-SCNC: 4.1 MMOL/L (ref 3.5–5.1)
POTASSIUM SERPL-SCNC: 4.3 MMOL/L (ref 3.5–5.1)
POTASSIUM SERPL-SCNC: 4.4 MMOL/L (ref 3.5–5.1)
POTASSIUM SERPL-SCNC: 4.5 MMOL/L (ref 3.5–5.1)
POTASSIUM SERPL-SCNC: 4.6 MMOL/L (ref 3.5–5.1)
POTASSIUM SERPL-SCNC: 4.7 MMOL/L (ref 3.5–5.1)
POTASSIUM SERPL-SCNC: 4.8 MMOL/L (ref 3.5–5.1)
POTASSIUM SERPL-SCNC: 5.1 MMOL/L (ref 3.5–5.1)
POTASSIUM SERPL-SCNC: 5.2 MMOL/L (ref 3.5–5.1)
POTASSIUM SERPL-SCNC: 5.4 MMOL/L (ref 3.5–5.1)
PRO-BNP: 1985 PG/ML
PRO-BNP: 2478 PG/ML
PRO-BNP: 4274 PG/ML
PRO-BNP: 787.9 PG/ML
PRO-BNP: 828.2 PG/ML
PRO-BNP: 834.9 PG/ML
PRO-BNP: 934.9 PG/ML
PRO-BNP: ABNORMAL PG/ML
PROCALCITONIN: 0.2
PROCALCITONIN: 0.21
PROCALCITONIN: 0.25
PROCALCITONIN: 0.35
PROCALCITONIN: 22.69
PROT/CREAT RATIO, UR: 1
PROTEIN UA: 100 MG/DL
PROTEIN UA: 100 MG/DL
PROTEIN UA: NORMAL MG/DL
RBC # BLD: 2.58 M/CU MM (ref 4.6–6.2)
RBC # BLD: 2.61 M/CU MM (ref 4.6–6.2)
RBC # BLD: 2.69 M/CU MM (ref 4.6–6.2)
RBC # BLD: 2.77 M/CU MM (ref 4.6–6.2)
RBC # BLD: 2.77 M/CU MM (ref 4.6–6.2)
RBC # BLD: 2.78 M/CU MM (ref 4.6–6.2)
RBC # BLD: 3.33 M/CU MM (ref 4.6–6.2)
RBC # BLD: 3.56 M/CU MM (ref 4.6–6.2)
RBC # BLD: 3.81 M/CU MM (ref 4.6–6.2)
RBC # BLD: 3.92 M/CU MM (ref 4.6–6.2)
RBC # BLD: 3.96 M/CU MM (ref 4.6–6.2)
RBC # BLD: 4.04 M/CU MM (ref 4.6–6.2)
RBC # BLD: 4.12 M/CU MM (ref 4.6–6.2)
RBC # BLD: 4.17 M/CU MM (ref 4.6–6.2)
RBC # BLD: 4.17 M/CU MM (ref 4.6–6.2)
RBC # BLD: 4.21 M/CU MM (ref 4.6–6.2)
RBC # BLD: 4.26 M/CU MM (ref 4.6–6.2)
RBC URINE: 1 /HPF (ref 0–3)
RBC URINE: 2 /HPF (ref 0–3)
RBC URINE: ABNORMAL /HPF (ref 0–3)
RENAL EPITHELIAL, UA: 60 /HPF
RHINOVIRUS ENTEROVIRUS PCR: NOT DETECTED
ROULEAUX: PRESENT
RSV PCR: NOT DETECTED
SARS-COV-2, NAAT: DETECTED
SARS-COV-2: NOT DETECTED
SEGMENTED NEUTROPHILS ABSOLUTE COUNT: 11.8 K/CU MM
SEGMENTED NEUTROPHILS ABSOLUTE COUNT: 12.5 K/CU MM
SEGMENTED NEUTROPHILS ABSOLUTE COUNT: 18.6 K/CU MM
SEGMENTED NEUTROPHILS ABSOLUTE COUNT: 3.8 K/CU MM
SEGMENTED NEUTROPHILS ABSOLUTE COUNT: 4.4 K/CU MM
SEGMENTED NEUTROPHILS ABSOLUTE COUNT: 4.5 K/CU MM
SEGMENTED NEUTROPHILS ABSOLUTE COUNT: 4.6 K/CU MM
SEGMENTED NEUTROPHILS ABSOLUTE COUNT: 5 K/CU MM
SEGMENTED NEUTROPHILS ABSOLUTE COUNT: 5.2 K/CU MM
SEGMENTED NEUTROPHILS ABSOLUTE COUNT: 5.7 K/CU MM
SEGMENTED NEUTROPHILS ABSOLUTE COUNT: 6 K/CU MM
SEGMENTED NEUTROPHILS ABSOLUTE COUNT: 6.1 K/CU MM
SEGMENTED NEUTROPHILS ABSOLUTE COUNT: 7.2 K/CU MM
SEGMENTED NEUTROPHILS ABSOLUTE COUNT: 7.3 K/CU MM
SEGMENTED NEUTROPHILS ABSOLUTE COUNT: 7.6 K/CU MM
SEGMENTED NEUTROPHILS ABSOLUTE COUNT: 8.3 K/CU MM
SEGMENTED NEUTROPHILS RELATIVE PERCENT: 61.6 % (ref 36–66)
SEGMENTED NEUTROPHILS RELATIVE PERCENT: 62.7 % (ref 36–66)
SEGMENTED NEUTROPHILS RELATIVE PERCENT: 68.3 % (ref 36–66)
SEGMENTED NEUTROPHILS RELATIVE PERCENT: 69.4 % (ref 36–66)
SEGMENTED NEUTROPHILS RELATIVE PERCENT: 71.2 % (ref 36–66)
SEGMENTED NEUTROPHILS RELATIVE PERCENT: 73 % (ref 36–66)
SEGMENTED NEUTROPHILS RELATIVE PERCENT: 75 % (ref 36–66)
SEGMENTED NEUTROPHILS RELATIVE PERCENT: 78 % (ref 36–66)
SEGMENTED NEUTROPHILS RELATIVE PERCENT: 83.9 % (ref 36–66)
SEGMENTED NEUTROPHILS RELATIVE PERCENT: 85.1 % (ref 36–66)
SEGMENTED NEUTROPHILS RELATIVE PERCENT: 86.7 % (ref 36–66)
SEGMENTED NEUTROPHILS RELATIVE PERCENT: 87 % (ref 36–66)
SEGMENTED NEUTROPHILS RELATIVE PERCENT: 87.7 % (ref 36–66)
SEGMENTED NEUTROPHILS RELATIVE PERCENT: 88 % (ref 36–66)
SEGMENTED NEUTROPHILS RELATIVE PERCENT: 88.9 % (ref 36–66)
SEGMENTED NEUTROPHILS RELATIVE PERCENT: 89.8 % (ref 36–66)
SODIUM BLD-SCNC: 121 MMOL/L (ref 135–145)
SODIUM BLD-SCNC: 121 MMOL/L (ref 135–145)
SODIUM BLD-SCNC: 125 MMOL/L (ref 135–145)
SODIUM BLD-SCNC: 127 MMOL/L (ref 135–145)
SODIUM BLD-SCNC: 128 MMOL/L (ref 135–145)
SODIUM BLD-SCNC: 128 MMOL/L (ref 135–145)
SODIUM BLD-SCNC: 129 MMOL/L (ref 135–145)
SODIUM BLD-SCNC: 131 MMOL/L (ref 135–145)
SODIUM BLD-SCNC: 132 MMOL/L (ref 135–145)
SODIUM BLD-SCNC: 135 MMOL/L (ref 135–145)
SODIUM BLD-SCNC: 135 MMOL/L (ref 135–145)
SODIUM BLD-SCNC: 136 MMOL/L (ref 135–145)
SODIUM BLD-SCNC: 136 MMOL/L (ref 135–145)
SODIUM BLD-SCNC: 138 MMOL/L (ref 135–145)
SODIUM BLD-SCNC: 139 MMOL/L (ref 135–145)
SODIUM BLD-SCNC: 139 MMOL/L (ref 135–145)
SODIUM BLD-SCNC: 140 MMOL/L (ref 135–145)
SODIUM BLD-SCNC: 142 MMOL/L (ref 135–145)
SODIUM URINE: 14 MMOL/L (ref 35–167)
SOURCE, BLOOD GAS: ABNORMAL
SOURCE: ABNORMAL
SPECIFIC GRAVITY UA: 1.01 (ref 1–1.03)
SPECIFIC GRAVITY UA: 1.03 (ref 1–1.03)
SPECIFIC GRAVITY UA: >1.03 (ref 1–1.03)
SPECIMEN: ABNORMAL
SPECIMEN: ABNORMAL
SPECIMEN: NORMAL
SQUAMOUS EPITHELIAL: <1 /HPF
STREP PNEUMONIAE ANTIGEN: NORMAL
TOTAL IMMATURE NEUTOROPHIL: 0.01 K/CU MM
TOTAL IMMATURE NEUTOROPHIL: 0.02 K/CU MM
TOTAL IMMATURE NEUTOROPHIL: 0.02 K/CU MM
TOTAL IMMATURE NEUTOROPHIL: 0.03 K/CU MM
TOTAL IMMATURE NEUTOROPHIL: 0.07 K/CU MM
TOTAL IMMATURE NEUTOROPHIL: 0.08 K/CU MM
TOTAL IMMATURE NEUTOROPHIL: 0.09 K/CU MM
TOTAL IMMATURE NEUTOROPHIL: 0.12 K/CU MM
TOTAL IMMATURE NEUTOROPHIL: 0.17 K/CU MM
TOTAL IMMATURE NEUTOROPHIL: 0.41 K/CU MM
TOTAL NUCLEATED RBC: 0 K/CU MM
TOTAL PROTEIN: 4.8 GM/DL (ref 6.4–8.2)
TOTAL PROTEIN: 5 GM/DL (ref 6.4–8.2)
TOTAL PROTEIN: 5.1 GM/DL (ref 6.4–8.2)
TOTAL PROTEIN: 5.3 GM/DL (ref 6.4–8.2)
TOTAL PROTEIN: 5.5 GM/DL (ref 6.4–8.2)
TOTAL PROTEIN: 5.6 GM/DL (ref 6.4–8.2)
TOTAL PROTEIN: 5.6 GM/DL (ref 6.4–8.2)
TOTAL PROTEIN: 5.8 GM/DL (ref 6.4–8.2)
TOTAL PROTEIN: 5.8 GM/DL (ref 6.4–8.2)
TOTAL PROTEIN: 5.9 GM/DL (ref 6.4–8.2)
TOTAL PROTEIN: 6.1 GM/DL (ref 6.4–8.2)
TOTAL PROTEIN: 6.3 GM/DL (ref 6.4–8.2)
TOTAL PROTEIN: 6.7 GM/DL (ref 6.4–8.2)
TOXIC GRANULATION: PRESENT
TOXIC GRANULATION: PRESENT
TRIGL SERPL-MCNC: 260 MG/DL
TRIGL SERPL-MCNC: 464 MG/DL
TROPONIN T: 0.01 NG/ML
TROPONIN T: 0.02 NG/ML
TROPONIN T: 0.04 NG/ML
TSH HIGH SENSITIVITY: 2.24 UIU/ML (ref 0.27–4.2)
URINE TOTAL PROTEIN: 162.4 MG/DL
UROBILINOGEN, URINE: 0.2 MG/DL (ref 0.2–1)
VANCOMYCIN RANDOM: 18.4 UG/ML
VANCOMYCIN RANDOM: 19.4 UG/ML
WBC # BLD: 10.7 K/CU MM (ref 4–10.5)
WBC # BLD: 13.5 K/CU MM (ref 4–10.5)
WBC # BLD: 14.1 K/CU MM (ref 4–10.5)
WBC # BLD: 2.6 K/CU MM (ref 4–10.5)
WBC # BLD: 21.2 K/CU MM (ref 4–10.5)
WBC # BLD: 6 K/CU MM (ref 4–10.5)
WBC # BLD: 6.1 K/CU MM (ref 4–10.5)
WBC # BLD: 6.2 K/CU MM (ref 4–10.5)
WBC # BLD: 6.2 K/CU MM (ref 4–10.5)
WBC # BLD: 6.9 K/CU MM (ref 4–10.5)
WBC # BLD: 7 K/CU MM (ref 4–10.5)
WBC # BLD: 7.6 K/CU MM (ref 4–10.5)
WBC # BLD: 8.2 K/CU MM (ref 4–10.5)
WBC # BLD: 8.2 K/CU MM (ref 4–10.5)
WBC # BLD: 8.4 K/CU MM (ref 4–10.5)
WBC # BLD: 8.5 K/CU MM (ref 4–10.5)
WBC # BLD: 8.5 K/CU MM (ref 4–10.5)
WBC UA: 2 /HPF (ref 0–2)
WBC UA: 3 /HPF (ref 0–2)
WBC UA: ABNORMAL /HPF (ref 0–2)

## 2022-01-01 PROCEDURE — 6370000000 HC RX 637 (ALT 250 FOR IP): Performed by: INTERNAL MEDICINE

## 2022-01-01 PROCEDURE — 36415 COLL VENOUS BLD VENIPUNCTURE: CPT

## 2022-01-01 PROCEDURE — 97110 THERAPEUTIC EXERCISES: CPT

## 2022-01-01 PROCEDURE — 81001 URINALYSIS AUTO W/SCOPE: CPT

## 2022-01-01 PROCEDURE — 2700000000 HC OXYGEN THERAPY PER DAY

## 2022-01-01 PROCEDURE — 6370000000 HC RX 637 (ALT 250 FOR IP): Performed by: NURSE PRACTITIONER

## 2022-01-01 PROCEDURE — 86141 C-REACTIVE PROTEIN HS: CPT

## 2022-01-01 PROCEDURE — 74176 CT ABD & PELVIS W/O CONTRAST: CPT

## 2022-01-01 PROCEDURE — 2060000000 HC ICU INTERMEDIATE R&B

## 2022-01-01 PROCEDURE — 2580000003 HC RX 258: Performed by: HOSPITALIST

## 2022-01-01 PROCEDURE — 51798 US URINE CAPACITY MEASURE: CPT

## 2022-01-01 PROCEDURE — 84145 PROCALCITONIN (PCT): CPT

## 2022-01-01 PROCEDURE — 6370000000 HC RX 637 (ALT 250 FOR IP): Performed by: HOSPITALIST

## 2022-01-01 PROCEDURE — 71045 X-RAY EXAM CHEST 1 VIEW: CPT

## 2022-01-01 PROCEDURE — 84100 ASSAY OF PHOSPHORUS: CPT

## 2022-01-01 PROCEDURE — 94002 VENT MGMT INPAT INIT DAY: CPT

## 2022-01-01 PROCEDURE — 94761 N-INVAS EAR/PLS OXIMETRY MLT: CPT

## 2022-01-01 PROCEDURE — 83880 ASSAY OF NATRIURETIC PEPTIDE: CPT

## 2022-01-01 PROCEDURE — C9113 INJ PANTOPRAZOLE SODIUM, VIA: HCPCS | Performed by: NURSE PRACTITIONER

## 2022-01-01 PROCEDURE — 2500000003 HC RX 250 WO HCPCS: Performed by: FAMILY MEDICINE

## 2022-01-01 PROCEDURE — APPSS45 APP SPLIT SHARED TIME 31-45 MINUTES: Performed by: NURSE PRACTITIONER

## 2022-01-01 PROCEDURE — 89220 SPUTUM SPECIMEN COLLECTION: CPT

## 2022-01-01 PROCEDURE — 82803 BLOOD GASES ANY COMBINATION: CPT

## 2022-01-01 PROCEDURE — 74150 CT ABDOMEN W/O CONTRAST: CPT

## 2022-01-01 PROCEDURE — 1200000002 HC SEMI PRIVATE SWING BED

## 2022-01-01 PROCEDURE — 85025 COMPLETE CBC W/AUTO DIFF WBC: CPT

## 2022-01-01 PROCEDURE — 2500000003 HC RX 250 WO HCPCS: Performed by: NURSE PRACTITIONER

## 2022-01-01 PROCEDURE — 82962 GLUCOSE BLOOD TEST: CPT

## 2022-01-01 PROCEDURE — 6360000002 HC RX W HCPCS: Performed by: NURSE PRACTITIONER

## 2022-01-01 PROCEDURE — 84156 ASSAY OF PROTEIN URINE: CPT

## 2022-01-01 PROCEDURE — C1751 CATH, INF, PER/CENT/MIDLINE: HCPCS

## 2022-01-01 PROCEDURE — 2580000003 HC RX 258: Performed by: INTERNAL MEDICINE

## 2022-01-01 PROCEDURE — 87147 CULTURE TYPE IMMUNOLOGIC: CPT

## 2022-01-01 PROCEDURE — 6360000002 HC RX W HCPCS: Performed by: INTERNAL MEDICINE

## 2022-01-01 PROCEDURE — 87635 SARS-COV-2 COVID-19 AMP PRB: CPT

## 2022-01-01 PROCEDURE — 2580000003 HC RX 258: Performed by: NURSE PRACTITIONER

## 2022-01-01 PROCEDURE — 84300 ASSAY OF URINE SODIUM: CPT

## 2022-01-01 PROCEDURE — 94003 VENT MGMT INPAT SUBQ DAY: CPT

## 2022-01-01 PROCEDURE — 36592 COLLECT BLOOD FROM PICC: CPT

## 2022-01-01 PROCEDURE — 6360000002 HC RX W HCPCS

## 2022-01-01 PROCEDURE — 83735 ASSAY OF MAGNESIUM: CPT

## 2022-01-01 PROCEDURE — 87040 BLOOD CULTURE FOR BACTERIA: CPT

## 2022-01-01 PROCEDURE — 80053 COMPREHEN METABOLIC PANEL: CPT

## 2022-01-01 PROCEDURE — 73590 X-RAY EXAM OF LOWER LEG: CPT

## 2022-01-01 PROCEDURE — 97530 THERAPEUTIC ACTIVITIES: CPT

## 2022-01-01 PROCEDURE — 0BH17EZ INSERTION OF ENDOTRACHEAL AIRWAY INTO TRACHEA, VIA NATURAL OR ARTIFICIAL OPENING: ICD-10-PCS | Performed by: INTERNAL MEDICINE

## 2022-01-01 PROCEDURE — 36556 INSERT NON-TUNNEL CV CATH: CPT

## 2022-01-01 PROCEDURE — 87186 SC STD MICRODIL/AGAR DIL: CPT

## 2022-01-01 PROCEDURE — 87070 CULTURE OTHR SPECIMN AEROBIC: CPT

## 2022-01-01 PROCEDURE — 84478 ASSAY OF TRIGLYCERIDES: CPT

## 2022-01-01 PROCEDURE — 73030 X-RAY EXAM OF SHOULDER: CPT

## 2022-01-01 PROCEDURE — 74018 RADEX ABDOMEN 1 VIEW: CPT

## 2022-01-01 PROCEDURE — 1200000000 HC SEMI PRIVATE

## 2022-01-01 PROCEDURE — 2709999900 HC NON-CHARGEABLE SUPPLY

## 2022-01-01 PROCEDURE — 2500000003 HC RX 250 WO HCPCS: Performed by: INTERNAL MEDICINE

## 2022-01-01 PROCEDURE — 83036 HEMOGLOBIN GLYCOSYLATED A1C: CPT

## 2022-01-01 PROCEDURE — 87075 CULTR BACTERIA EXCEPT BLOOD: CPT

## 2022-01-01 PROCEDURE — 94640 AIRWAY INHALATION TREATMENT: CPT

## 2022-01-01 PROCEDURE — 97116 GAIT TRAINING THERAPY: CPT

## 2022-01-01 PROCEDURE — 85007 BL SMEAR W/DIFF WBC COUNT: CPT

## 2022-01-01 PROCEDURE — 87077 CULTURE AEROBIC IDENTIFY: CPT

## 2022-01-01 PROCEDURE — 31500 INSERT EMERGENCY AIRWAY: CPT

## 2022-01-01 PROCEDURE — 80162 ASSAY OF DIGOXIN TOTAL: CPT

## 2022-01-01 PROCEDURE — 99232 SBSQ HOSP IP/OBS MODERATE 35: CPT | Performed by: INTERNAL MEDICINE

## 2022-01-01 PROCEDURE — B246ZZZ ULTRASONOGRAPHY OF RIGHT AND LEFT HEART: ICD-10-PCS | Performed by: INTERNAL MEDICINE

## 2022-01-01 PROCEDURE — 0202U NFCT DS 22 TRGT SARS-COV-2: CPT

## 2022-01-01 PROCEDURE — 97535 SELF CARE MNGMENT TRAINING: CPT

## 2022-01-01 PROCEDURE — 99285 EMERGENCY DEPT VISIT HI MDM: CPT

## 2022-01-01 PROCEDURE — 36600 WITHDRAWAL OF ARTERIAL BLOOD: CPT

## 2022-01-01 PROCEDURE — 93005 ELECTROCARDIOGRAM TRACING: CPT | Performed by: EMERGENCY MEDICINE

## 2022-01-01 PROCEDURE — 71046 X-RAY EXAM CHEST 2 VIEWS: CPT

## 2022-01-01 PROCEDURE — 93306 TTE W/DOPPLER COMPLETE: CPT

## 2022-01-01 PROCEDURE — 85379 FIBRIN DEGRADATION QUANT: CPT

## 2022-01-01 PROCEDURE — 87205 SMEAR GRAM STAIN: CPT

## 2022-01-01 PROCEDURE — 85027 COMPLETE CBC AUTOMATED: CPT

## 2022-01-01 PROCEDURE — 2000000000 HC ICU R&B

## 2022-01-01 PROCEDURE — 99223 1ST HOSP IP/OBS HIGH 75: CPT | Performed by: INTERNAL MEDICINE

## 2022-01-01 PROCEDURE — 6370000000 HC RX 637 (ALT 250 FOR IP): Performed by: PHYSICIAN ASSISTANT

## 2022-01-01 PROCEDURE — 73610 X-RAY EXAM OF ANKLE: CPT

## 2022-01-01 PROCEDURE — 97162 PT EVAL MOD COMPLEX 30 MIN: CPT

## 2022-01-01 PROCEDURE — 2580000003 HC RX 258

## 2022-01-01 PROCEDURE — 6360000002 HC RX W HCPCS: Performed by: HOSPITALIST

## 2022-01-01 PROCEDURE — 97166 OT EVAL MOD COMPLEX 45 MIN: CPT

## 2022-01-01 PROCEDURE — 80069 RENAL FUNCTION PANEL: CPT

## 2022-01-01 PROCEDURE — 99233 SBSQ HOSP IP/OBS HIGH 50: CPT | Performed by: INTERNAL MEDICINE

## 2022-01-01 PROCEDURE — 80048 BASIC METABOLIC PNL TOTAL CA: CPT

## 2022-01-01 PROCEDURE — 2500000003 HC RX 250 WO HCPCS

## 2022-01-01 PROCEDURE — 96374 THER/PROPH/DIAG INJ IV PUSH: CPT

## 2022-01-01 PROCEDURE — 93010 ELECTROCARDIOGRAM REPORT: CPT | Performed by: INTERNAL MEDICINE

## 2022-01-01 PROCEDURE — 82330 ASSAY OF CALCIUM: CPT

## 2022-01-01 PROCEDURE — 84484 ASSAY OF TROPONIN QUANT: CPT

## 2022-01-01 PROCEDURE — 87449 NOS EACH ORGANISM AG IA: CPT

## 2022-01-01 PROCEDURE — C1894 INTRO/SHEATH, NON-LASER: HCPCS

## 2022-01-01 PROCEDURE — 87081 CULTURE SCREEN ONLY: CPT

## 2022-01-01 PROCEDURE — 96375 TX/PRO/DX INJ NEW DRUG ADDON: CPT

## 2022-01-01 PROCEDURE — 76937 US GUIDE VASCULAR ACCESS: CPT

## 2022-01-01 PROCEDURE — 84132 ASSAY OF SERUM POTASSIUM: CPT

## 2022-01-01 PROCEDURE — 76775 US EXAM ABDO BACK WALL LIM: CPT

## 2022-01-01 PROCEDURE — 5A1955Z RESPIRATORY VENTILATION, GREATER THAN 96 CONSECUTIVE HOURS: ICD-10-PCS | Performed by: INTERNAL MEDICINE

## 2022-01-01 PROCEDURE — XW0DXM6 INTRODUCTION OF BARICITINIB INTO MOUTH AND PHARYNX, EXTERNAL APPROACH, NEW TECHNOLOGY GROUP 6: ICD-10-PCS | Performed by: HOSPITALIST

## 2022-01-01 PROCEDURE — 99211 OFF/OP EST MAY X REQ PHY/QHP: CPT

## 2022-01-01 PROCEDURE — 73560 X-RAY EXAM OF KNEE 1 OR 2: CPT

## 2022-01-01 PROCEDURE — 6360000002 HC RX W HCPCS: Performed by: PHYSICIAN ASSISTANT

## 2022-01-01 PROCEDURE — 83605 ASSAY OF LACTIC ACID: CPT

## 2022-01-01 PROCEDURE — 96372 THER/PROPH/DIAG INJ SC/IM: CPT

## 2022-01-01 PROCEDURE — 02HV33Z INSERTION OF INFUSION DEVICE INTO SUPERIOR VENA CAVA, PERCUTANEOUS APPROACH: ICD-10-PCS | Performed by: INTERNAL MEDICINE

## 2022-01-01 PROCEDURE — 94618 PULMONARY STRESS TESTING: CPT

## 2022-01-01 PROCEDURE — 31500 INSERT EMERGENCY AIRWAY: CPT | Performed by: ANESTHESIOLOGY

## 2022-01-01 PROCEDURE — 6360000002 HC RX W HCPCS: Performed by: FAMILY MEDICINE

## 2022-01-01 PROCEDURE — 84443 ASSAY THYROID STIM HORMONE: CPT

## 2022-01-01 PROCEDURE — 87899 AGENT NOS ASSAY W/OPTIC: CPT

## 2022-01-01 PROCEDURE — 82570 ASSAY OF URINE CREATININE: CPT

## 2022-01-01 PROCEDURE — 82565 ASSAY OF CREATININE: CPT

## 2022-01-01 PROCEDURE — 6360000002 HC RX W HCPCS: Performed by: EMERGENCY MEDICINE

## 2022-01-01 PROCEDURE — 2100000000 HC CCU R&B

## 2022-01-01 PROCEDURE — 80202 ASSAY OF VANCOMYCIN: CPT

## 2022-01-01 PROCEDURE — 96376 TX/PRO/DX INJ SAME DRUG ADON: CPT

## 2022-01-01 PROCEDURE — 93005 ELECTROCARDIOGRAM TRACING: CPT | Performed by: INTERNAL MEDICINE

## 2022-01-01 PROCEDURE — 51702 INSERT TEMP BLADDER CATH: CPT

## 2022-01-01 RX ORDER — PANTOPRAZOLE SODIUM 40 MG/1
40 TABLET, DELAYED RELEASE ORAL
Status: DISCONTINUED | OUTPATIENT
Start: 2022-01-01 | End: 2022-01-01 | Stop reason: HOSPADM

## 2022-01-01 RX ORDER — ASPIRIN 81 MG/1
81 TABLET ORAL DAILY
Status: DISCONTINUED | OUTPATIENT
Start: 2022-01-01 | End: 2022-01-01 | Stop reason: HOSPADM

## 2022-01-01 RX ORDER — INSULIN GLARGINE 100 [IU]/ML
20 INJECTION, SOLUTION SUBCUTANEOUS NIGHTLY
Status: DISCONTINUED | OUTPATIENT
Start: 2022-01-01 | End: 2022-01-01

## 2022-01-01 RX ORDER — ONDANSETRON 4 MG/1
4 TABLET, ORALLY DISINTEGRATING ORAL EVERY 8 HOURS PRN
Status: DISCONTINUED | OUTPATIENT
Start: 2022-01-01 | End: 2022-01-01 | Stop reason: HOSPADM

## 2022-01-01 RX ORDER — FUROSEMIDE 40 MG/1
40 TABLET ORAL 2 TIMES DAILY
Status: DISCONTINUED | OUTPATIENT
Start: 2022-01-01 | End: 2022-01-01

## 2022-01-01 RX ORDER — CALCIUM GLUCONATE 20 MG/ML
1000 INJECTION, SOLUTION INTRAVENOUS ONCE
Status: COMPLETED | OUTPATIENT
Start: 2022-01-01 | End: 2022-01-01

## 2022-01-01 RX ORDER — MORPHINE SULFATE 4 MG/ML
4 INJECTION, SOLUTION INTRAMUSCULAR; INTRAVENOUS ONCE
Status: COMPLETED | OUTPATIENT
Start: 2022-01-01 | End: 2022-01-01

## 2022-01-01 RX ORDER — CYCLOBENZAPRINE HCL 10 MG
10 TABLET ORAL NIGHTLY
Status: DISCONTINUED | OUTPATIENT
Start: 2022-01-01 | End: 2022-01-01 | Stop reason: HOSPADM

## 2022-01-01 RX ORDER — METOPROLOL SUCCINATE 50 MG/1
50 TABLET, EXTENDED RELEASE ORAL DAILY
Status: CANCELLED | OUTPATIENT
Start: 2022-01-01

## 2022-01-01 RX ORDER — ONDANSETRON 2 MG/ML
4 INJECTION INTRAMUSCULAR; INTRAVENOUS EVERY 6 HOURS PRN
Status: DISCONTINUED | OUTPATIENT
Start: 2022-01-01 | End: 2022-01-01 | Stop reason: HOSPADM

## 2022-01-01 RX ORDER — INSULIN GLARGINE 100 [IU]/ML
30 INJECTION, SOLUTION SUBCUTANEOUS NIGHTLY
Status: DISCONTINUED | OUTPATIENT
Start: 2022-01-01 | End: 2022-01-01

## 2022-01-01 RX ORDER — LORAZEPAM 2 MG/ML
0.5 INJECTION INTRAMUSCULAR EVERY 6 HOURS PRN
Status: DISCONTINUED | OUTPATIENT
Start: 2022-01-01 | End: 2022-01-01 | Stop reason: HOSPADM

## 2022-01-01 RX ORDER — PANTOPRAZOLE SODIUM 40 MG/10ML
40 INJECTION, POWDER, LYOPHILIZED, FOR SOLUTION INTRAVENOUS DAILY
Status: DISCONTINUED | OUTPATIENT
Start: 2022-01-01 | End: 2022-01-01 | Stop reason: HOSPADM

## 2022-01-01 RX ORDER — FUROSEMIDE 10 MG/ML
20 INJECTION INTRAMUSCULAR; INTRAVENOUS ONCE
Status: COMPLETED | OUTPATIENT
Start: 2022-01-01 | End: 2022-01-01

## 2022-01-01 RX ORDER — GLYCOPYRROLATE 1 MG/5 ML
0.2 SYRINGE (ML) INTRAVENOUS EVERY 4 HOURS PRN
Status: DISCONTINUED | OUTPATIENT
Start: 2022-01-01 | End: 2022-01-01 | Stop reason: HOSPADM

## 2022-01-01 RX ORDER — ALBUTEROL SULFATE 90 UG/1
1 AEROSOL, METERED RESPIRATORY (INHALATION) EVERY 4 HOURS PRN
Status: DISCONTINUED | OUTPATIENT
Start: 2022-01-01 | End: 2022-01-01 | Stop reason: HOSPADM

## 2022-01-01 RX ORDER — METOPROLOL SUCCINATE 50 MG/1
50 TABLET, EXTENDED RELEASE ORAL DAILY
Status: DISCONTINUED | OUTPATIENT
Start: 2022-01-01 | End: 2022-01-01 | Stop reason: HOSPADM

## 2022-01-01 RX ORDER — NICOTINE POLACRILEX 4 MG
15 LOZENGE BUCCAL PRN
Status: DISCONTINUED | OUTPATIENT
Start: 2022-01-01 | End: 2022-01-01 | Stop reason: HOSPADM

## 2022-01-01 RX ORDER — AMLODIPINE BESYLATE 10 MG/1
10 TABLET ORAL DAILY
Status: CANCELLED | OUTPATIENT
Start: 2022-01-01

## 2022-01-01 RX ORDER — LOSARTAN POTASSIUM 25 MG/1
25 TABLET ORAL DAILY
Status: DISCONTINUED | OUTPATIENT
Start: 2022-01-01 | End: 2022-01-01 | Stop reason: HOSPADM

## 2022-01-01 RX ORDER — AMLODIPINE BESYLATE 10 MG/1
10 TABLET ORAL DAILY
Status: DISCONTINUED | OUTPATIENT
Start: 2022-01-01 | End: 2022-01-01

## 2022-01-01 RX ORDER — ACETAMINOPHEN 325 MG/1
650 TABLET ORAL EVERY 4 HOURS PRN
Status: DISCONTINUED | OUTPATIENT
Start: 2022-01-01 | End: 2022-01-01 | Stop reason: HOSPADM

## 2022-01-01 RX ORDER — FENTANYL CITRATE-0.9 % NACL/PF 10 MCG/ML
25 PLASTIC BAG, INJECTION (ML) INTRAVENOUS CONTINUOUS
Status: DISCONTINUED | OUTPATIENT
Start: 2022-01-01 | End: 2022-01-01 | Stop reason: HOSPADM

## 2022-01-01 RX ORDER — GUAIFENESIN 600 MG/1
600 TABLET, EXTENDED RELEASE ORAL 2 TIMES DAILY
Status: DISCONTINUED | OUTPATIENT
Start: 2022-01-01 | End: 2022-01-01

## 2022-01-01 RX ORDER — INSULIN GLARGINE 100 [IU]/ML
50 INJECTION, SOLUTION SUBCUTANEOUS NIGHTLY
Status: DISCONTINUED | OUTPATIENT
Start: 2022-01-01 | End: 2022-01-01

## 2022-01-01 RX ORDER — MORPHINE SULFATE 4 MG/ML
INJECTION, SOLUTION INTRAMUSCULAR; INTRAVENOUS
Status: COMPLETED
Start: 2022-01-01 | End: 2022-01-01

## 2022-01-01 RX ORDER — ALBUTEROL SULFATE 90 UG/1
1 AEROSOL, METERED RESPIRATORY (INHALATION) EVERY 4 HOURS PRN
Status: CANCELLED | OUTPATIENT
Start: 2022-01-01

## 2022-01-01 RX ORDER — LIDOCAINE 4 G/G
1 PATCH TOPICAL DAILY
Status: DISCONTINUED | OUTPATIENT
Start: 2022-01-01 | End: 2022-01-01 | Stop reason: HOSPADM

## 2022-01-01 RX ORDER — PROPOFOL 10 MG/ML
10 INJECTION, EMULSION INTRAVENOUS CONTINUOUS
Status: DISCONTINUED | OUTPATIENT
Start: 2022-01-01 | End: 2022-01-01 | Stop reason: HOSPADM

## 2022-01-01 RX ORDER — 0.9 % SODIUM CHLORIDE 0.9 %
500 INTRAVENOUS SOLUTION INTRAVENOUS ONCE
Status: DISCONTINUED | OUTPATIENT
Start: 2022-01-01 | End: 2022-01-01 | Stop reason: HOSPADM

## 2022-01-01 RX ORDER — OXYCODONE HYDROCHLORIDE 5 MG/1
5 TABLET ORAL EVERY 4 HOURS PRN
Status: DISCONTINUED | OUTPATIENT
Start: 2022-01-01 | End: 2022-01-01 | Stop reason: HOSPADM

## 2022-01-01 RX ORDER — DILTIAZEM HYDROCHLORIDE 5 MG/ML
20 INJECTION INTRAVENOUS ONCE
Status: COMPLETED | OUTPATIENT
Start: 2022-01-01 | End: 2022-01-01

## 2022-01-01 RX ORDER — SODIUM PHOSPHATE, DIBASIC AND SODIUM PHOSPHATE, MONOBASIC 7; 19 G/133ML; G/133ML
1 ENEMA RECTAL DAILY PRN
Status: CANCELLED | OUTPATIENT
Start: 2022-01-01

## 2022-01-01 RX ORDER — DEXTROSE MONOHYDRATE 50 MG/ML
100 INJECTION, SOLUTION INTRAVENOUS PRN
Status: DISCONTINUED | OUTPATIENT
Start: 2022-01-01 | End: 2022-01-01

## 2022-01-01 RX ORDER — NOREPINEPHRINE BIT/0.9 % NACL 16MG/250ML
2-100 INFUSION BOTTLE (ML) INTRAVENOUS CONTINUOUS
Status: DISCONTINUED | OUTPATIENT
Start: 2022-01-01 | End: 2022-01-01 | Stop reason: HOSPADM

## 2022-01-01 RX ORDER — SODIUM CHLORIDE 0.9 % (FLUSH) 0.9 %
5-40 SYRINGE (ML) INJECTION EVERY 12 HOURS SCHEDULED
Status: DISCONTINUED | OUTPATIENT
Start: 2022-01-01 | End: 2022-01-01 | Stop reason: HOSPADM

## 2022-01-01 RX ORDER — FUROSEMIDE 40 MG/1
40 TABLET ORAL DAILY
Status: DISCONTINUED | OUTPATIENT
Start: 2022-01-01 | End: 2022-01-01

## 2022-01-01 RX ORDER — DEXTROSE MONOHYDRATE 25 G/50ML
12.5 INJECTION, SOLUTION INTRAVENOUS PRN
Status: DISCONTINUED | OUTPATIENT
Start: 2022-01-01 | End: 2022-01-01

## 2022-01-01 RX ORDER — AMLODIPINE BESYLATE 10 MG/1
10 TABLET ORAL DAILY
Status: DISCONTINUED | OUTPATIENT
Start: 2022-01-01 | End: 2022-01-01 | Stop reason: HOSPADM

## 2022-01-01 RX ORDER — METOPROLOL TARTRATE 5 MG/5ML
5 INJECTION INTRAVENOUS ONCE
Status: COMPLETED | OUTPATIENT
Start: 2022-01-01 | End: 2022-01-01

## 2022-01-01 RX ORDER — PHENAZOPYRIDINE HYDROCHLORIDE 100 MG/1
100 TABLET, FILM COATED ORAL ONCE
Status: DISCONTINUED | OUTPATIENT
Start: 2022-01-01 | End: 2022-01-01 | Stop reason: HOSPADM

## 2022-01-01 RX ORDER — ACETAMINOPHEN 325 MG/1
650 TABLET ORAL EVERY 4 HOURS PRN
Status: CANCELLED | OUTPATIENT
Start: 2022-01-01

## 2022-01-01 RX ORDER — GUAIFENESIN 100 MG/5ML
200 SOLUTION ORAL EVERY 4 HOURS
Status: DISCONTINUED | OUTPATIENT
Start: 2022-01-01 | End: 2022-01-01 | Stop reason: HOSPADM

## 2022-01-01 RX ORDER — DEXTROSE MONOHYDRATE 25 G/50ML
12.5 INJECTION, SOLUTION INTRAVENOUS PRN
Status: DISCONTINUED | OUTPATIENT
Start: 2022-01-01 | End: 2022-01-01 | Stop reason: CLARIF

## 2022-01-01 RX ORDER — INSULIN GLARGINE 100 [IU]/ML
0.15 INJECTION, SOLUTION SUBCUTANEOUS NIGHTLY
Status: DISCONTINUED | OUTPATIENT
Start: 2022-01-01 | End: 2022-01-01

## 2022-01-01 RX ORDER — DIGOXIN 0.25 MG/ML
125 INJECTION INTRAMUSCULAR; INTRAVENOUS DAILY
Status: DISCONTINUED | OUTPATIENT
Start: 2022-01-01 | End: 2022-01-01

## 2022-01-01 RX ORDER — HYDROCODONE POLISTIREX AND CHLORPHENIRAMINE POLISTIREX 10; 8 MG/5ML; MG/5ML
5 SUSPENSION, EXTENDED RELEASE ORAL EVERY 12 HOURS PRN
Status: DISCONTINUED | OUTPATIENT
Start: 2022-01-01 | End: 2022-01-01 | Stop reason: HOSPADM

## 2022-01-01 RX ORDER — ACETAMINOPHEN 325 MG/1
650 TABLET ORAL EVERY 6 HOURS PRN
Status: DISCONTINUED | OUTPATIENT
Start: 2022-01-01 | End: 2022-01-01 | Stop reason: HOSPADM

## 2022-01-01 RX ORDER — FUROSEMIDE 10 MG/ML
40 INJECTION INTRAMUSCULAR; INTRAVENOUS 2 TIMES DAILY
Status: DISCONTINUED | OUTPATIENT
Start: 2022-01-01 | End: 2022-01-01 | Stop reason: HOSPADM

## 2022-01-01 RX ORDER — BENZONATATE 100 MG/1
100 CAPSULE ORAL 3 TIMES DAILY
Status: DISCONTINUED | OUTPATIENT
Start: 2022-01-01 | End: 2022-01-01

## 2022-01-01 RX ORDER — ALPRAZOLAM 0.25 MG/1
0.25 TABLET ORAL NIGHTLY PRN
Status: DISCONTINUED | OUTPATIENT
Start: 2022-01-01 | End: 2022-01-01

## 2022-01-01 RX ORDER — POLYETHYLENE GLYCOL 3350 17 G
2 POWDER IN PACKET (EA) ORAL
Status: CANCELLED | OUTPATIENT
Start: 2022-01-01

## 2022-01-01 RX ORDER — LANOLIN ALCOHOL/MO/W.PET/CERES
400 CREAM (GRAM) TOPICAL 2 TIMES DAILY
Status: DISCONTINUED | OUTPATIENT
Start: 2022-01-01 | End: 2022-01-01 | Stop reason: HOSPADM

## 2022-01-01 RX ORDER — SODIUM CHLORIDE 0.9 % (FLUSH) 0.9 %
5-40 SYRINGE (ML) INJECTION 2 TIMES DAILY
Status: CANCELLED | OUTPATIENT
Start: 2022-01-01

## 2022-01-01 RX ORDER — ACETAMINOPHEN 500 MG
500 TABLET ORAL EVERY 4 HOURS PRN
Status: DISCONTINUED | OUTPATIENT
Start: 2022-01-01 | End: 2022-01-01 | Stop reason: HOSPADM

## 2022-01-01 RX ORDER — LORAZEPAM 2 MG/ML
INJECTION INTRAMUSCULAR
Status: COMPLETED
Start: 2022-01-01 | End: 2022-01-01

## 2022-01-01 RX ORDER — POLYETHYLENE GLYCOL 3350 17 G/17G
17 POWDER, FOR SOLUTION ORAL DAILY PRN
Status: DISCONTINUED | OUTPATIENT
Start: 2022-01-01 | End: 2022-01-01 | Stop reason: HOSPADM

## 2022-01-01 RX ORDER — CHLORHEXIDINE GLUCONATE 0.12 MG/ML
15 RINSE ORAL 2 TIMES DAILY
Status: DISCONTINUED | OUTPATIENT
Start: 2022-01-01 | End: 2022-01-01 | Stop reason: HOSPADM

## 2022-01-01 RX ORDER — SODIUM CHLORIDE 0.9 % (FLUSH) 0.9 %
5-40 SYRINGE (ML) INJECTION 2 TIMES DAILY
Status: DISCONTINUED | OUTPATIENT
Start: 2022-01-01 | End: 2022-01-01

## 2022-01-01 RX ORDER — SODIUM PHOSPHATE, DIBASIC AND SODIUM PHOSPHATE, MONOBASIC 7; 19 G/133ML; G/133ML
1 ENEMA RECTAL DAILY PRN
Status: DISCONTINUED | OUTPATIENT
Start: 2022-01-01 | End: 2022-01-01 | Stop reason: HOSPADM

## 2022-01-01 RX ORDER — FUROSEMIDE 10 MG/ML
80 INJECTION INTRAMUSCULAR; INTRAVENOUS 3 TIMES DAILY
Status: DISCONTINUED | OUTPATIENT
Start: 2022-01-01 | End: 2022-01-01 | Stop reason: HOSPADM

## 2022-01-01 RX ORDER — TORSEMIDE 20 MG/1
20 TABLET ORAL 2 TIMES DAILY
Status: DISCONTINUED | OUTPATIENT
Start: 2022-01-01 | End: 2022-01-01

## 2022-01-01 RX ORDER — ALBUTEROL SULFATE 90 UG/1
4 AEROSOL, METERED RESPIRATORY (INHALATION) EVERY 4 HOURS
Status: DISCONTINUED | OUTPATIENT
Start: 2022-01-01 | End: 2022-01-01 | Stop reason: HOSPADM

## 2022-01-01 RX ORDER — DEXAMETHASONE SODIUM PHOSPHATE 10 MG/ML
6 INJECTION, SOLUTION INTRAMUSCULAR; INTRAVENOUS EVERY 24 HOURS
Status: COMPLETED | OUTPATIENT
Start: 2022-01-01 | End: 2022-01-01

## 2022-01-01 RX ORDER — POLYETHYLENE GLYCOL 3350 17 G
2 POWDER IN PACKET (EA) ORAL
Status: DISCONTINUED | OUTPATIENT
Start: 2022-01-01 | End: 2022-01-01 | Stop reason: HOSPADM

## 2022-01-01 RX ORDER — METOPROLOL SUCCINATE 50 MG/1
50 TABLET, EXTENDED RELEASE ORAL 2 TIMES DAILY
Status: DISCONTINUED | OUTPATIENT
Start: 2022-01-01 | End: 2022-01-01 | Stop reason: HOSPADM

## 2022-01-01 RX ORDER — BENZONATATE 100 MG/1
100 CAPSULE ORAL 3 TIMES DAILY PRN
Status: DISCONTINUED | OUTPATIENT
Start: 2022-01-01 | End: 2022-01-01 | Stop reason: HOSPADM

## 2022-01-01 RX ORDER — DEXTROSE MONOHYDRATE 50 MG/ML
100 INJECTION, SOLUTION INTRAVENOUS PRN
Status: DISCONTINUED | OUTPATIENT
Start: 2022-01-01 | End: 2022-01-01 | Stop reason: HOSPADM

## 2022-01-01 RX ORDER — METOPROLOL TARTRATE 5 MG/5ML
INJECTION INTRAVENOUS
Status: COMPLETED
Start: 2022-01-01 | End: 2022-01-01

## 2022-01-01 RX ORDER — AMLODIPINE BESYLATE 5 MG/1
10 TABLET ORAL DAILY
Status: DISCONTINUED | OUTPATIENT
Start: 2022-01-01 | End: 2022-01-01 | Stop reason: HOSPADM

## 2022-01-01 RX ORDER — TORSEMIDE 20 MG/1
20 TABLET ORAL DAILY
Status: DISCONTINUED | OUTPATIENT
Start: 2022-01-01 | End: 2022-01-01

## 2022-01-01 RX ORDER — INSULIN GLARGINE 100 [IU]/ML
40 INJECTION, SOLUTION SUBCUTANEOUS NIGHTLY
Status: DISCONTINUED | OUTPATIENT
Start: 2022-01-01 | End: 2022-01-01

## 2022-01-01 RX ORDER — NICOTINE POLACRILEX 4 MG
15 LOZENGE BUCCAL PRN
Status: DISCONTINUED | OUTPATIENT
Start: 2022-01-01 | End: 2022-01-01 | Stop reason: SDUPTHER

## 2022-01-01 RX ORDER — SODIUM CHLORIDE 0.9 % (FLUSH) 0.9 %
10 SYRINGE (ML) INJECTION PRN
Status: CANCELLED | OUTPATIENT
Start: 2022-01-01

## 2022-01-01 RX ORDER — DEXTROSE MONOHYDRATE 25 G/50ML
12.5 INJECTION, SOLUTION INTRAVENOUS PRN
Status: DISCONTINUED | OUTPATIENT
Start: 2022-01-01 | End: 2022-01-01 | Stop reason: HOSPADM

## 2022-01-01 RX ORDER — FUROSEMIDE 10 MG/ML
80 INJECTION INTRAMUSCULAR; INTRAVENOUS ONCE
Status: COMPLETED | OUTPATIENT
Start: 2022-01-01 | End: 2022-01-01

## 2022-01-01 RX ORDER — METOPROLOL SUCCINATE 50 MG/1
50 TABLET, EXTENDED RELEASE ORAL DAILY
Status: DISCONTINUED | OUTPATIENT
Start: 2022-01-01 | End: 2022-01-01

## 2022-01-01 RX ORDER — TORSEMIDE 20 MG/1
20 TABLET ORAL 2 TIMES DAILY
Status: DISCONTINUED | OUTPATIENT
Start: 2022-01-01 | End: 2022-01-01 | Stop reason: HOSPADM

## 2022-01-01 RX ORDER — PANTOPRAZOLE SODIUM 40 MG/1
40 TABLET, DELAYED RELEASE ORAL
Status: DISCONTINUED | OUTPATIENT
Start: 2022-01-01 | End: 2022-01-01

## 2022-01-01 RX ORDER — LOSARTAN POTASSIUM 25 MG/1
25 TABLET ORAL DAILY
Status: CANCELLED | OUTPATIENT
Start: 2022-01-01

## 2022-01-01 RX ORDER — POTASSIUM CHLORIDE 7.45 MG/ML
10 INJECTION INTRAVENOUS PRN
Status: CANCELLED | OUTPATIENT
Start: 2022-01-01

## 2022-01-01 RX ORDER — METOPROLOL TARTRATE 5 MG/5ML
5 INJECTION INTRAVENOUS EVERY 6 HOURS
Status: DISCONTINUED | OUTPATIENT
Start: 2022-01-01 | End: 2022-01-01

## 2022-01-01 RX ORDER — ASPIRIN 81 MG/1
81 TABLET ORAL DAILY
Status: CANCELLED | OUTPATIENT
Start: 2022-01-01

## 2022-01-01 RX ORDER — DEXAMETHASONE SODIUM PHOSPHATE 10 MG/ML
10 INJECTION, SOLUTION INTRAMUSCULAR; INTRAVENOUS ONCE
Status: COMPLETED | OUTPATIENT
Start: 2022-01-01 | End: 2022-01-01

## 2022-01-01 RX ORDER — MAGNESIUM SULFATE IN WATER 40 MG/ML
2000 INJECTION, SOLUTION INTRAVENOUS PRN
Status: DISCONTINUED | OUTPATIENT
Start: 2022-01-01 | End: 2022-01-01 | Stop reason: HOSPADM

## 2022-01-01 RX ORDER — LORAZEPAM 2 MG/ML
0.5 INJECTION INTRAMUSCULAR ONCE
Status: COMPLETED | OUTPATIENT
Start: 2022-01-01 | End: 2022-01-01

## 2022-01-01 RX ORDER — SODIUM CHLORIDE 9 MG/ML
25 INJECTION, SOLUTION INTRAVENOUS PRN
Status: DISCONTINUED | OUTPATIENT
Start: 2022-01-01 | End: 2022-01-01

## 2022-01-01 RX ORDER — FUROSEMIDE 10 MG/ML
40 INJECTION INTRAMUSCULAR; INTRAVENOUS ONCE
Status: COMPLETED | OUTPATIENT
Start: 2022-01-01 | End: 2022-01-01

## 2022-01-01 RX ORDER — MORPHINE SULFATE 4 MG/ML
2 INJECTION, SOLUTION INTRAMUSCULAR; INTRAVENOUS
Status: DISCONTINUED | OUTPATIENT
Start: 2022-01-01 | End: 2022-01-01 | Stop reason: HOSPADM

## 2022-01-01 RX ORDER — FUROSEMIDE 10 MG/ML
80 INJECTION INTRAMUSCULAR; INTRAVENOUS 3 TIMES DAILY
Status: DISCONTINUED | OUTPATIENT
Start: 2022-01-01 | End: 2022-01-01

## 2022-01-01 RX ORDER — INSULIN GLARGINE 100 [IU]/ML
35 INJECTION, SOLUTION SUBCUTANEOUS NIGHTLY
Status: DISCONTINUED | OUTPATIENT
Start: 2022-01-01 | End: 2022-01-01 | Stop reason: HOSPADM

## 2022-01-01 RX ORDER — LANOLIN ALCOHOL/MO/W.PET/CERES
3 CREAM (GRAM) TOPICAL NIGHTLY PRN
Status: DISCONTINUED | OUTPATIENT
Start: 2022-01-01 | End: 2022-01-01

## 2022-01-01 RX ORDER — DILTIAZEM HYDROCHLORIDE 120 MG/1
120 CAPSULE, COATED, EXTENDED RELEASE ORAL DAILY
Status: DISCONTINUED | OUTPATIENT
Start: 2022-01-01 | End: 2022-01-01

## 2022-01-01 RX ORDER — FUROSEMIDE 10 MG/ML
80 INJECTION INTRAMUSCULAR; INTRAVENOUS 2 TIMES DAILY
Status: DISCONTINUED | OUTPATIENT
Start: 2022-01-01 | End: 2022-01-01

## 2022-01-01 RX ORDER — POTASSIUM CHLORIDE 20 MEQ/1
40 TABLET, EXTENDED RELEASE ORAL PRN
Status: DISCONTINUED | OUTPATIENT
Start: 2022-01-01 | End: 2022-01-01 | Stop reason: HOSPADM

## 2022-01-01 RX ORDER — POTASSIUM CHLORIDE 20 MEQ/1
40 TABLET, EXTENDED RELEASE ORAL PRN
Status: CANCELLED | OUTPATIENT
Start: 2022-01-01

## 2022-01-01 RX ORDER — LIDOCAINE 4 G/G
1 PATCH TOPICAL DAILY
Status: CANCELLED | OUTPATIENT
Start: 2022-01-01

## 2022-01-01 RX ORDER — DEXTROSE MONOHYDRATE 50 MG/ML
100 INJECTION, SOLUTION INTRAVENOUS PRN
Status: CANCELLED | OUTPATIENT
Start: 2022-01-01

## 2022-01-01 RX ORDER — POLYETHYLENE GLYCOL 3350 17 G/17G
17 POWDER, FOR SOLUTION ORAL DAILY PRN
Status: CANCELLED | OUTPATIENT
Start: 2022-01-01

## 2022-01-01 RX ORDER — SODIUM CHLORIDE 9 MG/ML
25 INJECTION, SOLUTION INTRAVENOUS PRN
Status: DISCONTINUED | OUTPATIENT
Start: 2022-01-01 | End: 2022-01-01 | Stop reason: HOSPADM

## 2022-01-01 RX ORDER — FUROSEMIDE 10 MG/ML
40 INJECTION INTRAMUSCULAR; INTRAVENOUS EVERY 12 HOURS
Status: DISCONTINUED | OUTPATIENT
Start: 2022-01-01 | End: 2022-01-01

## 2022-01-01 RX ORDER — CYCLOBENZAPRINE HCL 10 MG
10 TABLET ORAL NIGHTLY
Status: CANCELLED | OUTPATIENT
Start: 2022-01-01

## 2022-01-01 RX ORDER — PANTOPRAZOLE SODIUM 40 MG/1
40 TABLET, DELAYED RELEASE ORAL
Status: CANCELLED | OUTPATIENT
Start: 2022-01-01

## 2022-01-01 RX ORDER — SODIUM CHLORIDE 9 MG/ML
INJECTION, SOLUTION INTRAVENOUS CONTINUOUS
Status: ACTIVE | OUTPATIENT
Start: 2022-01-01 | End: 2022-01-01

## 2022-01-01 RX ORDER — METOPROLOL SUCCINATE 50 MG/1
50 TABLET, EXTENDED RELEASE ORAL DAILY
Qty: 30 TABLET | Refills: 0 | Status: SHIPPED | OUTPATIENT
Start: 2022-01-01

## 2022-01-01 RX ORDER — SODIUM CHLORIDE 0.9 % (FLUSH) 0.9 %
10 SYRINGE (ML) INJECTION PRN
Status: DISCONTINUED | OUTPATIENT
Start: 2022-01-01 | End: 2022-01-01

## 2022-01-01 RX ORDER — SODIUM CHLORIDE 0.9 % (FLUSH) 0.9 %
10 SYRINGE (ML) INJECTION PRN
Status: DISCONTINUED | OUTPATIENT
Start: 2022-01-01 | End: 2022-01-01 | Stop reason: HOSPADM

## 2022-01-01 RX ORDER — POTASSIUM CHLORIDE 7.45 MG/ML
10 INJECTION INTRAVENOUS PRN
Status: DISCONTINUED | OUTPATIENT
Start: 2022-01-01 | End: 2022-01-01

## 2022-01-01 RX ORDER — SODIUM POLYSTYRENE SULFONATE 15 G/60ML
15 SUSPENSION ORAL; RECTAL ONCE
Status: DISCONTINUED | OUTPATIENT
Start: 2022-01-01 | End: 2022-01-01

## 2022-01-01 RX ORDER — SODIUM CHLORIDE 0.9 % (FLUSH) 0.9 %
5-40 SYRINGE (ML) INJECTION PRN
Status: DISCONTINUED | OUTPATIENT
Start: 2022-01-01 | End: 2022-01-01 | Stop reason: HOSPADM

## 2022-01-01 RX ORDER — HYDRALAZINE HYDROCHLORIDE 20 MG/ML
5 INJECTION INTRAMUSCULAR; INTRAVENOUS ONCE
Status: DISCONTINUED | OUTPATIENT
Start: 2022-01-01 | End: 2022-01-01 | Stop reason: HOSPADM

## 2022-01-01 RX ORDER — POTASSIUM CHLORIDE 20 MEQ/1
40 TABLET, EXTENDED RELEASE ORAL PRN
Status: DISCONTINUED | OUTPATIENT
Start: 2022-01-01 | End: 2022-01-01

## 2022-01-01 RX ORDER — NICOTINE POLACRILEX 4 MG
15 LOZENGE BUCCAL PRN
Status: CANCELLED | OUTPATIENT
Start: 2022-01-01

## 2022-01-01 RX ORDER — MAGNESIUM SULFATE IN WATER 40 MG/ML
2000 INJECTION, SOLUTION INTRAVENOUS PRN
Status: CANCELLED | OUTPATIENT
Start: 2022-01-01

## 2022-01-01 RX ORDER — ALBUTEROL SULFATE 90 UG/1
2 AEROSOL, METERED RESPIRATORY (INHALATION) EVERY 4 HOURS PRN
Status: DISCONTINUED | OUTPATIENT
Start: 2022-01-01 | End: 2022-01-01 | Stop reason: HOSPADM

## 2022-01-01 RX ORDER — MAGNESIUM HYDROXIDE/ALUMINUM HYDROXICE/SIMETHICONE 120; 1200; 1200 MG/30ML; MG/30ML; MG/30ML
30 SUSPENSION ORAL EVERY 6 HOURS PRN
Status: DISCONTINUED | OUTPATIENT
Start: 2022-01-01 | End: 2022-01-01 | Stop reason: HOSPADM

## 2022-01-01 RX ORDER — TORSEMIDE 20 MG/1
20 TABLET ORAL 2 TIMES DAILY
Status: CANCELLED | OUTPATIENT
Start: 2022-01-01

## 2022-01-01 RX ORDER — CYCLOBENZAPRINE HCL 10 MG
10 TABLET ORAL NIGHTLY
Status: DISCONTINUED | OUTPATIENT
Start: 2022-01-01 | End: 2022-01-01

## 2022-01-01 RX ORDER — LANOLIN ALCOHOL/MO/W.PET/CERES
400 CREAM (GRAM) TOPICAL 2 TIMES DAILY
Status: CANCELLED | OUTPATIENT
Start: 2022-01-01

## 2022-01-01 RX ORDER — SODIUM CHLORIDE 0.9 % (FLUSH) 0.9 %
5-40 SYRINGE (ML) INJECTION 2 TIMES DAILY
Status: DISCONTINUED | OUTPATIENT
Start: 2022-01-01 | End: 2022-01-01 | Stop reason: HOSPADM

## 2022-01-01 RX ORDER — SODIUM CHLORIDE 9 MG/ML
25 INJECTION, SOLUTION INTRAVENOUS PRN
Status: CANCELLED | OUTPATIENT
Start: 2022-01-01

## 2022-01-01 RX ORDER — MAGNESIUM SULFATE IN WATER 40 MG/ML
2000 INJECTION, SOLUTION INTRAVENOUS PRN
Status: DISCONTINUED | OUTPATIENT
Start: 2022-01-01 | End: 2022-01-01

## 2022-01-01 RX ORDER — DIGOXIN 0.25 MG/ML
125 INJECTION INTRAMUSCULAR; INTRAVENOUS
Status: DISCONTINUED | OUTPATIENT
Start: 2022-01-01 | End: 2022-01-01

## 2022-01-01 RX ORDER — LORAZEPAM 2 MG/ML
0.5 INJECTION INTRAMUSCULAR
Status: DISCONTINUED | OUTPATIENT
Start: 2022-01-01 | End: 2022-01-01 | Stop reason: HOSPADM

## 2022-01-01 RX ORDER — POTASSIUM CHLORIDE 7.45 MG/ML
10 INJECTION INTRAVENOUS PRN
Status: DISCONTINUED | OUTPATIENT
Start: 2022-01-01 | End: 2022-01-01 | Stop reason: HOSPADM

## 2022-01-01 RX ORDER — SENNA PLUS 8.6 MG/1
1 TABLET ORAL 2 TIMES DAILY
Status: DISCONTINUED | OUTPATIENT
Start: 2022-01-01 | End: 2022-01-01 | Stop reason: CLARIF

## 2022-01-01 RX ADMIN — AMLODIPINE BESYLATE 10 MG: 10 TABLET ORAL at 09:21

## 2022-01-01 RX ADMIN — INSULIN LISPRO 4 UNITS: 100 INJECTION, SOLUTION INTRAVENOUS; SUBCUTANEOUS at 17:35

## 2022-01-01 RX ADMIN — CYCLOBENZAPRINE 10 MG: 10 TABLET, FILM COATED ORAL at 21:11

## 2022-01-01 RX ADMIN — PROPOFOL 67 MCG/KG/MIN: 10 INJECTION, EMULSION INTRAVENOUS at 18:17

## 2022-01-01 RX ADMIN — BENZONATATE 100 MG: 100 CAPSULE ORAL at 12:57

## 2022-01-01 RX ADMIN — INSULIN GLARGINE 40 UNITS: 100 INJECTION, SOLUTION SUBCUTANEOUS at 20:51

## 2022-01-01 RX ADMIN — IPRATROPIUM BROMIDE 4 PUFF: 17 AEROSOL, METERED RESPIRATORY (INHALATION) at 19:57

## 2022-01-01 RX ADMIN — DIPHENHYDRAMINE HYDROCHLORIDE 5 ML: 12.5 SOLUTION ORAL at 20:24

## 2022-01-01 RX ADMIN — ALBUTEROL SULFATE 4 PUFF: 90 AEROSOL, METERED RESPIRATORY (INHALATION) at 07:30

## 2022-01-01 RX ADMIN — APIXABAN 5 MG: 5 TABLET, FILM COATED ORAL at 21:55

## 2022-01-01 RX ADMIN — ALBUTEROL SULFATE 4 PUFF: 90 AEROSOL, METERED RESPIRATORY (INHALATION) at 11:02

## 2022-01-01 RX ADMIN — PROPOFOL 70 MCG/KG/MIN: 10 INJECTION, EMULSION INTRAVENOUS at 04:02

## 2022-01-01 RX ADMIN — ALBUTEROL SULFATE 4 PUFF: 90 AEROSOL, METERED RESPIRATORY (INHALATION) at 03:57

## 2022-01-01 RX ADMIN — BENZONATATE 100 MG: 100 CAPSULE ORAL at 20:10

## 2022-01-01 RX ADMIN — INSULIN LISPRO 2 UNITS: 100 INJECTION, SOLUTION INTRAVENOUS; SUBCUTANEOUS at 04:21

## 2022-01-01 RX ADMIN — IPRATROPIUM BROMIDE 4 PUFF: 17 AEROSOL, METERED RESPIRATORY (INHALATION) at 00:00

## 2022-01-01 RX ADMIN — AMLODIPINE BESYLATE 10 MG: 10 TABLET ORAL at 08:44

## 2022-01-01 RX ADMIN — ACETAMINOPHEN 500 MG: 500 TABLET ORAL at 20:54

## 2022-01-01 RX ADMIN — LOSARTAN POTASSIUM 25 MG: 25 TABLET, FILM COATED ORAL at 09:20

## 2022-01-01 RX ADMIN — FUROSEMIDE 80 MG: 10 INJECTION, SOLUTION INTRAMUSCULAR; INTRAVENOUS at 21:10

## 2022-01-01 RX ADMIN — APIXABAN 5 MG: 5 TABLET, FILM COATED ORAL at 13:58

## 2022-01-01 RX ADMIN — INSULIN LISPRO 6 UNITS: 100 INJECTION, SOLUTION INTRAVENOUS; SUBCUTANEOUS at 09:06

## 2022-01-01 RX ADMIN — METOPROLOL TARTRATE 5 MG: 1 INJECTION, SOLUTION INTRAVENOUS at 03:00

## 2022-01-01 RX ADMIN — SODIUM CHLORIDE, PRESERVATIVE FREE 10 ML: 5 INJECTION INTRAVENOUS at 08:55

## 2022-01-01 RX ADMIN — SODIUM CHLORIDE 9 UNITS/HR: 9 INJECTION, SOLUTION INTRAVENOUS at 16:21

## 2022-01-01 RX ADMIN — FUROSEMIDE 80 MG: 10 INJECTION, SOLUTION INTRAMUSCULAR; INTRAVENOUS at 20:12

## 2022-01-01 RX ADMIN — FUROSEMIDE 80 MG: 10 INJECTION, SOLUTION INTRAVENOUS at 16:32

## 2022-01-01 RX ADMIN — APIXABAN 5 MG: 5 TABLET, FILM COATED ORAL at 20:12

## 2022-01-01 RX ADMIN — ACETAMINOPHEN 500 MG: 500 TABLET ORAL at 17:01

## 2022-01-01 RX ADMIN — SENNOSIDES 8.6 MG: 8.6 TABLET, COATED ORAL at 08:54

## 2022-01-01 RX ADMIN — IPRATROPIUM BROMIDE 4 PUFF: 17 AEROSOL, METERED RESPIRATORY (INHALATION) at 07:05

## 2022-01-01 RX ADMIN — PROPOFOL 67 MCG/KG/MIN: 10 INJECTION, EMULSION INTRAVENOUS at 12:57

## 2022-01-01 RX ADMIN — AMLODIPINE BESYLATE 10 MG: 10 TABLET ORAL at 08:19

## 2022-01-01 RX ADMIN — IPRATROPIUM BROMIDE 4 PUFF: 17 AEROSOL, METERED RESPIRATORY (INHALATION) at 11:04

## 2022-01-01 RX ADMIN — GUAIFENESIN 200 MG: 100 SOLUTION ORAL at 01:32

## 2022-01-01 RX ADMIN — FUROSEMIDE 40 MG: 10 INJECTION, SOLUTION INTRAMUSCULAR; INTRAVENOUS at 12:08

## 2022-01-01 RX ADMIN — CYCLOBENZAPRINE 10 MG: 10 TABLET, FILM COATED ORAL at 22:42

## 2022-01-01 RX ADMIN — METOPROLOL SUCCINATE 50 MG: 50 TABLET, EXTENDED RELEASE ORAL at 20:12

## 2022-01-01 RX ADMIN — ASPIRIN 81 MG: 81 TABLET, COATED ORAL at 08:30

## 2022-01-01 RX ADMIN — PANTOPRAZOLE SODIUM 40 MG: 40 INJECTION, POWDER, FOR SOLUTION INTRAVENOUS at 08:18

## 2022-01-01 RX ADMIN — Medication 5 ML: at 20:48

## 2022-01-01 RX ADMIN — APIXABAN 5 MG: 5 TABLET, FILM COATED ORAL at 21:24

## 2022-01-01 RX ADMIN — Medication 400 MG: at 21:11

## 2022-01-01 RX ADMIN — METOPROLOL SUCCINATE 50 MG: 50 TABLET, EXTENDED RELEASE ORAL at 08:18

## 2022-01-01 RX ADMIN — APIXABAN 5 MG: 5 TABLET, FILM COATED ORAL at 08:51

## 2022-01-01 RX ADMIN — IPRATROPIUM BROMIDE 4 PUFF: 17 AEROSOL, METERED RESPIRATORY (INHALATION) at 14:59

## 2022-01-01 RX ADMIN — CYCLOBENZAPRINE 10 MG: 10 TABLET, FILM COATED ORAL at 20:51

## 2022-01-01 RX ADMIN — BENZONATATE 100 MG: 100 CAPSULE ORAL at 09:39

## 2022-01-01 RX ADMIN — BENZONATATE 100 MG: 100 CAPSULE ORAL at 20:36

## 2022-01-01 RX ADMIN — PROPOFOL 30 MCG/KG/MIN: 10 INJECTION, EMULSION INTRAVENOUS at 21:16

## 2022-01-01 RX ADMIN — INSULIN LISPRO 15 UNITS: 100 INJECTION, SOLUTION INTRAVENOUS; SUBCUTANEOUS at 12:36

## 2022-01-01 RX ADMIN — ALBUTEROL SULFATE 4 PUFF: 90 AEROSOL, METERED RESPIRATORY (INHALATION) at 11:10

## 2022-01-01 RX ADMIN — PROPOFOL 65 MCG/KG/MIN: 10 INJECTION, EMULSION INTRAVENOUS at 18:01

## 2022-01-01 RX ADMIN — TORSEMIDE 20 MG: 20 TABLET ORAL at 09:32

## 2022-01-01 RX ADMIN — Medication 400 MG: at 20:33

## 2022-01-01 RX ADMIN — APIXABAN 5 MG: 5 TABLET, FILM COATED ORAL at 13:21

## 2022-01-01 RX ADMIN — PANTOPRAZOLE SODIUM 40 MG: 40 TABLET, DELAYED RELEASE ORAL at 08:54

## 2022-01-01 RX ADMIN — IPRATROPIUM BROMIDE 4 PUFF: 17 AEROSOL, METERED RESPIRATORY (INHALATION) at 11:46

## 2022-01-01 RX ADMIN — ASPIRIN 81 MG: 81 TABLET, COATED ORAL at 08:53

## 2022-01-01 RX ADMIN — ASPIRIN 81 MG: 81 TABLET, COATED ORAL at 09:27

## 2022-01-01 RX ADMIN — ALBUTEROL SULFATE 4 PUFF: 90 AEROSOL, METERED RESPIRATORY (INHALATION) at 11:03

## 2022-01-01 RX ADMIN — LORAZEPAM 0.5 MG: 2 INJECTION INTRAMUSCULAR; INTRAVENOUS at 02:43

## 2022-01-01 RX ADMIN — ACETAMINOPHEN 650 MG: 325 TABLET ORAL at 20:02

## 2022-01-01 RX ADMIN — ALBUTEROL SULFATE 4 PUFF: 90 AEROSOL, METERED RESPIRATORY (INHALATION) at 00:40

## 2022-01-01 RX ADMIN — FUROSEMIDE 80 MG: 10 INJECTION, SOLUTION INTRAMUSCULAR; INTRAVENOUS at 14:03

## 2022-01-01 RX ADMIN — Medication 400 MG: at 20:08

## 2022-01-01 RX ADMIN — PANTOPRAZOLE SODIUM 40 MG: 40 TABLET, DELAYED RELEASE ORAL at 06:41

## 2022-01-01 RX ADMIN — GUAIFENESIN 200 MG: 100 SOLUTION ORAL at 13:56

## 2022-01-01 RX ADMIN — APIXABAN 5 MG: 5 TABLET, FILM COATED ORAL at 20:15

## 2022-01-01 RX ADMIN — Medication 50 MCG/HR: at 14:02

## 2022-01-01 RX ADMIN — GUAIFENESIN 200 MG: 100 SOLUTION ORAL at 04:39

## 2022-01-01 RX ADMIN — PROPOFOL 70 MCG/KG/MIN: 10 INJECTION, EMULSION INTRAVENOUS at 08:48

## 2022-01-01 RX ADMIN — APIXABAN 5 MG: 5 TABLET, FILM COATED ORAL at 20:36

## 2022-01-01 RX ADMIN — IPRATROPIUM BROMIDE 4 PUFF: 17 AEROSOL, METERED RESPIRATORY (INHALATION) at 07:09

## 2022-01-01 RX ADMIN — ASPIRIN 81 MG: 81 TABLET, COATED ORAL at 13:20

## 2022-01-01 RX ADMIN — APIXABAN 5 MG: 5 TABLET, FILM COATED ORAL at 22:43

## 2022-01-01 RX ADMIN — SODIUM CHLORIDE, PRESERVATIVE FREE 10 ML: 5 INJECTION INTRAVENOUS at 20:11

## 2022-01-01 RX ADMIN — APIXABAN 5 MG: 5 TABLET, FILM COATED ORAL at 20:57

## 2022-01-01 RX ADMIN — PANTOPRAZOLE SODIUM 40 MG: 40 TABLET, DELAYED RELEASE ORAL at 06:10

## 2022-01-01 RX ADMIN — GUAIFENESIN 200 MG: 100 SOLUTION ORAL at 09:10

## 2022-01-01 RX ADMIN — ALBUTEROL SULFATE 4 PUFF: 90 AEROSOL, METERED RESPIRATORY (INHALATION) at 19:11

## 2022-01-01 RX ADMIN — SODIUM CHLORIDE, PRESERVATIVE FREE 10 ML: 5 INJECTION INTRAVENOUS at 20:52

## 2022-01-01 RX ADMIN — ASPIRIN 81 MG: 81 TABLET, COATED ORAL at 09:00

## 2022-01-01 RX ADMIN — Medication 5 ML: at 09:52

## 2022-01-01 RX ADMIN — APIXABAN 5 MG: 5 TABLET, FILM COATED ORAL at 08:18

## 2022-01-01 RX ADMIN — ASPIRIN 81 MG: 81 TABLET, COATED ORAL at 08:19

## 2022-01-01 RX ADMIN — SODIUM CHLORIDE 3 UNITS/HR: 9 INJECTION, SOLUTION INTRAVENOUS at 04:15

## 2022-01-01 RX ADMIN — AMLODIPINE BESYLATE 10 MG: 10 TABLET ORAL at 08:54

## 2022-01-01 RX ADMIN — DILTIAZEM HYDROCHLORIDE 120 MG: 120 CAPSULE, COATED, EXTENDED RELEASE ORAL at 09:17

## 2022-01-01 RX ADMIN — GUAIFENESIN 200 MG: 100 SOLUTION ORAL at 00:34

## 2022-01-01 RX ADMIN — INSULIN GLARGINE 35 UNITS: 100 INJECTION, SOLUTION SUBCUTANEOUS at 20:12

## 2022-01-01 RX ADMIN — FUROSEMIDE 40 MG: 10 INJECTION INTRAMUSCULAR; INTRAVENOUS at 00:06

## 2022-01-01 RX ADMIN — PROPOFOL 10 MCG/KG/MIN: 10 INJECTION, EMULSION INTRAVENOUS at 09:05

## 2022-01-01 RX ADMIN — Medication 400 MG: at 09:32

## 2022-01-01 RX ADMIN — VANCOMYCIN HYDROCHLORIDE 2000 MG: 1 INJECTION, POWDER, LYOPHILIZED, FOR SOLUTION INTRAVENOUS at 21:04

## 2022-01-01 RX ADMIN — Medication 400 MG: at 20:04

## 2022-01-01 RX ADMIN — PROPOFOL 64.95 MCG/KG/MIN: 10 INJECTION, EMULSION INTRAVENOUS at 23:19

## 2022-01-01 RX ADMIN — SENNOSIDES 8.6 MG: 8.6 TABLET, COATED ORAL at 20:36

## 2022-01-01 RX ADMIN — PROPOFOL 70 MCG/KG/MIN: 10 INJECTION, EMULSION INTRAVENOUS at 22:09

## 2022-01-01 RX ADMIN — DEXAMETHASONE SODIUM PHOSPHATE 6 MG: 10 INJECTION, SOLUTION INTRAMUSCULAR; INTRAVENOUS at 20:15

## 2022-01-01 RX ADMIN — BENZONATATE 100 MG: 100 CAPSULE ORAL at 13:56

## 2022-01-01 RX ADMIN — Medication 400 MG: at 09:20

## 2022-01-01 RX ADMIN — FUROSEMIDE 80 MG: 10 INJECTION, SOLUTION INTRAMUSCULAR; INTRAVENOUS at 10:04

## 2022-01-01 RX ADMIN — DIPHENHYDRAMINE HYDROCHLORIDE 5 ML: 12.5 SOLUTION ORAL at 15:10

## 2022-01-01 RX ADMIN — INSULIN LISPRO 4 UNITS: 100 INJECTION, SOLUTION INTRAVENOUS; SUBCUTANEOUS at 00:24

## 2022-01-01 RX ADMIN — APIXABAN 5 MG: 5 TABLET, FILM COATED ORAL at 20:04

## 2022-01-01 RX ADMIN — INSULIN LISPRO 6 UNITS: 100 INJECTION, SOLUTION INTRAVENOUS; SUBCUTANEOUS at 20:35

## 2022-01-01 RX ADMIN — APIXABAN 5 MG: 5 TABLET, FILM COATED ORAL at 21:17

## 2022-01-01 RX ADMIN — SODIUM CHLORIDE 500 ML: 9 INJECTION, SOLUTION INTRAVENOUS at 17:13

## 2022-01-01 RX ADMIN — INSULIN GLARGINE 40 UNITS: 100 INJECTION, SOLUTION SUBCUTANEOUS at 20:37

## 2022-01-01 RX ADMIN — GUAIFENESIN 600 MG: 600 TABLET, EXTENDED RELEASE ORAL at 09:07

## 2022-01-01 RX ADMIN — CHLORHEXIDINE GLUCONATE 0.12% ORAL RINSE 15 ML: 1.2 LIQUID ORAL at 10:04

## 2022-01-01 RX ADMIN — TORSEMIDE 20 MG: 20 TABLET ORAL at 08:30

## 2022-01-01 RX ADMIN — METOPROLOL SUCCINATE 50 MG: 50 TABLET, EXTENDED RELEASE ORAL at 21:04

## 2022-01-01 RX ADMIN — ASPIRIN 81 MG: 81 TABLET, COATED ORAL at 09:12

## 2022-01-01 RX ADMIN — Medication 25 MCG/HR: at 19:17

## 2022-01-01 RX ADMIN — CYCLOBENZAPRINE 10 MG: 10 TABLET, FILM COATED ORAL at 20:56

## 2022-01-01 RX ADMIN — METOPROLOL SUCCINATE 50 MG: 50 TABLET, FILM COATED, EXTENDED RELEASE ORAL at 08:48

## 2022-01-01 RX ADMIN — GUAIFENESIN 200 MG: 100 SOLUTION ORAL at 16:34

## 2022-01-01 RX ADMIN — CHLOROTHIAZIDE SODIUM 500 MG: 500 INJECTION, POWDER, LYOPHILIZED, FOR SOLUTION INTRAVENOUS at 09:19

## 2022-01-01 RX ADMIN — INSULIN LISPRO 18 UNITS: 100 INJECTION, SOLUTION INTRAVENOUS; SUBCUTANEOUS at 12:14

## 2022-01-01 RX ADMIN — DIPHENHYDRAMINE HYDROCHLORIDE 5 ML: 12.5 SOLUTION ORAL at 08:57

## 2022-01-01 RX ADMIN — GUAIFENESIN 200 MG: 100 SOLUTION ORAL at 11:55

## 2022-01-01 RX ADMIN — PROPOFOL 64.95 MCG/KG/MIN: 10 INJECTION, EMULSION INTRAVENOUS at 02:29

## 2022-01-01 RX ADMIN — SODIUM CHLORIDE, PRESERVATIVE FREE 10 ML: 5 INJECTION INTRAVENOUS at 09:53

## 2022-01-01 RX ADMIN — PROPOFOL 30 MCG/KG/MIN: 10 INJECTION, EMULSION INTRAVENOUS at 02:24

## 2022-01-01 RX ADMIN — DIPHENHYDRAMINE HYDROCHLORIDE 5 ML: 12.5 SOLUTION ORAL at 09:38

## 2022-01-01 RX ADMIN — CEFEPIME HYDROCHLORIDE 2000 MG: 2 INJECTION, POWDER, FOR SOLUTION INTRAVENOUS at 21:22

## 2022-01-01 RX ADMIN — Medication 5 ML: at 13:54

## 2022-01-01 RX ADMIN — INSULIN LISPRO 2 UNITS: 100 INJECTION, SOLUTION INTRAVENOUS; SUBCUTANEOUS at 17:06

## 2022-01-01 RX ADMIN — PROPOFOL 40 MCG/KG/MIN: 10 INJECTION, EMULSION INTRAVENOUS at 22:07

## 2022-01-01 RX ADMIN — ASPIRIN 81 MG: 81 TABLET, COATED ORAL at 08:51

## 2022-01-01 RX ADMIN — FUROSEMIDE 40 MG: 10 INJECTION INTRAMUSCULAR; INTRAVENOUS at 13:58

## 2022-01-01 RX ADMIN — BARICITINIB 1 MG: 2 TABLET, FILM COATED ORAL at 08:51

## 2022-01-01 RX ADMIN — SODIUM CHLORIDE, PRESERVATIVE FREE 10 ML: 5 INJECTION INTRAVENOUS at 20:58

## 2022-01-01 RX ADMIN — FUROSEMIDE 80 MG: 10 INJECTION, SOLUTION INTRAMUSCULAR; INTRAVENOUS at 08:19

## 2022-01-01 RX ADMIN — PROPOFOL 75 MCG/KG/MIN: 10 INJECTION, EMULSION INTRAVENOUS at 00:47

## 2022-01-01 RX ADMIN — CHLORHEXIDINE GLUCONATE 0.12% ORAL RINSE 15 ML: 1.2 LIQUID ORAL at 09:24

## 2022-01-01 RX ADMIN — FUROSEMIDE 80 MG: 10 INJECTION, SOLUTION INTRAMUSCULAR; INTRAVENOUS at 08:28

## 2022-01-01 RX ADMIN — CYCLOBENZAPRINE 10 MG: 10 TABLET, FILM COATED ORAL at 21:24

## 2022-01-01 RX ADMIN — BENZONATATE 100 MG: 100 CAPSULE ORAL at 21:24

## 2022-01-01 RX ADMIN — METOPROLOL SUCCINATE 50 MG: 50 TABLET, EXTENDED RELEASE ORAL at 21:11

## 2022-01-01 RX ADMIN — DIPHENHYDRAMINE HYDROCHLORIDE 5 ML: 12.5 SOLUTION ORAL at 09:43

## 2022-01-01 RX ADMIN — CYCLOBENZAPRINE 10 MG: 10 TABLET, FILM COATED ORAL at 20:10

## 2022-01-01 RX ADMIN — FUROSEMIDE 80 MG: 10 INJECTION, SOLUTION INTRAMUSCULAR; INTRAVENOUS at 20:52

## 2022-01-01 RX ADMIN — TORSEMIDE 20 MG: 20 TABLET ORAL at 08:44

## 2022-01-01 RX ADMIN — IPRATROPIUM BROMIDE 4 PUFF: 17 AEROSOL, METERED RESPIRATORY (INHALATION) at 04:02

## 2022-01-01 RX ADMIN — DEXAMETHASONE SODIUM PHOSPHATE 10 MG: 10 INJECTION INTRAMUSCULAR; INTRAVENOUS at 17:25

## 2022-01-01 RX ADMIN — PROPOFOL 70 MCG/KG/MIN: 10 INJECTION, EMULSION INTRAVENOUS at 20:20

## 2022-01-01 RX ADMIN — CHLORHEXIDINE GLUCONATE 0.12% ORAL RINSE 15 ML: 1.2 LIQUID ORAL at 10:19

## 2022-01-01 RX ADMIN — CHLOROTHIAZIDE SODIUM 500 MG: 500 INJECTION, POWDER, LYOPHILIZED, FOR SOLUTION INTRAVENOUS at 10:17

## 2022-01-01 RX ADMIN — IPRATROPIUM BROMIDE 4 PUFF: 17 AEROSOL, METERED RESPIRATORY (INHALATION) at 23:36

## 2022-01-01 RX ADMIN — CHLORHEXIDINE GLUCONATE 0.12% ORAL RINSE 15 ML: 1.2 LIQUID ORAL at 20:38

## 2022-01-01 RX ADMIN — ALBUTEROL SULFATE 4 PUFF: 90 AEROSOL, METERED RESPIRATORY (INHALATION) at 04:48

## 2022-01-01 RX ADMIN — APIXABAN 5 MG: 5 TABLET, FILM COATED ORAL at 09:21

## 2022-01-01 RX ADMIN — GUAIFENESIN 200 MG: 100 SOLUTION ORAL at 05:10

## 2022-01-01 RX ADMIN — IPRATROPIUM BROMIDE 4 PUFF: 17 AEROSOL, METERED RESPIRATORY (INHALATION) at 19:20

## 2022-01-01 RX ADMIN — IPRATROPIUM BROMIDE 4 PUFF: 17 AEROSOL, METERED RESPIRATORY (INHALATION) at 19:12

## 2022-01-01 RX ADMIN — DEXAMETHASONE SODIUM PHOSPHATE 6 MG: 10 INJECTION, SOLUTION INTRAMUSCULAR; INTRAVENOUS at 20:51

## 2022-01-01 RX ADMIN — IPRATROPIUM BROMIDE 4 PUFF: 17 AEROSOL, METERED RESPIRATORY (INHALATION) at 07:21

## 2022-01-01 RX ADMIN — FUROSEMIDE 80 MG: 10 INJECTION, SOLUTION INTRAMUSCULAR; INTRAVENOUS at 13:20

## 2022-01-01 RX ADMIN — APIXABAN 2.5 MG: 2.5 TABLET, FILM COATED ORAL at 08:31

## 2022-01-01 RX ADMIN — APIXABAN 5 MG: 5 TABLET, FILM COATED ORAL at 09:07

## 2022-01-01 RX ADMIN — BARICITINIB 1 MG: 2 TABLET, FILM COATED ORAL at 08:52

## 2022-01-01 RX ADMIN — CYCLOBENZAPRINE 10 MG: 10 TABLET, FILM COATED ORAL at 20:33

## 2022-01-01 RX ADMIN — ASPIRIN 81 MG: 81 TABLET, COATED ORAL at 08:49

## 2022-01-01 RX ADMIN — SENNOSIDES 8.6 MG: 8.6 TABLET, COATED ORAL at 08:50

## 2022-01-01 RX ADMIN — DIGOXIN 125 MCG: 250 INJECTION, SOLUTION INTRAMUSCULAR; INTRAVENOUS; PARENTERAL at 08:51

## 2022-01-01 RX ADMIN — INSULIN GLARGINE 50 UNITS: 100 INJECTION, SOLUTION SUBCUTANEOUS at 21:21

## 2022-01-01 RX ADMIN — PANTOPRAZOLE SODIUM 40 MG: 20 TABLET, DELAYED RELEASE ORAL at 08:09

## 2022-01-01 RX ADMIN — CYCLOBENZAPRINE 10 MG: 10 TABLET, FILM COATED ORAL at 21:12

## 2022-01-01 RX ADMIN — CEFEPIME HYDROCHLORIDE 1000 MG: 1 INJECTION, POWDER, FOR SOLUTION INTRAMUSCULAR; INTRAVENOUS at 12:18

## 2022-01-01 RX ADMIN — GUAIFENESIN 200 MG: 100 SOLUTION ORAL at 23:57

## 2022-01-01 RX ADMIN — PROPOFOL 75 MCG/KG/MIN: 10 INJECTION, EMULSION INTRAVENOUS at 22:31

## 2022-01-01 RX ADMIN — PANTOPRAZOLE SODIUM 40 MG: 20 TABLET, DELAYED RELEASE ORAL at 05:46

## 2022-01-01 RX ADMIN — GUAIFENESIN 200 MG: 100 SOLUTION ORAL at 01:33

## 2022-01-01 RX ADMIN — PANTOPRAZOLE SODIUM 40 MG: 40 TABLET, DELAYED RELEASE ORAL at 06:14

## 2022-01-01 RX ADMIN — PANTOPRAZOLE SODIUM 40 MG: 40 TABLET, DELAYED RELEASE ORAL at 10:19

## 2022-01-01 RX ADMIN — BENZONATATE 100 MG: 100 CAPSULE ORAL at 08:48

## 2022-01-01 RX ADMIN — BARICITINIB 1 MG: 2 TABLET, FILM COATED ORAL at 13:59

## 2022-01-01 RX ADMIN — IPRATROPIUM BROMIDE 4 PUFF: 17 AEROSOL, METERED RESPIRATORY (INHALATION) at 11:09

## 2022-01-01 RX ADMIN — Medication 5 ML: at 10:19

## 2022-01-01 RX ADMIN — INSULIN LISPRO 7 UNITS: 100 INJECTION, SOLUTION INTRAVENOUS; SUBCUTANEOUS at 09:08

## 2022-01-01 RX ADMIN — TORSEMIDE 20 MG: 20 TABLET ORAL at 18:36

## 2022-01-01 RX ADMIN — CYCLOBENZAPRINE 10 MG: 10 TABLET, FILM COATED ORAL at 20:18

## 2022-01-01 RX ADMIN — ALBUTEROL SULFATE 4 PUFF: 90 AEROSOL, METERED RESPIRATORY (INHALATION) at 23:11

## 2022-01-01 RX ADMIN — METOPROLOL SUCCINATE 50 MG: 50 TABLET, EXTENDED RELEASE ORAL at 21:17

## 2022-01-01 RX ADMIN — APIXABAN 5 MG: 5 TABLET, FILM COATED ORAL at 09:01

## 2022-01-01 RX ADMIN — AMLODIPINE BESYLATE 10 MG: 10 TABLET ORAL at 09:01

## 2022-01-01 RX ADMIN — PANTOPRAZOLE SODIUM 40 MG: 20 TABLET, DELAYED RELEASE ORAL at 06:33

## 2022-01-01 RX ADMIN — BENZONATATE 100 MG: 100 CAPSULE ORAL at 20:57

## 2022-01-01 RX ADMIN — MORPHINE SULFATE 4 MG: 4 INJECTION, SOLUTION INTRAMUSCULAR; INTRAVENOUS at 03:10

## 2022-01-01 RX ADMIN — METOPROLOL SUCCINATE 50 MG: 50 TABLET, FILM COATED, EXTENDED RELEASE ORAL at 09:11

## 2022-01-01 RX ADMIN — FUROSEMIDE 80 MG: 10 INJECTION, SOLUTION INTRAMUSCULAR; INTRAVENOUS at 09:29

## 2022-01-01 RX ADMIN — Medication 25 MCG/HR: at 09:37

## 2022-01-01 RX ADMIN — Medication 5 ML: at 21:11

## 2022-01-01 RX ADMIN — APIXABAN 5 MG: 5 TABLET, FILM COATED ORAL at 21:11

## 2022-01-01 RX ADMIN — Medication 5 ML: at 08:08

## 2022-01-01 RX ADMIN — PROPOFOL 60 MCG/KG/MIN: 10 INJECTION, EMULSION INTRAVENOUS at 09:36

## 2022-01-01 RX ADMIN — INSULIN LISPRO 1 UNITS: 100 INJECTION, SOLUTION INTRAVENOUS; SUBCUTANEOUS at 08:29

## 2022-01-01 RX ADMIN — DIPHENHYDRAMINE HYDROCHLORIDE 5 ML: 12.5 SOLUTION ORAL at 01:33

## 2022-01-01 RX ADMIN — SODIUM CHLORIDE, PRESERVATIVE FREE 10 ML: 5 INJECTION INTRAVENOUS at 20:15

## 2022-01-01 RX ADMIN — PANTOPRAZOLE SODIUM 40 MG: 40 TABLET, DELAYED RELEASE ORAL at 06:18

## 2022-01-01 RX ADMIN — CALCIUM GLUCONATE 1000 MG: 20 INJECTION, SOLUTION INTRAVENOUS at 13:26

## 2022-01-01 RX ADMIN — PROPOFOL 40 MCG/KG/MIN: 10 INJECTION, EMULSION INTRAVENOUS at 07:56

## 2022-01-01 RX ADMIN — FAMOTIDINE 20 MG: 10 INJECTION, SOLUTION INTRAVENOUS at 08:28

## 2022-01-01 RX ADMIN — IPRATROPIUM BROMIDE 4 PUFF: 17 AEROSOL, METERED RESPIRATORY (INHALATION) at 19:10

## 2022-01-01 RX ADMIN — FUROSEMIDE 40 MG: 10 INJECTION, SOLUTION INTRAMUSCULAR; INTRAVENOUS at 22:43

## 2022-01-01 RX ADMIN — BENZONATATE 100 MG: 100 CAPSULE ORAL at 09:07

## 2022-01-01 RX ADMIN — LOSARTAN POTASSIUM 25 MG: 25 TABLET, FILM COATED ORAL at 10:08

## 2022-01-01 RX ADMIN — BARICITINIB 1 MG: 2 TABLET, FILM COATED ORAL at 09:39

## 2022-01-01 RX ADMIN — BENZONATATE 100 MG: 100 CAPSULE ORAL at 12:47

## 2022-01-01 RX ADMIN — DIPHENHYDRAMINE HYDROCHLORIDE 5 ML: 12.5 SOLUTION ORAL at 08:26

## 2022-01-01 RX ADMIN — APIXABAN 5 MG: 5 TABLET, FILM COATED ORAL at 08:54

## 2022-01-01 RX ADMIN — PROPOFOL 67 MCG/KG/MIN: 10 INJECTION, EMULSION INTRAVENOUS at 10:38

## 2022-01-01 RX ADMIN — GUAIFENESIN 200 MG: 100 SOLUTION ORAL at 17:02

## 2022-01-01 RX ADMIN — SENNOSIDES 8.6 MG: 8.6 TABLET, COATED ORAL at 20:10

## 2022-01-01 RX ADMIN — TORSEMIDE 20 MG: 20 TABLET ORAL at 16:35

## 2022-01-01 RX ADMIN — INSULIN LISPRO 1 UNITS: 100 INJECTION, SOLUTION INTRAVENOUS; SUBCUTANEOUS at 09:37

## 2022-01-01 RX ADMIN — CALCIUM GLUCONATE 8000 MG: 98 INJECTION, SOLUTION INTRAVENOUS at 11:30

## 2022-01-01 RX ADMIN — ASPIRIN 81 MG: 81 TABLET, COATED ORAL at 09:21

## 2022-01-01 RX ADMIN — PROPOFOL 70 MCG/KG/MIN: 10 INJECTION, EMULSION INTRAVENOUS at 06:19

## 2022-01-01 RX ADMIN — GUAIFENESIN 200 MG: 100 SOLUTION ORAL at 20:38

## 2022-01-01 RX ADMIN — GUAIFENESIN 200 MG: 100 SOLUTION ORAL at 04:31

## 2022-01-01 RX ADMIN — FAMOTIDINE 20 MG: 10 INJECTION, SOLUTION INTRAVENOUS at 08:52

## 2022-01-01 RX ADMIN — METOPROLOL SUCCINATE 50 MG: 50 TABLET, EXTENDED RELEASE ORAL at 09:29

## 2022-01-01 RX ADMIN — GUAIFENESIN 600 MG: 600 TABLET, EXTENDED RELEASE ORAL at 22:37

## 2022-01-01 RX ADMIN — GUAIFENESIN 200 MG: 100 SOLUTION ORAL at 00:08

## 2022-01-01 RX ADMIN — LOSARTAN POTASSIUM 25 MG: 25 TABLET, FILM COATED ORAL at 09:13

## 2022-01-01 RX ADMIN — ALBUTEROL SULFATE 4 PUFF: 90 AEROSOL, METERED RESPIRATORY (INHALATION) at 16:35

## 2022-01-01 RX ADMIN — SODIUM CHLORIDE, PRESERVATIVE FREE 10 ML: 5 INJECTION INTRAVENOUS at 20:13

## 2022-01-01 RX ADMIN — CYCLOBENZAPRINE 10 MG: 10 TABLET, FILM COATED ORAL at 20:57

## 2022-01-01 RX ADMIN — DEXAMETHASONE SODIUM PHOSPHATE 6 MG: 10 INJECTION, SOLUTION INTRAMUSCULAR; INTRAVENOUS at 21:24

## 2022-01-01 RX ADMIN — BARICITINIB 1 MG: 2 TABLET, FILM COATED ORAL at 13:20

## 2022-01-01 RX ADMIN — APIXABAN 5 MG: 5 TABLET, FILM COATED ORAL at 08:44

## 2022-01-01 RX ADMIN — PROPOFOL 70 MCG/KG/MIN: 10 INJECTION, EMULSION INTRAVENOUS at 00:23

## 2022-01-01 RX ADMIN — INSULIN GLARGINE 40 UNITS: 100 INJECTION, SOLUTION SUBCUTANEOUS at 20:32

## 2022-01-01 RX ADMIN — Medication 100 MCG/HR: at 19:11

## 2022-01-01 RX ADMIN — PROPOFOL 70 MCG/KG/MIN: 10 INJECTION, EMULSION INTRAVENOUS at 13:46

## 2022-01-01 RX ADMIN — INSULIN GLARGINE 45 UNITS: 100 INJECTION, SOLUTION SUBCUTANEOUS at 20:05

## 2022-01-01 RX ADMIN — GUAIFENESIN 200 MG: 100 SOLUTION ORAL at 09:38

## 2022-01-01 RX ADMIN — DILTIAZEM HYDROCHLORIDE 120 MG: 120 CAPSULE, COATED, EXTENDED RELEASE ORAL at 18:28

## 2022-01-01 RX ADMIN — LOSARTAN POTASSIUM 25 MG: 25 TABLET, FILM COATED ORAL at 08:44

## 2022-01-01 RX ADMIN — BARICITINIB 1 MG: 2 TABLET, FILM COATED ORAL at 09:29

## 2022-01-01 RX ADMIN — PANTOPRAZOLE SODIUM 40 MG: 40 TABLET, DELAYED RELEASE ORAL at 06:05

## 2022-01-01 RX ADMIN — AMLODIPINE BESYLATE 10 MG: 10 TABLET ORAL at 09:08

## 2022-01-01 RX ADMIN — METOPROLOL SUCCINATE 50 MG: 50 TABLET, FILM COATED, EXTENDED RELEASE ORAL at 09:08

## 2022-01-01 RX ADMIN — DIPHENHYDRAMINE HYDROCHLORIDE 5 ML: 12.5 SOLUTION ORAL at 13:57

## 2022-01-01 RX ADMIN — BENZONATATE 100 MG: 100 CAPSULE ORAL at 20:15

## 2022-01-01 RX ADMIN — BENZONATATE 100 MG: 100 CAPSULE ORAL at 08:54

## 2022-01-01 RX ADMIN — TORSEMIDE 20 MG: 20 TABLET ORAL at 08:15

## 2022-01-01 RX ADMIN — Medication 400 MG: at 08:44

## 2022-01-01 RX ADMIN — ALBUTEROL SULFATE 4 PUFF: 90 AEROSOL, METERED RESPIRATORY (INHALATION) at 11:14

## 2022-01-01 RX ADMIN — SENNOSIDES 8.6 MG: 8.6 TABLET, COATED ORAL at 20:57

## 2022-01-01 RX ADMIN — GUAIFENESIN 200 MG: 100 SOLUTION ORAL at 08:55

## 2022-01-01 RX ADMIN — Medication 400 MG: at 08:31

## 2022-01-01 RX ADMIN — CHLOROTHIAZIDE SODIUM 500 MG: 500 INJECTION, POWDER, LYOPHILIZED, FOR SOLUTION INTRAVENOUS at 10:49

## 2022-01-01 RX ADMIN — PANTOPRAZOLE SODIUM 40 MG: 40 INJECTION, POWDER, FOR SOLUTION INTRAVENOUS at 08:52

## 2022-01-01 RX ADMIN — DEXAMETHASONE SODIUM PHOSPHATE 6 MG: 10 INJECTION, SOLUTION INTRAMUSCULAR; INTRAVENOUS at 20:52

## 2022-01-01 RX ADMIN — PROPOFOL 60 MCG/KG/MIN: 10 INJECTION, EMULSION INTRAVENOUS at 17:09

## 2022-01-01 RX ADMIN — CHLORHEXIDINE GLUCONATE 0.12% ORAL RINSE 15 ML: 1.2 LIQUID ORAL at 08:28

## 2022-01-01 RX ADMIN — ALBUTEROL SULFATE 4 PUFF: 90 AEROSOL, METERED RESPIRATORY (INHALATION) at 07:05

## 2022-01-01 RX ADMIN — ALBUTEROL SULFATE 4 PUFF: 90 AEROSOL, METERED RESPIRATORY (INHALATION) at 19:12

## 2022-01-01 RX ADMIN — FUROSEMIDE 80 MG: 10 INJECTION, SOLUTION INTRAMUSCULAR; INTRAVENOUS at 16:30

## 2022-01-01 RX ADMIN — ACETAMINOPHEN 650 MG: 325 TABLET ORAL at 08:22

## 2022-01-01 RX ADMIN — IPRATROPIUM BROMIDE 4 PUFF: 17 AEROSOL, METERED RESPIRATORY (INHALATION) at 16:34

## 2022-01-01 RX ADMIN — CHLOROTHIAZIDE SODIUM 500 MG: 500 INJECTION, POWDER, LYOPHILIZED, FOR SOLUTION INTRAVENOUS at 20:26

## 2022-01-01 RX ADMIN — Medication 5 ML: at 20:22

## 2022-01-01 RX ADMIN — DEXAMETHASONE SODIUM PHOSPHATE 6 MG: 10 INJECTION, SOLUTION INTRAMUSCULAR; INTRAVENOUS at 20:36

## 2022-01-01 RX ADMIN — GUAIFENESIN 200 MG: 100 SOLUTION ORAL at 17:54

## 2022-01-01 RX ADMIN — IPRATROPIUM BROMIDE 4 PUFF: 17 AEROSOL, METERED RESPIRATORY (INHALATION) at 15:21

## 2022-01-01 RX ADMIN — SODIUM CHLORIDE, PRESERVATIVE FREE 10 ML: 5 INJECTION INTRAVENOUS at 21:03

## 2022-01-01 RX ADMIN — PANTOPRAZOLE SODIUM 40 MG: 40 INJECTION, POWDER, FOR SOLUTION INTRAVENOUS at 09:29

## 2022-01-01 RX ADMIN — DIPHENHYDRAMINE HYDROCHLORIDE 5 ML: 12.5 SOLUTION ORAL at 21:18

## 2022-01-01 RX ADMIN — ASPIRIN 81 MG: 81 TABLET, COATED ORAL at 09:10

## 2022-01-01 RX ADMIN — DIPHENHYDRAMINE HYDROCHLORIDE 5 ML: 12.5 SOLUTION ORAL at 14:40

## 2022-01-01 RX ADMIN — ALBUTEROL SULFATE 4 PUFF: 90 AEROSOL, METERED RESPIRATORY (INHALATION) at 15:16

## 2022-01-01 RX ADMIN — INSULIN LISPRO 6 UNITS: 100 INJECTION, SOLUTION INTRAVENOUS; SUBCUTANEOUS at 12:43

## 2022-01-01 RX ADMIN — IPRATROPIUM BROMIDE 4 PUFF: 17 AEROSOL, METERED RESPIRATORY (INHALATION) at 04:29

## 2022-01-01 RX ADMIN — ALBUTEROL SULFATE 4 PUFF: 90 AEROSOL, METERED RESPIRATORY (INHALATION) at 07:06

## 2022-01-01 RX ADMIN — Medication 400 MG: at 08:18

## 2022-01-01 RX ADMIN — ALBUTEROL SULFATE 4 PUFF: 90 AEROSOL, METERED RESPIRATORY (INHALATION) at 00:00

## 2022-01-01 RX ADMIN — GUAIFENESIN 200 MG: 100 SOLUTION ORAL at 20:56

## 2022-01-01 RX ADMIN — CHLORHEXIDINE GLUCONATE 0.12% ORAL RINSE 15 ML: 1.2 LIQUID ORAL at 21:11

## 2022-01-01 RX ADMIN — ACETAMINOPHEN 650 MG: 325 TABLET ORAL at 08:16

## 2022-01-01 RX ADMIN — ALBUTEROL SULFATE 4 PUFF: 90 AEROSOL, METERED RESPIRATORY (INHALATION) at 15:06

## 2022-01-01 RX ADMIN — FAMOTIDINE 20 MG: 10 INJECTION, SOLUTION INTRAVENOUS at 08:08

## 2022-01-01 RX ADMIN — AMLODIPINE BESYLATE 10 MG: 10 TABLET ORAL at 09:13

## 2022-01-01 RX ADMIN — SODIUM CHLORIDE, PRESERVATIVE FREE 10 ML: 5 INJECTION INTRAVENOUS at 09:12

## 2022-01-01 RX ADMIN — CEFEPIME HYDROCHLORIDE 2000 MG: 2 INJECTION, POWDER, FOR SOLUTION INTRAVENOUS at 12:31

## 2022-01-01 RX ADMIN — TORSEMIDE 20 MG: 20 TABLET ORAL at 17:31

## 2022-01-01 RX ADMIN — CYCLOBENZAPRINE 10 MG: 10 TABLET, FILM COATED ORAL at 20:36

## 2022-01-01 RX ADMIN — DIPHENHYDRAMINE HYDROCHLORIDE 5 ML: 12.5 SOLUTION ORAL at 20:22

## 2022-01-01 RX ADMIN — INSULIN LISPRO 18 UNITS: 100 INJECTION, SOLUTION INTRAVENOUS; SUBCUTANEOUS at 08:59

## 2022-01-01 RX ADMIN — APIXABAN 5 MG: 5 TABLET, FILM COATED ORAL at 20:18

## 2022-01-01 RX ADMIN — PANTOPRAZOLE SODIUM 40 MG: 40 INJECTION, POWDER, FOR SOLUTION INTRAVENOUS at 08:29

## 2022-01-01 RX ADMIN — INSULIN LISPRO 4 UNITS: 100 INJECTION, SOLUTION INTRAVENOUS; SUBCUTANEOUS at 12:35

## 2022-01-01 RX ADMIN — Medication 5 ML: at 20:39

## 2022-01-01 RX ADMIN — INSULIN LISPRO 4 UNITS: 100 INJECTION, SOLUTION INTRAVENOUS; SUBCUTANEOUS at 09:20

## 2022-01-01 RX ADMIN — ACETAMINOPHEN 650 MG: 325 TABLET ORAL at 19:02

## 2022-01-01 RX ADMIN — IPRATROPIUM BROMIDE 4 PUFF: 17 AEROSOL, METERED RESPIRATORY (INHALATION) at 11:15

## 2022-01-01 RX ADMIN — APIXABAN 5 MG: 5 TABLET, FILM COATED ORAL at 20:03

## 2022-01-01 RX ADMIN — PROPOFOL 30 MCG/KG/MIN: 10 INJECTION, EMULSION INTRAVENOUS at 14:14

## 2022-01-01 RX ADMIN — ACETAMINOPHEN 650 MG: 325 TABLET ORAL at 21:24

## 2022-01-01 RX ADMIN — IPRATROPIUM BROMIDE 4 PUFF: 17 AEROSOL, METERED RESPIRATORY (INHALATION) at 19:06

## 2022-01-01 RX ADMIN — APIXABAN 5 MG: 5 TABLET, FILM COATED ORAL at 09:11

## 2022-01-01 RX ADMIN — APIXABAN 5 MG: 5 TABLET, FILM COATED ORAL at 09:32

## 2022-01-01 RX ADMIN — SODIUM CHLORIDE 25 ML: 9 INJECTION, SOLUTION INTRAVENOUS at 21:21

## 2022-01-01 RX ADMIN — Medication 5 ML: at 09:17

## 2022-01-01 RX ADMIN — PANTOPRAZOLE SODIUM 40 MG: 40 TABLET, DELAYED RELEASE ORAL at 06:34

## 2022-01-01 RX ADMIN — CYCLOBENZAPRINE 10 MG: 10 TABLET, FILM COATED ORAL at 20:43

## 2022-01-01 RX ADMIN — Medication 400 MG: at 09:14

## 2022-01-01 RX ADMIN — IPRATROPIUM BROMIDE 4 PUFF: 17 AEROSOL, METERED RESPIRATORY (INHALATION) at 03:57

## 2022-01-01 RX ADMIN — APIXABAN 5 MG: 5 TABLET, FILM COATED ORAL at 09:30

## 2022-01-01 RX ADMIN — GUAIFENESIN 200 MG: 100 SOLUTION ORAL at 14:26

## 2022-01-01 RX ADMIN — APIXABAN 5 MG: 5 TABLET, FILM COATED ORAL at 20:44

## 2022-01-01 RX ADMIN — SODIUM CHLORIDE, PRESERVATIVE FREE 10 ML: 5 INJECTION INTRAVENOUS at 09:08

## 2022-01-01 RX ADMIN — DILTIAZEM HYDROCHLORIDE 20 MG: 5 INJECTION INTRAVENOUS at 16:31

## 2022-01-01 RX ADMIN — IPRATROPIUM BROMIDE 4 PUFF: 17 AEROSOL, METERED RESPIRATORY (INHALATION) at 15:18

## 2022-01-01 RX ADMIN — BENZONATATE 100 MG: 100 CAPSULE ORAL at 20:18

## 2022-01-01 RX ADMIN — PROPOFOL 62 MCG/KG/MIN: 10 INJECTION, EMULSION INTRAVENOUS at 02:29

## 2022-01-01 RX ADMIN — CEFEPIME HYDROCHLORIDE 2000 MG: 2 INJECTION, POWDER, FOR SOLUTION INTRAVENOUS at 23:42

## 2022-01-01 RX ADMIN — PANTOPRAZOLE SODIUM 40 MG: 40 INJECTION, POWDER, FOR SOLUTION INTRAVENOUS at 08:08

## 2022-01-01 RX ADMIN — GUAIFENESIN 200 MG: 100 SOLUTION ORAL at 01:14

## 2022-01-01 RX ADMIN — BENZONATATE 100 MG: 100 CAPSULE ORAL at 13:04

## 2022-01-01 RX ADMIN — Medication 75 MCG/HR: at 05:40

## 2022-01-01 RX ADMIN — ACETAMINOPHEN 650 MG: 325 TABLET ORAL at 08:31

## 2022-01-01 RX ADMIN — MORPHINE SULFATE 2 MG: 4 INJECTION INTRAVENOUS at 11:25

## 2022-01-01 RX ADMIN — METOPROLOL SUCCINATE 50 MG: 50 TABLET, EXTENDED RELEASE ORAL at 09:13

## 2022-01-01 RX ADMIN — BENZOCAINE AND MENTHOL 1 LOZENGE: 15; 3.6 LOZENGE ORAL at 16:34

## 2022-01-01 RX ADMIN — PROPOFOL 70 MCG/KG/MIN: 10 INJECTION, EMULSION INTRAVENOUS at 03:29

## 2022-01-01 RX ADMIN — IPRATROPIUM BROMIDE 4 PUFF: 17 AEROSOL, METERED RESPIRATORY (INHALATION) at 15:06

## 2022-01-01 RX ADMIN — SODIUM CHLORIDE, PRESERVATIVE FREE 10 ML: 5 INJECTION INTRAVENOUS at 08:28

## 2022-01-01 RX ADMIN — PANTOPRAZOLE SODIUM 40 MG: 40 TABLET, DELAYED RELEASE ORAL at 05:54

## 2022-01-01 RX ADMIN — CHLORHEXIDINE GLUCONATE 0.12% ORAL RINSE 15 ML: 1.2 LIQUID ORAL at 08:53

## 2022-01-01 RX ADMIN — SODIUM CHLORIDE, PRESERVATIVE FREE 10 ML: 5 INJECTION INTRAVENOUS at 09:28

## 2022-01-01 RX ADMIN — CYCLOBENZAPRINE 10 MG: 10 TABLET, FILM COATED ORAL at 20:04

## 2022-01-01 RX ADMIN — HUMAN INSULIN 15 UNITS: 100 INJECTION, SUSPENSION SUBCUTANEOUS at 09:26

## 2022-01-01 RX ADMIN — ASPIRIN 81 MG: 81 TABLET, COATED ORAL at 08:54

## 2022-01-01 RX ADMIN — METOPROLOL SUCCINATE 50 MG: 50 TABLET, EXTENDED RELEASE ORAL at 08:44

## 2022-01-01 RX ADMIN — APIXABAN 5 MG: 5 TABLET, FILM COATED ORAL at 20:51

## 2022-01-01 RX ADMIN — FUROSEMIDE 40 MG: 10 INJECTION, SOLUTION INTRAVENOUS at 02:58

## 2022-01-01 RX ADMIN — PROPOFOL 60 MCG/KG/MIN: 10 INJECTION, EMULSION INTRAVENOUS at 19:40

## 2022-01-01 RX ADMIN — ASPIRIN 81 MG: 81 TABLET, COATED ORAL at 09:24

## 2022-01-01 RX ADMIN — METOPROLOL SUCCINATE 50 MG: 50 TABLET, EXTENDED RELEASE ORAL at 08:08

## 2022-01-01 RX ADMIN — APIXABAN 5 MG: 5 TABLET, FILM COATED ORAL at 08:52

## 2022-01-01 RX ADMIN — Medication 5 ML: at 08:29

## 2022-01-01 RX ADMIN — PANTOPRAZOLE SODIUM 40 MG: 20 TABLET, DELAYED RELEASE ORAL at 06:03

## 2022-01-01 RX ADMIN — TORSEMIDE 20 MG: 20 TABLET ORAL at 09:27

## 2022-01-01 RX ADMIN — INSULIN GLARGINE 13 UNITS: 100 INJECTION, SOLUTION SUBCUTANEOUS at 23:42

## 2022-01-01 RX ADMIN — DIPHENHYDRAMINE HYDROCHLORIDE 5 ML: 12.5 SOLUTION ORAL at 09:41

## 2022-01-01 RX ADMIN — PROPOFOL 70 MCG/KG/MIN: 10 INJECTION, EMULSION INTRAVENOUS at 16:51

## 2022-01-01 RX ADMIN — FUROSEMIDE 80 MG: 10 INJECTION, SOLUTION INTRAMUSCULAR; INTRAVENOUS at 12:58

## 2022-01-01 RX ADMIN — DEXAMETHASONE SODIUM PHOSPHATE 6 MG: 10 INJECTION, SOLUTION INTRAMUSCULAR; INTRAVENOUS at 22:42

## 2022-01-01 RX ADMIN — Medication 400 MG: at 20:46

## 2022-01-01 RX ADMIN — METOPROLOL SUCCINATE 50 MG: 50 TABLET, EXTENDED RELEASE ORAL at 09:21

## 2022-01-01 RX ADMIN — APIXABAN 5 MG: 5 TABLET, FILM COATED ORAL at 20:33

## 2022-01-01 RX ADMIN — ASPIRIN 81 MG: 81 TABLET, COATED ORAL at 09:13

## 2022-01-01 RX ADMIN — PANTOPRAZOLE SODIUM 40 MG: 40 TABLET, DELAYED RELEASE ORAL at 05:21

## 2022-01-01 RX ADMIN — OXYCODONE 5 MG: 5 TABLET ORAL at 14:00

## 2022-01-01 RX ADMIN — APIXABAN 5 MG: 5 TABLET, FILM COATED ORAL at 08:48

## 2022-01-01 RX ADMIN — SODIUM CHLORIDE, PRESERVATIVE FREE 10 ML: 5 INJECTION INTRAVENOUS at 20:19

## 2022-01-01 RX ADMIN — CYCLOBENZAPRINE 10 MG: 10 TABLET, FILM COATED ORAL at 20:03

## 2022-01-01 RX ADMIN — SODIUM CHLORIDE, PRESERVATIVE FREE 10 ML: 5 INJECTION INTRAVENOUS at 20:44

## 2022-01-01 RX ADMIN — TORSEMIDE 20 MG: 20 TABLET ORAL at 17:25

## 2022-01-01 RX ADMIN — CHLORHEXIDINE GLUCONATE 0.12% ORAL RINSE 15 ML: 1.2 LIQUID ORAL at 08:08

## 2022-01-01 RX ADMIN — Medication 400 MG: at 20:03

## 2022-01-01 RX ADMIN — ACETAMINOPHEN 500 MG: 500 TABLET ORAL at 18:28

## 2022-01-01 RX ADMIN — PANTOPRAZOLE SODIUM 40 MG: 40 TABLET, DELAYED RELEASE ORAL at 05:29

## 2022-01-01 RX ADMIN — ALBUTEROL SULFATE 4 PUFF: 90 AEROSOL, METERED RESPIRATORY (INHALATION) at 04:29

## 2022-01-01 RX ADMIN — CYCLOBENZAPRINE 10 MG: 10 TABLET, FILM COATED ORAL at 21:54

## 2022-01-01 RX ADMIN — GUAIFENESIN 200 MG: 100 SOLUTION ORAL at 20:51

## 2022-01-01 RX ADMIN — SODIUM CHLORIDE, PRESERVATIVE FREE 10 ML: 5 INJECTION INTRAVENOUS at 08:32

## 2022-01-01 RX ADMIN — ASPIRIN 81 MG: 81 TABLET, COATED ORAL at 09:07

## 2022-01-01 RX ADMIN — ALBUTEROL SULFATE 4 PUFF: 90 AEROSOL, METERED RESPIRATORY (INHALATION) at 07:08

## 2022-01-01 RX ADMIN — ALBUTEROL SULFATE 4 PUFF: 90 AEROSOL, METERED RESPIRATORY (INHALATION) at 07:07

## 2022-01-01 RX ADMIN — IPRATROPIUM BROMIDE 4 PUFF: 17 AEROSOL, METERED RESPIRATORY (INHALATION) at 07:30

## 2022-01-01 RX ADMIN — INSULIN GLARGINE 45 UNITS: 100 INJECTION, SOLUTION SUBCUTANEOUS at 21:12

## 2022-01-01 RX ADMIN — SODIUM CHLORIDE, PRESERVATIVE FREE 10 ML: 5 INJECTION INTRAVENOUS at 21:17

## 2022-01-01 RX ADMIN — DIPHENHYDRAMINE HYDROCHLORIDE 5 ML: 12.5 SOLUTION ORAL at 21:27

## 2022-01-01 RX ADMIN — SENNOSIDES 8.6 MG: 8.6 TABLET, COATED ORAL at 21:24

## 2022-01-01 RX ADMIN — TORSEMIDE 20 MG: 20 TABLET ORAL at 09:15

## 2022-01-01 RX ADMIN — GUAIFENESIN 200 MG: 100 SOLUTION ORAL at 04:21

## 2022-01-01 RX ADMIN — SODIUM CHLORIDE, PRESERVATIVE FREE 10 ML: 5 INJECTION INTRAVENOUS at 09:40

## 2022-01-01 RX ADMIN — METOPROLOL SUCCINATE 50 MG: 50 TABLET, EXTENDED RELEASE ORAL at 20:43

## 2022-01-01 RX ADMIN — SENNOSIDES 8.6 MG: 8.6 TABLET, COATED ORAL at 09:07

## 2022-01-01 RX ADMIN — ASPIRIN 81 MG: 81 TABLET, COATED ORAL at 09:30

## 2022-01-01 RX ADMIN — ALBUTEROL SULFATE 4 PUFF: 90 AEROSOL, METERED RESPIRATORY (INHALATION) at 00:47

## 2022-01-01 RX ADMIN — PROPOFOL 70 MCG/KG/MIN: 10 INJECTION, EMULSION INTRAVENOUS at 06:29

## 2022-01-01 RX ADMIN — ACETAMINOPHEN 650 MG: 325 TABLET ORAL at 12:28

## 2022-01-01 RX ADMIN — APIXABAN 5 MG: 5 TABLET, FILM COATED ORAL at 09:23

## 2022-01-01 RX ADMIN — METOPROLOL SUCCINATE 50 MG: 50 TABLET, EXTENDED RELEASE ORAL at 09:10

## 2022-01-01 RX ADMIN — Medication 400 MG: at 09:01

## 2022-01-01 RX ADMIN — INSULIN LISPRO 18 UNITS: 100 INJECTION, SOLUTION INTRAVENOUS; SUBCUTANEOUS at 21:55

## 2022-01-01 RX ADMIN — PANTOPRAZOLE SODIUM 40 MG: 40 INJECTION, POWDER, FOR SOLUTION INTRAVENOUS at 09:24

## 2022-01-01 RX ADMIN — Medication 400 MG: at 21:13

## 2022-01-01 RX ADMIN — ASPIRIN 81 MG: 81 TABLET, COATED ORAL at 08:52

## 2022-01-01 RX ADMIN — ACETAMINOPHEN 650 MG: 325 TABLET ORAL at 23:51

## 2022-01-01 RX ADMIN — CHLOROTHIAZIDE SODIUM 500 MG: 500 INJECTION, POWDER, LYOPHILIZED, FOR SOLUTION INTRAVENOUS at 10:20

## 2022-01-01 RX ADMIN — Medication 25 MCG/HR: at 09:05

## 2022-01-01 RX ADMIN — CYCLOBENZAPRINE 10 MG: 10 TABLET, FILM COATED ORAL at 20:34

## 2022-01-01 RX ADMIN — ALBUTEROL SULFATE 4 PUFF: 90 AEROSOL, METERED RESPIRATORY (INHALATION) at 15:17

## 2022-01-01 RX ADMIN — GUAIFENESIN 200 MG: 100 SOLUTION ORAL at 21:25

## 2022-01-01 RX ADMIN — SENNOSIDES 8.6 MG: 8.6 TABLET, COATED ORAL at 09:11

## 2022-01-01 RX ADMIN — METOPROLOL SUCCINATE 50 MG: 50 TABLET, EXTENDED RELEASE ORAL at 13:21

## 2022-01-01 RX ADMIN — FUROSEMIDE 80 MG: 10 INJECTION, SOLUTION INTRAMUSCULAR; INTRAVENOUS at 10:19

## 2022-01-01 RX ADMIN — METOPROLOL SUCCINATE 50 MG: 50 TABLET, EXTENDED RELEASE ORAL at 08:33

## 2022-01-01 RX ADMIN — CHLORHEXIDINE GLUCONATE 0.12% ORAL RINSE 15 ML: 1.2 LIQUID ORAL at 20:51

## 2022-01-01 RX ADMIN — GUAIFENESIN 200 MG: 100 SOLUTION ORAL at 12:47

## 2022-01-01 RX ADMIN — BENZONATATE 100 MG: 100 CAPSULE ORAL at 12:52

## 2022-01-01 RX ADMIN — Medication 5 ML: at 20:12

## 2022-01-01 RX ADMIN — DEXAMETHASONE 6 MG: 4 TABLET ORAL at 11:05

## 2022-01-01 RX ADMIN — FUROSEMIDE 80 MG: 10 INJECTION, SOLUTION INTRAVENOUS at 09:16

## 2022-01-01 RX ADMIN — SODIUM CHLORIDE, PRESERVATIVE FREE 10 ML: 5 INJECTION INTRAVENOUS at 09:14

## 2022-01-01 RX ADMIN — PROPOFOL 70 MCG/KG/MIN: 10 INJECTION, EMULSION INTRAVENOUS at 11:00

## 2022-01-01 RX ADMIN — CEFEPIME HYDROCHLORIDE 1000 MG: 1 INJECTION, POWDER, FOR SOLUTION INTRAMUSCULAR; INTRAVENOUS at 10:40

## 2022-01-01 RX ADMIN — BARICITINIB 1 MG: 2 TABLET, FILM COATED ORAL at 08:49

## 2022-01-01 RX ADMIN — ALBUTEROL SULFATE 4 PUFF: 90 AEROSOL, METERED RESPIRATORY (INHALATION) at 14:59

## 2022-01-01 RX ADMIN — INSULIN LISPRO 2 UNITS: 100 INJECTION, SOLUTION INTRAVENOUS; SUBCUTANEOUS at 04:17

## 2022-01-01 RX ADMIN — FAMOTIDINE 20 MG: 10 INJECTION, SOLUTION INTRAVENOUS at 09:29

## 2022-01-01 RX ADMIN — APIXABAN 2.5 MG: 2.5 TABLET, FILM COATED ORAL at 09:37

## 2022-01-01 RX ADMIN — DIPHENHYDRAMINE HYDROCHLORIDE 5 ML: 12.5 SOLUTION ORAL at 09:39

## 2022-01-01 RX ADMIN — ACETAMINOPHEN 650 MG: 325 TABLET ORAL at 16:04

## 2022-01-01 RX ADMIN — ACETAMINOPHEN 650 MG: 325 TABLET ORAL at 20:04

## 2022-01-01 RX ADMIN — LORAZEPAM 0.5 MG: 2 INJECTION INTRAMUSCULAR; INTRAVENOUS at 11:24

## 2022-01-01 RX ADMIN — ALBUTEROL SULFATE 4 PUFF: 90 AEROSOL, METERED RESPIRATORY (INHALATION) at 07:21

## 2022-01-01 RX ADMIN — APIXABAN 2.5 MG: 2.5 TABLET, FILM COATED ORAL at 20:07

## 2022-01-01 RX ADMIN — Medication 400 MG: at 20:39

## 2022-01-01 RX ADMIN — GUAIFENESIN 200 MG: 100 SOLUTION ORAL at 00:38

## 2022-01-01 RX ADMIN — LOSARTAN POTASSIUM 25 MG: 25 TABLET, FILM COATED ORAL at 09:10

## 2022-01-01 RX ADMIN — LOSARTAN POTASSIUM 25 MG: 25 TABLET, FILM COATED ORAL at 09:27

## 2022-01-01 RX ADMIN — Medication 400 MG: at 09:27

## 2022-01-01 RX ADMIN — SODIUM CHLORIDE, PRESERVATIVE FREE 10 ML: 5 INJECTION INTRAVENOUS at 09:10

## 2022-01-01 RX ADMIN — IPRATROPIUM BROMIDE 4 PUFF: 17 AEROSOL, METERED RESPIRATORY (INHALATION) at 15:16

## 2022-01-01 RX ADMIN — BARICITINIB 1 MG: 2 TABLET, FILM COATED ORAL at 08:18

## 2022-01-01 RX ADMIN — PROPOFOL 60 MCG/KG/MIN: 10 INJECTION, EMULSION INTRAVENOUS at 16:53

## 2022-01-01 RX ADMIN — TORSEMIDE 20 MG: 20 TABLET ORAL at 08:53

## 2022-01-01 RX ADMIN — SODIUM CHLORIDE, PRESERVATIVE FREE 10 ML: 5 INJECTION INTRAVENOUS at 20:49

## 2022-01-01 RX ADMIN — CHLORHEXIDINE GLUCONATE 0.12% ORAL RINSE 15 ML: 1.2 LIQUID ORAL at 08:11

## 2022-01-01 RX ADMIN — PROPOFOL 62 MCG/KG/MIN: 10 INJECTION, EMULSION INTRAVENOUS at 21:45

## 2022-01-01 RX ADMIN — IPRATROPIUM BROMIDE 4 PUFF: 17 AEROSOL, METERED RESPIRATORY (INHALATION) at 07:07

## 2022-01-01 RX ADMIN — CYCLOBENZAPRINE 10 MG: 10 TABLET, FILM COATED ORAL at 20:46

## 2022-01-01 RX ADMIN — FUROSEMIDE 5 MG/HR: 100 INJECTION, SOLUTION INTRAMUSCULAR; INTRAVENOUS at 15:02

## 2022-01-01 RX ADMIN — FUROSEMIDE 80 MG: 10 INJECTION, SOLUTION INTRAMUSCULAR; INTRAVENOUS at 21:17

## 2022-01-01 RX ADMIN — AMLODIPINE BESYLATE 10 MG: 10 TABLET ORAL at 09:38

## 2022-01-01 RX ADMIN — IPRATROPIUM BROMIDE 4 PUFF: 17 AEROSOL, METERED RESPIRATORY (INHALATION) at 11:18

## 2022-01-01 RX ADMIN — PROPOFOL 52 MCG/KG/MIN: 10 INJECTION, EMULSION INTRAVENOUS at 05:37

## 2022-01-01 RX ADMIN — FUROSEMIDE 40 MG: 40 TABLET ORAL at 08:54

## 2022-01-01 RX ADMIN — Medication 0.2 MG: at 11:25

## 2022-01-01 RX ADMIN — FUROSEMIDE 80 MG: 10 INJECTION, SOLUTION INTRAMUSCULAR; INTRAVENOUS at 08:52

## 2022-01-01 RX ADMIN — BENZONATATE 100 MG: 100 CAPSULE ORAL at 16:08

## 2022-01-01 RX ADMIN — SODIUM CHLORIDE, PRESERVATIVE FREE 10 ML: 5 INJECTION INTRAVENOUS at 23:13

## 2022-01-01 RX ADMIN — INSULIN LISPRO 1 UNITS: 100 INJECTION, SOLUTION INTRAVENOUS; SUBCUTANEOUS at 08:20

## 2022-01-01 RX ADMIN — TORSEMIDE 20 MG: 20 TABLET ORAL at 09:00

## 2022-01-01 RX ADMIN — LOSARTAN POTASSIUM 25 MG: 25 TABLET, FILM COATED ORAL at 09:01

## 2022-01-01 RX ADMIN — GUAIFENESIN 200 MG: 100 SOLUTION ORAL at 08:46

## 2022-01-01 RX ADMIN — FUROSEMIDE 5 MG/HR: 100 INJECTION, SOLUTION INTRAMUSCULAR; INTRAVENOUS at 08:58

## 2022-01-01 RX ADMIN — ASPIRIN 81 MG: 81 TABLET, COATED ORAL at 09:17

## 2022-01-01 RX ADMIN — DIPHENHYDRAMINE HYDROCHLORIDE 5 ML: 12.5 SOLUTION ORAL at 13:05

## 2022-01-01 RX ADMIN — INSULIN HUMAN 10 UNITS: 100 INJECTION, SOLUTION PARENTERAL at 21:55

## 2022-01-01 RX ADMIN — CEFEPIME HYDROCHLORIDE 1000 MG: 1 INJECTION, POWDER, FOR SOLUTION INTRAMUSCULAR; INTRAVENOUS at 11:46

## 2022-01-01 RX ADMIN — AMLODIPINE BESYLATE 10 MG: 10 TABLET ORAL at 08:18

## 2022-01-01 RX ADMIN — LOSARTAN POTASSIUM 25 MG: 25 TABLET, FILM COATED ORAL at 09:32

## 2022-01-01 RX ADMIN — INSULIN LISPRO 15 UNITS: 100 INJECTION, SOLUTION INTRAVENOUS; SUBCUTANEOUS at 09:39

## 2022-01-01 RX ADMIN — TORSEMIDE 20 MG: 20 TABLET ORAL at 09:10

## 2022-01-01 RX ADMIN — SENNOSIDES 8.6 MG: 8.6 TABLET, COATED ORAL at 09:39

## 2022-01-01 RX ADMIN — SODIUM CHLORIDE, PRESERVATIVE FREE 10 ML: 5 INJECTION INTRAVENOUS at 08:19

## 2022-01-01 RX ADMIN — SODIUM CHLORIDE, PRESERVATIVE FREE 10 ML: 5 INJECTION INTRAVENOUS at 21:11

## 2022-01-01 RX ADMIN — Medication 5 ML: at 21:02

## 2022-01-01 RX ADMIN — DIPHENHYDRAMINE HYDROCHLORIDE 5 ML: 12.5 SOLUTION ORAL at 20:14

## 2022-01-01 RX ADMIN — BARICITINIB 1 MG: 2 TABLET, FILM COATED ORAL at 09:11

## 2022-01-01 RX ADMIN — ACETAMINOPHEN 650 MG: 325 TABLET ORAL at 13:20

## 2022-01-01 RX ADMIN — ACETAMINOPHEN 650 MG: 325 TABLET ORAL at 20:40

## 2022-01-01 RX ADMIN — GUAIFENESIN 200 MG: 100 SOLUTION ORAL at 13:03

## 2022-01-01 RX ADMIN — AMLODIPINE BESYLATE 10 MG: 10 TABLET ORAL at 09:27

## 2022-01-01 RX ADMIN — BARICITINIB 1 MG: 2 TABLET, FILM COATED ORAL at 09:07

## 2022-01-01 RX ADMIN — CHLORHEXIDINE GLUCONATE 0.12% ORAL RINSE 15 ML: 1.2 LIQUID ORAL at 20:21

## 2022-01-01 RX ADMIN — APIXABAN 5 MG: 5 TABLET, FILM COATED ORAL at 20:46

## 2022-01-01 RX ADMIN — IPRATROPIUM BROMIDE 4 PUFF: 17 AEROSOL, METERED RESPIRATORY (INHALATION) at 07:12

## 2022-01-01 RX ADMIN — PROPOFOL 60 MCG/KG/MIN: 10 INJECTION, EMULSION INTRAVENOUS at 13:23

## 2022-01-01 RX ADMIN — ACETAMINOPHEN 650 MG: 325 TABLET ORAL at 12:33

## 2022-01-01 RX ADMIN — GUAIFENESIN 200 MG: 100 SOLUTION ORAL at 16:15

## 2022-01-01 RX ADMIN — PROPOFOL 67 MCG/KG/MIN: 10 INJECTION, EMULSION INTRAVENOUS at 21:17

## 2022-01-01 RX ADMIN — FAMOTIDINE 20 MG: 10 INJECTION, SOLUTION INTRAVENOUS at 09:39

## 2022-01-01 RX ADMIN — GUAIFENESIN 200 MG: 100 SOLUTION ORAL at 16:59

## 2022-01-01 RX ADMIN — IPRATROPIUM BROMIDE 4 PUFF: 17 AEROSOL, METERED RESPIRATORY (INHALATION) at 23:11

## 2022-01-01 RX ADMIN — INSULIN GLARGINE 50 UNITS: 100 INJECTION, SOLUTION SUBCUTANEOUS at 20:29

## 2022-01-01 RX ADMIN — METOPROLOL SUCCINATE 50 MG: 50 TABLET, EXTENDED RELEASE ORAL at 20:21

## 2022-01-01 RX ADMIN — PROPOFOL 67 MCG/KG/MIN: 10 INJECTION, EMULSION INTRAVENOUS at 14:54

## 2022-01-01 RX ADMIN — ASPIRIN 81 MG: 81 TABLET, COATED ORAL at 10:12

## 2022-01-01 RX ADMIN — INSULIN LISPRO 1 UNITS: 100 INJECTION, SOLUTION INTRAVENOUS; SUBCUTANEOUS at 08:12

## 2022-01-01 RX ADMIN — PROPOFOL 67 MCG/KG/MIN: 10 INJECTION, EMULSION INTRAVENOUS at 09:32

## 2022-01-01 RX ADMIN — SODIUM CHLORIDE, PRESERVATIVE FREE 10 ML: 5 INJECTION INTRAVENOUS at 20:21

## 2022-01-01 RX ADMIN — SODIUM CHLORIDE, PRESERVATIVE FREE 10 ML: 5 INJECTION INTRAVENOUS at 08:17

## 2022-01-01 RX ADMIN — FUROSEMIDE 40 MG: 40 TABLET ORAL at 09:39

## 2022-01-01 RX ADMIN — PANTOPRAZOLE SODIUM 40 MG: 40 TABLET, DELAYED RELEASE ORAL at 06:02

## 2022-01-01 RX ADMIN — FUROSEMIDE 80 MG: 10 INJECTION, SOLUTION INTRAMUSCULAR; INTRAVENOUS at 20:43

## 2022-01-01 RX ADMIN — DIPHENHYDRAMINE HYDROCHLORIDE 5 ML: 12.5 SOLUTION ORAL at 10:39

## 2022-01-01 RX ADMIN — SODIUM CHLORIDE, PRESERVATIVE FREE 10 ML: 5 INJECTION INTRAVENOUS at 21:24

## 2022-01-01 RX ADMIN — ALBUTEROL SULFATE 4 PUFF: 90 AEROSOL, METERED RESPIRATORY (INHALATION) at 11:45

## 2022-01-01 RX ADMIN — APIXABAN 5 MG: 5 TABLET, FILM COATED ORAL at 09:10

## 2022-01-01 RX ADMIN — BENZONATATE 100 MG: 100 CAPSULE ORAL at 09:18

## 2022-01-01 RX ADMIN — Medication 50 MCG/HR: at 10:36

## 2022-01-01 RX ADMIN — CEFEPIME HYDROCHLORIDE 1000 MG: 1 INJECTION, POWDER, FOR SOLUTION INTRAMUSCULAR; INTRAVENOUS at 10:43

## 2022-01-01 RX ADMIN — INSULIN LISPRO 15 UNITS: 100 INJECTION, SOLUTION INTRAVENOUS; SUBCUTANEOUS at 17:57

## 2022-01-01 RX ADMIN — AMLODIPINE BESYLATE 10 MG: 10 TABLET ORAL at 08:49

## 2022-01-01 RX ADMIN — IPRATROPIUM BROMIDE 4 PUFF: 17 AEROSOL, METERED RESPIRATORY (INHALATION) at 15:09

## 2022-01-01 RX ADMIN — APIXABAN 5 MG: 5 TABLET, FILM COATED ORAL at 08:08

## 2022-01-01 RX ADMIN — ASPIRIN 81 MG: 81 TABLET, COATED ORAL at 09:32

## 2022-01-01 RX ADMIN — ACETAMINOPHEN 650 MG: 325 TABLET ORAL at 14:03

## 2022-01-01 RX ADMIN — AMLODIPINE BESYLATE 10 MG: 10 TABLET ORAL at 09:32

## 2022-01-01 RX ADMIN — METOPROLOL SUCCINATE 50 MG: 50 TABLET, EXTENDED RELEASE ORAL at 20:38

## 2022-01-01 RX ADMIN — TORSEMIDE 20 MG: 20 TABLET ORAL at 09:21

## 2022-01-01 RX ADMIN — INSULIN LISPRO 6 UNITS: 100 INJECTION, SOLUTION INTRAVENOUS; SUBCUTANEOUS at 16:20

## 2022-01-01 RX ADMIN — PROPOFOL 67 MCG/KG/MIN: 10 INJECTION, EMULSION INTRAVENOUS at 00:14

## 2022-01-01 RX ADMIN — PROPOFOL 67 MCG/KG/MIN: 10 INJECTION, EMULSION INTRAVENOUS at 06:41

## 2022-01-01 RX ADMIN — ALBUTEROL SULFATE 4 PUFF: 90 AEROSOL, METERED RESPIRATORY (INHALATION) at 19:06

## 2022-01-01 RX ADMIN — METOPROLOL SUCCINATE 50 MG: 50 TABLET, EXTENDED RELEASE ORAL at 09:32

## 2022-01-01 RX ADMIN — TORSEMIDE 20 MG: 20 TABLET ORAL at 16:50

## 2022-01-01 RX ADMIN — APIXABAN 5 MG: 5 TABLET, FILM COATED ORAL at 21:13

## 2022-01-01 RX ADMIN — MORPHINE SULFATE 4 MG: 4 INJECTION INTRAVENOUS at 03:10

## 2022-01-01 RX ADMIN — BENZOCAINE AND MENTHOL 1 LOZENGE: 15; 3.6 LOZENGE ORAL at 16:59

## 2022-01-01 RX ADMIN — CHLORHEXIDINE GLUCONATE 0.12% ORAL RINSE 15 ML: 1.2 LIQUID ORAL at 20:12

## 2022-01-01 RX ADMIN — PROPOFOL 64.95 MCG/KG/MIN: 10 INJECTION, EMULSION INTRAVENOUS at 20:07

## 2022-01-01 RX ADMIN — APIXABAN 5 MG: 5 TABLET, FILM COATED ORAL at 20:38

## 2022-01-01 RX ADMIN — FUROSEMIDE 80 MG: 10 INJECTION, SOLUTION INTRAMUSCULAR; INTRAVENOUS at 08:08

## 2022-01-01 RX ADMIN — INSULIN GLARGINE 45 UNITS: 100 INJECTION, SOLUTION SUBCUTANEOUS at 20:03

## 2022-01-01 RX ADMIN — APIXABAN 5 MG: 5 TABLET, FILM COATED ORAL at 09:15

## 2022-01-01 RX ADMIN — GUAIFENESIN 200 MG: 100 SOLUTION ORAL at 20:15

## 2022-01-01 RX ADMIN — GUAIFENESIN 200 MG: 100 SOLUTION ORAL at 05:08

## 2022-01-01 RX ADMIN — FUROSEMIDE 40 MG: 40 TABLET ORAL at 09:08

## 2022-01-01 RX ADMIN — GUAIFENESIN 200 MG: 100 SOLUTION ORAL at 13:20

## 2022-01-01 RX ADMIN — APIXABAN 5 MG: 5 TABLET, FILM COATED ORAL at 20:34

## 2022-01-01 RX ADMIN — INSULIN LISPRO 3 UNITS: 100 INJECTION, SOLUTION INTRAVENOUS; SUBCUTANEOUS at 12:33

## 2022-01-01 RX ADMIN — DEXAMETHASONE SODIUM PHOSPHATE 6 MG: 10 INJECTION, SOLUTION INTRAMUSCULAR; INTRAVENOUS at 20:57

## 2022-01-01 RX ADMIN — SENNOSIDES 8.6 MG: 8.6 TABLET, COATED ORAL at 20:18

## 2022-01-01 RX ADMIN — INSULIN GLARGINE 40 UNITS: 100 INJECTION, SOLUTION SUBCUTANEOUS at 20:36

## 2022-01-01 RX ADMIN — HUMAN INSULIN 15 UNITS: 100 INJECTION, SUSPENSION SUBCUTANEOUS at 08:03

## 2022-01-01 RX ADMIN — CHLORHEXIDINE GLUCONATE 0.12% ORAL RINSE 15 ML: 1.2 LIQUID ORAL at 21:17

## 2022-01-01 RX ADMIN — INSULIN GLARGINE 30 UNITS: 100 INJECTION, SOLUTION SUBCUTANEOUS at 21:14

## 2022-01-01 RX ADMIN — PANTOPRAZOLE SODIUM 40 MG: 40 TABLET, DELAYED RELEASE ORAL at 06:03

## 2022-01-01 RX ADMIN — CYCLOBENZAPRINE 10 MG: 10 TABLET, FILM COATED ORAL at 20:02

## 2022-01-01 RX ADMIN — PROPOFOL 67 MCG/KG/MIN: 10 INJECTION, EMULSION INTRAVENOUS at 03:15

## 2022-01-01 RX ADMIN — ALBUTEROL SULFATE 4 PUFF: 90 AEROSOL, METERED RESPIRATORY (INHALATION) at 11:18

## 2022-01-01 RX ADMIN — DEXAMETHASONE SODIUM PHOSPHATE 6 MG: 10 INJECTION, SOLUTION INTRAMUSCULAR; INTRAVENOUS at 20:18

## 2022-01-01 RX ADMIN — PROPOFOL 40 MCG/KG/MIN: 10 INJECTION, EMULSION INTRAVENOUS at 08:49

## 2022-01-01 RX ADMIN — PROPOFOL 67 MCG/KG/MIN: 10 INJECTION, EMULSION INTRAVENOUS at 15:38

## 2022-01-01 RX ADMIN — DIPHENHYDRAMINE HYDROCHLORIDE 5 ML: 12.5 SOLUTION ORAL at 16:28

## 2022-01-01 RX ADMIN — PROPOFOL 50 MCG/KG/MIN: 10 INJECTION, EMULSION INTRAVENOUS at 12:18

## 2022-01-01 RX ADMIN — INSULIN LISPRO 15 UNITS: 100 INJECTION, SOLUTION INTRAVENOUS; SUBCUTANEOUS at 11:54

## 2022-01-01 RX ADMIN — ASPIRIN 81 MG: 81 TABLET, COATED ORAL at 08:28

## 2022-01-01 RX ADMIN — INSULIN LISPRO 3 UNITS: 100 INJECTION, SOLUTION INTRAVENOUS; SUBCUTANEOUS at 12:24

## 2022-01-01 RX ADMIN — GUAIFENESIN 200 MG: 100 SOLUTION ORAL at 16:08

## 2022-01-01 RX ADMIN — LOSARTAN POTASSIUM 25 MG: 25 TABLET, FILM COATED ORAL at 08:19

## 2022-01-01 RX ADMIN — FAMOTIDINE 20 MG: 10 INJECTION, SOLUTION INTRAVENOUS at 08:19

## 2022-01-01 RX ADMIN — INSULIN LISPRO 15 UNITS: 100 INJECTION, SOLUTION INTRAVENOUS; SUBCUTANEOUS at 08:56

## 2022-01-01 RX ADMIN — LOSARTAN POTASSIUM 25 MG: 25 TABLET, FILM COATED ORAL at 08:31

## 2022-01-01 RX ADMIN — INSULIN LISPRO 3 UNITS: 100 INJECTION, SOLUTION INTRAVENOUS; SUBCUTANEOUS at 12:39

## 2022-01-01 RX ADMIN — FAMOTIDINE 20 MG: 10 INJECTION, SOLUTION INTRAVENOUS at 10:04

## 2022-01-01 RX ADMIN — DIPHENHYDRAMINE HYDROCHLORIDE 5 ML: 12.5 SOLUTION ORAL at 09:10

## 2022-01-01 RX ADMIN — INSULIN GLARGINE 40 UNITS: 100 INJECTION, SOLUTION SUBCUTANEOUS at 21:15

## 2022-01-01 RX ADMIN — METOPROLOL TARTRATE 5 MG: 5 INJECTION INTRAVENOUS at 03:00

## 2022-01-01 RX ADMIN — Medication 5 ML: at 21:19

## 2022-01-01 RX ADMIN — PROPOFOL 52 MCG/KG/MIN: 10 INJECTION, EMULSION INTRAVENOUS at 10:41

## 2022-01-01 RX ADMIN — BENZONATATE 100 MG: 100 CAPSULE ORAL at 10:22

## 2022-01-01 RX ADMIN — APIXABAN 5 MG: 5 TABLET, FILM COATED ORAL at 20:10

## 2022-01-01 RX ADMIN — BENZONATATE 100 MG: 100 CAPSULE ORAL at 20:51

## 2022-01-01 RX ADMIN — ALBUTEROL SULFATE 4 PUFF: 90 AEROSOL, METERED RESPIRATORY (INHALATION) at 07:11

## 2022-01-01 RX ADMIN — ALBUTEROL SULFATE 4 PUFF: 90 AEROSOL, METERED RESPIRATORY (INHALATION) at 19:57

## 2022-01-01 RX ADMIN — SODIUM CHLORIDE, PRESERVATIVE FREE 10 ML: 5 INJECTION INTRAVENOUS at 20:36

## 2022-01-01 RX ADMIN — LOSARTAN POTASSIUM 25 MG: 25 TABLET, FILM COATED ORAL at 08:16

## 2022-01-01 RX ADMIN — INSULIN LISPRO 2 UNITS: 100 INJECTION, SOLUTION INTRAVENOUS; SUBCUTANEOUS at 20:42

## 2022-01-01 RX ADMIN — METOPROLOL SUCCINATE 50 MG: 50 TABLET, EXTENDED RELEASE ORAL at 09:01

## 2022-01-01 RX ADMIN — LOSARTAN POTASSIUM 25 MG: 25 TABLET, FILM COATED ORAL at 08:53

## 2022-01-01 RX ADMIN — METOPROLOL SUCCINATE 50 MG: 50 TABLET, EXTENDED RELEASE ORAL at 09:23

## 2022-01-01 RX ADMIN — AMLODIPINE BESYLATE 10 MG: 10 TABLET ORAL at 09:10

## 2022-01-01 RX ADMIN — FUROSEMIDE 80 MG: 10 INJECTION, SOLUTION INTRAMUSCULAR; INTRAVENOUS at 20:22

## 2022-01-01 RX ADMIN — AMLODIPINE BESYLATE 10 MG: 10 TABLET ORAL at 08:53

## 2022-01-01 RX ADMIN — ASPIRIN 81 MG: 81 TABLET, COATED ORAL at 09:38

## 2022-01-01 RX ADMIN — TORSEMIDE 20 MG: 20 TABLET ORAL at 20:33

## 2022-01-01 RX ADMIN — ALBUTEROL SULFATE 4 PUFF: 90 AEROSOL, METERED RESPIRATORY (INHALATION) at 15:19

## 2022-01-01 RX ADMIN — ALBUTEROL SULFATE 4 PUFF: 90 AEROSOL, METERED RESPIRATORY (INHALATION) at 19:21

## 2022-01-01 RX ADMIN — INSULIN LISPRO 1 UNITS: 100 INJECTION, SOLUTION INTRAVENOUS; SUBCUTANEOUS at 18:25

## 2022-01-01 RX ADMIN — AMLODIPINE BESYLATE 10 MG: 10 TABLET ORAL at 08:31

## 2022-01-01 RX ADMIN — INSULIN LISPRO 4 UNITS: 100 INJECTION, SOLUTION INTRAVENOUS; SUBCUTANEOUS at 04:31

## 2022-01-01 RX ADMIN — LORAZEPAM 0.5 MG: 2 INJECTION INTRAMUSCULAR at 02:43

## 2022-01-01 RX ADMIN — IPRATROPIUM BROMIDE 4 PUFF: 17 AEROSOL, METERED RESPIRATORY (INHALATION) at 00:40

## 2022-01-01 RX ADMIN — CHLORHEXIDINE GLUCONATE 0.12% ORAL RINSE 15 ML: 1.2 LIQUID ORAL at 20:43

## 2022-01-01 RX ADMIN — PROPOFOL 40 MCG/KG/MIN: 10 INJECTION, EMULSION INTRAVENOUS at 02:12

## 2022-01-01 RX ADMIN — Medication 400 MG: at 08:53

## 2022-01-01 RX ADMIN — SODIUM CHLORIDE, PRESERVATIVE FREE 10 ML: 5 INJECTION INTRAVENOUS at 13:57

## 2022-01-01 RX ADMIN — GUAIFENESIN 200 MG: 100 SOLUTION ORAL at 20:36

## 2022-01-01 RX ADMIN — FUROSEMIDE 80 MG: 10 INJECTION, SOLUTION INTRAMUSCULAR; INTRAVENOUS at 14:54

## 2022-01-01 RX ADMIN — METOPROLOL SUCCINATE 50 MG: 50 TABLET, EXTENDED RELEASE ORAL at 08:54

## 2022-01-01 RX ADMIN — PROPOFOL 40 MCG/KG/MIN: 10 INJECTION, EMULSION INTRAVENOUS at 17:12

## 2022-01-01 RX ADMIN — DEXAMETHASONE SODIUM PHOSPHATE 6 MG: 10 INJECTION, SOLUTION INTRAMUSCULAR; INTRAVENOUS at 20:10

## 2022-01-01 RX ADMIN — SENNOSIDES 8.6 MG: 8.6 TABLET, COATED ORAL at 09:17

## 2022-01-01 RX ADMIN — ALBUTEROL SULFATE 4 PUFF: 90 AEROSOL, METERED RESPIRATORY (INHALATION) at 23:36

## 2022-01-01 RX ADMIN — TORSEMIDE 20 MG: 20 TABLET ORAL at 17:50

## 2022-01-01 RX ADMIN — ASPIRIN 81 MG: 81 TABLET, COATED ORAL at 08:44

## 2022-01-01 RX ADMIN — Medication 5 ML: at 09:41

## 2022-01-01 RX ADMIN — APIXABAN 5 MG: 5 TABLET, FILM COATED ORAL at 13:04

## 2022-01-01 RX ADMIN — METOPROLOL SUCCINATE 50 MG: 50 TABLET, EXTENDED RELEASE ORAL at 09:17

## 2022-01-01 RX ADMIN — SODIUM BICARBONATE 50 MEQ: 84 INJECTION, SOLUTION INTRAVENOUS at 03:20

## 2022-01-01 RX ADMIN — BARICITINIB 1 MG: 2 TABLET, FILM COATED ORAL at 09:17

## 2022-01-01 RX ADMIN — METOPROLOL SUCCINATE 50 MG: 50 TABLET, EXTENDED RELEASE ORAL at 08:15

## 2022-01-01 RX ADMIN — TORSEMIDE 20 MG: 20 TABLET ORAL at 17:27

## 2022-01-01 RX ADMIN — SODIUM CHLORIDE, PRESERVATIVE FREE 10 ML: 5 INJECTION INTRAVENOUS at 08:09

## 2022-01-01 RX ADMIN — Medication 75 MCG/HR: at 06:40

## 2022-01-01 RX ADMIN — GUAIFENESIN 200 MG: 100 SOLUTION ORAL at 20:18

## 2022-01-01 RX ADMIN — PROPOFOL 40 MCG/KG/MIN: 10 INJECTION, EMULSION INTRAVENOUS at 12:35

## 2022-01-01 RX ADMIN — ALBUTEROL SULFATE 4 PUFF: 90 AEROSOL, METERED RESPIRATORY (INHALATION) at 11:08

## 2022-01-01 RX ADMIN — SODIUM CHLORIDE, PRESERVATIVE FREE 10 ML: 5 INJECTION INTRAVENOUS at 08:20

## 2022-01-01 RX ADMIN — GUAIFENESIN 200 MG: 100 SOLUTION ORAL at 20:50

## 2022-01-01 RX ADMIN — METOPROLOL SUCCINATE 50 MG: 50 TABLET, FILM COATED, EXTENDED RELEASE ORAL at 09:07

## 2022-01-01 RX ADMIN — HUMAN INSULIN 15 UNITS: 100 INJECTION, SUSPENSION SUBCUTANEOUS at 08:29

## 2022-01-01 RX ADMIN — INSULIN GLARGINE 40 UNITS: 100 INJECTION, SOLUTION SUBCUTANEOUS at 20:09

## 2022-01-01 RX ADMIN — PANTOPRAZOLE SODIUM 40 MG: 40 TABLET, DELAYED RELEASE ORAL at 06:47

## 2022-01-01 RX ADMIN — FUROSEMIDE 80 MG: 10 INJECTION, SOLUTION INTRAMUSCULAR; INTRAVENOUS at 14:29

## 2022-01-01 RX ADMIN — DIPHENHYDRAMINE HYDROCHLORIDE 5 ML: 12.5 SOLUTION ORAL at 21:10

## 2022-01-01 RX ADMIN — SODIUM CHLORIDE, PRESERVATIVE FREE 10 ML: 5 INJECTION INTRAVENOUS at 08:54

## 2022-01-01 RX ADMIN — ALBUTEROL SULFATE 4 PUFF: 90 AEROSOL, METERED RESPIRATORY (INHALATION) at 04:02

## 2022-01-01 RX ADMIN — PANTOPRAZOLE SODIUM 40 MG: 40 TABLET, DELAYED RELEASE ORAL at 13:21

## 2022-01-01 RX ADMIN — GUAIFENESIN 200 MG: 100 SOLUTION ORAL at 16:32

## 2022-01-01 RX ADMIN — DIPHENHYDRAMINE HYDROCHLORIDE 5 ML: 12.5 SOLUTION ORAL at 08:54

## 2022-01-01 RX ADMIN — ASPIRIN 81 MG: 81 TABLET, COATED ORAL at 08:18

## 2022-01-01 RX ADMIN — LORAZEPAM 0.5 MG: 2 INJECTION INTRAMUSCULAR; INTRAVENOUS at 05:32

## 2022-01-01 RX ADMIN — FUROSEMIDE 80 MG: 10 INJECTION, SOLUTION INTRAMUSCULAR; INTRAVENOUS at 13:56

## 2022-01-01 RX ADMIN — DIPHENHYDRAMINE HYDROCHLORIDE 5 ML: 12.5 SOLUTION ORAL at 12:57

## 2022-01-01 RX ADMIN — Medication 400 MG: at 09:10

## 2022-01-01 RX ADMIN — INSULIN LISPRO 15 UNITS: 100 INJECTION, SOLUTION INTRAVENOUS; SUBCUTANEOUS at 11:29

## 2022-01-01 RX ADMIN — PANTOPRAZOLE SODIUM 40 MG: 40 TABLET, DELAYED RELEASE ORAL at 09:11

## 2022-01-01 RX ADMIN — AMLODIPINE BESYLATE 10 MG: 10 TABLET ORAL at 09:07

## 2022-01-01 RX ADMIN — DIGOXIN 125 MCG: 250 INJECTION, SOLUTION INTRAMUSCULAR; INTRAVENOUS; PARENTERAL at 12:39

## 2022-01-01 RX ADMIN — CEFEPIME HYDROCHLORIDE 1000 MG: 1 INJECTION, POWDER, FOR SOLUTION INTRAMUSCULAR; INTRAVENOUS at 11:02

## 2022-01-01 RX ADMIN — SENNOSIDES 8.6 MG: 8.6 TABLET, COATED ORAL at 22:43

## 2022-01-01 RX ADMIN — BENZONATATE 100 MG: 100 CAPSULE ORAL at 13:51

## 2022-01-01 RX ADMIN — APIXABAN 5 MG: 5 TABLET, FILM COATED ORAL at 09:08

## 2022-01-01 RX ADMIN — GUAIFENESIN 200 MG: 100 SOLUTION ORAL at 04:20

## 2022-01-01 RX ADMIN — METOPROLOL SUCCINATE 50 MG: 50 TABLET, FILM COATED, EXTENDED RELEASE ORAL at 09:39

## 2022-01-01 RX ADMIN — GUAIFENESIN 200 MG: 100 SOLUTION ORAL at 09:17

## 2022-01-01 RX ADMIN — APIXABAN 5 MG: 5 TABLET, FILM COATED ORAL at 20:52

## 2022-01-01 RX ADMIN — TORSEMIDE 20 MG: 20 TABLET ORAL at 18:19

## 2022-01-01 RX ADMIN — METOPROLOL SUCCINATE 50 MG: 50 TABLET, EXTENDED RELEASE ORAL at 20:10

## 2022-01-01 RX ADMIN — METOPROLOL SUCCINATE 50 MG: 50 TABLET, EXTENDED RELEASE ORAL at 20:52

## 2022-01-01 RX ADMIN — BARICITINIB 1 MG: 2 TABLET, FILM COATED ORAL at 08:54

## 2022-01-01 RX ADMIN — GUAIFENESIN 200 MG: 100 SOLUTION ORAL at 18:07

## 2022-01-01 RX ADMIN — CHLOROTHIAZIDE SODIUM 500 MG: 500 INJECTION, POWDER, LYOPHILIZED, FOR SOLUTION INTRAVENOUS at 21:18

## 2022-01-01 RX ADMIN — Medication 400 MG: at 20:02

## 2022-01-01 RX ADMIN — APIXABAN 5 MG: 5 TABLET, FILM COATED ORAL at 09:17

## 2022-01-01 RX ADMIN — Medication 5 ML: at 08:21

## 2022-01-01 RX ADMIN — CYCLOBENZAPRINE 10 MG: 10 TABLET, FILM COATED ORAL at 20:42

## 2022-01-01 RX ADMIN — METOPROLOL SUCCINATE 50 MG: 50 TABLET, FILM COATED, EXTENDED RELEASE ORAL at 08:54

## 2022-01-01 RX ADMIN — AMLODIPINE BESYLATE 10 MG: 10 TABLET ORAL at 09:11

## 2022-01-01 RX ADMIN — INSULIN GLARGINE 45 UNITS: 100 INJECTION, SOLUTION SUBCUTANEOUS at 20:09

## 2022-01-01 RX ADMIN — ASPIRIN 81 MG: 81 TABLET, COATED ORAL at 08:08

## 2022-01-01 RX ADMIN — CYCLOBENZAPRINE 10 MG: 10 TABLET, FILM COATED ORAL at 20:15

## 2022-01-01 RX ADMIN — DEXAMETHASONE SODIUM PHOSPHATE 6 MG: 10 INJECTION, SOLUTION INTRAMUSCULAR; INTRAVENOUS at 20:38

## 2022-01-01 RX ADMIN — FUROSEMIDE 20 MG: 10 INJECTION, SOLUTION INTRAMUSCULAR; INTRAVENOUS at 09:50

## 2022-01-01 RX ADMIN — SENNOSIDES 8.6 MG: 8.6 TABLET, COATED ORAL at 20:15

## 2022-01-01 RX ADMIN — INSULIN LISPRO 2 UNITS: 100 INJECTION, SOLUTION INTRAVENOUS; SUBCUTANEOUS at 17:16

## 2022-01-01 RX ADMIN — TORSEMIDE 20 MG: 20 TABLET ORAL at 08:19

## 2022-01-01 RX ADMIN — DIPHENHYDRAMINE HYDROCHLORIDE 5 ML: 12.5 SOLUTION ORAL at 14:25

## 2022-01-01 RX ADMIN — IPRATROPIUM BROMIDE 4 PUFF: 17 AEROSOL, METERED RESPIRATORY (INHALATION) at 11:10

## 2022-01-01 RX ADMIN — APIXABAN 5 MG: 5 TABLET, FILM COATED ORAL at 20:21

## 2022-01-01 RX ADMIN — INSULIN GLARGINE 30 UNITS: 100 INJECTION, SOLUTION SUBCUTANEOUS at 20:55

## 2022-01-01 RX ADMIN — APIXABAN 5 MG: 5 TABLET, FILM COATED ORAL at 08:28

## 2022-01-01 RX ADMIN — SODIUM CHLORIDE, PRESERVATIVE FREE 10 ML: 5 INJECTION INTRAVENOUS at 20:38

## 2022-01-01 RX ADMIN — DIPHENHYDRAMINE HYDROCHLORIDE 5 ML: 12.5 SOLUTION ORAL at 11:26

## 2022-01-01 RX ADMIN — METOPROLOL SUCCINATE 50 MG: 50 TABLET, EXTENDED RELEASE ORAL at 09:27

## 2022-01-01 RX ADMIN — DIPHENHYDRAMINE HYDROCHLORIDE 5 ML: 12.5 SOLUTION ORAL at 21:03

## 2022-01-01 RX ADMIN — ASPIRIN 81 MG: 81 TABLET, COATED ORAL at 08:15

## 2022-01-01 RX ADMIN — IPRATROPIUM BROMIDE 4 PUFF: 17 AEROSOL, METERED RESPIRATORY (INHALATION) at 04:48

## 2022-01-01 RX ADMIN — INSULIN GLARGINE 30 UNITS: 100 INJECTION, SOLUTION SUBCUTANEOUS at 20:02

## 2022-01-01 RX ADMIN — FUROSEMIDE 80 MG: 10 INJECTION, SOLUTION INTRAMUSCULAR; INTRAVENOUS at 09:24

## 2022-01-01 RX ADMIN — INSULIN GLARGINE 30 UNITS: 100 INJECTION, SOLUTION SUBCUTANEOUS at 20:43

## 2022-01-01 RX ADMIN — Medication 30 ML: at 22:47

## 2022-01-01 RX ADMIN — METOPROLOL SUCCINATE 50 MG: 50 TABLET, EXTENDED RELEASE ORAL at 08:53

## 2022-01-01 RX ADMIN — CHLOROTHIAZIDE SODIUM 500 MG: 500 INJECTION, POWDER, LYOPHILIZED, FOR SOLUTION INTRAVENOUS at 20:49

## 2022-01-01 RX ADMIN — DIPHENHYDRAMINE HYDROCHLORIDE 5 ML: 12.5 SOLUTION ORAL at 20:11

## 2022-01-01 RX ADMIN — ALBUTEROL SULFATE 4 PUFF: 90 AEROSOL, METERED RESPIRATORY (INHALATION) at 15:07

## 2022-01-01 RX ADMIN — SENNOSIDES 8.6 MG: 8.6 TABLET, COATED ORAL at 20:51

## 2022-01-01 RX ADMIN — FUROSEMIDE 40 MG: 40 TABLET ORAL at 14:25

## 2022-01-01 RX ADMIN — SENNOSIDES 8.6 MG: 8.6 TABLET, COATED ORAL at 09:08

## 2022-01-01 RX ADMIN — CEFEPIME HYDROCHLORIDE 2000 MG: 2 INJECTION, POWDER, FOR SOLUTION INTRAVENOUS at 10:01

## 2022-01-01 RX ADMIN — CHLORHEXIDINE GLUCONATE 0.12% ORAL RINSE 15 ML: 1.2 LIQUID ORAL at 09:41

## 2022-01-01 RX ADMIN — BENZONATATE 100 MG: 100 CAPSULE ORAL at 09:12

## 2022-01-01 RX ADMIN — APIXABAN 5 MG: 5 TABLET, FILM COATED ORAL at 08:16

## 2022-01-01 RX ADMIN — IPRATROPIUM BROMIDE 4 PUFF: 17 AEROSOL, METERED RESPIRATORY (INHALATION) at 00:46

## 2022-01-01 RX ADMIN — VANCOMYCIN HYDROCHLORIDE 1000 MG: 1 INJECTION, POWDER, LYOPHILIZED, FOR SOLUTION INTRAVENOUS at 14:51

## 2022-01-01 ASSESSMENT — PAIN SCALES - GENERAL
PAINLEVEL_OUTOF10: 0
PAINLEVEL_OUTOF10: 1
PAINLEVEL_OUTOF10: 0
PAINLEVEL_OUTOF10: 4
PAINLEVEL_OUTOF10: 2
PAINLEVEL_OUTOF10: 0
PAINLEVEL_OUTOF10: 4
PAINLEVEL_OUTOF10: 0
PAINLEVEL_OUTOF10: 0
PAINLEVEL_OUTOF10: 10
PAINLEVEL_OUTOF10: 4
PAINLEVEL_OUTOF10: 0
PAINLEVEL_OUTOF10: 3
PAINLEVEL_OUTOF10: 0
PAINLEVEL_OUTOF10: 4
PAINLEVEL_OUTOF10: 4
PAINLEVEL_OUTOF10: 0
PAINLEVEL_OUTOF10: 0
PAINLEVEL_OUTOF10: 4
PAINLEVEL_OUTOF10: 0
PAINLEVEL_OUTOF10: 0
PAINLEVEL_OUTOF10: 1
PAINLEVEL_OUTOF10: 0
PAINLEVEL_OUTOF10: 4
PAINLEVEL_OUTOF10: 0
PAINLEVEL_OUTOF10: 3
PAINLEVEL_OUTOF10: 0
PAINLEVEL_OUTOF10: 0
PAINLEVEL_OUTOF10: 4
PAINLEVEL_OUTOF10: 0
PAINLEVEL_OUTOF10: 2
PAINLEVEL_OUTOF10: 4
PAINLEVEL_OUTOF10: 0
PAINLEVEL_OUTOF10: 0
PAINLEVEL_OUTOF10: 2
PAINLEVEL_OUTOF10: 5
PAINLEVEL_OUTOF10: 4
PAINLEVEL_OUTOF10: 6
PAINLEVEL_OUTOF10: 0
PAINLEVEL_OUTOF10: 4
PAINLEVEL_OUTOF10: 3
PAINLEVEL_OUTOF10: 0
PAINLEVEL_OUTOF10: 0
PAINLEVEL_OUTOF10: 3
PAINLEVEL_OUTOF10: 5
PAINLEVEL_OUTOF10: 0
PAINLEVEL_OUTOF10: 7
PAINLEVEL_OUTOF10: 0
PAINLEVEL_OUTOF10: 5
PAINLEVEL_OUTOF10: 0
PAINLEVEL_OUTOF10: 1
PAINLEVEL_OUTOF10: 0
PAINLEVEL_OUTOF10: 4
PAINLEVEL_OUTOF10: 0
PAINLEVEL_OUTOF10: 0
PAINLEVEL_OUTOF10: 3
PAINLEVEL_OUTOF10: 4
PAINLEVEL_OUTOF10: 0
PAINLEVEL_OUTOF10: 2
PAINLEVEL_OUTOF10: 0

## 2022-01-01 ASSESSMENT — PAIN DESCRIPTION - ONSET
ONSET: ON-GOING
ONSET: GRADUAL
ONSET: ON-GOING
ONSET: GRADUAL
ONSET: ON-GOING
ONSET: ON-GOING
ONSET: GRADUAL
ONSET: ON-GOING

## 2022-01-01 ASSESSMENT — PULMONARY FUNCTION TESTS
PIF_VALUE: 25
PIF_VALUE: 30
PIF_VALUE: 25
PIF_VALUE: 26
PIF_VALUE: 28
PIF_VALUE: 27
PIF_VALUE: 28
PIF_VALUE: 39
PIF_VALUE: 40
PIF_VALUE: 25
PIF_VALUE: 25
PIF_VALUE: 37
PIF_VALUE: 26
PIF_VALUE: 26
PIF_VALUE: 40
PIF_VALUE: 40
PIF_VALUE: 38
PIF_VALUE: 29
PIF_VALUE: 25
PIF_VALUE: 27
PIF_VALUE: 26
PIF_VALUE: 25
PIF_VALUE: 27
PIF_VALUE: 26
PIF_VALUE: 25
PIF_VALUE: 29
PIF_VALUE: 25
PIF_VALUE: 26
PIF_VALUE: 28
PIF_VALUE: 40
PIF_VALUE: 25
PIF_VALUE: 25
PIF_VALUE: 28
PIF_VALUE: 29
PIF_VALUE: 26
PIF_VALUE: 27
PIF_VALUE: 40
PIF_VALUE: 27
PIF_VALUE: 40
PIF_VALUE: 28
PIF_VALUE: 25
PIF_VALUE: 28
PIF_VALUE: 26
PIF_VALUE: 26
PIF_VALUE: 25
PIF_VALUE: 26
PIF_VALUE: 25
PIF_VALUE: 28
PIF_VALUE: 28
PIF_VALUE: 25
PIF_VALUE: 29
PIF_VALUE: 25
PIF_VALUE: 26
PIF_VALUE: 26
PIF_VALUE: 40
PIF_VALUE: 25
PIF_VALUE: 25
PIF_VALUE: 26
PIF_VALUE: 29
PIF_VALUE: 25
PIF_VALUE: 27
PIF_VALUE: 40
PIF_VALUE: 26
PIF_VALUE: 40
PIF_VALUE: 26
PIF_VALUE: 27
PIF_VALUE: 28
PIF_VALUE: 40
PIF_VALUE: 40
PIF_VALUE: 26
PIF_VALUE: 28
PIF_VALUE: 29
PIF_VALUE: 28
PIF_VALUE: 25
PIF_VALUE: 40
PIF_VALUE: 29
PIF_VALUE: 27
PIF_VALUE: 27
PIF_VALUE: 39
PIF_VALUE: 30
PIF_VALUE: 32
PIF_VALUE: 26
PIF_VALUE: 28
PIF_VALUE: 27
PIF_VALUE: 40
PIF_VALUE: 35
PIF_VALUE: 27
PIF_VALUE: 26
PIF_VALUE: 41
PIF_VALUE: 41
PIF_VALUE: 28

## 2022-01-01 ASSESSMENT — PAIN DESCRIPTION - LOCATION
LOCATION: GENERALIZED
LOCATION: HEAD
LOCATION: BACK;FOOT
LOCATION: BACK;LEG;HEAD
LOCATION: HEAD
LOCATION: BACK
LOCATION: BACK
LOCATION: HEAD
LOCATION: BACK
LOCATION: GENERALIZED
LOCATION: GENERALIZED;HEAD
LOCATION: KNEE;LEG
LOCATION: GENERALIZED
LOCATION: HEAD
LOCATION: BACK

## 2022-01-01 ASSESSMENT — PAIN DESCRIPTION - PROGRESSION
CLINICAL_PROGRESSION: GRADUALLY WORSENING
CLINICAL_PROGRESSION: GRADUALLY WORSENING
CLINICAL_PROGRESSION: NOT CHANGED
CLINICAL_PROGRESSION: GRADUALLY WORSENING
CLINICAL_PROGRESSION: NOT CHANGED
CLINICAL_PROGRESSION: GRADUALLY WORSENING
CLINICAL_PROGRESSION: NOT CHANGED
CLINICAL_PROGRESSION: NOT CHANGED
CLINICAL_PROGRESSION: GRADUALLY WORSENING
CLINICAL_PROGRESSION: NOT CHANGED
CLINICAL_PROGRESSION: GRADUALLY WORSENING

## 2022-01-01 ASSESSMENT — PAIN DESCRIPTION - FREQUENCY
FREQUENCY: INTERMITTENT
FREQUENCY: CONTINUOUS
FREQUENCY: INTERMITTENT
FREQUENCY: CONTINUOUS
FREQUENCY: INTERMITTENT
FREQUENCY: INTERMITTENT
FREQUENCY: CONTINUOUS
FREQUENCY: INTERMITTENT

## 2022-01-01 ASSESSMENT — PAIN DESCRIPTION - ORIENTATION
ORIENTATION: ANTERIOR
ORIENTATION: RIGHT;LEFT
ORIENTATION: OTHER (COMMENT)
ORIENTATION: MID
ORIENTATION: RIGHT;LEFT
ORIENTATION: OTHER (COMMENT)
ORIENTATION: RIGHT;LEFT
ORIENTATION: OTHER (COMMENT)

## 2022-01-01 ASSESSMENT — PAIN DESCRIPTION - DESCRIPTORS
DESCRIPTORS: ACHING
DESCRIPTORS: ACHING
DESCRIPTORS: HEADACHE
DESCRIPTORS: ACHING
DESCRIPTORS: ACHING;DISCOMFORT
DESCRIPTORS: ACHING
DESCRIPTORS: ACHING
DESCRIPTORS: ACHING;DISCOMFORT
DESCRIPTORS: ACHING
DESCRIPTORS: HEADACHE
DESCRIPTORS: ACHING;DISCOMFORT
DESCRIPTORS: ACHING;DISCOMFORT
DESCRIPTORS: ACHING
DESCRIPTORS: ACHING

## 2022-01-01 ASSESSMENT — ENCOUNTER SYMPTOMS
SHORTNESS OF BREATH: 0
COUGH: 1
SORE THROAT: 0
VOMITING: 0
RHINORRHEA: 0
BACK PAIN: 1
ABDOMINAL PAIN: 0
EYE REDNESS: 0
NAUSEA: 0

## 2022-01-01 ASSESSMENT — PAIN DESCRIPTION - PAIN TYPE
TYPE: ACUTE PAIN;CHRONIC PAIN
TYPE: CHRONIC PAIN
TYPE: CHRONIC PAIN
TYPE: ACUTE PAIN
TYPE: ACUTE PAIN
TYPE: CHRONIC PAIN
TYPE: ACUTE PAIN
TYPE: CHRONIC PAIN
TYPE: ACUTE PAIN
TYPE: CHRONIC PAIN
TYPE: ACUTE PAIN
TYPE: CHRONIC PAIN

## 2022-01-01 ASSESSMENT — PAIN - FUNCTIONAL ASSESSMENT

## 2022-01-15 PROBLEM — R53.81 DEBILITY: Status: ACTIVE | Noted: 2022-01-01

## 2022-01-15 NOTE — ED PROVIDER NOTES
Emergency [address] EMERGENCY DEPARTMENT    Patient: Tanisha Moyer  MRN: [id number]  : 1945  Date of Evaluation: 1/15/2022  ED Provider: Corrina Velazquez MD    Chief Complaint       Chief Complaint   Patient presents with    Fall     Chronic lower back pain, bilateral leg weakness worsening. Denies hitting head or LOC     Palomo Campbell is a 68 y.o. male who presents to the emergency department this is a 49-year-old male brought to the emergency department for evaluation of bilateral lower leg pain status post a fall several days ago. Patient reports that he normally uses a wheelchair to get around his house and will occasionally use a walker. He was using his walker and he says his legs gave out from underneath him and he fell to the ground. He reports that today he had bilateral leg pain EMS was contacted and was brought to the ED for evaluation. Patient denies antecedent chest pain shortness breath lightheadedness or syncope. He says he has been feeling increasingly tired and weak. Patient denies any recent changes of diet or medications fevers. Has not tried taking any new medications for his symptoms. ROS:     At least 10 systems reviewed and otherwise acutely negative except as in the [address]     Past History     Past Medical History:   Diagnosis Date    Acid reflux     Arrhythmia     Arthritis     \"Back, Hands, Fingers\"    CAD (coronary artery disease)     2000 non-critical;  no signigicante change, Sees Dr. Gwen Monique CHF (congestive heart failure) ([facility])     Chronic back pain     CKD (chronic kidney disease) stage 3, GFR 30-59 ml/min ([facility] 2015    Sees Dr. Mitch Morton    COPD (chronic obstructive pulmonary disease) ([facility] 11/16/15    Diabetes mellitus ([facility] Dx     Glaucoma     \"Both Eyes\"    H/O 24 hour EKG monitoring ,2017    Conclusion abnormal 48 hours of cardiac monitor finding consistent with sinus rhythm with physiological heart rate variation frequent complex ventricular ectopy. Patient did not report any symptoms for correlation    H/O cardiac catheterization 12/27/2009    Patent coronary arteries with ectasia and minimal coronary luminal irregularities and preserved left ventricular function.  H/O cardiovascular stress test 08/09/2018    Lexiscan Cardiolite. ECG portion of test is negative for ischemia, normal ventricular contractility, no infarct or ischemia noted, normal stress myocardial perfusion, EF 58%. Normal study.  H/O complete electrocardiogram 04/05/2012    H/O Doppler ultrasound 09/13/2007    Bormal study suggestive of lack of evidence of any significant peripheral arterial disease. Patient'a symptoms are very suggestive of non-ischemic and non-vascular etiology. When compared to the previous study of 2005, ther is no significant change.  H/O Doppler ultrasound (Lower extremity) 01/30/2017    Native arteries in the examined lower extremity(ies) are patent, with no elevated velocities. No aneurysms are noted.  H/O percutaneous left heart catheterization 09/23/2016    Multivessel CAD with 100 % occluded RCA. 80 % to 90%  circumflex diffusely diseased. 80% mid LAD focal lesion.  Heat syncope 9/15/2016    [place] (hard of hearing)     Bilateral Ears    Hx of echocardiogram 2/6/19; 10/27/2016    2/6/19  LV function and size normal, mild concentric LVH, no regional wall motion abnormalities, normal diastolic filling pattern for age, mildly dilated LA, sclerotic but non-stenotic aortic valve, mitral annular calcification, RVSP= 27 mmHg, EF 55-60%.  Hyperlipidemia     Hypertension     Macular degeneration     Bilateral Eyes    Other activity(E029.9) 04/05/2012    48 Holter Monitor. Normal sinus rhythm with frequent low-grade supraventricular ectopy, without clinically significant arrhythmias.     PAF (paroxysmal atrial fibrillation) (HCC)     PVD (peripheral vascular disease) ([address]     S/P CABG x 3 10/25/2016    10/3/16 LIMA-LAD, SVG-PDA, SVG-Cx + Maze+Appendage resection Dr Chris Brady 's    Nose    SOB (shortness of breath) on exertion 2014    Teeth missing     Upper And Lower    Ventricular ectopy     supraventricular and ventricular ectopy    Wears dentures     Full Upper    Wears glasses      Past Surgical History:   Procedure Laterality Date    CARDIAC SURGERY  10/03/2016    CABG (3 Bypasses)    COLONOSCOPY  Last Done In     Polyps Removed In Past    CORONARY ARTERY BYPASS GRAFT  10/03/2016    Coronary Artery Bypass Graft x 3. LIMA to LAD. SVG to PDA. SVG to Cx. MVR with CG ring. LA appendage resection. Epi and endocardial MAZE.    CYST REMOVAL  's    Back    DENTAL SURGERY      Teeth Extracted In Past    EYE SURGERY Bilateral 2015    Cataracts With Lens Implants    FRACTURE SURGERY Left 2016    Broken Left Hip With Hardware    OTHER SURGICAL HISTORY  10/03/2017    sternal plate    SKIN CANCER EXCISION  's    Nose    THORACENTESIS Left 10/06/2016    [facility]- Dr Valles Learn History     Socioeconomic History    Marital status:       Spouse name: None    Number of children: None    Years of education: None    Highest education level: None   Occupational History    None   Tobacco Use    Smoking status: Former Smoker     Packs/day: 2.00     Years: 30.00     Pack years: 60.00     Types: Cigarettes     Start date:      Quit date:      Years since quittin.0    Smokeless tobacco: Never Used   Vaping Use    Vaping Use: Never used   Substance and Sexual Activity    Alcohol use: No    Drug use: No    Sexual activity: Never   Other Topics Concern    None   Social History Narrative    None     Social Determinants of Health     Financial Resource Strain:     Difficulty of Paying Living Expenses: Not on file   Food Insecurity:     Worried About Running Out of Food in the Last Year: Not on file    [address] [facility] [address] in the Last Year: Not on file   Transportation Needs:     Lack of Transportation (Medical): Not on file    Lack of Transportation (Non-Medical):  Not on file   Physical Activity:     Days of Exercise per Week: Not on file    Minutes of Exercise per Session: Not on file   Stress:     Feeling of Stress : Not on file   Social Connections:     Frequency of Communication with Friends and Family: Not on file    Frequency of Social Gatherings with Friends and Family: Not on file    Attends Orthodoxy Services: Not on file    Active Member of [address] [place] [address] [employer] or Organizations: Not on file    Attends Club or Organization Meetings: Not on file    Marital Status: Not on file   Intimate Partner Violence:     Fear of Current or Ex-Partner: Not on file    Emotionally Abused: Not on file    Physically Abused: Not on file    Sexually Abused: Not on file   Housing Stability:     Unable to Pay for Housing in the Last Year: Not on file    Number of [place] in the Last Year: Not on file    Unstable Housing in the Last Year: Not on file       Medications/Allergies     Previous Medications    ACETAMINOPHEN (ACETAMINOPHEN EXTRA STRENGTH) 500 MG TABLET    Take 2 tablets by mouth every 4 hours as needed for Pain Do not take more than 6 tablets per day    ALBUTEROL IN    Inhale into the lungs as needed    AMLODIPINE (NORVASC) 10 MG TABLET    Take 1 tablet by mouth daily    APIXABAN (ELIQUIS) 2.5 MG TABS TABLET    TAKE ONE TABLET TWO TIMES A DAY    ASPIRIN EC 81 MG EC TABLET    Take 1 tablet by mouth daily    COMFORT EZ PEN NEEDLES 31G X 8 MM MISC        CONTOUR NEXT TEST STRIP        CYCLOBENZAPRINE (FLEXERIL) 10 MG TABLET    Take 10 mg by mouth nightly Takes one tablet at hs    GLYCERIN-POLYSORBATE 80 (REFRESH DRY EYE THERAPY OP)    Apply to eye    GUAIFENESIN (MUCINEX) 600 MG EXTENDED RELEASE TABLET    Take 600 mg by mouth 2 times daily     INSULIN DEGLUDEC (TRESIBA FLEXTOUCH) 100 UNIT/ML SOPN    Inject 40 Units into the skin nightly     INSULIN LISPRO (HUMALOG) 100 UNIT/ML INJECTION VIAL    Inject 0-12 Units into the skin 3 times daily (with meals)    LOSARTAN (COZAAR) 25 MG TABLET    TAKE 1 TABLET BY MOUTH DAILY    MAGNESIUM OXIDE (MAG-OX) 400 MG TABLET    TAKE ONE TABLET TWO TIMES A DAY    MENTHOL-CAMPHOR (ICY HOT ADVANCED RELIEF) 16-11 % CREA    Apply topically as needed     METOPROLOL SUCCINATE (TOPROL XL) 100 MG EXTENDED RELEASE TABLET    Take 0.5 tablets by mouth daily    MULTIPLE VITAMINS-MINERALS (ICAPS PO)    Take by mouth every morning Over The Counter    MUPIROCIN (BACTROBAN) 2 % OINTMENT    Apply topically as needed     NITROGLYCERIN (NITROSTAT) 0.4 MG SL TABLET    Place 0.4 mg under the tongue every 5 minutes as needed for Chest pain up to max of 3 total doses. If no relief after 1 dose, call 911. OMEPRAZOLE (PRILOSEC) 40 MG DELAYED RELEASE CAPSULE    Take 40 mg by mouth daily    PRALUENT 75 MG/ML SOAJ INJECTION PEN    INJECT 1 ML INTO THE SKIN EVERY 14 DAYS    SENNOSIDES (SENOKOT PO)    Take by mouth as needed Over The Counter     SITAGLIPTIN (JANUVIA) 50 MG TABLET    Take 50 mg by mouth every morning     TORSEMIDE (DEMADEX) 20 MG TABLET    Take 1 tablet by mouth 2 times daily    TRAZODONE (DESYREL) 100 MG TABLET    Take 100 mg by mouth nightly    TRIAMCINOLONE (KENALOG) 0.1 % CREAM    Apply topically 2 times daily Apply topically 2 times daily. Allergies   Allergen Reactions    Aldactone [Spironolactone]      \"Developed Pain In The  Breasts And Knots In The Breasts\"    Crestor [Rosuvastatin Calcium]      \"Leg Cramps\"    Lipitor      \"Leg Cramps\"    Zocor [Simvastatin - High Dose]      \"Leg Cramps\"        Physical Exam       ED Triage Vitals [01/15/22 1300]   BP Temp Temp Source Pulse Resp SpO2 Height Weight   (!) 156/76 98.4 °F (36.9 °C) Oral 84 17 97 % 5' 8\" (1.727 m) 190 lb (86.2 kg)     GENERAL APPEARANCE: Awake and alert. Cooperative. No acute distress. HEAD: Normocephalic. Atraumatic.    EYES: Sclera anicteric. ENT: Tolerates saliva. No trismus. NECK: Supple. Trachea midline. CARDIO: RRR. Radial pulse 2+. LUNGS: Respirations unlabored. CTAB. ABDOMEN: Soft. Non-distended. Non-tender. EXTREMITIES: No acute deformities. No tenderness to palpation over bony prominence of clavicles and shoulders humerus elbowulnar or scaphoid bilaterally. Patient's pelvis is stable no tenderness over femurs knees tib-fib or ankle. No posterior cervical thoracic or lumbosacral bony tenderness or step-offs. No posterior rib tenderness or crepitus no anterior rib tenderness or crepitus  SKIN: Warm and dry. NEUROLOGICAL: No gross facial drooping. Moves all 4 extremities spontaneously. PSYCHIATRIC: Normal mood.      Diagnostics   Labs:  Results for orders placed or performed during the hospital encounter of 01/15/22   CBC Auto Differential   Result Value Ref Range    WBC 8.2 4.0 - 10.5 K/CU MM    RBC 4.26 (L) 4.6 - 6.2 M/CU MM    Hemoglobin 12.4 (L) 13.5 - 18.0 GM/DL    Hematocrit 38.4 (L) 42 - 52 %    MCV 90.1 78 - 100 FL    MCH 29.1 27 - 31 PG    MCHC 32.3 32.0 - 36.0 %    RDW 14.2 11.7 - 14.9 %    Platelets 812 563 - 361 K/CU MM    MPV 8.7 7.5 - 11.1 FL    Differential Type AUTOMATED DIFFERENTIAL     Segs Relative 69.4 (H) 36 - 66 %    Lymphocytes % 17.4 (L) 24 - 44 %    Monocytes % 9.4 (H) 0 - 4 %    Eosinophils % 2.7 0 - 3 %    Basophils % 0.7 0 - 1 %    Segs Absolute 5.7 K/CU MM    Lymphocytes Absolute 1.4 K/CU MM    Monocytes Absolute 0.8 K/CU MM    Eosinophils Absolute 0.2 K/CU MM    Basophils Absolute 0.1 K/CU MM    Immature Neutrophil % 0.4 0 - 0.43 %    Total Immature Neutrophil 0.03 K/CU MM   Comprehensive Metabolic Panel w/ Reflex to MG   Result Value Ref Range    Sodium 136 135 - 145 MMOL/L    Potassium 5.4 (H) 3.5 - 5.1 MMOL/L    Chloride 100 99 - 110 mMol/L    CO2 25 21 - 32 MMOL/L    BUN 42 (H) 6 - 23 MG/DL    CREATININE 2.6 (H) 0.9 - 1.3 MG/DL    Glucose 315 (H) 70 - 99 MG/DL    Calcium 9.0 8.3 - 10.6 MG/DL    Albumin 3.7 3.4 - 5.0 GM/DL    Total Protein 6.7 6.4 - 8.2 GM/DL    Total Bilirubin 0.3 0.0 - 1.0 MG/DL    ALT 8 (L) 10 - 40 U/L    AST 11 (L) 15 - 37 IU/L    Alkaline Phosphatase 116 40 - 129 IU/L    GFR Non- 24 (L) >60 mL/min/1.73m2    GFR  29 (L) >60 mL/min/1.73m2    Anion Gap 11 4 - 16   Urinalysis, reflex to microscopic   Result Value Ref Range    Color, UA YELLOW YELLOW    Clarity, UA CLEAR CLEAR    Glucose, Urine TRACE NEGATIVE MG/DL    Bilirubin Urine NEGATIVE NEGATIVE MG/DL    Ketones, Urine NEGATIVE NEGATIVE MG/DL    Specific Gravity, UA 1.015 1.001 - 1.035    Blood, Urine NEGATIVE NEGATIVE    pH, Urine 6.0 5.0 - 8.0    Protein, UA 2+ NEGATIVE MG/DL    Urobilinogen, Urine 0.2 0.2 - 1.0 MG/DL    Nitrite Urine, Quantitative NEGATIVE NEGATIVE    Leukocyte Esterase, Urine NEGATIVE NEGATIVE    RBC, UA 2 0 - 3 /HPF    WBC, UA 2 0 - 2 /HPF    Epithelial Cells, UA NONE SEEN /HPF    Cast Type NO CAST FORMS SEEN NO CAST FORMS SEEN /HPF    Bacteria, UA NEGATIVE NEGATIVE /HPF    Crystal Type NEGATIVE NEGATIVE /HPF   Brain Natriuretic Peptide   Result Value Ref Range    Pro-.9 (H) <300 PG/ML   EKG 12 Lead   Result Value Ref Range    Ventricular Rate 81 BPM    Atrial Rate 81 BPM    P-R Interval 146 ms    QRS Duration 130 ms    Q-T Interval 410 ms    QTc Calculation (Bazett) 476 ms    R Axis -46 degrees    T Axis -9 degrees    Diagnosis       Normal sinus rhythm  Right bundle branch block  Left anterior fascicular block  Bifascicular block   Abnormal ECG  When compared with ECG of 28-SEP-2017 14:28,  premature ventricular complexes are no longer present  T wave inversion no longer evident in Anterior leads  Confirmed by [facility] Elizabeth SANCHEZ ([id number]) on 1/15/2022 6:13:31 PM       Radiographs:  XR KNEE LEFT (1-2 VIEWS)    Result Date: 1/15/2022  EXAMINATION: THREE XRAY VIEWS OF THE LEFT ANKLE; TWO XRAY VIEWS OF THE LEFT KNEE; 2 XRAY VIEWS OF THE LEFT TIBIA AND FIBULA 1/15/2022 2:17 pm COMPARISON: None. HISTORY: ORDERING SYSTEM PROVIDED HISTORY: fall, pain TECHNOLOGIST PROVIDED HISTORY: Reason for exam:->fall, pain Reason for Exam: fall, pain Additional signs and symptoms: fall, pain Relevant Medical/Surgical History: fall, pain FINDINGS: Ankle: Osseous fragments are noted along the medial talar dome, unchanged in comparison to 01/23/2019 and consistent with sequela of remote trauma. Otherwise, no fractures identified. The joints of the ankle are anatomically aligned with preserved joint spaces. No significant soft tissue swelling. No definite joint effusion. Mild degenerative changes of the subtalar joint. Extensive vascular calcifications are noted. Tibia/fibula: No acute fracture the proximal lower leg. Extensive vascular calcifications are noted. No focal soft tissue swelling. Surgical clips are noted along the medial tibial plateau Knee: No fracture about the knee. The knee joint is anatomically aligned with mild tricompartmental degenerative changes and moderate medial compartment joint space narrowing. Trace knee joint effusion. Extensive vascular calcifications. No acute osseous abnormality of the left knee, lower leg, or ankle. XR TIBIA FIBULA LEFT (2 VIEWS)    Result Date: 1/15/2022  EXAMINATION: THREE XRAY VIEWS OF THE LEFT ANKLE; TWO XRAY VIEWS OF THE LEFT KNEE; 2 XRAY VIEWS OF THE LEFT TIBIA AND FIBULA 1/15/2022 2:17 pm COMPARISON: None. HISTORY: ORDERING SYSTEM PROVIDED HISTORY: fall, pain TECHNOLOGIST PROVIDED HISTORY: Reason for exam:->fall, pain Reason for Exam: fall, pain Additional signs and symptoms: fall, pain Relevant Medical/Surgical History: fall, pain FINDINGS: Ankle: Osseous fragments are noted along the medial talar dome, unchanged in comparison to 01/23/2019 and consistent with sequela of remote trauma. Otherwise, no fractures identified. The joints of the ankle are anatomically aligned with preserved joint spaces.   No significant soft tissue swelling. No definite joint effusion. Mild degenerative changes of the subtalar joint. Extensive vascular calcifications are noted. Tibia/fibula: No acute fracture the proximal lower leg. Extensive vascular calcifications are noted. No focal soft tissue swelling. Surgical clips are noted along the medial tibial plateau Knee: No fracture about the knee. The knee joint is anatomically aligned with mild tricompartmental degenerative changes and moderate medial compartment joint space narrowing. Trace knee joint effusion. Extensive vascular calcifications. No acute osseous abnormality of the left knee, lower leg, or ankle. XR TIBIA FIBULA RIGHT (2 VIEWS)    Result Date: 1/15/2022  EXAMINATION: THREE XRAY VIEWS OF THE RIGHT ANKLE;   XRAY VIEWS OF THE RIGHT TIBIA AND FIBULA 1/15/2022 2:17 pm COMPARISON: None. HISTORY: ORDERING SYSTEM PROVIDED HISTORY: fall, pain TECHNOLOGIST PROVIDED HISTORY: Reason for exam:->fall, pain Reason for Exam: fall, pain Additional signs and symptoms: fall, pain Relevant Medical/Surgical History: fall, pain FINDINGS: Ankle: Osseous fragments along the medial aspect of the talar dome, with adjacent soft tissue swelling. Osseous irregularity is also noted at the tip of the medial malleolus. Joints of the ankle are anatomically aligned. Additional soft tissue swelling is noted laterally. Small tibiotalar joint effusion. Extensive vascular calcifications. Tibia/fibula: No fracture. The knee joint is anatomically aligned with mild medial compartment joint space narrowing. Extensive vascular calcifications are noted. No focal soft tissue swelling. Ankle: Osseous fragment along the medial aspect of the talar dome with adjacent soft tissue swelling, as well as irregularity at the tip of the medial malleolus. Findings may represent age indeterminate ligamentous avulsion injury, and correlation with point tenderness is requested.  Additional lateral soft tissue swelling with small tibiotalar joint effusion. Tibia/fibula: No acute osseous abnormality in the proximal lower leg. XR ANKLE LEFT (MIN 3 VIEWS)    Result Date: 1/15/2022  EXAMINATION: THREE XRAY VIEWS OF THE LEFT ANKLE; TWO XRAY VIEWS OF THE LEFT KNEE; 2 XRAY VIEWS OF THE LEFT TIBIA AND FIBULA 1/15/2022 2:17 pm COMPARISON: None. HISTORY: ORDERING SYSTEM PROVIDED HISTORY: fall, pain TECHNOLOGIST PROVIDED HISTORY: Reason for exam:->fall, pain Reason for Exam: fall, pain Additional signs and symptoms: fall, pain Relevant Medical/Surgical History: fall, pain FINDINGS: Ankle: Osseous fragments are noted along the medial talar dome, unchanged in comparison to 01/23/2019 and consistent with sequela of remote trauma. Otherwise, no fractures identified. The joints of the ankle are anatomically aligned with preserved joint spaces. No significant soft tissue swelling. No definite joint effusion. Mild degenerative changes of the subtalar joint. Extensive vascular calcifications are noted. Tibia/fibula: No acute fracture the proximal lower leg. Extensive vascular calcifications are noted. No focal soft tissue swelling. Surgical clips are noted along the medial tibial plateau Knee: No fracture about the knee. The knee joint is anatomically aligned with mild tricompartmental degenerative changes and moderate medial compartment joint space narrowing. Trace knee joint effusion. Extensive vascular calcifications. No acute osseous abnormality of the left knee, lower leg, or ankle. XR ANKLE RIGHT (MIN 3 VIEWS)    Result Date: 1/15/2022  EXAMINATION: THREE XRAY VIEWS OF THE RIGHT ANKLE;   XRAY VIEWS OF THE RIGHT TIBIA AND FIBULA 1/15/2022 2:17 pm COMPARISON: None.  HISTORY: ORDERING SYSTEM PROVIDED HISTORY: fall, pain TECHNOLOGIST PROVIDED HISTORY: Reason for exam:->fall, pain Reason for Exam: fall, pain Additional signs and symptoms: fall, pain Relevant Medical/Surgical History: fall, pain FINDINGS: Ankle: Osseous fragments along the medial aspect of the talar dome, with adjacent soft tissue swelling. Osseous irregularity is also noted at the tip of the medial malleolus. Joints of the ankle are anatomically aligned. Additional soft tissue swelling is noted laterally. Small tibiotalar joint effusion. Extensive vascular calcifications. Tibia/fibula: No fracture. The knee joint is anatomically aligned with mild medial compartment joint space narrowing. Extensive vascular calcifications are noted. No focal soft tissue swelling. Ankle: Osseous fragment along the medial aspect of the talar dome with adjacent soft tissue swelling, as well as irregularity at the tip of the medial malleolus. Findings may represent age indeterminate ligamentous avulsion injury, and correlation with point tenderness is requested. Additional lateral soft tissue swelling with small tibiotalar joint effusion. Tibia/fibula: No acute osseous abnormality in the proximal lower leg. XR SHOULDER LEFT (MIN 2 VIEWS)    Result Date: 1/15/2022  EXAMINATION: THREE XRAY VIEWS OF THE LEFT SHOULDER 1/15/2022 2:17 pm COMPARISON: 01/10/2016, chest radiograph 09/28/2017 HISTORY: ORDERING SYSTEM PROVIDED HISTORY: fall, pain TECHNOLOGIST PROVIDED HISTORY: Reason for exam:->fall, pain Reason for Exam: fall, pain Additional signs and symptoms: fall, pain Relevant Medical/Surgical History: fall, pain FINDINGS: There is diffuse demineralization, which limits fracture detection. No visible fracture or dislocation. Variation the humeral head on internal rotation is likely due to suboptimal positioning. Overlying soft tissues are grossly unremarkable. Mild acromioclavicular arthrosis. Chest findings reported separately. No acute osseous abnormality.      XR CHEST PORTABLE    Result Date: 1/15/2022  EXAMINATION: ONE XRAY VIEW OF THE CHEST 1/15/2022 2:17 pm COMPARISON: Chest radiograph 07/29/2021 HISTORY: ORDERING SYSTEM PROVIDED HISTORY: dyspnea TECHNOLOGIST PROVIDED HISTORY: Reason for exam:->dyspnea Reason for Exam: dyspnea Additional signs and symptoms: dyspnea Relevant Medical/Surgical History: dyspnea FINDINGS: Cardiomediastinal silhouette is unchanged. No pleural effusion or pneumothorax. Patchy bilateral airspace opacities with lower lobe predominance. Prior fixation of the sternum. No acute osseous abnormality. Patchy bilateral airspace opacities with lower lobe predominance, concerning for multifocal pneumonia. Of note, this finding can be seen in the setting of COVID-19 pneumonia. Procedures/EKG:   Patient's EKG as interpreted by me sinus rhythm with a bifascicular block rate 81   no salvation no ST depression I appreciate no acute ischemia or infarctions EKG no old EKGs with which to compare    ED Course and MDM   In brief, Christopher Ying is a 68 y.o. male who presented to the emergency department for evaluation of bilateral lower leg pain status post fall several days ago. Based on patient's history and physical low clinical suspicion for acute fracture or dislocation. Patient appears clinically quite well in no significant distress or discomfort    I did review patient's imaging studies and laboratory work as noted above. On reexamination of the patient. He does have mild amount of tenderness and point help patient that was identified by radiology over the talus on the right side. We will be concerned the patient does have a small tendon injury. Patient was placed in a air splint for protection. Patient is requesting to be discharged home he does not wish to stay in the hospital tonight. He does walk with a walker and use a wheelchair at home and does feel comfortable using this. Given the patient's x-ray we did test the patient for COVID.  Patient did tolerate p.o. challenge and is resting comfortably at this time    We attempted to ambulate the patient here in the emergency department using a walker and assist.  Patient was unable to bear weight or stand unassisted. Because the patient cannot transfer to care of himself and he lives by himself he will need to be admitted for further evaluation. This was discussed with the patient. Currently awaiting test for his COVID. This will determine ultimate disposition. ED Medication Orders (From admission, onward)    None          Final Impression      1. Fall, initial encounter    2. Multiple contusions    3. Generalized weakness    4.  Sprain of right ankle, unspecified ligament, initial encounter      DISPOSITION       (Please note that portions of this note may have been completed with a voice recognition program. Efforts were made to edit the dictations but occasionally words are mis-transcribed.)    Brendan Victor MD  [id number] Sebastián Stephens MD  01/15/22 2912

## 2022-01-15 NOTE — ED TRIAGE NOTES
Pt from home states multiple falls. [place] Bang again yesterday trying to get out of bed to bathroom legs gave way. Bilateral leg pain from knees lower with increasing weakness. Chronic back pain.  A&Ox4

## 2022-01-16 NOTE — PROGRESS NOTES
Medications reviewed with patient and list from home care staff, is patient of [facility] [place]. Patient is alert and oriented.

## 2022-01-16 NOTE — H&P
History and Physical    This patient was seen and examined autonomously  A hospitalist attending physician was available for questions/consultation as needed. Name:  Tanisha Moyer /Age/Sex: 1945  (68 y.o. male)   MRN & CSN:  [id number] & [id number] Admission Date/Time: 1/15/2022 12:49 PM   Location:   PCP: Ruben Troncoso MD       Hospital Day: 1           Assessment and Plan:   Tanisha Moyer is a 68 y.o. male who presents with  Fall (Chronic lower back pain, bilateral leg weakness worsening. Denies hitting head or LOC)     Possible early COPD ex without signs of hypoxia  WBC 8.2 on admission no early signs of sepsis. Patient reports cough with clear sputum x several weeks. He denied SOB. He reports previous hx of COPD and uses PRN rescue  inhaler not on home o2. Lungs are clear on exam will not order steroids or abx at this time will cont to follow symptoms. Chest x-ray:  Cardiomediastinal silhouette is unchanged.  No pleural effusion or   pneumothorax.  Patchy bilateral airspace opacities with lower lobe   predominance.  Prior fixation of the sternum.  No acute osseous abnormality. Resp pan neg/ pro prabhjot urine strep and urine leg pending. Home albuterol ordered PRN, oxygen as needed but not needed thus far. Con spot check pulse ox. .9 con with daily I&O and weights      Debility with multiple falls    patient reports multiple falls where his legs just become weak and give out. He lives home pedrito and is on a blood thiinner denied hitting his head. PT/ OT eval and treat  May need rehab after DC  Mulitple x-rays taken in ED of right and lfet lowe ex and left shoulder no acute fx's noted. Please see full results bellow. Chronic constipation    PRN and daily meds added last BM 3 days ago  Ordered on time dose of kayexalate see bellow    Urinary retention    - bladder scan showed over 500ml post void, bradshaw cath ordered  CT abd and pelvis.  W/o con  Due to CKD Pending. KAREEM with CKD stage 3  Given IV NS 500ml total while awaiting a room will follow lab trends  Creat 2.6 this seems to be his basline    Hyperkalemia:  Potasium was 5.4 given one time dose of kayexalate potasium imporved to 4.7      Other concerns:  Afib: telem, con home meds on oral AC  DM type 2 : S/s blood sugar chesk and hypoglcmeia protocal ordered tsh and a1c pend. CAD with hx of [place]: con home meds, daily weight I&O  Anemia: follow labs stable  COPD see above   Hyperlipidemia: con home meds    Patient case discussed with ED provider     Patient would like to be a full code he would like his son Gonzalo Angeles to be his medical POA. Diet No diet orders on file   DVT Prophylaxis [] Lovenox, []  Heparin, [] SCDs, [] Ambulation  [] Long term AC   GI Prophylaxis [] PPI,  [] H2 Blocker,  [] Carafate,  [x] Diet/Tube Feeds   Code Status Full    Disposition Admit to med surg    Patient may need rehab post DC     -Patient assessment and plan discussed and reviewed with admitting physician: No admitting provider for patient encounter. Erin Soto History of Present Illness:     Chief Complaint: Fall (Chronic lower back pain, bilateral leg weakness worsening. Denies hitting head or LOC)      Jhonny Pate is a 68 y.o. male who presents with cough with clear sputum x several days. He contributes this to COPD. He denied wearing home o2 does use rescue inhalers at times. Denied increased SOB or chest pain fevers N/V/D abdominal pain although reports chronic constipation last bm3 days ago. He denied burring or freq urination. Reports living at home alone. Yesterday he got out of bed ans started walkign when his legs gave out. This is not un common he reports this has been happening more often. He denied LOC or hitting head he does report being on a blood thinner Eliquis for afib. He report chronic lower back pain 7/10 and bilateral feet pain that is also chronic with muscle spasms.  He deneid smoking, drining STOH or doing 48 hours of cardiac monitor finding consistent with sinus rhythm with physiological heart rate variation frequent complex ventricular ectopy. Patient did not report any symptoms for correlation    H/O cardiac catheterization 12/27/2009    Patent coronary arteries with ectasia and minimal coronary luminal irregularities and preserved left ventricular function.  H/O cardiovascular stress test 08/09/2018    Lexiscan Cardiolite. ECG portion of test is negative for ischemia, normal ventricular contractility, no infarct or ischemia noted, normal stress myocardial perfusion, EF 58%. Normal study.  H/O complete electrocardiogram 04/05/2012    H/O Doppler ultrasound 09/13/2007    Bormal study suggestive of lack of evidence of any significant peripheral arterial disease. Patient'a symptoms are very suggestive of non-ischemic and non-vascular etiology. When compared to the previous study of 2005, ther is no significant change.  H/O Doppler ultrasound (Lower extremity) 01/30/2017    Native arteries in the examined lower extremity(ies) are patent, with no elevated velocities. No aneurysms are noted.  H/O percutaneous left heart catheterization 09/23/2016    Multivessel CAD with 100 % occluded RCA. 80 % to 90%  circumflex diffusely diseased. 80% mid LAD focal lesion.  Heat syncope 9/15/2016    [place] (hard of hearing)     Bilateral Ears    Hx of echocardiogram 2/6/19; 10/27/2016    2/6/19  LV function and size normal, mild concentric LVH, no regional wall motion abnormalities, normal diastolic filling pattern for age, mildly dilated LA, sclerotic but non-stenotic aortic valve, mitral annular calcification, RVSP= 27 mmHg, EF 55-60%.  Hyperlipidemia     Hypertension     Macular degeneration     Bilateral Eyes    Other activity(E029.9) 04/05/2012    48 Holter Monitor. Normal sinus rhythm with frequent low-grade supraventricular ectopy, without clinically significant arrhythmias.     [facility]     PVD (peripheral vascular disease) [facility]     S/P CABG x 3 10/25/2016    10/3/16 LIMA-LAD, SVG-PDA, SVG-Cx + Maze+Appendage resection Dr Moshe Soler 's    Nose    SOB (shortness of breath) on exertion 2014    Teeth missing     Upper And Lower    Ventricular ectopy     supraventricular and ventricular ectopy    Wears dentures     Full Upper    Wears glasses        Past Surgical  History:    has a past surgical history that includes cyst removal (); Thoracentesis (Left, 10/06/2016); Coronary artery bypass graft (10/03/2016); Skin cancer excision (); eye surgery (Bilateral, ); Dental surgery; Colonoscopy (Last Done In ); fracture surgery (Left, 2016); Cardiac surgery (10/03/2016); and other surgical history (10/03/2017). Social History:    FAM HX: Reviewed and noncontributory   family history includes Breast Cancer in his sister; Cancer in his father, mother, sister, and son; Diabetes in his father; Early Death (age of onset: 48) in his mother; Other in his father. Soc HX:   Social History     Socioeconomic History    Marital status:       Spouse name: None    Number of children: None    Years of education: None    Highest education level: None   Occupational History    None   Tobacco Use    Smoking status: Former Smoker     Packs/day: 2.00     Years: 30.00     Pack years: 60.00     Types: Cigarettes     Start date:      Quit date:      Years since quittin.0    Smokeless tobacco: Never Used   Vaping Use    Vaping Use: Never used   Substance and Sexual Activity    Alcohol use: No    Drug use: No    Sexual activity: Never   Other Topics Concern    None   Social History Narrative    None     Social Determinants of Health     Financial Resource Strain:     Difficulty of Paying Living Expenses: Not on file   Food Insecurity:     Worried About Running Out of Food in the Last Year: Not on file    Precious hay Food in the Last Year: Not on file   Transportation Needs:     Lack of Transportation (Medical): Not on file    Lack of Transportation (Non-Medical): Not on file   Physical Activity:     Days of Exercise per Week: Not on file    Minutes of Exercise per Session: Not on file   Stress:     Feeling of Stress : Not on file   Social Connections:     Frequency of Communication with Friends and Family: Not on file    Frequency of Social Gatherings with Friends and Family: Not on file    Attends Worship Services: Not on file    Active Member of [address] [place] [address] [employer] or Organizations: Not on file    Attends Club or Organization Meetings: Not on file    Marital Status: Not on file   Intimate Partner Violence:     Fear of Current or Ex-Partner: Not on file    Emotionally Abused: Not on file    Physically Abused: Not on file    Sexually Abused: Not on file   Housing Stability:     Unable to Pay for Housing in the Last Year: Not on file    Number of [place] in the Last Year: Not on file    Unstable Housing in the Last Year: Not on file     TOBACCO:   reports that he quit smoking about 27 years ago. His smoking use included cigarettes. He started smoking about 57 years ago. He has a 60.00 pack-year smoking history. He has never used smokeless tobacco.  ETOH:   reports no history of alcohol use. Drugs:  reports no history of drug use. Allergies: Allergies   Allergen Reactions    Aldactone [Spironolactone]      \"Developed Pain In The  Breasts And Knots In The Breasts\"    Crestor [Rosuvastatin Calcium]      \"Leg Cramps\"    Lipitor      \"Leg Cramps\"    Zocor [Simvastatin - High Dose]      \"Leg Cramps\"       Home Medications:     Prior to Admission medications    Medication Sig Start Date End Date Taking?  Authorizing Provider   losartan (COZAAR) 25 MG tablet TAKE 1 TABLET BY MOUTH DAILY 1/10/22  Yes Amol Oleary MD   magnesium oxide (MAG-OX) 400 MG tablet TAKE ONE TABLET TWO TIMES A DAY 9/13/21  Yes Inessa [place] Ortiz MD   PRALUENT 75 MG/ML SOAJ injection pen INJECT 1 ML INTO THE SKIN EVERY 14 DAYS 8/2/21  Yes RAMY Dooley CNP   apixaban (ELIQUIS) 2.5 MG TABS tablet TAKE ONE TABLET TWO TIMES A DAY 7/12/21  Yes RAMY Dooley CNP   torsemide (DEMADEX) 20 MG tablet Take 1 tablet by mouth 2 times daily  Patient taking differently: Take 20 mg by mouth daily  7/12/21  Yes Roosevelt Snyder MD   acetaminophen (ACETAMINOPHEN EXTRA STRENGTH) 500 MG tablet Take 2 tablets by mouth every 4 hours as needed for Pain Do not take more than 6 tablets per day 6/14/21  Yes RAMY Dooley CNP   metoprolol succinate (TOPROL XL) 100 MG extended release tablet Take 0.5 tablets by mouth daily 1/14/21  Yes Soheila Aggarwal MD   amLODIPine (NORVASC) 10 MG tablet Take 1 tablet by mouth daily 1/6/21  Yes Roosevelt Snyder MD   guaiFENesin (MUCINEX) 600 MG extended release tablet Take 600 mg by mouth 2 times daily    Yes Historical Provider, MD   cyclobenzaprine (FLEXERIL) 10 MG tablet Take 10 mg by mouth nightly Takes one tablet at hs   Yes Historical Provider, MD   CONTOUR NEXT TEST strip  1/21/19  Yes Historical Provider, MD   nitroGLYCERIN (NITROSTAT) 0.4 MG SL tablet Place 0.4 mg under the tongue every 5 minutes as needed for Chest pain up to max of 3 total doses. If no relief after 1 dose, call 911.    Yes Historical Provider, MD   traZODone (DESYREL) 100 MG tablet Take 100 mg by mouth nightly   Yes Historical Provider, MD   Insulin Degludec (TRESIBA FLEXTOUCH) 100 UNIT/ML SOPN Inject 40 Units into the skin nightly    Yes Historical Provider, MD   Menthol-Camphor ([address] [place]) 16-11 % CREA Apply topically as needed    Yes Historical Provider, MD   Sennosides (SENOKOT PO) Take by mouth as needed Over The Counter    Yes Historical Provider, MD   ALBUTEROL IN Inhale into the lungs as needed   Yes Historical Provider, MD   Multiple Vitamins-Minerals (ICAPS PO) Take by mouth every morning Over The Counter   Yes Historical Provider, MD   SITagliptin (JANUVIA) 50 MG tablet Take 50 mg by mouth every morning    Yes Historical Provider, MD   aspirin EC 81 MG EC tablet Take 1 tablet by mouth daily 10/11/16  Yes Nabil Grant MD   insulin lispro (HUMALOG) 100 UNIT/ML injection vial Inject 0-12 Units into the skin 3 times daily (with meals)  Patient taking differently: Inject into the skin Per Sliding Scale 10/11/16  Yes Zina Guido MD   triamcinolone (KENALOG) 0.1 % cream Apply topically 2 times daily Apply topically 2 times daily.     Historical Provider, MD   mupirocin (BACTROBAN) 2 % ointment Apply topically as needed     Historical Provider, MD   omeprazole (PRILOSEC) 40 MG delayed release capsule Take 40 mg by mouth daily    Historical Provider, MD   COMFORT EZ PEN NEEDLES 31G X 8 MM [facility]  1/21/19   Historical Provider, MD   Glycerin-Polysorbate 80 (REFRESH DRY EYE THERAPY OP) Apply to eye    Historical Provider, MD         Medications:   Medications:    [START ON 1/16/2022] insulin lispro  0-6 Units SubCUTAneous TID WC    insulin lispro  0-3 Units SubCUTAneous Nightly    sodium polystyrene  15 g Oral Once    [START ON 1/16/2022] amLODIPine  10 mg Oral Daily    [START ON 1/16/2022] losartan  1 tablet Oral Daily    Insulin Degludec  40 Units SubCUTAneous Nightly    cyclobenzaprine  10 mg Oral Nightly    magnesium oxide  400 mg Oral BID    [START ON 1/16/2022] metoprolol succinate  50 mg Oral Daily    [START ON 1/16/2022] pantoprazole  40 mg Oral QAM AC    [START ON 1/16/2022] torsemide  20 mg Oral Daily    apixaban  2.5 mg Oral BID    [START ON 1/16/2022] aspirin EC  81 mg Oral Daily      Infusions:    dextrose      sodium chloride       PRN Meds: glucose, 15 g, PRN  glucagon (rDNA), 1 mg, PRN  dextrose, 100 mL/hr, PRN  dextrose bolus (hypoglycemia), 125 mL, PRN   Or  dextrose bolus (hypoglycemia), 250 mL, PRN  acetaminophen, 650 mg, Q4H PRN  albuterol sulfate HFA, 1 puff, Q4H PRN        Data:     Laboratory this visit:  Reviewed  Recent Labs     01/15/22  1333   WBC 8.2   HGB 12.4*   HCT 38.4*         Recent Labs     01/15/22  1333      K 5.4*      CO2 25   BUN 42*   CREATININE 2.6*     Recent Labs     01/15/22  1333   AST 11*   ALT 8*   BILITOT 0.3   ALKPHOS 116     No results for input(s): INR in the last 72 hours. Radiology this visit:  Reviewed. XR KNEE LEFT (1-2 VIEWS)    Result Date: 1/15/2022  EXAMINATION: THREE XRAY VIEWS OF THE LEFT ANKLE; TWO XRAY VIEWS OF THE LEFT KNEE; 2 XRAY VIEWS OF THE LEFT TIBIA AND FIBULA 1/15/2022 2:17 pm COMPARISON: None. HISTORY: ORDERING SYSTEM PROVIDED HISTORY: fall, pain TECHNOLOGIST PROVIDED HISTORY: Reason for exam:->fall, pain Reason for Exam: fall, pain Additional signs and symptoms: fall, pain Relevant Medical/Surgical History: fall, pain FINDINGS: Ankle: Osseous fragments are noted along the medial talar dome, unchanged in comparison to 01/23/2019 and consistent with sequela of remote trauma. Otherwise, no fractures identified. The joints of the ankle are anatomically aligned with preserved joint spaces. No significant soft tissue swelling. No definite joint effusion. Mild degenerative changes of the subtalar joint. Extensive vascular calcifications are noted. Tibia/fibula: No acute fracture the proximal lower leg. Extensive vascular calcifications are noted. No focal soft tissue swelling. Surgical clips are noted along the medial tibial plateau Knee: No fracture about the knee. The knee joint is anatomically aligned with mild tricompartmental degenerative changes and moderate medial compartment joint space narrowing. Trace knee joint effusion. Extensive vascular calcifications. No acute osseous abnormality of the left knee, lower leg, or ankle.      XR TIBIA FIBULA LEFT (2 VIEWS)    Result Date: 1/15/2022  EXAMINATION: THREE XRAY VIEWS OF THE LEFT ANKLE; TWO XRAY VIEWS OF THE LEFT KNEE; 2 XRAY VIEWS OF THE LEFT TIBIA AND FIBULA 1/15/2022 2:17 pm COMPARISON: None. HISTORY: ORDERING SYSTEM PROVIDED HISTORY: fall, pain TECHNOLOGIST PROVIDED HISTORY: Reason for exam:->fall, pain Reason for Exam: fall, pain Additional signs and symptoms: fall, pain Relevant Medical/Surgical History: fall, pain FINDINGS: Ankle: Osseous fragments are noted along the medial talar dome, unchanged in comparison to 01/23/2019 and consistent with sequela of remote trauma. Otherwise, no fractures identified. The joints of the ankle are anatomically aligned with preserved joint spaces. No significant soft tissue swelling. No definite joint effusion. Mild degenerative changes of the subtalar joint. Extensive vascular calcifications are noted. Tibia/fibula: No acute fracture the proximal lower leg. Extensive vascular calcifications are noted. No focal soft tissue swelling. Surgical clips are noted along the medial tibial plateau Knee: No fracture about the knee. The knee joint is anatomically aligned with mild tricompartmental degenerative changes and moderate medial compartment joint space narrowing. Trace knee joint effusion. Extensive vascular calcifications. No acute osseous abnormality of the left knee, lower leg, or ankle. XR TIBIA FIBULA RIGHT (2 VIEWS)    Result Date: 1/15/2022  EXAMINATION: THREE XRAY VIEWS OF THE RIGHT ANKLE;   XRAY VIEWS OF THE RIGHT TIBIA AND FIBULA 1/15/2022 2:17 pm COMPARISON: None. HISTORY: ORDERING SYSTEM PROVIDED HISTORY: fall, pain TECHNOLOGIST PROVIDED HISTORY: Reason for exam:->fall, pain Reason for Exam: fall, pain Additional signs and symptoms: fall, pain Relevant Medical/Surgical History: fall, pain FINDINGS: Ankle: Osseous fragments along the medial aspect of the talar dome, with adjacent soft tissue swelling. Osseous irregularity is also noted at the tip of the medial malleolus. Joints of the ankle are anatomically aligned. Additional soft tissue swelling is noted laterally.   Small tibiotalar joint effusion. Extensive vascular calcifications. Tibia/fibula: No fracture. The knee joint is anatomically aligned with mild medial compartment joint space narrowing. Extensive vascular calcifications are noted. No focal soft tissue swelling. Ankle: Osseous fragment along the medial aspect of the talar dome with adjacent soft tissue swelling, as well as irregularity at the tip of the medial malleolus. Findings may represent age indeterminate ligamentous avulsion injury, and correlation with point tenderness is requested. Additional lateral soft tissue swelling with small tibiotalar joint effusion. Tibia/fibula: No acute osseous abnormality in the proximal lower leg. XR ANKLE LEFT (MIN 3 VIEWS)    Result Date: 1/15/2022  EXAMINATION: THREE XRAY VIEWS OF THE LEFT ANKLE; TWO XRAY VIEWS OF THE LEFT KNEE; 2 XRAY VIEWS OF THE LEFT TIBIA AND FIBULA 1/15/2022 2:17 pm COMPARISON: None. HISTORY: ORDERING SYSTEM PROVIDED HISTORY: fall, pain TECHNOLOGIST PROVIDED HISTORY: Reason for exam:->fall, pain Reason for Exam: fall, pain Additional signs and symptoms: fall, pain Relevant Medical/Surgical History: fall, pain FINDINGS: Ankle: Osseous fragments are noted along the medial talar dome, unchanged in comparison to 01/23/2019 and consistent with sequela of remote trauma. Otherwise, no fractures identified. The joints of the ankle are anatomically aligned with preserved joint spaces. No significant soft tissue swelling. No definite joint effusion. Mild degenerative changes of the subtalar joint. Extensive vascular calcifications are noted. Tibia/fibula: No acute fracture the proximal lower leg. Extensive vascular calcifications are noted. No focal soft tissue swelling. Surgical clips are noted along the medial tibial plateau Knee: No fracture about the knee. The knee joint is anatomically aligned with mild tricompartmental degenerative changes and moderate medial compartment joint space narrowing. Trace knee joint effusion. Extensive vascular calcifications. No acute osseous abnormality of the left knee, lower leg, or ankle. XR ANKLE RIGHT (MIN 3 VIEWS)    Result Date: 1/15/2022  EXAMINATION: THREE XRAY VIEWS OF THE RIGHT ANKLE;   XRAY VIEWS OF THE RIGHT TIBIA AND FIBULA 1/15/2022 2:17 pm COMPARISON: None. HISTORY: ORDERING SYSTEM PROVIDED HISTORY: fall, pain TECHNOLOGIST PROVIDED HISTORY: Reason for exam:->fall, pain Reason for Exam: fall, pain Additional signs and symptoms: fall, pain Relevant Medical/Surgical History: fall, pain FINDINGS: Ankle: Osseous fragments along the medial aspect of the talar dome, with adjacent soft tissue swelling. Osseous irregularity is also noted at the tip of the medial malleolus. Joints of the ankle are anatomically aligned. Additional soft tissue swelling is noted laterally. Small tibiotalar joint effusion. Extensive vascular calcifications. Tibia/fibula: No fracture. The knee joint is anatomically aligned with mild medial compartment joint space narrowing. Extensive vascular calcifications are noted. No focal soft tissue swelling. Ankle: Osseous fragment along the medial aspect of the talar dome with adjacent soft tissue swelling, as well as irregularity at the tip of the medial malleolus. Findings may represent age indeterminate ligamentous avulsion injury, and correlation with point tenderness is requested. Additional lateral soft tissue swelling with small tibiotalar joint effusion. Tibia/fibula: No acute osseous abnormality in the proximal lower leg.      XR SHOULDER LEFT (MIN 2 VIEWS)    Result Date: 1/15/2022  EXAMINATION: THREE XRAY VIEWS OF THE LEFT SHOULDER 1/15/2022 2:17 pm COMPARISON: 01/10/2016, chest radiograph 09/28/2017 HISTORY: ORDERING SYSTEM PROVIDED HISTORY: fall, pain TECHNOLOGIST PROVIDED HISTORY: Reason for exam:->fall, pain Reason for Exam: fall, pain Additional signs and symptoms: fall, pain Relevant Medical/Surgical History: fall, pain FINDINGS: There is diffuse demineralization, which limits fracture detection. No visible fracture or dislocation. Variation the humeral head on internal rotation is likely due to suboptimal positioning. Overlying soft tissues are grossly unremarkable. Mild acromioclavicular arthrosis. Chest findings reported separately. No acute osseous abnormality. XR CHEST PORTABLE    Result Date: 1/15/2022  EXAMINATION: ONE XRAY VIEW OF THE CHEST 1/15/2022 2:17 pm COMPARISON: Chest radiograph 07/29/2021 HISTORY: ORDERING SYSTEM PROVIDED HISTORY: dyspnea TECHNOLOGIST PROVIDED HISTORY: Reason for exam:->dyspnea Reason for Exam: dyspnea Additional signs and symptoms: dyspnea Relevant Medical/Surgical History: dyspnea FINDINGS: Cardiomediastinal silhouette is unchanged. No pleural effusion or pneumothorax. Patchy bilateral airspace opacities with lower lobe predominance. Prior fixation of the sternum. No acute osseous abnormality. Patchy bilateral airspace opacities with lower lobe predominance, concerning for multifocal pneumonia. Of note, this finding can be seen in the setting of COVID-19 pneumonia.          Electronically signed by RAMY Garcia CNP on 1/15/2022 at 10:51 PM

## 2022-01-16 NOTE — PROGRESS NOTES
Hospitalist Progress Note      Name:  Dana Edwards /Age/Sex: 1945  (68 y.o. male)   MRN & CSN:  [id number] & [id number] Admission Date/Time: 1/15/2022 12:49 PM   Location:  008- PCP: Kemp Saint, MD         Hospital Day: 2    Assessment and Plan:       Hospital course:    68 y.o. male who presents with cough with clear sputum x several days. He denied wearing home o2 and reported using his inhalers. He also reported history of falls and endorsed a recent episode of his \"legs giving out\". He also endorsed chronic lower back pain. In the ED: Patient's vitals were unremarkable. Respiratory panel was unremarkable . The following diagnostic images were done in the ED:      CXR   Patchy bilateral airspace opacities with lower lobe predominance, concerning   for multifocal pneumonia.  Of note, this finding can be seen in the setting   of COVID-19 pneumonia. Right Tib-Fib XRAY    Ankle: Osseous fragment along the medial aspect of the talar dome with   adjacent soft tissue swelling, as well as irregularity at the tip of the   medial malleolus.  Findings may represent age indeterminate ligamentous   avulsion injury, and correlation with point tenderness is requested.       Additional lateral soft tissue swelling with small tibiotalar joint effusion.       Tibia/fibula: No acute osseous abnormality in the proximal lower leg. Left shoulder xray , left knee, left ankle and left Tib-Fib xray were unremarkable       Hospital problems:       Multifocal PNA   - seen on CXR  - not sure if this is truly bacterial --> procal is 0.211  - Respiratory panel was negative for viral infections including COVID  - WBC is normal and no symptoms of a PNA and no hypoxia  - will obtain a repeat CXR in am and CBC   - for now will not treat  This may be a past infection?  - could this be fluid overload?  BNP was elevated on admission  - reports he had J&J vaccine summer       Acute respiratory distress   - cxr shows multifocal PNA and the patient also has an elevated BNP  - noted possible COPD exacerbation however he did not receive treatment for it   And he is not exhibiting signs of COPD exac  - he states that he had a history of CHF when he had his CABG   - reports his SOB has been present for \"months\" worse with exertion  - checking an echo           Debility with multiple falls   - has a history of multiple falled  - PT/OT  Consulted   - the patient will likely need PT upon discharge  - noted on xrays above he has a possible avulsion injury -> treatment is rest       Elevated BNP   - no history of CHF  - he is on torsemide for his CKD  - the elevated BNP might be 2/2  To CKD   - continue to monitor       COPD  - no signs of flare up   - continue to monitor        Chronic constipation   -PRN and daily meds added last BM 3 days ago  - CT abd ordered by admitting hospitalist --> pending        KAREEM with CKD stage 3  -possibly post renal ; patient asked if I can keep the bradshaw in for today   Until tomorrow   - repeat Cr and will attempt a post void trial        Hyperkalemia:  - resolved   - admitting hospitalist gave kayexalate       Afib  - rate controlled   - con home meds on oral AC      DM type 2  - controlled with ISS  - CCM       CAD   - with hx of CABG  - con home meds, daily weight I&O        Hyperlipidemia  - con home meds       Dispo: ECHO pending           History of Present Illness:     Chief Complaint:     No acute events overnight   Denies SOB at rest  No Cp no gu or gi complaints       Ten point ROS reviewed negative, unless as noted above    Objective:        Intake/Output Summary (Last 24 hours) at 1/16/2022 1420  Last data filed at 1/16/2022 1056  Gross per 24 hour   Intake 0 ml   Output 900 ml   Net -900 ml      Vitals:   Vitals:    01/16/22 1215   BP: (!) 143/61   Pulse: 76   Resp: 16   Temp: 98.9 °F (37.2 °C)   SpO2: 93%     Physical Exam:   GEN Awake male, sitting upright in bed in no apparent distress. AAOX3     EYES No scleral erythema, discharge, or conjunctivitis. HENT Mucous membranes are moist. Oral pharynx without exudates,     NECK Supple, no apparent thyromegaly or masses. RESP Clear to auscultation, no wheezes, rales or rhonchi. Symmetric chest movement while on room air. CARDIO/VASC S1/S2 auscultated. Regular rate without appreciable murmurs, rubs, or gallops. No JVD or carotid bruits. Peripheral pulses equal bilaterally and palpable. No peripheral edema. GI Abdomen is soft without significant tenderness, masses, or guarding. Bowel sounds are normoactive. Rectal exam deferred.  No costovertebral angle tenderness. Normal appearing external genitalia. Reyes catheter is placed    HEME/LYMPH No palpable cervical lymphadenopathy and no hepatosplenomegaly. No petechiae or ecchymoses. MSK No gross joint deformities. SKIN Normal coloration, warm, dry. NEURO Cranial nerves appear grossly intact, normal speech, no lateralizing weakness. PSYCH Awake, alert, oriented x 4. Affect appropriate.     Medications:   Medications:    hydrALAZINE  5 mg IntraVENous Once    lidocaine  1 patch TransDERmal Daily    insulin lispro  0-6 Units SubCUTAneous TID WC    insulin lispro  0-3 Units SubCUTAneous Nightly    sodium polystyrene  15 g Oral Once    amLODIPine  10 mg Oral Daily    losartan  25 mg Oral Daily    cyclobenzaprine  10 mg Oral Nightly    magnesium oxide  400 mg Oral BID    metoprolol succinate  50 mg Oral Daily    pantoprazole  40 mg Oral QAM AC    torsemide  20 mg Oral Daily    apixaban  2.5 mg Oral BID    aspirin EC  81 mg Oral Daily    sodium chloride flush  5-40 mL IntraVENous BID    insulin glargine  40 Units SubCUTAneous Nightly      Infusions:    dextrose      sodium chloride       PRN Meds: glucose, 15 g, PRN  glucagon (rDNA), 1 mg, PRN  dextrose, 100 mL/hr, PRN  dextrose bolus (hypoglycemia), 125 mL, PRN   Or  dextrose bolus (hypoglycemia), 250 mL, PRN  acetaminophen, 650 mg, Q4H PRN  albuterol sulfate HFA, 1 puff, Q4H PRN  sodium chloride flush, 10 mL, PRN  sodium chloride, 25 mL, PRN  potassium chloride, 40 mEq, PRN   Or  potassium alternative oral replacement, 40 mEq, PRN   Or  potassium chloride, 10 mEq, PRN  magnesium sulfate, 2,000 mg, PRN  nicotine polacrilex, 2 mg, Q1H PRN  polyethylene glycol, 17 g, Daily PRN  fleet, 1 enema, Daily PRN          Electronically signed by Radhika Lai MD on 1/16/2022 at 2:20 PM

## 2022-01-16 NOTE — ED PROVIDER NOTES
Emergency Department Encounter  Location: [facility] [address] [place] [address]    Patient: Reshma Orozco  MRN: [id number]  : 1945  Date of evaluation: 1/15/2022  ED Provider: Radha Edward MD    7PM  Reshma Orozco was checked out to me by Dr. Genna Quiroz. Please see his/her initial documentation for details of the patient's initial ED presentation, physical exam and completed studies. In brief, Reshma Orozco is a 68 y.o. male that presented to the emergency department after a several days ago complaining of pain in his bilateral lower extremities. He normally uses a wheelchair to get around his house and occasionally uses a walker. He states he has been increasingly tired and weak recently. He denies any fevers. Laboratory evaluation thus far is unremarkable. Creatinine is elevated that at his baseline. No acute osseous abnormalities noted on x-rays of his lower legs. Urine is not concerning for an infection. Awaiting respiratory disease panel.     I have reviewed and interpreted all of the currently available lab results and diagnostics from this visit:  Results for orders placed or performed during the hospital encounter of 01/15/22   Respiratory Panel, Molecular, with COVID-19 (Restricted: peds pts or suitable admitted adults)    Specimen: Nasopharyngeal   Result Value Ref Range    Adenovirus Detection by PCR NOT DETECTED NOT DETECTED    Coronavirus 229E PCR NOT DETECTED NOT DETECTED    Coronavirus HKU1 PCR NOT DETECTED NOT DETECTED    Coronavirus NL63 PCR NOT DETECTED NOT DETECTED    Coronavirus OC43 PCR NOT DETECTED NOT DETECTED    SARS-CoV-2 NOT DETECTED NOT DETECTED    Human Metapneumovirus PCR NOT DETECTED NOT DETECTED    Rhinovirus Enterovirus PCR NOT DETECTED NOT DETECTED    Influenza A by PCR NOT DETECTED NOT DETECTED    Influenza A H1 Pandemic PCR NOT DETECTED NOT DETECTED    Influenza A H1 () PCR NOT DETECTED NOT DETECTED    Influenza A H3 PCR NOT DETECTED NOT DETECTED Influenza B by PCR NOT DETECTED NOT DETECTED    Parainfluenza 1 PCR NOT DETECTED NOT DETECTED    Parainfluenza 2 PCR NOT DETECTED NOT DETECTED    Parainfluenza 3 PCR NOT DETECTED NOT DETECTED    Parainfluenza 4 PCR NOT DETECTED NOT DETECTED    RSV PCR NOT DETECTED NOT DETECTED    Bordetella parapertussis by PCR NOT DETECTED NOT DETECTED    B Pertussis by PCR NOT DETECTED NOT DETECTED    Chlamydophila Pneumonia PCR NOT DETECTED NOT DETECTED    Mycoplasma pneumo by PCR NOT DETECTED NOT DETECTED   CBC Auto Differential   Result Value Ref Range    WBC 8.2 4.0 - 10.5 K/CU MM    RBC 4.26 (L) 4.6 - 6.2 M/CU MM    Hemoglobin 12.4 (L) 13.5 - 18.0 GM/DL    Hematocrit 38.4 (L) 42 - 52 %    MCV 90.1 78 - 100 FL    MCH 29.1 27 - 31 PG    MCHC 32.3 32.0 - 36.0 %    RDW 14.2 11.7 - 14.9 %    Platelets 906 980 - 305 K/CU MM    MPV 8.7 7.5 - 11.1 FL    Differential Type AUTOMATED DIFFERENTIAL     Segs Relative 69.4 (H) 36 - 66 %    Lymphocytes % 17.4 (L) 24 - 44 %    Monocytes % 9.4 (H) 0 - 4 %    Eosinophils % 2.7 0 - 3 %    Basophils % 0.7 0 - 1 %    Segs Absolute 5.7 K/CU MM    Lymphocytes Absolute 1.4 K/CU MM    Monocytes Absolute 0.8 K/CU MM    Eosinophils Absolute 0.2 K/CU MM    Basophils Absolute 0.1 K/CU MM    Immature Neutrophil % 0.4 0 - 0.43 %    Total Immature Neutrophil 0.03 K/CU MM   Comprehensive Metabolic Panel w/ Reflex to MG   Result Value Ref Range    Sodium 136 135 - 145 MMOL/L    Potassium 5.4 (H) 3.5 - 5.1 MMOL/L    Chloride 100 99 - 110 mMol/L    CO2 25 21 - 32 MMOL/L    BUN 42 (H) 6 - 23 MG/DL    CREATININE 2.6 (H) 0.9 - 1.3 MG/DL    Glucose 315 (H) 70 - 99 MG/DL    Calcium 9.0 8.3 - 10.6 MG/DL    Albumin 3.7 3.4 - 5.0 GM/DL    Total Protein 6.7 6.4 - 8.2 GM/DL    Total Bilirubin 0.3 0.0 - 1.0 MG/DL    ALT 8 (L) 10 - 40 U/L    AST 11 (L) 15 - 37 IU/L    Alkaline Phosphatase 116 40 - 129 IU/L    GFR Non- 24 (L) >60 mL/min/1.73m2    GFR  29 (L) >60 mL/min/1.73m2    Anion Gap 11 4 - 16   Urinalysis, reflex to microscopic   Result Value Ref Range    Color, UA YELLOW YELLOW    Clarity, UA CLEAR CLEAR    Glucose, Urine TRACE NEGATIVE MG/DL    Bilirubin Urine NEGATIVE NEGATIVE MG/DL    Ketones, Urine NEGATIVE NEGATIVE MG/DL    Specific Gravity, UA 1.015 1.001 - 1.035    Blood, Urine NEGATIVE NEGATIVE    pH, Urine 6.0 5.0 - 8.0    Protein, UA 2+ NEGATIVE MG/DL    Urobilinogen, Urine 0.2 0.2 - 1.0 MG/DL    Nitrite Urine, Quantitative NEGATIVE NEGATIVE    Leukocyte Esterase, Urine NEGATIVE NEGATIVE    RBC, UA 2 0 - 3 /HPF    WBC, UA 2 0 - 2 /HPF    Epithelial Cells, UA NONE SEEN /HPF    Cast Type NO CAST FORMS SEEN NO CAST FORMS SEEN /HPF    Bacteria, UA NEGATIVE NEGATIVE /HPF    Crystal Type NEGATIVE NEGATIVE /HPF   Brain Natriuretic Peptide   Result Value Ref Range    Pro-.9 (H) <300 PG/ML   EKG 12 Lead   Result Value Ref Range    Ventricular Rate 81 BPM    Atrial Rate 81 BPM    P-R Interval 146 ms    QRS Duration 130 ms    Q-T Interval 410 ms    QTc Calculation (Bazett) 476 ms    R Axis -46 degrees    T Axis -9 degrees    Diagnosis       Normal sinus rhythm  Right bundle branch block  Left anterior fascicular block   Bifascicular block   Abnormal ECG  When compared with ECG of 28-SEP-2017 14:28,  premature ventricular complexes are no longer present  T wave inversion no longer evident in Anterior leads  Confirmed by Ruben Fung MD, Philly Valdez ([id number]) on 1/15/2022 6:13:31 PM       XR KNEE LEFT (1-2 VIEWS)    Result Date: 1/15/2022  EXAMINATION: THREE XRAY VIEWS OF THE LEFT ANKLE; TWO XRAY VIEWS OF THE LEFT KNEE; 2 XRAY VIEWS OF THE LEFT TIBIA AND FIBULA 1/15/2022 2:17 pm COMPARISON: None.  HISTORY: ORDERING SYSTEM PROVIDED HISTORY: fall, pain TECHNOLOGIST PROVIDED HISTORY: Reason for exam:->fall, pain Reason for Exam: fall, pain Additional signs and symptoms: fall, pain Relevant Medical/Surgical History: fall, pain FINDINGS: Ankle: Osseous fragments are noted along the medial talar dome, unchanged in comparison to 01/23/2019 and consistent with sequela of remote trauma. Otherwise, no fractures identified. The joints of the ankle are anatomically aligned with preserved joint spaces. No significant soft tissue swelling. No definite joint effusion. Mild degenerative changes of the subtalar joint. Extensive vascular calcifications are noted. Tibia/fibula: No acute fracture the proximal lower leg. Extensive vascular calcifications are noted. No focal soft tissue swelling. Surgical clips are noted along the medial tibial plateau Knee: No fracture about the knee. The knee joint is anatomically aligned with mild tricompartmental degenerative changes and moderate medial compartment joint space narrowing. Trace knee joint effusion. Extensive vascular calcifications. No acute osseous abnormality of the left knee, lower leg, or ankle. XR TIBIA FIBULA LEFT (2 VIEWS)    Result Date: 1/15/2022  EXAMINATION: THREE XRAY VIEWS OF THE LEFT ANKLE; TWO XRAY VIEWS OF THE LEFT KNEE; 2 XRAY VIEWS OF THE LEFT TIBIA AND FIBULA 1/15/2022 2:17 pm COMPARISON: None. HISTORY: ORDERING SYSTEM PROVIDED HISTORY: fall, pain TECHNOLOGIST PROVIDED HISTORY: Reason for exam:->fall, pain Reason for Exam: fall, pain Additional signs and symptoms: fall, pain Relevant Medical/Surgical History: fall, pain FINDINGS: Ankle: Osseous fragments are noted along the medial talar dome, unchanged in comparison to 01/23/2019 and consistent with sequela of remote trauma. Otherwise, no fractures identified. The joints of the ankle are anatomically aligned with preserved joint spaces. No significant soft tissue swelling. No definite joint effusion. Mild degenerative changes of the subtalar joint. Extensive vascular calcifications are noted. Tibia/fibula: No acute fracture the proximal lower leg. Extensive vascular calcifications are noted. No focal soft tissue swelling.   Surgical clips are noted along the medial tibial plateau Knee: No fracture about the knee. The knee joint is anatomically aligned with mild tricompartmental degenerative changes and moderate medial compartment joint space narrowing. Trace knee joint effusion. Extensive vascular calcifications. No acute osseous abnormality of the left knee, lower leg, or ankle. XR TIBIA FIBULA RIGHT (2 VIEWS)    Result Date: 1/15/2022  EXAMINATION: THREE XRAY VIEWS OF THE RIGHT ANKLE;   XRAY VIEWS OF THE RIGHT TIBIA AND FIBULA 1/15/2022 2:17 pm COMPARISON: None. HISTORY: ORDERING SYSTEM PROVIDED HISTORY: fall, pain TECHNOLOGIST PROVIDED HISTORY: Reason for exam:->fall, pain Reason for Exam: fall, pain Additional signs and symptoms: fall, pain Relevant Medical/Surgical History: fall, pain FINDINGS: Ankle: Osseous fragments along the medial aspect of the talar dome, with adjacent soft tissue swelling. Osseous irregularity is also noted at the tip of the medial malleolus. Joints of the ankle are anatomically aligned. Additional soft tissue swelling is noted laterally. Small tibiotalar joint effusion. Extensive vascular calcifications. Tibia/fibula: No fracture. The knee joint is anatomically aligned with mild medial compartment joint space narrowing. Extensive vascular calcifications are noted. No focal soft tissue swelling. Ankle: Osseous fragment along the medial aspect of the talar dome with adjacent soft tissue swelling, as well as irregularity at the tip of the medial malleolus. Findings may represent age indeterminate ligamentous avulsion injury, and correlation with point tenderness is requested. Additional lateral soft tissue swelling with small tibiotalar joint effusion. Tibia/fibula: No acute osseous abnormality in the proximal lower leg. XR ANKLE LEFT (MIN 3 VIEWS)    Result Date: 1/15/2022  EXAMINATION: THREE XRAY VIEWS OF THE LEFT ANKLE; TWO XRAY VIEWS OF THE LEFT KNEE; 2 XRAY VIEWS OF THE LEFT TIBIA AND FIBULA 1/15/2022 2:17 pm COMPARISON: None.  HISTORY: ORDERING SYSTEM PROVIDED HISTORY: fall, pain TECHNOLOGIST PROVIDED HISTORY: Reason for exam:->fall, pain Reason for Exam: fall, pain Additional signs and symptoms: fall, pain Relevant Medical/Surgical History: fall, pain FINDINGS: Ankle: Osseous fragments are noted along the medial talar dome, unchanged in comparison to 01/23/2019 and consistent with sequela of remote trauma. Otherwise, no fractures identified. The joints of the ankle are anatomically aligned with preserved joint spaces. No significant soft tissue swelling. No definite joint effusion. Mild degenerative changes of the subtalar joint. Extensive vascular calcifications are noted. Tibia/fibula: No acute fracture the proximal lower leg. Extensive vascular calcifications are noted. No focal soft tissue swelling. Surgical clips are noted along the medial tibial plateau Knee: No fracture about the knee. The knee joint is anatomically aligned with mild tricompartmental degenerative changes and moderate medial compartment joint space narrowing. Trace knee joint effusion. Extensive vascular calcifications. No acute osseous abnormality of the left knee, lower leg, or ankle. XR ANKLE RIGHT (MIN 3 VIEWS)    Result Date: 1/15/2022  EXAMINATION: THREE XRAY VIEWS OF THE RIGHT ANKLE;   XRAY VIEWS OF THE RIGHT TIBIA AND FIBULA 1/15/2022 2:17 pm COMPARISON: None. HISTORY: ORDERING SYSTEM PROVIDED HISTORY: fall, pain TECHNOLOGIST PROVIDED HISTORY: Reason for exam:->fall, pain Reason for Exam: fall, pain Additional signs and symptoms: fall, pain Relevant Medical/Surgical History: fall, pain FINDINGS: Ankle: Osseous fragments along the medial aspect of the talar dome, with adjacent soft tissue swelling. Osseous irregularity is also noted at the tip of the medial malleolus. Joints of the ankle are anatomically aligned. Additional soft tissue swelling is noted laterally. Small tibiotalar joint effusion. Extensive vascular calcifications. Tibia/fibula: No fracture.   The knee joint is anatomically aligned with mild medial compartment joint space narrowing. Extensive vascular calcifications are noted. No focal soft tissue swelling. Ankle: Osseous fragment along the medial aspect of the talar dome with adjacent soft tissue swelling, as well as irregularity at the tip of the medial malleolus. Findings may represent age indeterminate ligamentous avulsion injury, and correlation with point tenderness is requested. Additional lateral soft tissue swelling with small tibiotalar joint effusion. Tibia/fibula: No acute osseous abnormality in the proximal lower leg. XR SHOULDER LEFT (MIN 2 VIEWS)    Result Date: 1/15/2022  EXAMINATION: THREE XRAY VIEWS OF THE LEFT SHOULDER 1/15/2022 2:17 pm COMPARISON: 01/10/2016, chest radiograph 09/28/2017 HISTORY: ORDERING SYSTEM PROVIDED HISTORY: fall, pain TECHNOLOGIST PROVIDED HISTORY: Reason for exam:->fall, pain Reason for Exam: fall, pain Additional signs and symptoms: fall, pain Relevant Medical/Surgical History: fall, pain FINDINGS: There is diffuse demineralization, which limits fracture detection. No visible fracture or dislocation. Variation the humeral head on internal rotation is likely due to suboptimal positioning. Overlying soft tissues are grossly unremarkable. Mild acromioclavicular arthrosis. Chest findings reported separately. No acute osseous abnormality. XR CHEST PORTABLE    Result Date: 1/15/2022  EXAMINATION: ONE XRAY VIEW OF THE CHEST 1/15/2022 2:17 pm COMPARISON: Chest radiograph 07/29/2021 HISTORY: ORDERING SYSTEM PROVIDED HISTORY: dyspnea TECHNOLOGIST PROVIDED HISTORY: Reason for exam:->dyspnea Reason for Exam: dyspnea Additional signs and symptoms: dyspnea Relevant Medical/Surgical History: dyspnea FINDINGS: Cardiomediastinal silhouette is unchanged. No pleural effusion or pneumothorax. Patchy bilateral airspace opacities with lower lobe predominance. Prior fixation of the sternum.   No acute osseous abnormality. Patchy bilateral airspace opacities with lower lobe predominance, concerning for multifocal pneumonia. Of note, this finding can be seen in the setting of COVID-19 pneumonia. Final ED Course and MDM: In brief, Bard Park is a 68 y.o. male whose care was signed out to me by the outgoing provider. Respiratory disease panel is negative. No obvious cause of patient's generalized weakness found on evaluation today. Patient lives by himself and unable to transfer due to generalized weakness at this time. Plan for admission for possible placement in either rehab or long-term care facility. I spoke with Steven Chou, [facility] for hospitalist service, who graciously agreed to admit the patient. Plan of care explained to patient. All questions and concerns were addressed to the patient's satisfaction. Patient understood and agreed with plan. I did don appropriate PPE (including N95 face mask, protective eye ware/safety glasses, gloves, hair covering, and no isolation gown), as recommended by the health facility/national standard best practice, during my bedside interactions with the patient. ED Medication Orders (From admission, onward)    None          Final Impression      1. Fall, initial encounter    2. Multiple contusions    3. Generalized weakness    4.  Sprain of right ankle, unspecified ligament, initial encounter        DISPOSITION Decision To Admit 01/15/2022 07:22:38 PM     (Please note that portions of this note may have been completed with a voice recognition program. Efforts were made to edit the dictations but occasionally words are mis-transcribed.)    Felix Carter MD  [id number] [address]  01/18/22 [id number]

## 2022-01-17 NOTE — CARE COORDINATION
CM met with the patient for discharge planning. Patient lives at home alone, has insurance with Rx coverage & PCP, stated that he requires assistance with ADL's, and no longer drives. Patient stated that he uses a walker and wheelchair at home. Patient does not require home oxygen but does use an inhaler. Patient stated that he receives personal aide visits 7 days/week for 3 hours/day from [facility] and a nurse visits 1 day/every 2 weeks from [facility]. Patient stated that he is weaker than normal and would be willing to admit to the swing bed program if recommended. CM will follow. 10:04 AM  CM notified the swing bed coordinator of the referral, PT/OT evaluations are pending at this time. CM will follow.

## 2022-01-17 NOTE — PROGRESS NOTES
Physical Therapy    Facility/Department: [facility] UNIT  Initial Assessment    NAME: Reshma Orozco  : 1945  MRN: [id number]    Date of Service: 2022    Discharge Recommendations:  Subacute/Skilled Nursing Facility (swing bed)       Assessment   Body structures, Functions, Activity limitations: Decreased ADL status; Decreased functional mobility ; Decreased strength;Decreased endurance;Decreased high-level IADLs  Assessment: patient is a 67 yo male admitted to [facility] for leg pain due to falls- patient has a hx of declining mobility and has not been ambulating much @ home- patient will benefit ftrom skilled interventions to maximze strength and mobility which he will need to return back to his PLOF; patient would be good candidate for swing bed  Treatment Diagnosis: deconditioning/  Prognosis: Good  Decision Making: Medium Complexity  History: PF - hx of immobility  Exam: ROM/ strength transfers  Clinical Presentation: changing characterstics of function  Barriers to Learning: none  REQUIRES PT FOLLOW UP: Yes       Patient Diagnosis(es): The primary encounter diagnosis was Fall, initial encounter. Diagnoses of Multiple contusions, Generalized weakness, and Sprain of right ankle, unspecified ligament, initial encounter were also pertinent to this visit.      has a past medical history of Acid reflux, Arrhythmia, Arthritis, CAD (coronary artery disease), CHF (congestive heart failure) ([facility]), Chronic back pain, CKD (chronic kidney disease) stage 3, GFR 30-59 ml/min ([facility]), COPD (chronic obstructive pulmonary disease) ([facility] [address] Diabetes mellitus ([facility] [address] Glaucoma, H/O 24 hour EKG monitoring, H/O cardiac catheterization, H/O cardiovascular stress test, H/O complete electrocardiogram, H/O Doppler ultrasound, H/O Doppler ultrasound (Lower extremity), H/O percutaneous left heart catheterization, Heat syncope, [place] (hard of hearing), Hx of echocardiogram, Hyperlipidemia, Hypertension, Macular degeneration, Other activity(E029.9), PAF (paroxysmal atrial fibrillation) ([facility] [address] PVD (peripheral vascular disease) ([facility] [address] S/P CABG x 3, Skin Cancer, SOB (shortness of breath) on exertion, Teeth missing, Ventricular ectopy, Wears dentures, and Wears glasses. has a past surgical history that includes cyst removal (2000's); Thoracentesis (Left, 10/06/2016); Coronary artery bypass graft (10/03/2016); Skin cancer excision (2000's); eye surgery (Bilateral, 2015); Dental surgery; Colonoscopy (Last Done In 2000's); fracture surgery (Left, 01/11/2016); Cardiac surgery (10/03/2016); and other surgical history (10/03/2017). Restrictions  Restrictions/Precautions  Restrictions/Precautions: Fall Risk  Vision/Hearing  Vision:  (blind in L eye)  Vision Exceptions: Wears glasses for reading  Hearing Exceptions: Hard of hearing/hearing concerns     Subjective  General  Chart Reviewed: Yes  Patient assessed for rehabilitation services?: Yes  Follows Commands: Within Functional Limits  Pain Screening  Patient Currently in Pain: Denies     Pre Treatment Pain Screening  Pain at present: 0    Orientation  Orientation  Overall Orientation Status: Within Normal Limits  Social/Functional History  Social/Functional History  Lives With: Alone  Type of Home: House  Home Layout: One level  Home Access: Level entry  Bathroom Shower/Tub: Tub/Shower unit (lately patient has been sponge bathing)  Bathroom Toilet: Handicap height  Bathroom Equipment: Grab bars around toilet  Home Equipment: Rolling walker,Wheelchair-manual,Lift chair,Wheelchair-electric  Receives Help From: Personal care attendant (aide come in 7 days/week)  ADL Assistance: Needs assistance  Bath: Moderate assistance  Dressing:  Moderate assistance  Grooming: Minimal assistance  Feeding: Independent  Toileting: Needs assistance  Homemaking Assistance: Needs assistance  Homemaking Responsibilities: No  Ambulation Assistance: Needs assistance  Transfer Assistance: Needs assistance  Active : No  Leisure & Hobbies: Pt reports he can't do much with limited vision now, listens to music sometimes and just sits in his chair  IADL Comments: Pt get assistance with ADLs from aid that comes 7 days a week in the morning and evening  Cognition        Objective     Observation/Palpation  Observation: Pt awake sitting up in chair    PROM RLE (degrees)  RLE PROM: WFL  PROM LLE (degrees)  LLE PROM: [facility]  Strength RLE  Comment: grossly 2/5-unable to I SLR  Strength LLE  Comment: grossly 2/5-unable to I SLR        Bed mobility  Supine to Sit: Unable to assess  Sit to Supine: Unable to assess  Transfers  Sit to Stand: Dependent/Total  Ambulation  Ambulation?: No              Plan   Plan  Times per week: Mon- Sat 5+/wk  Current Treatment Recommendations: ROM,Strengthening,Transfer Training,ADL/Self-care Training,Functional Mobility Training,Balance Training,Gait Training  Safety Devices  Type of devices: Gait belt,Left in chair,Call light within reach,Chair alarm in place    G-Code       OutComes Score                                                  AM-PAC Score        Basic Mobility Six Clicks Form [facility] AM-PAC Score Conversion Table   How much difficulty does the patient currently have Unable   (pt is unable to do activity) A Lot   (activity is a struggle, requires great effort/time) A Little   (pt can manage, but takes more effort/time than should) None   (pt has no difficulty) Raw Score Standardized Score CMS -100% Score CMS Modifier        6 23.55 100% CN   Turning over in bed (including adjusting bedclothes, sheets, and blankets)? []1 [x]2  []3  []4  7 26.42 92.36% CM        8 28.58 86.62% CM   Sitting down on and standing up from a chair with arms (e.g. wheelchair, bedside commode, etc.)? []1 [x]2 []3   []4   9 30.55 81.38% CM        10 32.29 76.75% CL   Moving from lying on back to sitting on the side of the bed?  []1 [x]2  []3   []4   11 33.86 72.57% CL        12 35.33 68.66% CL   How much help from another person does the patient currently need Total   (Total/Dependent Assist) A Lot   (Max/Mod Assist) A Little   (Min/CGA/Supervision) None   (No human assistance) 13 36.74 64.91% CL        14 38.1 61.29% CL   Moving to and from a bed to a chair (including a wheelchair)? []1  [x]2   []3  []4   15 39.45 57.70% CK        16 40.78 54.16% CK   To walk in a hospital room? [x]1 []2   []3    []4  17 42.13 50.57% CK        18 43.63 46.58% CK   Climbing 3-5 steps with a railing?  [x]1  []2   []3    []4  19 45.44 41.77% CK        20 47.67 35.83% CJ   Raw Score 10  21 50.25 28.97% CJ   Standardized Score   22 53.28 20.91% CJ   CMS 0-100% Score   23 56.93 11.20% CI   CMS Modifier  24 61.14 0.00% CH     CH = 0% impaired  CI = 1-20% impaired  CJ = 20-40% impaired  CK = 40-60% impaired  CL = 60-80% impaired  CM = % impaired  CN = 100% impaired          Goals  Short term goals  Time Frame for Short term goals: 1 week  Short term goal 1: patient will safely perform bed mobility activities with mod A  Short term goal 2: patient will safely perform transfer actvities with max A  Short term goal 3: patient will stand x3' holding supported  by walker       Therapy Time   Individual Concurrent Group Co-treatment   Time In       12   Time Out       0902   Minutes       12           LARS Bridges, PT

## 2022-01-17 NOTE — PROGRESS NOTES
Hospitalist Progress Note      Name:  Maria G Chen /Age/Sex: 1945  (68 y.o. male)   MRN & CSN:  [id number] & [id number] Admission Date/Time: 1/15/2022 12:49 PM   Location:  [id number] PCP: Steve Dawkins MD         Hospital Day: 3    Assessment and Plan:                                            Hospital course:     68 y. o. male who presents to the ED on 1/15  with cough with clear sputum x several days.  He reports that he has been experiencing SOB for a couple of months. He also reported history of falls and endorsed a recent episode of his \"legs giving out\".    In the ED: Patient's vitals were unremarkable. Respiratory panel was unremarkable . The following diagnostic images were done in the ED:            CXR   Patchy bilateral airspace opacities with lower lobe predominance, concerning   for multifocal pneumonia.  Of note, this finding can be seen in the setting   of COVID-19 pneumonia.      Right Tib-Fib XRAY     Ankle: Osseous fragment along the medial aspect of the talar dome with   adjacent soft tissue swelling, as well as irregularity at the tip of the   medial malleolus.  Findings may represent age indeterminate ligamentous   avulsion injury, and correlation with point tenderness is requested.       Additional lateral soft tissue swelling with small tibiotalar joint effusion.       Tibia/fibula: No acute osseous abnormality in the proximal lower leg.      Left shoulder xray , left knee, left ankle and left Tib-Fib xray were unremarkable          Hospital problems:         Multifocal PNA   - seen on CXR  - not sure if this is truly bacterial --> procal is 0.211  - Respiratory panel was negative for viral infections including COVID  - WBC is normal and no symptoms of a PNA and no hypoxia  - repeat CXR  pending and WBC has not trended upwards   - for now will not treat  This may be a past infection?  Maybe this is not a   PNA but rather old pulm fibrosis from COPD?   - could this be fluid overload? BNP was elevated on admission  - reports he had J&J vaccine summer 2021        Acute respiratory distress   - cxr shows multifocal PNA and the patient also has an elevated BNP  - noted possible COPD exacerbation however he did not receive treatment for it   And he is not exhibiting signs of COPD exac--> for now will not treat   - he states that he had a history of CHF when he had his CABG   - reports his SOB has been present for \"months\" worse with exertion  - echo has been ordered --> awaiting results    - proBNP is trending upwards from 834--> 934   - no signs of overload on exam --> awaiting repeat CXR   - pt was placed on 1 L of O2 but his O2 sat 93% no documented hypoxia to call this acute respiratory failure with hypoxia.          Debility with multiple falls   - has a history of multiple falls  - PT/OT  Consulted   - d/w CM today that swing bed would be a good option   - noted on xrays above he has a possible avulsion injury -> treatment is conservative         Elevated BNP   - no history of CHF  - he is on torsemide for his CKD  - the elevated BNP might be 2/2  To CKD   - however since he reported that he had a prior history of CHF when he got his CABG  - will give one dose of lasix 20mg IV since his cr is trending upwards and it may be cardiorenal. This is a trial and will evaluate afterwards           COPD  - no signs of flare up   - continue to monitor         Chronic constipation   -PRN and daily meds added last BM 3 days ago  - CT abd ordered by admitting hospitalist --> pending         KAREEM with CKD stage 3  -possibly post renal? A bradshaw was placed   - however his cr trended upwards today along with the BNP  - will give a trial of lasix 20mg and re-assess    - remove bradshaw today and attempt a void trial   - will need to monitor Cr to assess if the lasix helped. - It appears he already received his torsemide as well. I tried to put it on hold  Today but it was already given.    Hyperkalemia:  - resolved   - admitting hospitalist gave kayexalate        Afib  - rate controlled   - con home meds on oral AC   - his eliquis was reduced OP to 2.5mg BID  - pharmacist suggests that we can increase his dose 5mg PO BID  Because he does not meet criteria for reduced dose       DM type 2  - controlled with ISS and lantus 40U   - CCM         CAD   - with hx of CABG  - con home med  - awaiting ECHO            Hyperlipidemia  - it doesn't appear that the patient is on a statin?  - will need to further look into        Dispo: ECHO pending            History of Present Illness:     Chief Complaint:   Still reports SOB at rest  No CP no  or GI complaints        Ten point ROS reviewed negative, unless as noted above    Objective: Intake/Output Summary (Last 24 hours) at 1/17/2022 0931  Last data filed at 1/16/2022 1808  Gross per 24 hour   Intake 0 ml   Output 700 ml   Net -700 ml      Vitals:   Vitals:    01/17/22 0752   BP: (!) 154/80   Pulse: 78   Resp: 16   Temp: 98.4 °F (36.9 °C)   SpO2: 92%     Physical Exam:     GEN    Awake male, sitting upright in bed in no apparent distress. AAOX3      EYES   No scleral erythema, discharge, or conjunctivitis.     HENT  Mucous membranes are moist. Oral pharynx without exudates,      NECK  Supple, no apparent thyromegaly or masses.     RESP  Clear to auscultation, no wheezes, rales or rhonchi. Symmetric chest movement while on room air.     CARDIO/VASC           S1/S2 auscultated. Regular rate without appreciable murmurs, rubs, or gallops. No JVD or carotid bruits. Peripheral pulses equal bilaterally and palpable. No peripheral edema.     GI        Abdomen is soft without significant tenderness, masses, or guarding. Bowel sounds are normoactive. Rectal exam deferred.             No costovertebral angle tenderness. Normal appearing external genitalia.  Reyes catheter is placed --> will be removed      HEME/LYMPH            No palpable cervical lymphadenopathy and no hepatosplenomegaly. No petechiae or ecchymoses.     MSK    No gross joint deformities.     SKIN    Normal coloration, warm, dry.     NEURO           Cranial nerves appear grossly intact, normal speech, no lateralizing weakness.     PSYCH            Awake, alert, oriented x 4. Affect appropriate.     Medications:   Medications:    hydrALAZINE  5 mg IntraVENous Once    lidocaine  1 patch TransDERmal Daily    insulin lispro  0-6 Units SubCUTAneous TID WC    insulin lispro  0-3 Units SubCUTAneous Nightly    sodium polystyrene  15 g Oral Once    amLODIPine  10 mg Oral Daily    losartan  25 mg Oral Daily    cyclobenzaprine  10 mg Oral Nightly    magnesium oxide  400 mg Oral BID    metoprolol succinate  50 mg Oral Daily    pantoprazole  40 mg Oral QAM AC    torsemide  20 mg Oral Daily    apixaban  2.5 mg Oral BID    aspirin EC  81 mg Oral Daily    sodium chloride flush  5-40 mL IntraVENous BID    insulin glargine  40 Units SubCUTAneous Nightly      Infusions:    dextrose      sodium chloride       PRN Meds: glucose, 15 g, PRN  glucagon (rDNA), 1 mg, PRN  dextrose, 100 mL/hr, PRN  dextrose bolus (hypoglycemia), 125 mL, PRN   Or  dextrose bolus (hypoglycemia), 250 mL, PRN  acetaminophen, 650 mg, Q4H PRN  albuterol sulfate HFA, 1 puff, Q4H PRN  sodium chloride flush, 10 mL, PRN  sodium chloride, 25 mL, PRN  potassium chloride, 40 mEq, PRN   Or  potassium alternative oral replacement, 40 mEq, PRN   Or  potassium chloride, 10 mEq, PRN  magnesium sulfate, 2,000 mg, PRN  nicotine polacrilex, 2 mg, Q1H PRN  polyethylene glycol, 17 g, Daily PRN  fleet, 1 enema, Daily PRN          Electronically signed by Michelle Ellis MD on 1/17/2022 at 9:31 AM

## 2022-01-17 NOTE — PLAN OF CARE
1.  No evidence of hydronephrosis or obstructive uropathy.       2.  Bilateral lower lobe bronchiectasis, bronchial wall thickening, and   peripheral reticulation suggestive of fibrotic change.  Right lower lobe   linear atelectasis/scarring demonstrates a 1.2 cm nodular component. Consider follow-up CT chest in 3 months.         CT abd done on admission showed the following up stairs. His fibrotic changes may be the cause of his SOB.

## 2022-01-17 NOTE — PROGRESS NOTES
Occupational Therapy   Occupational Therapy Initial Assessment  Date: 2022   Patient Name: Melissa Wise  MRN: [id number]     : 1945    Date of Service: 2022    Discharge Recommendations:  Subacute/Skilled Nursing Facility,24 hour supervision or assist (Swingbed)  OT Equipment Recommendations  Equipment Needed: No (Will continue to assess)    Assessment   Performance deficits / Impairments: Decreased functional mobility ; Decreased ADL status; Decreased strength;Decreased endurance;Decreased balance  Assessment: Pt is a 69 yo male admitted to [facility] with BLE weakness s/p a fall at home. Pt presents with decreased strength and inability to perform functional mobility for trfs. Recommend OT to address improving strength, endurance and ability to perform transfers and ADLs. Treatment Diagnosis: Weakness  Prognosis: Fair  Decision Making: Medium Complexity  OT Education: OT Role;Home Exercise Program;Transfer Training;Plan of Care;Precautions  Barriers to Learning: None  REQUIRES OT FOLLOW UP: Yes  Activity Tolerance  Activity Tolerance: Patient limited by fatigue  Activity Tolerance: Pt unable to perform sit to stand/trf d/t LE weakness  Safety Devices  Safety Devices in place: Yes  Type of devices: All fall risk precautions in place;Gait belt;Patient at risk for falls; Left in chair;Nurse notified;Call light within reach; Chair alarm in place  Restraints  Initially in place: No           Patient Diagnosis(es): The primary encounter diagnosis was Fall, initial encounter. Diagnoses of Multiple contusions, Generalized weakness, and Sprain of right ankle, unspecified ligament, initial encounter were also pertinent to this visit.      has a past medical history of Acid reflux, Arrhythmia, Arthritis, CAD (coronary artery disease), CHF (congestive heart failure) ([facility]), Chronic back pain, CKD (chronic kidney disease) stage 3, GFR 30-59 ml/min ([facility]), COPD (chronic obstructive pulmonary disease) ([facility] [address] [facility] Diabetes mellitus ([facility] Glaucoma, H/O 24 hour EKG monitoring, H/O cardiac catheterization, H/O cardiovascular stress test, H/O complete electrocardiogram, H/O Doppler ultrasound, H/O Doppler ultrasound (Lower extremity), H/O percutaneous left heart catheterization, Heat syncope, [place] (hard of hearing), Hx of echocardiogram, Hyperlipidemia, Hypertension, Macular degeneration, Other activity(E029.9), PAF (paroxysmal atrial fibrillation) ([facility] PVD (peripheral vascular disease) ([facility] S/P CABG x 3, Skin Cancer, SOB (shortness of breath) on exertion, Teeth missing, Ventricular ectopy, Wears dentures, and Wears glasses. has a past surgical history that includes cyst removal (2000's); Thoracentesis (Left, 10/06/2016); Coronary artery bypass graft (10/03/2016); Skin cancer excision (2000's); eye surgery (Bilateral, 2015); Dental surgery; Colonoscopy (Last Done In 2000's); fracture surgery (Left, 01/11/2016); Cardiac surgery (10/03/2016); and other surgical history (10/03/2017). Treatment Diagnosis: Weakness      Restrictions  Restrictions/Precautions  Restrictions/Precautions: Fall Risk    Subjective   General  Chart Reviewed: Yes  Patient assessed for rehabilitation services?: Yes  Family / Caregiver Present: No  Subjective  Subjective: \"My legs are weak\"  Pt reports hx of falls at home and progressing leg weakness. Pt reported sustaining fx of his L hip requiring sx and pins 2 years a go.   Pt reports his legs will give out on him and reports that is what happend last week as he was trying to use walker to get to the bathroom  General Comment  Comments: Pt presents awake sitting up in chair  Patient Currently in Pain: Denies  Vital Signs  Temp: 98.4 °F (36.9 °C)  Pulse: 78  Heart Rate Source: Monitor  Resp: 16  BP: (!) 154/80  BP Location: Right Arm  MAP (mmHg): 106  Patient Currently in Pain: Denies  Oxygen Therapy  SpO2: 92 %  O2 Device: None (Room air)  Social/Functional History  Social/Functional History  Lives With: Alone  Type of Home: House  Home Layout: One level  Home Access: Level entry  Bathroom Shower/Tub: Tub/Shower unit (Pt report aid gives him a sponge bath as he can't get into the tub)  Bathroom Toilet: Handicap height  Bathroom Equipment: Grab bars around toilet  Home Equipment: Rolling walker,Wheelchair-manual,Lift chair (Pt reports he is supposed to be getting a hospital bed)  Receives Help From: Personal care attendant (Pt reports he has an aid that comes 7days a week)  ADL Assistance: Needs assistance  Bath: Moderate assistance  Dressing:  Moderate assistance (Pt reports he needs assist with LE dressing but can put on his shirt)  Grooming: Minimal assistance  Feeding: Independent  Toileting: Needs assistance  Homemaking Assistance: Needs assistance  Homemaking Responsibilities: No (Pt reports aid fixes his meals and does laundry, cleaning and shopping)  Ambulation Assistance: Needs assistance (Pt uses a RW to trf to a WC and uses WC to get around the house)  Transfer Assistance: Needs assistance (Uses lift chair and walker to trf to [facility])  Active : No  Leisure & Hobbies: Pt reports he can't do much with limited vision now, listens to music sometimes and just sits in his chair  IADL Comments: Pt get assistance with ADLs from aid that comes 7 days a week in the morning and evening       Objective   Vision: Impaired (Pt is blind in L eye, uses glasses to see up close)  Vision Exceptions: Wears glasses for reading  Hearing: Exceptions to [facility]  Hearing Exceptions: Hard of hearing/hearing concerns    Orientation  Overall Orientation Status: Within Functional Limits  Observation/Palpation  Observation: Pt awake sitting up in chair  Balance  Standing Balance: Maximum assistance (x2, Pt started to stand but unable to fully stand upright as legs started to give out)  Standing Balance  Activity: Pt attempted sit to stand from chair and was unable to fully stand up before legs started to give out  ADL  Feeding: Modified independent ;Setup  Grooming: Moderate assistance  UE Bathing: Moderate assistance  LE Bathing: Maximum assistance  UE Dressing: Minimal assistance  LE Dressing: Maximum assistance  Toileting: Maximum assistance  Additional Comments: Based on observation of Pt current functional mobility, strength and endurance status  Tone RUE  RUE Tone: Normotonic  Tone LUE  LUE Tone: Normotonic  Coordination  Movements Are Fluid And Coordinated: Yes     Bed mobility  Comment: Not assessed Pt seen out of bed  Transfers  Stand Step Transfers: Unable to assess  Sit to stand: 2 Person assistance;Maximum assistance  Stand to sit: 2 Person assistance; Moderate assistance  Vision - Basic Assessment  Prior Vision: Blind (Blind in L eye)           Sensation  Overall Sensation Status: Impaired (Pt reports numbness in B feet)  Type of ROM/Therapeutic Exercise  Type of ROM/Therapeutic Exercise: Resistive Bands  Comment: Provided Pt with red theraband and enducated on UE exercise to complete to improve strength for trfs. Pt verbalized understanding     LUE AROM (degrees)  LUE AROM : WFL  RUE AROM (degrees)  RUE AROM : WFL  LUE Strength  Gross LUE Strength: Exceptions to [facility] (3+/5)  RUE Strength  Gross RUE Strength: Exceptions to [facility] (3+/5)                   Plan   Plan  Times per week: 4x  Current Treatment Recommendations: Strengthening,Balance Training,Endurance Training,Functional Mobility Training,Safety Education & Training,Patient/Caregiver Education & Training,Self-Care / ADL,Equipment Evaluation, Education, & procurement    G-Code     OutComes Score                                                  AM-PAC Score    AM-PAC 6 click short form for inpatient daily activity:   How much help from another person does the patient currently need. .. Unable  Dep A Lot  Max A A Lot   Mod A A Little  Min A A Little   CGA  SBA None   Mod I  Indep  Sup   1. Putting on and taking off regular lower body clothing?  [] 1    [x] 2   [] 2   [] 3   [] 3   [] 4      2. Bathing (including washing, rinsing, drying)? [] 1   [x] 2   [] 2 [] 3 [] 3 [] 4   3. Toileting, which includes using toilet, bedpan, or urinal? [] 1    [x] 2   [] 2   [] 3   [] 3   [] 4     4. Putting on and taking off regular upper body clothing? [] 1   [] 2   [] 2   [x] 3   [] 3    [] 4      5. Taking care of personal grooming such as brushing teeth? [] 1   [] 2    [] 2 [x] 3    [] 3   [] 4      6. Eating meals? [] 1   [] 2   [] 2   [] 3   [] 3   [x] 4      Raw Score:  16     [24=0% impaired(CH), 23=1-19%(CI), 20-22=20-39%(CJ), 15-19=40-59%(CK), 10-14=60-79%(CL), 7-9=80-99%(CM), 6=100%(CN)]              Goals  Short term goals  Time Frame for Short term goals: Until DC or goals met  Short term goal 1: Pt will complete stand/step trf using RW to WC, toilet,chair mod I w/o LOB  Short term goal 2: Pt will participate in UE bathing/dressing with min A  Short term goal 3: Pt will participate in LE bathing/dressing with mod A  Short term goal 4: Pt will complete toileting with min A  Short term goal 5: Pt will complete 10+ min of BUE therex to increase strength/endurance for transfers, using WC and ADLs. Patient Goals   Patient goals : To return home       Therapy Time   Individual Concurrent Group Co-treatment   Time In 0903     0850   Time Out 0915     0902   Minutes 12     12   Timed Code Treatment Minutes: [address]  Adama Rios, OTR/L [id number]

## 2022-01-17 NOTE — CARE COORDINATION
called for pt. can come by wheelchair. getting an echo. Bautista Liu RN will call when echo is completed.

## 2022-01-18 NOTE — PROGRESS NOTES
Hospitalist Progress Note      Name:  Jessica Bautista /Age/Sex: 1945  (68 y.o. male)   MRN & CSN:  [id number] & [id number] Admission Date/Time: 1/15/2022 12:49 PM   Location:   PCP: Krupa Zapata MD         Hospital Day: 4    Assessment and Plan:                               Hospital course:      68 y. o. male who presents to the ED on 1/15  with cough with clear sputum x several days.  He reports that he has been experiencing SOB for a couple of months. He also reported history of falls and endorsed a recent episode of his \"legs giving out\".    In the ED: Patient's vitals were unremarkable. Respiratory panel was unremarkable .    The following diagnostic images were done in the ED:            CXR   Patchy bilateral airspace opacities with lower lobe predominance, concerning   for multifocal pneumonia.  Of note, this finding can be seen in the setting   of COVID-19 pneumonia.      Right Tib-Fib XRAY     Ankle: Osseous fragment along the medial aspect of the talar dome with   adjacent soft tissue swelling, as well as irregularity at the tip of the   medial malleolus.  Findings may represent age indeterminate ligamentous   avulsion injury, and correlation with point tenderness is requested.       Additional lateral soft tissue swelling with small tibiotalar joint effusion.       Tibia/fibula: No acute osseous abnormality in the proximal lower leg.      Left shoulder xray , left knee, left ankle and left Tib-Fib xray were unremarkable                                        Hospital problems:         Multifocal PNA   - seen on CXR  - not sure if this is truly bacterial --> procal is 0.211  - the CT abd ordered on admission did show fibrotic changes   Which makes me believe that this may be fibrotic changes we are seeing on the CXR?   - Respiratory panel was negative for viral infections including COVID  - WBC is normal and no symptoms of a PNA and no hypoxia  - repeat CXR  did not show any changes and WBC has not trended upwards   - for now will not treat  This may be a past infection? Maybe this is not a   PNA but rather old pulm fibrosis from COPD?   - could this be fluid overload? BNP was elevated on admission  - reports he had J&J vaccine summer 2021        Acute respiratory distress   - cxr shows multifocal PNA and the patient also has an elevated BNP  - noted possible COPD exacerbation however he did not receive treatment for it   And he is not exhibiting signs of COPD exac--> for now will not treat    - he states that he had a history of CHF when he had his CABG   - reports his SOB has been present for \"months\" worse with exertion  - echo done during this admission:      Left Ventricle   Left ventricular systolic function is normal.   Ejection fraction is visually estimated at 50-55%. Mild left ventricular hypertrophy. Left ventricle size is normal.   No regional wall motion abnormalites. Indeterminate diastolic function; E/A flow reversal is noted.          - proBNP has trended down 834--> 934 --> 828. 2         Debility with multiple falls   - has a history of multiple falls  - PT/OT  Consulted   - d/w CM today that swing bed would be a good option   - noted on xrays above he has a possible avulsion injury -> treatment is conservative         Elevated BNP   - no history of CHF  - he is on torsemide for his CKD   - echo did not show HF   - his BNP decreased when lasix was given however his cr trended upwards  - the elevated BNP might be 2/2  To CKD    - see below under KAREEM        COPD  - no signs of flare up   - continue to monitor         Chronic constipation   -PRN and daily meds added last BM 3 days ago  - CT abd ordered by admitting hospitalist --> pending         KAREEM with CKD stage 3  - possibly post renal? A bradshaw was placed on admission   - around baseline but his SOB may be due to the elevated BNP ( fluid overload)   - however his cr trended upwards on 1/17 along with the cr  - I gave him a trial of lasix 20mg IV and he apparently received the torsemide before I discontinued it  - The patient BNP improved after IV  lasix and torsemide were given ; will do a trial of torsemide 20mg BID and check and see if his cr is able to tolerate the 20mg BID  - also checking a renal ultrasound given his post renal in the ED and required a bradshaw cath will check for hydronephrosis.          Hyperkalemia:  - resolved   - admitting hospitalist gave kayexalate        Afib  - rate controlled   - con home meds on oral AC   - his eliquis was reduced OP to 2.5mg BID  - pharmacist suggests that we can increase his dose 5mg PO BID  Because he does not meet criteria for reduced dose        DM type 2  - controlled with ISS and lantus 40U   - CCM         CAD   - with hx of CABG  - con home med  - ECHO results are above            Hyperlipidemia  - it doesn't appear that the patient is on a statin?  - will need to further look into         Dispo: renal ultrasound               Anticipate dc to swing bed soon  ( CM working on it)            History of Present Illness:     Chief Complaint:   Doing well   No cp no sob no gu complaints  Reports that his SOB improved with the lasix        Ten point ROS reviewed negative, unless as noted above    Objective:        Intake/Output Summary (Last 24 hours) at 1/18/2022 1119  Last data filed at 1/18/2022 0806  Gross per 24 hour   Intake 490 ml   Output 600 ml   Net -110 ml      Vitals:   Vitals:    01/18/22 0556   BP:    Pulse: 81   Resp: 16   Temp: 98.4 °F (36.9 °C)   SpO2: 94%     Physical Exam:     GEN    Awake male, getting his renal ultrasound done  in no apparent distress. AAOX3      EYES   No scleral erythema, discharge, or conjunctivitis.     HENT  Mucous membranes are moist. Oral pharynx without exudates,      NECK  Supple, no apparent thyromegaly or masses.     RESP  Clear to auscultation, no wheezes, rales or rhonchi.  Symmetric chest movement while on room air.     CARDIO/VASC           S1/S2 auscultated. Regular rate without appreciable murmurs, rubs, or gallops. No JVD or carotid bruits. Peripheral pulses equal bilaterally and palpable. No peripheral edema.     GI        Abdomen is soft without significant tenderness, masses, or guarding. Bowel sounds are normoactive. Rectal exam deferred.             No costovertebral angle tenderness. Normal appearing external genitalia. Reyes catheter is not in place ( removed)      HEME/LYMPH            No palpable cervical lymphadenopathy and no hepatosplenomegaly.  No petechiae or ecchymoses.     MSK    No gross joint deformities.     SKIN    Normal coloration, warm, dry.     NEURO           Cranial nerves appear grossly intact, normal speech, no lateralizing weakness.     PSYCH            Awake, alert, oriented x 4.  Affect appropriate    Medications:   Medications:    lidocaine  1 patch TransDERmal Nightly    apixaban  5 mg Oral BID    hydrALAZINE  5 mg IntraVENous Once    lidocaine  1 patch TransDERmal Daily    insulin lispro  0-6 Units SubCUTAneous TID WC    insulin lispro  0-3 Units SubCUTAneous Nightly    amLODIPine  10 mg Oral Daily    losartan  25 mg Oral Daily    cyclobenzaprine  10 mg Oral Nightly    magnesium oxide  400 mg Oral BID    metoprolol succinate  50 mg Oral Daily    pantoprazole  40 mg Oral QAM AC    torsemide  20 mg Oral Daily    aspirin EC  81 mg Oral Daily    sodium chloride flush  5-40 mL IntraVENous BID    insulin glargine  40 Units SubCUTAneous Nightly      Infusions:    dextrose      sodium chloride       PRN Meds: glucose, 15 g, PRN  glucagon (rDNA), 1 mg, PRN  dextrose, 100 mL/hr, PRN  dextrose bolus (hypoglycemia), 125 mL, PRN   Or  dextrose bolus (hypoglycemia), 250 mL, PRN  acetaminophen, 650 mg, Q4H PRN  albuterol sulfate HFA, 1 puff, Q4H PRN  sodium chloride flush, 10 mL, PRN  sodium chloride, 25 mL, PRN  potassium chloride, 40 mEq, PRN   Or  potassium alternative oral replacement, 40 mEq, PRN   Or  potassium chloride, 10 mEq, PRN  magnesium sulfate, 2,000 mg, PRN  nicotine polacrilex, 2 mg, Q1H PRN  polyethylene glycol, 17 g, Daily PRN  fleet, 1 enema, Daily PRN          Electronically signed by Elise Roberts MD on 1/18/2022 at 11:19 AM

## 2022-01-18 NOTE — FLOWSHEET NOTE
Physical Therapy Treatment Note   Date: 2022 Room: [unfilled]   Name: Guy Craven : 1945   MRN: [id number] Admission Date:1/15/2022    Primary Problem:   Past Medical History:   Diagnosis Date    Acid reflux     Arrhythmia     Arthritis     \"Back, Hands, Fingers\"    CAD (coronary artery disease)     2000 non-critical;  no signigicante change, Sees Dr. Kam Perez CH[facility] (congestive heart failure) ([facility])     Chronic back pain     CKD (chronic kidney disease) stage 3, GFR 30-59 ml/min ([facility] [address] 2015    Sees Dr. Yared García    COPD (chronic obstructive pulmonary disease) ([facility] [address] 11/16/15    Diabetes mellitus ([facility] [address] Dx     Glaucoma     \"Both Eyes\"    H/O 24 hour EKG monitoring ,2017    Conclusion abnormal 48 hours of cardiac monitor finding consistent with sinus rhythm with physiological heart rate variation frequent complex ventricular ectopy. Patient did not report any symptoms for correlation    H/O cardiac catheterization 2009    Patent coronary arteries with ectasia and minimal coronary luminal irregularities and preserved left ventricular function.  H/O cardiovascular stress test 2018    Lexiscan Cardiolite. ECG portion of test is negative for ischemia, normal ventricular contractility, no infarct or ischemia noted, normal stress myocardial perfusion, EF 58%. Normal study.  H/O complete electrocardiogram 2012    H/O Doppler ultrasound 2007    Bormal study suggestive of lack of evidence of any significant peripheral arterial disease. Patient'a symptoms are very suggestive of non-ischemic and non-vascular etiology. When compared to the previous study of , ther is no significant change.  H/O Doppler ultrasound (Lower extremity) 2017    Native arteries in the examined lower extremity(ies) are patent, with no elevated velocities. No aneurysms are noted.     H/O percutaneous left heart catheterization 2016    Multivessel CAD with 100 % occluded RCA. 80 % to 90%  circumflex diffusely diseased. 80% mid LAD focal lesion.  Heat syncope 9/15/2016    [place] (hard of hearing)     Bilateral Ears    Hx of echocardiogram 2/6/19; 10/27/2016    2/6/19  LV function and size normal, mild concentric LVH, no regional wall motion abnormalities, normal diastolic filling pattern for age, mildly dilated LA, sclerotic but non-stenotic aortic valve, mitral annular calcification, RVSP= 27 mmHg, EF 55-60%.  Hyperlipidemia     Hypertension     Macular degeneration     Bilateral Eyes    Other activity(E029.9) 04/05/2012    48 Holter Monitor. Normal sinus rhythm with frequent low-grade supraventricular ectopy, without clinically significant arrhythmias.  PAF [facility]oxysmal atrial fibrillation) ([facility])     PVD (peripheral vascular disease) ([facility] [address]     S/P CABG x 3 10/25/2016    10/3/16 LIMA-LAD, SVG-PDA, SVG-Cx + Maze+Appendage resection Dr Felicitas Pierce 2000's    Nose    SOB (shortness of breath) on exertion 8/12/2014    Teeth missing     Upper And Lower    Ventricular ectopy     supraventricular and ventricular ectopy    Wears dentures     Full Upper    Wears glasses      Past Surgical History:   Procedure Laterality Date    CARDIAC SURGERY  10/03/2016    CABG (3 Bypasses)    COLONOSCOPY  Last Done In 2000's    Polyps Removed In Past    CORONARY ARTERY BYPASS GRAFT  10/03/2016    Coronary Artery Bypass Graft x 3. LIMA to LAD. SVG to PDA. SVG to Cx. MVR with CG ring. LA appendage resection.  Epi and endocardial MAZE.    CYST REMOVAL  2000's    Back    DENTAL SURGERY      Teeth Extracted In Past    EYE SURGERY Bilateral 2015    Cataracts With Lens Implants    FRACTURE SURGERY Left 01/11/2016    Broken Left Hip With Hardware    OTHER SURGICAL HISTORY  10/03/2017    sternal plate    SKIN CANCER EXCISION  2000's    Nose    THORACENTESIS Left 10/06/2016    [facility]- Dr Chan [facility]     Rehabilitation Diagnosis: impaired mobility  Restrictions/Precautions:     Subjective:   Initial Pain level: **0/10*    Goals:                   Short term goals  Time Frame for Short term goals: 1 week  Short term goal 1: patient will safely perform bed mobility activities with mod A  Short term goal 2: patient will safely perform transfer actvities with max A  Short term goal 3: patient will stand x3' holding supported  by walker               Plan of Care:             Times per week: Mon- Sat 5+/wk            Current Treatment Recommendations: ROM,Strengthening,Transfer Training,ADL/Self-care Training,Functional Mobility Training,Balance Training,Gait Training      Objective Findings:    Date:22 Date:  Date:  Date:  Date:    Bed mobility Mod A       Sit to stand transfer Max A       Stand to sit transfer Max A       Commode transfer Max A       Standing tolerance poor       Ambulation Bed to commode using RW- Max A due to leg weakness       Stairs np         Exercises:   Exercise/Equipment/Modalities  Date:  Date:  Date:  Date:                                                                               Modality/intervention used:   [ x] Therapeutic Exercise   [ ] Modalities:   [x ] Therapeutic Activity     [ ] Ultrasound   [ ] Elec Stim   [ x] Gait Training     [ ] Cervical Traction  [ ] Lumbar Traction   [ ] Neuromuscular Re-education   [ ] Cold/hotpack  [ ] Iontophoresis   [ ] Instruction in HEP     [ ] Vasopneumatic   [ ] Manual Therapy   [ ] Aquatic Therapy     Other Therapeutic Activities:    Home Exercise Program/Education provided to patient:     Manual Treatments:     Communication with other providers:      Adverse reactions to treatment:     Treatment/Activity Tolerance:   [x ] Patient limited by fatigue [ ] Patient limited by pain [ ] Patient limited by other medical complications [ ] Patient tolerated therapy well  [ ] Other:     Post Tx Pain Ratin /10     Safety Precautions:   [ ] left in bed  [x ] left in chair [ x] call light within reach  [ ] bed alarm on  [ x] personal alarm on  [ ] other staff present:    Plan:   [ x] Continue per plan of care [ ] Rich Byrd current plan   [ ] Plan of care initiated [ ] Hold pending MD visit [ ] Discharge     Plan for Next Session:     Time In: 6635,2190  Time BFL:4606,7241   Total Treatment Minutes: 21  Billed Units: 1 TA    Signed: Adryan Young PT, 1/18/2022, 3:41 PM

## 2022-01-19 PROBLEM — R53.81 PHYSICAL DEBILITY: Status: ACTIVE | Noted: 2022-01-01

## 2022-01-19 NOTE — CARE COORDINATION
SWING BED PROGRAM PRE-ADMISSION ASSESSMENT  (TRADITIONAL MEDICARE PATIENTS)  Patient Name: Guy Craven      : 1945  (78 y.o.) Gender: male   Inpatient Admission Dater:   01/15/2022  Room: [facility]008- MRN: [id number]    Home Phone #:  [phone]  Emergency Contact and Phone Number:  Sparkle Schultz   [phone]      Inpatient Admission Date: 1/15/2022 Date & Time of Referral:  2022     Referred By: Delfino Chin MD Referred from:  [x] [place]   [] Other:     # of Skilled Care Days Used in Last 60 days: 0 Insurance: [x]  Medicare                      []  Secondary  Type:     Present Condition/Diagnosis:    Debility [R53.81]  Generalized weakness [R53.1]  Multiple contusions [T07. XXXA]  Fall, initial encounter [id number]. XXXA]  Sprain of right ankle, unspecified ligament, initial encounter [S93.401A]      Previous Medical History:   Past Medical History:   Diagnosis Date    Acid reflux     Arrhythmia     Arthritis     \"Back, Hands, Fingers\"    CAD (coronary artery disease)     2000 non-critical;  no signigicante change, Sees Dr. Kam Perez [facility]congest[facility] failure) ([facility])     Chronic back pain     CKD (chronic kidney disease) stage 3, GFR 30-59 ml/min ([facility] [address] 2015    Sees Dr. Yared García    COPD (chronic obstructive pulmonary disease) ([facility] [address] 11/16/15    Diabetes mellitus ([facility] [address] Dx     Glaucoma     \"Both Eyes\"    H/O 24 hour EKG monitoring /,2017    Conclusion abnormal 48 hours of cardiac monitor finding consistent with sinus rhythm with physiological heart rate variation frequent complex ventricular ectopy. Patient did not report any symptoms for correlation    H/O cardiac catheterization 2009    Patent coronary arteries with ectasia and minimal coronary luminal irregularities and preserved left ventricular function.  H/O cardiovascular stress test 2018    Lexiscan Cardiolite.   ECG portion of test is negative for ischemia, normal ventricular contractility, no infarct or ischemia noted, normal stress myocardial perfusion, EF 58%. Normal study.  H/O complete electrocardiogram 04/05/2012    H/O Doppler ultrasound 09/13/2007    Bormal study suggestive of lack of evidence of any significant peripheral arterial disease. Patient'a symptoms are very suggestive of non-ischemic and non-vascular etiology. When compared to the previous study of 2005, ther is no significant change.  H/O Doppler ultrasound (Lower extremity) 01/30/2017    Native arteries in the examined lower extremity(ies) are patent, with no elevated velocities. No aneurysms are noted.  H/O percutaneous left heart catheterization 09/23/2016    Multivessel CAD with 100 % occluded RCA. 80 % to 90%  circumflex diffusely diseased. 80% mid LAD focal lesion.  Heat syncope 9/15/2016    [place] (hard of hearing)     Bilateral Ears    Hx of echocardiogram 2/6/19; 10/27/2016    2/6/19  LV function and size normal, mild concentric LVH, no regional wall motion abnormalities, normal diastolic filling pattern for age, mildly dilated LA, sclerotic but non-stenotic aortic valve, mitral annular calcification, RVSP= 27 mmHg, EF 55-60%.  Hyperlipidemia     Hypertension     Macular degeneration     Bilateral Eyes    Other activity(E029.9) 04/05/2012    48 Holter Monitor. Normal sinus rhythm with frequent low-grade supraventricular ectopy, without clinically significant arrhythmias.     [facility]     PVD [facility]peripheral vascular disease) ([facility])     S/P CABG x 3 10/25/2016    10/3/16 LIMA-LAD, SVG-PDA, SVG-Cx + Maze+Appendage resection Dr Lupis Cornelius 2000's    Nose    SOB (shortness of breath) on exertion 8/12/2014    Teeth missing     Upper And Lower    Ventricular ectopy     supraventricular and ventricular ectopy    Wears dentures     Full Upper    Wears glasses         COGNITIVE/BEHAVIORAL  Change in cognitive status in last 90 days:  ( )no change ( ) improved  ( ) deteriorated  Behavioral changes in last seven days:  ( ) wandering  ( ) verbally abusive  ( )phys. Abusive                                                                         ( ) socially inappropriate  ( ) resists care  ADL Performance Last Seven (7) Days (Please Score)   Patients performance over all shifts during last seven (7) days   0 = Independent  No help or oversight  1 = Supervision  Oversight, encouragement or cueing provided  2 = Limited Assist  Receives physical help in guided maneuvering of limbs or other non-weight bearing assistance  3 = Extensive Assist  Patient performs part of the activity, weight bearing support was provided  4 = Dependence = Full staff performance of activity *NOTE: USE THE FOLLOWING SCORING FOR EATING ONLY:  1 = Supervision or Independent  2 = Limited Assist  3 = Dependent or Extensive Assist     SCORE   BED MOBILITY  How client moves to and from lying position, turns side to side, and positions body while in bed 3   TRANSFER  How resident moves between surfaces  to/from: bed, chair, wheelchair, standing position (EXCLUDE to/from bath/toilet) 3   TOILET USE  How client uses the toilet room (or commode, bedpan, urinal);transfers on/off toilet, cleanses, changes pad, manages ostomy or catheter, adjusts clothes 3   EATING (SCORE 1-3 ONLY*)  How client eats and drinks (regardless of skill).  Includes intake of nourishment by other means (e.g., tube feeding, total parenteral nutrition) 1   Estimated Pre-Admission ADL Value: 10     PATIENT WILL RECEIVE THE FOLLOWING SKILLED SERVICES AS A SWING BED PATIENT:       REHABILITATION (PT/OT/SP)    [] ULTRA HIGH 720 or more minutes minimum per week of at least two (2) disciplines  1st for at least five (5) days and 2nd for at least three (3) days   [] VERY HIGH 500 or more minutes minimum per week of at least one (1) discipline for at least five (5) days   [x] HIGH 325 or more minutes minimum per week of at least one (1) discipline for at least five (5) days   [] MEDIUM 150 or more minutes minimum per week at least five (5) days of any combination with three (3) therapies   [] LOW Restorative nursing at least six (6) days, two (2) activities, or therapies for at least three (3) days at least forty-five (45) minute per week minimum services. EXTENSIVE SERVICES   [] Tracheostomy Care   [] Ventilator/Respirator   [] Infection Isolation   SPECIAL CARE HIGH   [] MS with ADL greater than or equal to 10  [] Quadriplegic with ADL greater than or equal to 5  [] emphysema/COPD and shortness of breath when lying flat  [] Fever w/at least one (1) of the following: [] dehydration [] pneumonia [] vomiting [] weight loss  [] feeding tube  [] Septicemia  [] Coma (not awake & completely dependent in ADL)  [] Diabetes and injections seven (7) days and Dr. order change two (2) or more days.   [] Parenteral/IV feeding  [] respiratory therapy for seven (7) days   SPECIAL CARE LOW:   [] Respiratory Therapy  [] Ulcers (2+sites all stages) w/treatment  [] Multiple Sclerosis  [] Cerebral Palsy  [] Parkinsons Disease  [] Oxygen Therapy  [] Extensive care services w/ADL less than 6  [] Fever w/at least one (1) of the following: [] dehydration [] pneumonia [] vomiting [] weight loss  [] Foot Infection or open lesions on the foot with treatment  [] tube-feeding  calories greater than or equal to 51% or tube feeding with total calories greater than or equal to 26% and fluid parental or enteral intake of greater than or equal to 50 ml per day   CLINICALLY COMPLEX   CURRENTLY:  [] Pneumonia  [] Hemiplegics with ADL with ADL Sum greater than or equal to 10  [] IV medications delivered post admission in the SNF  [] Surgical wounds or open lesions w/treatment      EVALUATORS SIGNATURE:Dominique Hutson DATE: 1/19/2022       [x] ACCEPTED FOR ADMISSION ON:  01/19/2022                            ELOS:  [] 1-2wks  [] 2-3wks  [] 3-4wks   [] ADMISSION DENIED BASED ON:    [] [address] STAFF COORDINATOR

## 2022-01-19 NOTE — CARE COORDINATION
CM notified the swing bed coordinator that the physician feels the patient is stable for discharge today and plans to discharge/readmit the patient to the swing bed program.

## 2022-01-19 NOTE — PLAN OF CARE
Discussed with pt safety measures to prevent falls  Problem: Falls - Risk of:  Goal: Will remain free from falls  Description: Will remain free from falls  Outcome: Ongoing  Goal: Absence of physical injury  Description: Absence of physical injury  Outcome: Ongoing     Problem: Pain:  Goal: Pain level will decrease  Description: Pain level will decrease  Outcome: Ongoing  Goal: Control of acute pain  Description: Control of acute pain  Outcome: Ongoing  Goal: Control of chronic pain  Description: Control of chronic pain  Outcome: Ongoing     Problem: Skin Integrity:  Goal: Will show no infection signs and symptoms  Description: Will show no infection signs and symptoms  Outcome: Ongoing  Goal: Absence of new skin breakdown  Description: Absence of new skin breakdown  Outcome: Ongoing

## 2022-01-19 NOTE — CARE COORDINATION
[facility]  Swing Bed Evaluation for Certification for 40 [address]oughby meets skilled criteria due to the need for   [ x] Therapy; physical and occupational; for decreased strength, balance and self     care activities. [ ] Tpn   [ ] [place] care  [ ] IV therapy  [ ] Elodia Roque lives x[ ] Alone   [ ] With Spouse  [ ] Other:   and plans on returning there at discharge. Tanisha Moyer prefers this facially for skilled care. Tanisha Moyer will require skilled care on a daily basis beginning 1/19/2022, if medically stable. Estimated length of stay [x ] 1-2 weeks   [ ] 2-3 weeks  [ ] 3-4 weeks       Dominique LIU  1/19/2022         Cosigned by:    Revision History

## 2022-01-19 NOTE — CARE COORDINATION
CM contacted by the patient's PASSPORT case [address] [facility] Jay Rubin, [phone]) who confirmed that he receives services through the [facility] waiver program which includes:  an emergency response system at home, incontinence supplies, personal aide services through [facility], and skilled nursing visits through [facility]. CM will follow.

## 2022-01-19 NOTE — PROGRESS NOTES
Pt seen by therapist from approximately [id number]. Pt's spouse in room and pt expressed feeling ok with her being there. Pt reports minimal leisure engagement d/t low vision and physically feeling he is unable to participate. Pt discussed that he prefers to be active in the morning and complete individual activities.   Pt expressed enjoyment in:  MELANI Yu  Current Events  Car Rides  Television  Sports on TV: Football-[place]  Movies  Music-Country  Walking  Yard Work    Electronically signed by Radha Hodge MA on 1/19/2022 at 2:38 PM

## 2022-01-19 NOTE — DISCHARGE SUMMARY
Discharge Summary    Name:  Bard Park /Age/Sex: 1945  (68 y.o. male)   MRN & CSN:  [id number] & [id number] Admission Date/Time: 1/15/2022 12:49 PM   Attending:  No att. providers found Discharging Physician: Donley Kocher, MD     Hospital Course: This is a 51-year-old male that initially presented with debility with multiple falls as well as acute respiratory distress with noted bronchiectasis and fibrotic changes on CT scan on admission. Patient remained on room air but did have a chronic cough throughout his hospitalization. Given that his procalcitonin was unremarkable he was not treated with antibiotics and did progress well with improvement in his acute respiratory distress without any antibiotic therapy. I discussed the changes seen on his CT scan as well as a prior attending with the patient and did instruct the patient to follow-up with the lung doctor for further evaluation with PFTs as an outpatient. Given patient's recent mechanical falls he did have multiple x-rays as well as CT scans did not show any evidence of any acute fractures except for questionable medial malleolus ligamentous avulsion injury which is not treated operatively. Patient progressed well with PT OT who recommended swing bed for the patient. Patient was discharged to swing bed in stable condition doing very well. He was very eager to participate with PT and to go home shortly after working with them. I did again reiterate to the patient the importance of following up with a pulmonologist upon discharge.     Bronchiectasis  Fibrotic changes in the lower portion of the lung  Acute respiratory distress-resolved  Right lower lobe nodule  -Chest x-ray did say questionable type focal pneumonia but none was seen on CT scan and his Pro-Javier was unremarkable  -We will need follow-up with a pulmonologist for PFTs as an outpatient  -Will also need follow-up for the 1.2 cm nodular component noted on the CT scan of his chest; follow-up with pulmonology and PCP. Will need repeat scan in 3 months  -On room air; continue home inhaler therapies    Moderate mitral valve stenosis  -Continue to follow with PCP who can refer to cardiology  -Was seen on echo after obtained for elevated BNP    History of COPD  -States he is never formally diagnosed with does have an inhaler  -We will continue discharge and as per above needs outpatient PFTs    Debility with multiple falls  -Minute swing bed program    Chronic constipation  -As needed laxatives     KAREEM on CKD stage III  -Resolved with IV Lasix  -Ultrasound unremarkable  -Continue p.o. torsemide twice daily  -Follow-up with nephrologist as an outpatient    Hyperkalemia  -Resolved    Atrial fibrillation  -Rate controlled  -Continue Eliquis    Diabetes mellitus type 2  -Sliding scale and Lantus  -Glycemia protocol    History of coronary disease  -Does have history of CABG  -Continue home medications    Hyperlipidemia  -No statin therapy; unsure if had myopathy in the past  -Follow-up with PCP    The patient expressed appropriate understanding of and agreement with the discharge recommendations, medications, and plan.      Consults this admission:  IP CONSULT TO HOSPITALIST  IP CONSULT TO SOCIAL WORK    Discharge Instruction:   Follow up appointments: Pulmonology  Primary care physician:  within 2 weeks    Diet: Diabetic  Activity: activity as tolerated  Disposition: Discharged to:   []Home, []HHC, []SNF, []Acute Rehab, []Hospice Swing bed  Condition on discharge: Stable    Discharge Medications:        Medication List      ASK your doctor about these medications    acetaminophen 500 MG tablet  Commonly known as: Acetaminophen Extra Strength  Take 2 tablets by mouth every 4 hours as needed for Pain Do not take more than 6 tablets per day     ALBUTEROL IN     amLODIPine 10 MG tablet  Commonly known as: NORVASC  Take 1 tablet by mouth daily     apixaban 2.5 MG Tabs tablet  Commonly known as: Eliquis  TAKE ONE TABLET TWO TIMES A DAY     aspirin EC 81 MG EC tablet  Take 1 tablet by mouth daily     cyclobenzaprine 10 MG tablet  Commonly known as: FLEXERIL     guaiFENesin 600 MG extended release tablet  Commonly known as: MUCINEX     ICAPS PO     Icy Hot Advanced Relief 16-11 % Crea  Generic drug: Menthol-Camphor     insulin lispro 100 UNIT/ML injection vial  Commonly known as: HUMALOG  Inject 0-12 Units into the skin 3 times daily (with meals)     losartan 25 MG tablet  Commonly known as: COZAAR  TAKE 1 TABLET BY MOUTH DAILY     magnesium oxide 400 MG tablet  Commonly known as: MAG-OX  TAKE ONE TABLET TWO TIMES A DAY     metoprolol succinate 100 MG extended release tablet  Commonly known as: TOPROL XL  Take 0.5 tablets by mouth daily     nitroGLYCERIN 0.4 MG SL tablet  Commonly known as: NITROSTAT     omeprazole 40 MG delayed release capsule  Commonly known as: PRILOSEC     Praluent 75 MG/ML Soaj injection pen  Generic drug: alirocumab  INJECT 1 ML INTO THE SKIN EVERY 14 DAYS     REFRESH DRY EYE THERAPY OP     SENOKOT PO     SITagliptin 50 MG tablet  Commonly known as: JANUVIA     torsemide 20 MG tablet  Commonly known as: Demadex  Take 1 tablet by mouth 2 times daily     traZODone 100 MG tablet  Commonly known as: DESYREL     Tresiba FlexTouch 100 UNIT/ML Sopn  Generic drug: Insulin Degludec            Objective Findings at Discharge:   BP (!) 143/87   Pulse 83   Temp 98 °F (36.7 °C) (Oral)   Resp 16   Ht 5' 8\" (1.727 m)   Wt 187 lb 12.8 oz (85.2 kg)   SpO2 95%   BMI 28.55 kg/m²            PHYSICAL EXAM   GEN Awake male, sitting upright in bed in no apparent distress. Appears given age. EYES Pupils are equally round. No scleral erythema, discharge, or conjunctivitis. HENT Mucous membranes are moist. Oral pharynx without exudates, no evidence of thrush. NECK Supple, no apparent thyromegaly or masses. RESP some rhonchi bilaterally; remains on room air  CARDIO/VASC S1/S2 auscultated.  Regular rate without appreciable murmurs, rubs, or gallops. No JVD or carotid bruits. MSK No gross joint deformities. SKIN Normal coloration, warm, dry. NEURO Cranial nerves appear grossly intact, normal speech. PSYCH Awake, alert, oriented x 4. Affect appropriate. BMP/CBC  Recent Labs     01/17/22  0400 01/17/22  0400 01/17/22  1350 01/18/22  0455 01/19/22  0555      < > 138 139 138   K 4.7   < > 4.4 4.8 4.6      < > 101 102 100   CO2 25   < > 28 27 29   BUN 41*   < > 41* 44* 49*   CREATININE 2.7*   < > 2.5* 2.6* 2.6*   WBC 6.2  --   --  7.0 7.6   HCT 37.5*  --   --  38.1* 37.2*     --   --  172 166    < > = values in this interval not displayed. IMAGING:  CT PE Protocol:    1.  No evidence of hydronephrosis or obstructive uropathy.       2.  Bilateral lower lobe bronchiectasis, bronchial wall thickening, and   peripheral reticulation suggestive of fibrotic change.  Right lower lobe   linear atelectasis/scarring demonstrates a 1.2 cm nodular component. Consider follow-up CT chest in 3 months. XR Right Ankle and Tib/Fib: Ankle: Osseous fragment along the medial aspect of the talar dome with   adjacent soft tissue swelling, as well as irregularity at the tip of the   medial malleolus.  Findings may represent age indeterminate ligamentous   avulsion injury, and correlation with point tenderness is requested.       Additional lateral soft tissue swelling with small tibiotalar joint effusion.       Tibia/fibula: No acute osseous abnormality in the proximal lower leg. XR L Shoulder:  FINDINGS:   There is diffuse demineralization, which limits fracture detection. No   visible fracture or dislocation.  Variation the humeral head on internal   rotation is likely due to suboptimal positioning.  Overlying soft tissues are   grossly unremarkable.  Mild acromioclavicular arthrosis.  Chest findings   reported separately. XR L Ankle/Tib/fib:   Ankle: Osseous fragments are noted along the medial

## 2022-01-20 NOTE — PROGRESS NOTES
Occupational Therapy    Occupational Therapy Treatment Note      Name: Bard Park MRN: [id number] :   1945   Date:  2022   Admission Date: 2022 Room:  [address] [place] [address]     Primary Problem: weakness    Restrictions/Precautions:  Restrictions/Precautions  Restrictions/Precautions: Fall Risk  Required Braces or Orthoses?: No  Implants present? :  (brace in chest, screws in hip)     Communication with other providers: ok to see per nurse; Lorenaal was co-treat    Subjective:  Patient states: I will put my clothes on  Pain:denies    Objective:    Observation:Pt was up in chair  Objective Measures:  Pt was seen for adls, sit to stands and using urinal, toileting    Treatment, including education:   Mobility: Pt was Max A x1 for sit to stand from chair; stand to sit min A + verbal cues  Using urinal/toileting:  Nurse assistant held urinal, Pt was D for handling clothing  ADLs: Pt was CGA to don shirt, just needed it pulled down; Pants were D to don  toileting with urinal: D    Assessment / Impression:    Patient's tolerance of treatment: good but getting tired  Adverse Reaction:0  Significant change in status and impact:  improving  Barriers to improvement: fatigue , weakness    Plan for Next Session:    adls or transfers or exercise    Time in: 1133  Time out: 1150  Timed treatment minutes: 17  Total treatment time:  17      Electronically signed by:    Suzy Blanco OT,   2022, 11:57 AM

## 2022-01-20 NOTE — PROGRESS NOTES
Physical Therapy    Facility/Department: [facility] UNIT  Initial Assessment    NAME: Christopher Ying  : 1945  MRN: [id number]    Date of Service: 2022    Discharge Recommendations:  Continue to assess pending progress,Subacute/Skilled Nursing Facility,Home with nursing aide,Home with Home health PT,Home with assist PRN,24 hour supervision or assist   PT Equipment Recommendations:  B AFOs  Other: continue to assess    Assessment   Body structures, Functions, Activity limitations: Decreased ADL status; Decreased functional mobility ; Decreased strength;Decreased endurance;Decreased high-level IADLs;Decreased ROM; Decreased sensation;Decreased balance;Decreased posture;Decreased safe awareness;Decreased coordination  Assessment: bobby is a 68year old male that presents with increased weakness and falls. Pt would benefit from skilled PT in order to address functional mobility, strength, and endurance. Strongly recommend neuro consult at discharge and B AFOs. Prognosis: Good  Decision Making: Medium Complexity  History: Hx of falls, pt reprots it has been a year since he has been moving much secondary to numbness in bilateral extremeties, pt reports eval was the most he had walked in a while. Exam: see below  Clinical Presentation: evolving  PT Education: Goals;Transfer Training;Equipment; Adaptive Device Training; Injury Prevention;General Safety  Barriers to Learning: none - pt blind in left eye  REQUIRES PT FOLLOW UP: Yes  Activity Tolerance  Activity Tolerance: Patient Tolerated treatment well;Patient limited by endurance;Treatment limited secondary to medical complications (free text); Patient limited by fatigue  Activity Tolerance: pt after ambulating, pt reports ready for a nap       Patient Diagnosis(es): There were no encounter diagnoses.      has a past medical history of Acid reflux, Arrhythmia, Arthritis, CAD (coronary artery disease), CHF (congestive heart failure) ([facility] [address] Chronic back pain, CKD (chronic kidney disease) stage 3, GFR 30-59 ml/min ([facility]), COPD (chronic obstructive pulmonary disease) ([facility] [address] Diabetes mellitus ([facility] [address] Glaucoma, H/O 24 hour EKG monitoring, H/O cardiac catheterization, H/O cardiovascular stress test, H/O complete electrocardiogram, H/O Doppler ultrasound, H/O Doppler ultrasound (Lower extremity), H/O percutaneous left heart catheterization, Heat syncope, Koi (hard of hearing), Hx of echocardiogram, Hyperlipidemia, Hypertension, Macular degeneration, Other activity(E029.9), PAF (paroxysmal atrial fibrillation) ([facility] [address] [facility]per[facility] S/P CABG x 3, Skin Cancer, SOB (shortness of breath) on exertion, Teeth missing, Ventricular ectopy, Wears dentures, and Wears glasses. has a past surgical history that includes cyst removal (2000's); Thoracentesis (Left, 10/06/2016); Coronary artery bypass graft (10/03/2016); Skin cancer excision (2000's); eye surgery (Bilateral, 2015); Dental surgery; Colonoscopy (Last Done In 2000's); fracture surgery (Left, 01/11/2016); Cardiac surgery (10/03/2016); and other surgical history (10/03/2017). Restrictions  Restrictions/Precautions  Restrictions/Precautions: Fall Risk  Required Braces or Orthoses?: No  Implants present? :  (brace in chest, screws in hip)  Vision/Hearing  Vision: Impaired  Vision Exceptions: Wears glasses for reading;Visual field cut (left eye blind, cataract surgery bilateral)  Hearing: Exceptions to [facility]  Hearing Exceptions: Hard of hearing/hearing concerns     Subjective  General  Chart Reviewed: Yes  Patient assessed for rehabilitation services?: Yes  Family / Caregiver Present: No  Follows Commands: Within Functional Limits  General Comment  Comments: Pt was in chair upon arrival and exit. Subjective  Subjective: Pt reports no pain throughout. Pt reprots not walking much in the last month.   Pain Screening  Patient Currently in Pain: No  Pain Assessment  Pain Assessment: 0-10  Pain Level: 0  Vital Signs  Patient Currently in Pain: No       Orientation  Orientation  Overall Orientation Status: Within Functional Limits  Social/Functional History  Social/Functional History  Lives With: Alone  Type of Home: Apartment  Home Layout: One level  Home Access: Level entry  Bathroom Shower/Tub: Tub/Shower unit (sponge bathes mostly due to inability to get into the tb.)  Bathroom Toilet: Handicap height  Bathroom Equipment: Grab bars around toilet,Grab bars in shower  Home Equipment: Rolling walker,Wheelchair-manual,Lift chair,Wheelchair-electric  Receives Help From: Personal care attendant (aide comes in for 3 hoursx7 days and a nurse 2days/week)  ADL Assistance: Needs assistance  Bath: Moderate assistance  Dressing:  Moderate assistance  Grooming: Minimal assistance  Feeding: Independent  Toileting: Needs assistance  Homemaking Assistance: Needs assistance  Homemaking Responsibilities: No  Ambulation Assistance: Needs assistance (reports using RW at home and w/c in community; pt reports being sedentary and not walking much for ~1 year)  Transfer Assistance: Needs assistance  Active : No  Occupation: Retired  Type of occupation: farmer, 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[place] [address] Dr: Pt reports he can't do much with limited vision now, listens to music sometimes and just sits in his chair  IADL Comments: Pt get assistance with ADLs from aid that comes 7 days a week in the morning and evening  Cognition        Objective     Observation/Palpation  Posture: Fair (kypothic)  Observation: Pt is awake and in the chair    PROM RLE (degrees)  RLE PROM: Exceptions  RLE General PROM: limited ankle dorsiflexion to neutral  AROM RLE (degrees)  RLE AROM: Exceptions  RLE General AROM: decreased throughout; knee extension lacking 20-30 degrees in non-WB  PROM LLE (degrees)  LLE PROM: Exceptions  LLE General PROM: limited ankle dorsiflexion to neutral  AROM LLE (degrees)  LLE AROM : Exceptions  LLE General AROM: decreased throughout; knee extension lacking 20-30 degrees in non-WB  Strength RLE  Strength RLE: Exception  R Hip Flexion: 3+/5  R Hip ABduction: 4-/5  R Hip ADduction: 4-/5  R Knee Flexion: 3/5  R Knee Extension: 3-/5  R Ankle Dorsiflexion: 3-/5  R Ankle Plantar flexion: 3-/5  Strength LLE  Strength LLE: Exception  L Hip Flexion: 3/5  L Hip ABduction: 3+/5  L Hip ADduction: 3+/5  L Knee Flexion: 3-/5  L Knee Extension: 3-/5  L Ankle Dorsiflexion: 3-/5  L Ankle Plantar Flexion: 3-/5  Tone RLE  RLE Tone: Clonus  Tone LLE  LLE Tone: Clonus  Tone Description: increased on L  Motor Control  Gross Motor?: Exceptions  Comments: Pt demonstrates foot drop and abnormal gait  Coordination  Finger to Nose: Normal  Heel to Jose: Abnormal (pt unbale to get heel to shin)  Sensation  Overall Sensation Status: Impaired  Additional Comments: pt reports numbness in feet  Bed mobility  Comment: not assessed at this date, pt in chair upon arrival and exit  Transfers  Sit to Stand: Maximum Assistance;2 Person Assistance (mod Ax3)  Stand to sit: Moderate Assistance;2 Person Assistance  Bed to Chair: Contact guard assistance  Stand Pivot Transfers: Contact guard assistance  Ambulation  Ambulation?: Yes  WB Status: FWB  More Ambulation?: Yes  Ambulation 1  Surface: level tile  Device: Rolling Walker  Assistance: Minimal assistance  Quality of Gait: recurvatum Bilaterally, overpronation, foot drop,  Gait Deviations: Decreased step length; Slow Lucrecia  Distance: 5ft  Comments: close chair follow  Ambulation 2  Surface - 2: level tile  Device 2: Rolling Walker  Assistance 2: Contact guard assistance  Quality of Gait 2: recurvatum Bilaterally, overpronation, foot drop,  Gait Deviations: Slow Lucrecia;Decreased step length  Distance: 14ft  Comments: close chair follow  Stairs/Curb  Stairs?: No     Balance  Posture: Fair  Sitting - Static: Fair;+  Sitting - Dynamic: Fair  Standing - Static: Fair;-  Standing - Dynamic: Fair;-  Comments: pt uses RW, bracing in sitting; cues for hand placement  Exercises  Gluteal Sets: x10 3\" hold  Hip Flexion: x5 bilaterally  Hip Abduction: x5 with resistance  Knee Long Arc Quad: x5  Ankle Pumps: x5 bilaterally  Comments: resisted hip Adduction; decreased enduracne and strength     Plan   Plan  Times per week: Mon- Sat 5+/wk; recommend two short session per day if possible  Times per day: Daily  Current Treatment Recommendations: ROM,Strengthening,Transfer Training,ADL/Self-care Training,Functional Mobility Training,Balance Training,Gait Training,Endurance Training,Neuromuscular Re-education,Equipment Evaluation, Education, & procurement,IADL Training,Home Exercise Program,Safety Education & Training,Positioning (ther ex, ther act, bed mobilty)  Safety Devices  Type of devices: All fall risk precautions in place,Call light within reach,Chair alarm in place,Gait belt,Left in chair      AM-PAC Score        Basic Mobility Six Clicks Form [facility] AM-PAC Score Conversion Table   How much difficulty does the patient currently have Unable   (pt is unable to do activity) A Lot   (activity is a struggle, requires great effort/time) A Little   (pt can manage, but takes more effort/time than should) None   (pt has no difficulty) Raw Score Standardized Score CMS -100% Score CMS Modifier        6 23.55 100% CN   Turning over in bed (including adjusting bedclothes, sheets, and blankets)? []1 []2  [x]3  []4  7 26.42 92.36% CM        8 28.58 86.62% CM   Sitting down on and standing up from a chair with arms (e.g. wheelchair, bedside commode, etc.)? []1 [x]2 []3   []4   9 30.55 81.38% CM        10 32.29 76.75% CL   Moving from lying on back to sitting on the side of the bed?  []1 [x]2  []3   []4   11 33.86 72.57% CL        12 35.33 68.66% CL   How much help from another person does the patient currently need Total   (Total/Dependent Assist) A Lot   (Max/Mod Assist) A Little   (Min/CGA/Supervision) None   (No human assistance) 13 36.74 64.91% CL        14 38.1 61.29% CL   Moving to and from a bed to a chair (including a wheelchair)? []1  []2   [x]3  []4   15 39.45 57.70% CK        16 40.78 54.16% CK   To walk in a hospital room? []1 []2   [x]3    []4  17 42.13 50.57% CK        18 43.63 46.58% CK   Climbing 3-5 steps with a railing?  [x]1  []2   []3    []4  19 45.44 41.77% CK        20 47.67 35.83% CJ   Raw Score  14 21 50.25 28.97% CJ   Standardized Score  38.1 22 53.28 20.91% CJ   CMS 0-100% Score  61.29% 23 56.93 11.20% CI   CMS Modifier CL  24 61.14 0.00% CH     CH = 0% impaired  CI = 1-20% impaired  CJ = 20-40% impaired  CK = 40-60% impaired  CL = 60-80% impaired  CM = % impaired  CN = 100% impaired    Goals  Short term goals  Time Frame for Short term goals: 1 week or until d/c  Short term goal 1: Pt will demonstrate bed mobilty Mod I  Short term goal 2: pt will demonstrate Mod I sit to/from stand  Short term goal 3: Pt will demonstrate 50 ft ambulation with RW  Short term goal 4: Pt will demonstrate dynamic standing balance for 10 minutes  Short term goal 5: Pt will complete 10 consecutive minutes on NuStep in order to improve endurance    Therapy Time   Individual Concurrent Group Co-treatment   Time In       1045   Time Out       1110   Minutes       25   Timed Code Treatment Minutes: [address] [facility], PT DPT [id number]

## 2022-01-20 NOTE — PROGRESS NOTES
catheterization, Heat syncope, [place] (hard of hearing), Hx of echocardiogram, Hyperlipidemia, Hypertension, Macular degeneration, Other activity(E029.9), PAF (paroxysmal atrial fibrillation) ([facility] [address] [facility]peripheral vascular disease) [facility] S/P CABG x 3, Skin Cancer, SOB (shortness of breath) on exertion, Teeth missing, Ventricular ectopy, Wears dentures, and Wears glasses. has a past surgical history that includes cyst removal (2000's); Thoracentesis (Left, 10/06/2016); Coronary artery bypass graft (10/03/2016); Skin cancer excision (2000's); eye surgery (Bilateral, 2015); Dental surgery; Colonoscopy (Last Done In 2000's); fracture surgery (Left, 01/11/2016); Cardiac surgery (10/03/2016); and other surgical history (10/03/2017). Treatment Diagnosis: Weakness      Restrictions  Restrictions/Precautions  Restrictions/Precautions: Fall Risk  Required Braces or Orthoses?: No  Implants present? :  (brace in chest, screws in hip)    Subjective   General  Patient assessed for rehabilitation services?: Yes  Patient Currently in Pain: No  Pain Assessment  Pain Assessment: 0-10  Pain Level: 0  Vital Signs  Patient Currently in Pain: No  Social/Functional History  Social/Functional History  Lives With: Alone  Type of Home: Apartment  Home Layout: One level  Home Access: Level entry  Bathroom Shower/Tub: Tub/Shower unit (sponge bathes mostly due to inability to get into the tb.)  Bathroom Toilet: Handicap height  Bathroom Equipment: Grab bars around toilet,Grab bars in shower  Home Equipment: Rolling walker,Wheelchair-manual,Lift chair,Wheelchair-electric  Receives Help From: Personal care attendant (aide comes in for 3 hoursx7 days and a nurse 2days/week)  ADL Assistance: Needs assistance  Bath: Moderate assistance  Dressing:  Moderate assistance  Grooming: Minimal assistance  Feeding: Independent  Toileting: Needs assistance  Homemaking Assistance: Needs assistance  Homemaking Responsibilities: No  Ambulation Assistance: Needs assistance (reports using RW at home and w/c in community; pt reports being sedentary and not walking much for ~1 year)  Transfer Assistance: Needs assistance  Active : No  Occupation: Retired  Type of occupation: farmer, 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[place] [address] Dr: Pt reports he can't do much with limited vision now, listens to music sometimes and just sits in his chair  IADL Comments: Pt get assistance with ADLs from aid that comes 7 days a week in the morning and evening       Objective   Vision: Impaired  Vision Exceptions: Wears glasses for reading  Hearing: Exceptions to [facility]  Hearing Exceptions: Hard of hearing/hearing concerns    Orientation  Overall Orientation Status: Within Functional Limits  Observation/Palpation  Posture: Fair (kypothic)  Observation: Pt is awake and in the chair  Balance  Sitting Balance: Modified independent   Standing Balance:  Moderate assistance  Standing Balance  Activity: Pt was mod A x 2-3 to stand but then Min A+Mod A  Functional Mobility  Functional - Mobility Device: Rolling Walker  Assist Level: Contact guard assistance  ADL  Feeding: Modified independent ;Setup  Grooming: Stand by assistance  UE Bathing: Contact guard assistance (simulated)  LE Bathing: Maximum assistance  UE Dressing: Contact guard assistance  LE Dressing: Maximum assistance (pajama pants)  Toileting: Contact guard assistance  Tone RUE  RUE Tone: Normotonic  Tone LUE  LUE Tone: Normotonic  Coordination  Movements Are Fluid And Coordinated: Yes        Transfers  Sit to stand: 2 Person assistance;Maximum assistance  Stand to sit: 2 Person assistance;Minimal assistance  Vision - Basic Assessment  Prior Vision: Wears glasses only for reading (in L eye)  Visual History: Macular degeneration  Vision Comments: Blind in left eye  Cognition  Overall Cognitive Status: WFL        Sensation  Overall Sensation Status: Impaired  Additional Comments: numbness in feet        LUE AROM (degrees)  LUE AROM : WFL  RUE AROM (degrees)  RUE AROM : WFL  LUE Strength  Gross LUE Strength: WFL  RUE Strength  Gross RUE Strength: WFL     Hand Dominance  Hand Dominance: Right             Plan   Plan  Times per week: $+  Current Treatment Recommendations: Strengthening,Balance Training,Endurance Training,Functional Mobility Training,Safety Education & Training,Patient/Caregiver Education & Training,Self-Care / ADL,Equipment Evaluation, Education, & procurement    G-Code     OutComes Score                                                  AM-PAC Score       AM-PAC 6 click short form for inpatient daily activity:   How much help from another person does the patient currently need. .. Unable  Dep A Lot  Max A A Lot   Mod A A Little  Min A A Little   CGA  SBA None   Mod I  Indep  Sup   1. Putting on and taking off regular lower body clothing? [x] 1    [] 2   [] 2   [] 3   [] 3   [] 4      2. Bathing (including washing, rinsing, drying)? [] 1   [] 2   [x] 2 [] 3 [] 3 [] 4   3. Toileting, which includes using toilet, bedpan, or urinal? [] 1    [] 2   [x] 2   [] 3   [] 3   [] 4     4. Putting on and taking off regular upper body clothing? [] 1   [] 2   [] 2   [] 3   [x] 3    [] 4      5. Taking care of personal grooming such as brushing teeth? [] 1   [] 2    [] 2 [] 3    [x] 3   [] 4      6. Eating meals? [] 1   [] 2   [] 2   [] 3   [] 3   [x] 4      Raw Score: 15/24  40-59% impaired     [24=0% impaired(CH), 23=1-19%(CI), 20-22=20-39%(CJ), 15-19=40-59%(CK), 10-14=60-79%(CL), 7-9=80-99%(CM), 6=100%(CN)]         Goals  Short term goals  Time Frame for Short term goals: Until DC or goals met  Short term goal 1: Pt will be Mod I for functional adl transfers to chair, toilet, or bed w/o LOB or falls using good walker safety  Short term goal 2: Pt will be SBA/S for UB bathing, min A LB bathing using necessary a.e. Short term goal 3: Pt will be SBA UB dressing/ Mod A LB dressing using necessary a.e.   Short term goal 4: Pt will be SBA toileting  Short term goal 5: Pt will complete 10+ min of BUE therex to increase strength/endurance for transfers, using WC and ADLs.   Patient Goals   Patient goals : to return home with [facility]       Therapy Time   Individual Concurrent Group Co-treatment   Time In 6142 2036   Time Out 8743     4017   Minutes 15     34   Timed Code Treatment Minutes: 24 Minutes       Devi Goetz, OT

## 2022-01-20 NOTE — PLAN OF CARE
Problem: Infection:  Goal: Will remain free from infection  Description: Will remain free from infection  1/20/2022 1002 by Karyn Wise RN  Outcome: Ongoing  1/19/2022 2344 by Jimi Neumann LPN  Outcome: Ongoing     Problem: Safety:  Goal: Free from accidental physical injury  Description: Free from accidental physical injury  1/20/2022 1002 by Karyn Wise RN  Outcome: Ongoing  1/19/2022 2344 by Jimi Neumann LPN  Outcome: Ongoing  Goal: Free from intentional harm  Description: Free from intentional harm  1/20/2022 1002 by Karyn Wise RN  Outcome: Ongoing  1/19/2022 2344 by Jimi Neumann LPN  Outcome: Ongoing     Problem: Daily Care:  Goal: Daily care needs are met  Description: Daily care needs are met  1/20/2022 1002 by Karyn Wise RN  Outcome: Ongoing  1/19/2022 2344 by Jimi Neumann LPN  Outcome: Ongoing     Problem: Pain:  Goal: Patient's pain/discomfort is manageable  Description: Patient's pain/discomfort is manageable  1/20/2022 1002 by Karyn Wise RN  Outcome: Ongoing  1/19/2022 2344 by Jimi Neumann LPN  Outcome: Ongoing     Problem: Skin Integrity:  Goal: Skin integrity will stabilize  Description: Skin integrity will stabilize  1/20/2022 1002 by Karyn Wise RN  Outcome: Ongoing  1/19/2022 2344 by Jimi Neumann LPN  Outcome: Ongoing     Problem: Discharge Planning:  Goal: Patients continuum of care needs are met  Description: Patients continuum of care needs are met  1/20/2022 1002 by Karyn Wise RN  Outcome: Ongoing  1/19/2022 2344 by Jimi Neumann LPN  Outcome: Ongoing     Problem: Falls - Risk of:  Goal: Will remain free from falls  Description: Will remain free from falls  1/20/2022 1002 by Karyn Wise RN  Outcome: Ongoing  1/19/2022 2344 by Jimi Neumann LPN  Outcome: Ongoing  Goal: Absence of physical injury  Description: Absence of physical injury  1/20/2022 1002 by Karyn Wise RN  Outcome: Ongoing  1/19/2022 2344 by Jimi Neumann LPN  Outcome: Ongoing

## 2022-01-20 NOTE — PROGRESS NOTES
Pt provided with handout of interesting facts about penguins. Today is Dequan and Priyank. Client encouraged to read handout independently.     Electronically signed by ALICIA Rivera MA on 1/20/2022 at 3:31 PM

## 2022-01-20 NOTE — H&P
History and Physical      Name:  Bessy Cavazos /Age/Sex: 1945  (68 y.o. male)   MRN & CSN:  [id number] & [id number] Admission Date/Time: 2022  9:54 AM   Location:  [id number] PCP: Jerri Mejía MD       Hospital Day: 2    Assessment and Plan: This is a 59-year-old male that initially presented with debility multiple falls as well as acute respiratory distress with noted bronchiectasis and fibrotic changes on CT scan; admitted to swing bed for continued physical therapy prior to discharge home. Debility  -Continue working with PT OT daily  -Plan to discharge home as appropriate    Bronchiectasis  Fibrotic changes in the lower portion of the lung  Acute respiratory distress-resolved  Right lower lobe nodule  -Chest x-ray did say questionable type focal pneumonia but none was seen on CT scan and his Pro-Javier was unremarkable  -We will need follow-up with a pulmonologist for PFTs as an outpatient  -Will also need follow-up for the 1.2 cm nodular component noted on the CT scan of his chest; follow-up with pulmonology and PCP.   Will need repeat scan in 3 months  -On room air; continue home inhaler therapies     Moderate mitral valve stenosis  -Continue to follow with PCP who can refer to cardiology  -Was seen on echo after obtained for elevated BNP this admission     History of COPD  -States he is never formally diagnosed with does have an inhaler  -We will continue discharge and as per above needs outpatient PFTs     Debility with multiple falls  -Minute swing bed program     Chronic constipation  -As needed laxatives      KAREEM on CKD stage III  -Resolved with IV Lasix  -Ultrasound unremarkable  -Continue p.o. torsemide twice daily  -Follow-up with nephrologist as an outpatient     Hyperkalemia  -Resolved     Atrial fibrillation  -Rate controlled  -Continue Eliquis     Diabetes mellitus type 2  -Sliding scale and Lantus  -Glycemia protocol     History of coronary disease  -Does have history of CABG  -Continue home medications     Hyperlipidemia  -No statin therapy; unsure if had myopathy in the past  -Follow-up with PCP    Diet ADULT DIET; Regular; 3 carb choices (45 gm/meal); Low Sodium (2 gm)   DVT Prophylaxis [] Lovenox, []  Heparin, [] SCDs, [] Ambulation   GI Prophylaxis [] PPI,  [] H2 Blocker,  [] Carafate,  [] Diet/Tube Feeds   Code Status Full Code   Disposition Patient requires continued admission due to    MDM [] Low, [] Moderate,[]  High  Patient's risk as above due to      History of Present Illness: This is a 75-year-old male that initially presented with debility multiple falls as well as acute respiratory distress with noted bronchiectasis and fibrotic changes on CT scan. Patient progressed very well during the course of his hospitalization not requiring any antibiotic therapy remained afebrile. He did progress slowly with physical therapy who did recommend admission to the swing bed for which the patient was admitted. Patient seen and examined this morning was doing very well with no chest pain nausea vomiting shortness of breath. States that his sputum production is decreased significantly and overall is feeling very well excited to start working physical therapy. Objective: Intake/Output Summary (Last 24 hours) at 1/20/2022 1018  Last data filed at 1/20/2022 1002  Gross per 24 hour   Intake 840 ml   Output 700 ml   Net 140 ml      Vitals:   Vitals:    01/20/22 0719   BP: (!) 130/55   Pulse: 84   Resp: 16   Temp: 97.8 °F (36.6 °C)   SpO2: 93%     Physical Exam:     GEN    Awake male, sitting upright in bed in no apparent distress. Appears given age. EYES   Pupils are equally round. No scleral erythema, discharge, or conjunctivitis. HENT  Mucous membranes are moist. Oral pharynx without exudates, no evidence of thrush. NECK  Supple, no apparent thyromegaly or masses. RESP  some rhonchi bilaterally; remains on room air  CARDIO/VASC           S1/S2 auscultated. Regular rate without appreciable murmurs, rubs, or gallops. No JVD or carotid bruits. MSK    No gross joint deformities. SKIN    Normal coloration, warm, dry. NEURO           Cranial nerves appear grossly intact, normal speech. PSYCH            Awake, alert, oriented x 4. Affect appropriate. Past Medical History:      Past Medical History:   Diagnosis Date    Acid reflux     Arrhythmia     Arthritis     \"Back, Hands, Fingers\"    CAD (coronary artery disease)     09/2000 non-critical; 2004 no signigicante change, Sees Dr. Bailey Dennison CHF (congestive heart failure) ([facility])     Chronic back pain     CKD (chronic kidney disease) stage 3, GFR 30-59 ml/min ([facility] [address] 12/03/2015    Sees Dr. Jose M Lizama    COPD (chronic obstructive pulmonary disease) ([facility] [address] 11/16/15    Diabetes mellitus ([facility] [address] Dx 1990's    Glaucoma     \"Both Eyes\"    H/O 24 hour EKG monitoring 7/161/8,01/26/2017    Conclusion abnormal 48 hours of cardiac monitor finding consistent with sinus rhythm with physiological heart rate variation frequent complex ventricular ectopy. Patient did not report any symptoms for correlation    H/O cardiac catheterization 12/27/2009    Patent coronary arteries with ectasia and minimal coronary luminal irregularities and preserved left ventricular function.  H/O cardiovascular stress test 08/09/2018    Lexiscan Cardiolite. ECG portion of test is negative for ischemia, normal ventricular contractility, no infarct or ischemia noted, normal stress myocardial perfusion, EF 58%. Normal study.  H/O complete electrocardiogram 04/05/2012    H/O Doppler ultrasound 09/13/2007    Bormal study suggestive of lack of evidence of any significant peripheral arterial disease. Patient'a symptoms are very suggestive of non-ischemic and non-vascular etiology. When compared to the previous study of 2005, ther is no significant change.      H/O Doppler ultrasound (Lower extremity) 01/30/2017    Native arteries in the examined lower extremity(ies) are patent, with no elevated velocities. No aneurysms are noted.  H/O percutaneous left heart catheterization 09/23/2016    Multivessel CAD with 100 % occluded RCA. 80 % to 90%  circumflex diffusely diseased. 80% mid LAD focal lesion.  Heat syncope 9/15/2016    [place] (hard of hearing)     Bilateral Ears    Hx of echocardiogram 2/6/19; 10/27/2016    2/6/19  LV function and size normal, mild concentric LVH, no regional wall motion abnormalities, normal diastolic filling pattern for age, mildly dilated LA, sclerotic but non-stenotic aortic valve, mitral annular calcification, RVSP= 27 mmHg, EF 55-60%.  Hyperlipidemia     Hypertension     Macular degeneration     Bilateral Eyes    Other activity(E029.9) 04/05/2012    48 Holter Monitor. Normal sinus rhythm with frequent low-grade supraventricular ectopy, without clinically significant arrhythmias.  PAF (paroxysmal atrial fibrillation) ([facility]     PVD (peripheral vascular disease) ([address]     S/P CABG x 3 10/25/2016    10/3/16 LIMA-LAD, SVG-PDA, SVG-Cx + Maze+Appendage resection Dr Angelica Small 2000's    Nose    SOB (shortness of breath) on exertion 8/12/2014    Teeth missing     Upper And Lower    Ventricular ectopy     supraventricular and ventricular ectopy    Wears dentures     Full Upper    Wears glasses      PSHX:  has a past surgical history that includes cyst removal (2000's); Thoracentesis (Left, 10/06/2016); Coronary artery bypass graft (10/03/2016); Skin cancer excision (2000's); eye surgery (Bilateral, 2015); Dental surgery; Colonoscopy (Last Done In 2000's); fracture surgery (Left, 01/11/2016); Cardiac surgery (10/03/2016); and other surgical history (10/03/2017). Allergies:    Allergies   Allergen Reactions    Aldactone [Spironolactone]      \"Developed Pain In The  Breasts And Knots In The Breasts\"    Crestor [Rosuvastatin Calcium]      \"Leg Cramps\"    Lipitor      \"Leg Cramps\"    Zocor [Simvastatin - High Dose]      \"Leg Cramps\"       FAM HX: family history includes Breast Cancer in his sister; Cancer in his father, mother, sister, and son; Diabetes in his father; Early Death (age of onset: 48) in his mother; Other in his father. Soc HX:   Social History     Socioeconomic History    Marital status:      Spouse name: Not on file    Number of children: Not on file    Years of education: Not on file    Highest education level: Not on file   Occupational History    Not on file   Tobacco Use    Smoking status: Former Smoker     Packs/day: 2.00     Years: 30.00     Pack years: 60.00     Types: Cigarettes     Start date:      Quit date:      Years since quittin.0    Smokeless tobacco: Never Used   Vaping Use    Vaping Use: Never used   Substance and Sexual Activity    Alcohol use: No    Drug use: No    Sexual activity: Never   Other Topics Concern    Not on file   Social History Narrative    Not on file     Social Determinants of Health     Financial Resource Strain:     Difficulty of Paying Living Expenses: Not on file   Food Insecurity:     Worried About [address] in the Last Year: Not on file    Precious of Food in the Last Year: Not on file   Transportation Needs:     Lack of Transportation (Medical): Not on file    Lack of Transportation (Non-Medical):  Not on file   Physical Activity:     Days of Exercise per Week: Not on file    Minutes of Exercise per Session: Not on file   Stress:     Feeling of Stress : Not on file   Social Connections:     Frequency of Communication with Friends and Family: Not on file    Frequency of Social Gatherings with Friends and Family: Not on file    Attends Hindu Services: Not on file    Active Member of Clubs or Organizations: Not on file    Attends Club or Organization Meetings: Not on file    Marital Status: Not on file   Intimate Partner Violence:     Fear of Current or Ex-Partner: Not on file    Emotionally Abused: Not on file    Physically Abused: Not on file    Sexually Abused: Not on file   Housing Stability:     Unable to Pay for Housing in the Last Year: Not on file    Number of Places Lived in the Last Year: Not on file    Unstable Housing in the Last Year: Not on file       Medications:   Medications:    insulin lispro  5 Units SubCUTAneous TID WC    sodium chloride flush  5-40 mL IntraVENous BID    amLODIPine  10 mg Oral Daily    apixaban  5 mg Oral BID    aspirin EC  81 mg Oral Daily    cyclobenzaprine  10 mg Oral Nightly    insulin glargine  40 Units SubCUTAneous Nightly    insulin lispro  0-3 Units SubCUTAneous Nightly    insulin lispro  0-6 Units SubCUTAneous TID WC    lidocaine  1 patch TransDERmal Nightly    lidocaine  1 patch TransDERmal Daily    losartan  25 mg Oral Daily    magnesium oxide  400 mg Oral BID    metoprolol succinate  50 mg Oral Daily    pantoprazole  40 mg Oral QAM AC    torsemide  20 mg Oral BID      Infusions:    sodium chloride      dextrose       PRN Meds: sodium chloride, 25 mL, PRN  fleet, 1 enema, Daily PRN  nicotine polacrilex, 2 mg, Q1H PRN  polyethylene glycol, 17 g, Daily PRN  sodium chloride flush, 10 mL, PRN  acetaminophen, 650 mg, Q4H PRN  albuterol sulfate HFA, 1 puff, Q4H PRN  dextrose, 100 mL/hr, PRN  dextrose bolus (hypoglycemia), 125 mL, PRN   Or  dextrose bolus (hypoglycemia), 250 mL, PRN  glucagon (rDNA), 1 mg, PRN  glucose, 15 g, PRN          Electronically signed by Chung Mattson MD on 1/20/2022 at 10:18 AM

## 2022-01-21 NOTE — PROGRESS NOTES
Occupational Therapy    Occupational Therapy Treatment Note  Name: Cathlene Baumgarten MRN: [id number] :   1945   Date:  2022   Admission Date: 2022 Room:  [address] [place], [place] [address]   Restrictions/Precautions:  Restrictions/Precautions  Restrictions/Precautions: Fall Risk  Required Braces or Orthoses?: No  Implants present? :  (brace in chest, screws in hip)     Communication with other providers:   Cleared for treatment by RN. Subjective:  Patient states:  \"I have an aide that helps me at home\"  Pain:   Location, Type, Intensity (0/10 to 10/10): No pain     Objective:    Observation:  Pt alert and oriented    Treatment, including education:  Transfers  Supine to sit :SBA using rails and HOB elevated  Scooting :SBA  Sit to stand : Mod A  Stand to sit :CGA  SPT:CGA      Self Care Training:   Activities performed today included the following:    Grooming  Sup for oral care, wash face and comb hair    Bathing   Mod A for B LEs and roxi area/buttocks    UB Dress  Sup zack pull over shirt    LB Dress  Max A required assistance to thread over B feet and pull up clothes due to unsteady balance. Therapeutic Activity Training: Pt completed functional mobility with CGA with RW x 16' x 2 and chair follow. Safety Measures: Gait belt used, Left in bed, Pull/Bed Alarm activated and call light left in reach        Assessment / Impression:        Patient's tolerance of treatment: Good   Adverse Reaction: None  Significant change in status and impact:  None  Barriers to improvement:  Dec strength and endurance    Plan for Next Session:    Continue with POC.     Time in:  730  Time out:  825  Total treatment time:  55  Billed Units: 3 ADL, 1 TA  Electronically signed by:    Garrison Flower, [address]    2022, 10:05 AM    Previously filed values:  Patient Goals   Patient goals : to return home with [address]  Short term goals  Time Frame for Short term goals: Until DC or goals met  Short term goal 1: Pt will be Mod I for functional adl transfers to chair, toilet, or bed w/o LOB or falls using good walker safety  Short term goal 2: Pt will be SBA/S for UB bathing, min A LB bathing using necessary a.e. Short term goal 3: Pt will be SBA UB dressing/ Mod A LB dressing using necessary a.e. Short term goal 4: Pt will be SBA toileting  Short term goal 5: Pt will complete 10+ min of BUE therex to increase strength/endurance for transfers, using WC and ADLs.

## 2022-01-21 NOTE — PROGRESS NOTES
Attempted to complete pt's 1:1 session at ~1035. Therapist will attempt again at a later time.      Electronically signed by ALICIA Guillaume MA on 1/21/2022 at 10:51 AM

## 2022-01-21 NOTE — FLOWSHEET NOTE
Physical Therapy Treatment Note   Date: 2022 Room: [unfilled]   Name: Jessica Bautista : 1945   MRN: [id number] Admission Date:2022    Primary Problem:   Past Medical History:   Diagnosis Date    Acid reflux     Arrhythmia     Arthritis     \"Back, Hands, Fingers\"    CAD (coronary artery disease)     2000 non-critical;  no signigicante change, Sees Dr. Cuba Muñoz CHF (congestive heart failure) (HCC)     Chronic back pain     CKD (chronic kidney disease) stage 3, GFR 30-59 ml/min ([facility] [address] 2015    Sees Dr. Gloria Chapa    COPD (chronic obstructive pulmonary disease) ([facility] [address] 11/16/15    Diabetes mellitus ([facility] [address] Dx     Glaucoma     \"Both Eyes\"    H/O 24 hour EKG monitoring ,2017    Conclusion abnormal 48 hours of cardiac monitor finding consistent with sinus rhythm with physiological heart rate variation frequent complex ventricular ectopy. Patient did not report any symptoms for correlation    H/O cardiac catheterization 2009    Patent coronary arteries with ectasia and minimal coronary luminal irregularities and preserved left ventricular function.  H/O cardiovascular stress test 2018    Lexiscan Cardiolite. ECG portion of test is negative for ischemia, normal ventricular contractility, no infarct or ischemia noted, normal stress myocardial perfusion, EF 58%. Normal study.  H/O complete electrocardiogram 2012    H/O Doppler ultrasound 2007    Bormal study suggestive of lack of evidence of any significant peripheral arterial disease. Patient'a symptoms are very suggestive of non-ischemic and non-vascular etiology. When compared to the previous study of , ther is no significant change.  H/O Doppler ultrasound (Lower extremity) 2017    Native arteries in the examined lower extremity(ies) are patent, with no elevated velocities. No aneurysms are noted.     H/O percutaneous left heart catheterization 2016    Multivessel CAD with 100 % occluded RCA. 80 % to 90%  circumflex diffusely diseased. 80% mid LAD focal lesion.  Heat syncope 9/15/2016    [place] (hard of hearing)     Bilateral Ears    Hx of echocardiogram 2/6/19; 10/27/2016    2/6/19  LV function and size normal, mild concentric LVH, no regional wall motion abnormalities, normal diastolic filling pattern for age, mildly dilated LA, sclerotic but non-stenotic aortic valve, mitral annular calcification, RVSP= 27 mmHg, EF 55-60%.  Hyperlipidemia     Hypertension     Macular degeneration     Bilateral Eyes    Other activity(E029.9) 04/05/2012    48 Holter Monitor. Normal sinus rhythm with frequent low-grade supraventricular ectopy, without clinically significant arrhythmias.  PAF [facility]paroxysmal atrial fibrillation) [facility]     PVD (peripheral vascular disease) ([facility] [address]     S/P CABG x 3 10/25/2016    10/3/16 LIMA-LAD, SVG-PDA, SVG-Cx + Maze+Appendage resection Dr Camilo Rico 2000's    Nose    SOB (shortness of breath) on exertion 8/12/2014    Teeth missing     Upper And Lower    Ventricular ectopy     supraventricular and ventricular ectopy    Wears dentures     Full Upper    Wears glasses      Past Surgical History:   Procedure Laterality Date    CARDIAC SURGERY  10/03/2016    CABG (3 Bypasses)    COLONOSCOPY  Last Done In 2000's    Polyps Removed In Past    CORONARY ARTERY BYPASS GRAFT  10/03/2016    Coronary Artery Bypass Graft x 3. LIMA to LAD. SVG to PDA. SVG to Cx. MVR with CG ring. LA appendage resection.  Epi and endocardial MAZE.    CYST REMOVAL  2000's    Back    DENTAL SURGERY      Teeth Extracted In Past    EYE SURGERY Bilateral 2015    Cataracts With Lens Implants    FRACTURE SURGERY Left 01/11/2016    Broken Left Hip With Hardware    OTHER SURGICAL HISTORY  10/03/2017    sternal plate    SKIN CANCER EXCISION  2000's    Nose    THORACENTESIS Left 10/06/2016    [facility]- Dr Ki Anderson     Rehabilitation Diagnosis:   Restrictions/Precautions: Subjective: Patient states feeling okay today. Working with Kam Zhao upon therapist arrival.  Azul Cast my legs give out, I go down\". Initial Pain level: None rated    Goals:                   Short term goals  Time Frame for Short term goals: 1 week or until d/c  Short term goal 1: Pt will demonstrate bed mobilty Mod I  Short term goal 2: pt will demonstrate Mod I sit to/from stand  Short term goal 3: Pt will demonstrate 50 ft ambulation with RW  Short term goal 4: Pt will demonstrate dynamic standing balance for 10 minutes  Short term goal 5: Pt will complete 10 consecutive minutes on NuStep in order to improve endurance               Plan of Care:             Times per week: Mon- Sat 5+/wk; recommend two short session per day if possible            Current Treatment Recommendations: ROM,Strengthening,Transfer Training,ADL/Self-care Training,Functional Mobility Training,Balance Training,Gait Training,Endurance Training,Neuromuscular Re-education,Equipment Evaluation, Education, & procurement,IADL [employer] Exercise Program,Safety Education & Training,Positioning (ther ex, ther act, bed mobilty)      Objective Findings:    Date: 1/21/2022 Date:  Date:  Date:  Date:    Bed mobility x       Sit to stand transfer Mod A       Stand to sit transfer Mod A. Patient with difficulty controlling eccentric lowering to chair       Commode transfer x       Standing tolerance For ambulation       Ambulation Patient ambulated 16'x2 with mod A x1 and chair follow. Seated rest break required between ambulation attempts due to LE fatigue.         Stairs x         Exercises:   Exercise/Equipment/Modalities  Date:   1/21/2022 Date:  Date:  Date:    HR x10      LAQ 10x B      Marching 10x B      Hip Abduction Manual resistance 10x B      Hip Adduction  Manual resistance 10x B      HS Curls Manual resistance 10x B                                      Modality/intervention used:   [x ] Therapeutic Exercise   [ ] Modalities:   [ ] Therapeutic Activity     [ ] Ultrasound   [ ] Elec Stim   [x ] Gait Training     [ ] Cervical Traction  [ ] Lumbar Traction   [ ] Neuromuscular Re-education   [ ] Cold/hotpack  [ ] Iontophoresis   [ ] Instruction in HEP     [ ] Vasopneumatic   [ ] Manual Therapy   [ ] Aquatic Therapy     Other Therapeutic Activities:x    Home Exercise Program/Education provided to patient: x    Manual Treatments: x    Communication with other providers:  Co-tx with LACEY    Adverse reactions to treatment: None    Treatment/Activity Tolerance:   [x ] Patient limited by fatigue [ ] Patient limited by pain [ ] Patient limited by other medical complications [ ] Patient tolerated therapy well  [ ] Other:     Post Tx Pain Rating: None rated /10     Safety Precautions:   [ ] left in bed  [x ] left in chair [x ] call light within reach  [ ] bed alarm on  [x ] personal alarm on  [ ] other staff present:    Plan:   [x ] Continue per plan of care [ ] Deny Mcclelland current plan   [ ] Plan of care initiated [ ] Hold pending MD visit [ ] Discharge     Plan for Next Session: Continue per POC    Time In: 0808  Time Out: 0840  Total Treatment Minutes: 32'  Billed Units: 1 gait, 1 TE    Signed: Florence Pruett, PTA  1/21/2022, 8:41 AM

## 2022-01-22 NOTE — FLOWSHEET NOTE
Physical Therapy Treatment Note   Date: 2022 Room: [unfilled]   Name: Abad Novak : 1945   MRN: [id number] Admission Date:2022    Primary Problem:   Past Medical History:   Diagnosis Date    Acid reflux     Arrhythmia     Arthritis     \"Back, Hands, Fingers\"    CAD (coronary artery disease)     2000 non-critical;  no signigicante change, Sees Dr. Jeannie Carrasco CHF (congestive heart failure) (HCC)     Chronic back pain     CKD (chronic kidney disease) stage 3, GFR 30-59 ml/min ([facility] [address] 2015    Sees Dr. Kristopher Arndt    COPD (chronic obstructive pulmonary disease) ([facility] [address] 11/16/15    Diabetes mellitus ([facility] [address] Dx     Glaucoma     \"Both Eyes\"    H/O 24 hour EKG monitoring ,2017    Conclusion abnormal 48 hours of cardiac monitor finding consistent with sinus rhythm with physiological heart rate variation frequent complex ventricular ectopy. Patient did not report any symptoms for correlation    H/O cardiac catheterization 2009    Patent coronary arteries with ectasia and minimal coronary luminal irregularities and preserved left ventricular function.  H/O cardiovascular stress test 2018    Lexiscan Cardiolite. ECG portion of test is negative for ischemia, normal ventricular contractility, no infarct or ischemia noted, normal stress myocardial perfusion, EF 58%. Normal study.  H/O complete electrocardiogram 2012    H/O Doppler ultrasound 2007    Bormal study suggestive of lack of evidence of any significant peripheral arterial disease. Patient'a symptoms are very suggestive of non-ischemic and non-vascular etiology. When compared to the previous study of , ther is no significant change.  H/O Doppler ultrasound (Lower extremity) 2017    Native arteries in the examined lower extremity(ies) are patent, with no elevated velocities. No aneurysms are noted.     H/O percutaneous left heart catheterization 2016    Multivessel CAD with 100 % occluded RCA. 80 % to 90%  circumflex diffusely diseased. 80% mid LAD focal lesion.  Heat syncope 9/15/2016    [place] (hard of hearing)     Bilateral Ears    Hx of echocardiogram 2/6/19; 10/27/2016    2/6/19  LV function and size normal, mild concentric LVH, no regional wall motion abnormalities, normal diastolic filling pattern for age, mildly dilated LA, sclerotic but non-stenotic aortic valve, mitral annular calcification, RVSP= 27 mmHg, EF 55-60%.  Hyperlipidemia     Hypertension     Macular degeneration     Bilateral Eyes    Other activity(E029.9) 04/05/2012    48 Holter Monitor. Normal sinus rhythm with frequent low-grade supraventricular ectopy, without clinically significant arrhythmias.  PAF [facility]oxysmal atrial fibrillation) ([facility])     PVD (peripheral vascular disease) ([facility] [address]     S/P CABG x 3 10/25/2016    10/3/16 LIMA-LAD, SVG-PDA, SVG-Cx + Maze+Appendage resection Dr Alma Rosa Harrison 2000's    Nose    SOB (shortness of breath) on exertion 8/12/2014    Teeth missing     Upper And Lower    Ventricular ectopy     supraventricular and ventricular ectopy    Wears dentures     Full Upper    Wears glasses      Past Surgical History:   Procedure Laterality Date    CARDIAC SURGERY  10/03/2016    CABG (3 Bypasses)    COLONOSCOPY  Last Done In 2000's    Polyps Removed In Past    CORONARY ARTERY BYPASS GRAFT  10/03/2016    Coronary Artery Bypass Graft x 3. LIMA to LAD. SVG to PDA. SVG to Cx. MVR with CG ring. LA appendage resection.  Epi and endocardial MAZE.    CYST REMOVAL  2000's    Back    DENTAL SURGERY      Teeth Extracted In Past    EYE SURGERY Bilateral 2015    Cataracts With Lens Implants    FRACTURE SURGERY Left 01/11/2016    Broken Left Hip With Hardware    OTHER SURGICAL HISTORY  10/03/2017    sternal plate    SKIN CANCER EXCISION  2000's    Nose    THORACENTESIS Left 10/06/2016    [facility]- Dr Dali Malik     Rehabilitation Diagnosis: Ryann Springer is a 68year old male that presents with increased weakness and falls. Pt would benefit from skilled PT in order to address functional mobility, strength, and endurance. Strongly recommend neuro consult at discharge and B AFOs. Restrictions/Precautions: fall risk, LE giving out,  Walk prior to exercises    Subjective: Patient presented sitting in the chair with his caregiver present. He reports he is feeling tired today   Initial Pain level: 3/10 chronic pain    Goals:                   Short term goals  Time Frame for Short term goals: 1 week or until d/c  Short term goal 1: Pt will demonstrate bed mobilty Mod I  Short term goal 2: pt will demonstrate Mod I sit to/from stand  Short term goal 3: Pt will demonstrate 50 ft ambulation with RW  Short term goal 4: Pt will demonstrate dynamic standing balance for 10 minutes  Short term goal 5: Pt will complete 10 consecutive minutes on NuStep in order to improve endurance               Plan of Care:             Times per week: Mon- Sat 5+/wk; recommend two short session per day if possible            Current Treatment Recommendations: ROM,Strengthening,Transfer Training,ADL/Self-care Training,Functional Mobility Training,Balance Training,Gait Training,Endurance Training,Neuromuscular Re-education,Equipment Evaluation, Education, & procurement,IADL Training,Home Exercise Program,Safety Education & Training,Positioning (ther ex, ther act, bed mobilty)      Objective Findings:    Date: 1/21/2022 Date: 1/22/22 Date:  Date:  Date:    Bed mobility x Not performed      Sit to stand transfer Mod A Trial 1: mod A x2  Trial 2: min-mod A x2  Trial 3: Mod A x1      Stand to sit transfer Mod A. Patient with difficulty controlling eccentric lowering to chair Min A x1-2 with cues to hand placement and to avoid plopping       Commode transfer x Not performed.        Standing tolerance For ambulation Pt stood with CGA to use urinal and later to pull up pants with A      Ambulation Patient ambulated 16'x2 with mod A x1 and chair follow. Seated rest break required between ambulation attempts due to LE fatigue. Pt ambulated 20' and 25' with RW, CGA, and chair follow the first trial. Seated rest break required. Pt demonstrated some difficulty starting and ataxia in L LE      Stairs x         Exercises:   Exercise/Equipment/Modalities  Date:   1/21/2022 Date: 1/22/22 Date:  Date:    HR/TR x10 1x15 B LE     LAQ 10x B 1x10 B LE 5\"     Marching 10x B 1x10 B LE      Hip Abduction Manual resistance 10x B 1x10 YTB B LE     Hip Adduction  Manual resistance 10x B 1x10 manual resistance B LE     HS Curls Manual resistance 10x B 1x10 YTB     glute set  1x10 5\"                              Modality/intervention used:   [x] Therapeutic Exercise   [ ] Modalities:   [x] Therapeutic Activity     [ ] Ultrasound   [ ] Elec Stim   [ ] Gait Training     [ ] Cervical Traction  [ ] Lumbar Traction   [ ] Neuromuscular Re-education   [ ] Cold/hotpack  [ ] Iontophoresis   [ ] Instruction in HEP     [ ] Kalen Sires   [ ] Manual Therapy   [ ] Aquatic Therapy     Other Therapeutic Activities: Pt required A to pull pants up and down, and CGA for standing balance. Pt continues to demonstrate difficulty with transfers and poor hand placement. Pt demonstrated shaking in L LE during exercises when he became tired interfering with exercises and requiring knee ext to stop. Pt demonstrated improved speed and foot clearance while ambulating today. Home Exercise Program/Education provided to patient: Pt educated on therapy plan for the weekend and performing exercises mally glute squeezes on his own as they are very difficult. Pt also educated to ambulate with staff.      Manual Treatments: x    Communication with other providers:  Aide A with ambulation    Adverse reactions to treatment: None noted    Treatment/Activity Tolerance:   [x] Patient limited by fatigue [ ] Patient limited by pain [x] Patient limited by other medical complications [ ] Patient tolerated therapy well  [ ] Other:     Post Tx Pain Rating: None rated 4/10 chronic pain, pt reports it was normal for him.      Safety Precautions:   [ ] left in bed  [x] left in chair [x] call light within reach  [ ] bed alarm on  [x] personal alarm on  [ ] other staff present:    Plan:   [x] Continue per plan of care [ ] Hanane Perea current plan   [ ] Plan of care initiated [ ] Hold pending MD visit [ ] Discharge     Plan for Next Session: Continue per POC, address standing balance and endurance    Time In: 11:20 am  Time Out: 12:00 pm  Total Treatment Minutes: 40''  Billed Units: 1 therapeutic activity, 2 therapeutic exercise    Signed: Zi Young PT, DPT [id number]  1/22/2022, 1:11 PM

## 2022-01-22 NOTE — PLAN OF CARE
Problem: Infection:  Goal: Will remain free from infection  Description: Will remain free from infection  1/22/2022 0957 by Shantal Spears RN  Outcome: Ongoing  1/21/2022 1958 by Ascencion Knapp LPN  Outcome: Ongoing     Problem: Safety:  Goal: Free from accidental physical injury  Description: Free from accidental physical injury  1/22/2022 0957 by Shantal Spears RN  Outcome: Ongoing  1/21/2022 1958 by Ascencion Knapp LPN  Outcome: Met This Shift  Goal: Free from intentional harm  Description: Free from intentional harm  1/22/2022 0957 by Shantal Spears RN  Outcome: Ongoing  1/21/2022 1958 by Ascencion Knapp LPN  Outcome: Met This Shift     Problem: Daily Care:  Goal: Daily care needs are met  Description: Daily care needs are met  1/22/2022 0957 by Shantal Spears RN  Outcome: Ongoing  1/21/2022 1958 by Ascencion Knapp LPN  Outcome: Ongoing

## 2022-01-22 NOTE — PLAN OF CARE
Problem: Safety:  Goal: Free from accidental physical injury  Description: Free from accidental physical injury  1/21/2022 1958 by Jamila Arreola LPN  Outcome: Met This Shift  1/21/2022 1015 by Ras Roper RN  Outcome: Ongoing  Goal: Free from intentional harm  Description: Free from intentional harm  1/21/2022 1958 by Jamila Arreola LPN  Outcome: Met This Shift  1/21/2022 1015 by Ras Roper RN  Outcome: Ongoing     Problem: Falls - Risk of:  Goal: Will remain free from falls  Description: Will remain free from falls  1/21/2022 1958 by Jamila Arreola LPN  Outcome: Met This Shift  1/21/2022 1015 by Ras Roper RN  Outcome: Ongoing  Goal: Absence of physical injury  Description: Absence of physical injury  1/21/2022 1958 by Jamila Arreola LPN  Outcome: Met This Shift  1/21/2022 1015 by Ras Roper RN  Outcome: Ongoing     Problem: Infection:  Goal: Will remain free from infection  Description: Will remain free from infection  1/21/2022 1958 by Jamila Arreola LPN  Outcome: Ongoing  1/21/2022 1015 by Ras Roper RN  Outcome: Ongoing     Problem: Daily Care:  Goal: Daily care needs are met  Description: Daily care needs are met  1/21/2022 1958 by Jamila Arreola LPN  Outcome: Ongoing  1/21/2022 1015 by Ras Roper RN  Outcome: Ongoing     Problem: Pain:  Goal: Patient's pain/discomfort is manageable  Description: Patient's pain/discomfort is manageable  1/21/2022 1958 by Jamila Arreola LPN  Outcome: Ongoing  1/21/2022 1015 by Ras Roper RN  Outcome: Ongoing     Problem: Skin Integrity:  Goal: Skin integrity will stabilize  Description: Skin integrity will stabilize  1/21/2022 1958 by Jamila Arreola LPN  Outcome: Ongoing  1/21/2022 1015 by Ras Roper RN  Outcome: Ongoing     Problem: Discharge Planning:  Goal: Patients continuum of care needs are met  Description: Patients continuum of care needs are met  1/21/2022 1958 by Lauryn Parkinson Dustin Yañez LPN  Outcome: Ongoing  1/21/2022 1015 by Raquel Limon RN  Outcome: Ongoing

## 2022-01-24 NOTE — PROGRESS NOTES
Occupational Therapy    Occupational Therapy Treatment Note  Name: Guy Craven MRN: [id number] :   1945   Date:  2022   Admission Date: 2022 Room:  [address] [place] [address]   Restrictions/Precautions:  Restrictions/Precautions  Restrictions/Precautions: Fall Risk  Required Braces or Orthoses?: No  Implants present? :  (brace in chest, screws in hip)     Communication with other providers:   Cleared for treatment by RN. Subjective:  Patient states:  \"I'm a little tired\" at end of session  Pain:   Location, Type, Intensity (0/10 to 10/10): No pain noted    Objective:    Observation:  Pt alert and oriented up in recliner. Treatment, including education:  Transfers    Sit to stand : Mod A  Stand to sit :Min A for control  SPT:CGA using RW      Therapeutic Exercise:  Pt completed UE exercises to increase strength and ROM to facilitate increase for ADLs and functional mobility. Pt completed B UEs with 3# dumbbell exercises with curls, shoulder presses, punch, stirring and wrist curls x 20 reps with mod rest breaks due to fatigue. Pt completed Nustep x 10 min on level 1 for 407 steps with 40 steps per min with 2 rest breaks. Therapeutic Activity Training: Pt completed transfers with RW with CGA         Safety Measures: Gait belt used, up in chair, Pull/Bed Alarm activated and call light left in reach        Assessment / Impression:        Patient's tolerance of treatment: Good   Adverse Reaction: None  Significant change in status and impact:  None  Barriers to improvement:Dec strength and endurance  Plan for Next Session:    Continue with POC.     Time [id number]  Time out:  1030  Total treatment time:  55  Billed Units: 3 TE, 1 TA  Electronically signed by:    Shantelle Gaston [address]    2022, 10:48 AM    Previously filed values:  Patient Goals   Patient goals : to return home with [facility]  Short term goals  Time Frame for Short term goals: Until DC or goals met  Short term goal 1: Pt will be Mod I for functional adl transfers to chair, toilet, or bed w/o LOB or falls using good walker safety  Short term goal 2: Pt will be SBA/S for UB bathing, min A LB bathing using necessary a.e. Short term goal 3: Pt will be SBA UB dressing/ Mod A LB dressing using necessary a.e. Short term goal 4: Pt will be SBA toileting  Short term goal 5: Pt will complete 10+ min of BUE therex to increase strength/endurance for transfers, using WC and ADLs.

## 2022-01-24 NOTE — FLOWSHEET NOTE
Attempted to see patient for PT. Was getting ready to get a shower with PCT. Offered to walk patient to the shower, but he refused stating that he was too fatigued and stiff from sitting in the chair all day.   Riley Saxena, PTA

## 2022-01-24 NOTE — PROGRESS NOTES
Attempted 1:1 session. Therapist and pt had brief 1:1 interaction from [id number], but then pt received phone call. Pt requested to take phone call. Therapist provided with handout of apple souza to celebrate [employer] day.      Electronically signed by ALICIA Moody MA on 1/24/2022 at 2:16 PM

## 2022-01-24 NOTE — PROGRESS NOTES
Hospitalist Progress Note      Name:  Yasmin Simmons /Age/Sex: 1945  (68 y.o. male)   MRN & CSN:  [id number] & [id number] Admission Date/Time: 2022  9:54 AM   Location:   PCP: Alban Seip, MD         Hospital Day: 6    Assessment and Plan:     1. Debility: Patient continues to work with PT OT daily plan will be to discharge home when appropriate. 2. Bronchiectasis, fibrotic changes in the lower portion of the lung patient will need follow-up with pulmonologist for PFTs as outpatient will also need follow-up for the 1.2 cm nodule component noted on the CT scan of his chest follow-up with pulmonology and PCP. Will need repeat scan in 3 months. Patient is doing well he is on room air. 3. Acute respiratory distress resolved  4. History COPD, he states he is never formally diagnosed with COPD he states he does have an inhaler. 5. Debility with multiple falls, he is in the swing bed program continue working with PT OT  6. Chronic constipation as needed laxatives, Colace nightly  7. KAREEM on CKD 3  8. Hyper kalemia resolved  9. Atrial fibrillation rate is controlled continue Eliquis  10. Diabetes mellitus type 2 continue sliding scale and Lantus  11. Hyperlipidemia patient is not on any statin unsure if he had myopathy in the past, he will need to follow-up with his primary care provider  12. History of coronary artery disease, patient does not have any chest pain he does have a history of a CABG continue current home medications. Diet ADULT DIET; Regular; 3 carb choices (45 gm/meal); Low Sodium (2 gm)   DVT Prophylaxis [] Lovenox, []  Heparin, [] SCDs, [] Ambulation   GI Prophylaxis [] PPI,  [] H2 Blocker,  [] Carafate,  [] Diet/Tube Feeds   Code Status Full Code             History of Present Illness:     Chief Complaint:      Patient seen and examined today. He continues to work with therapy.   He has no complaints of any chest pain, no shortness of breath, no nausea no vomiting no diarrhea. Continue current treatment of physical and Occupational Therapy discharge when appropriate. Ten point ROS reviewed negative, unless as noted above    Objective: Intake/Output Summary (Last 24 hours) at 1/24/2022 0735  Last data filed at 1/23/2022 1846  Gross per 24 hour   Intake 960 ml   Output 500 ml   Net 460 ml      Vitals:   Vitals:    01/24/22 0728   BP: 131/60   Pulse: 92   Resp: 18   Temp: 99.1 °F (37.3 °C)   SpO2: (!) 88%     Physical Exam:   GEN Awake male, sitting upright in bed in no apparent distress. Appears given age. EYES Pupils are equally round. No scleral erythema, discharge, or conjunctivitis. HENT Mucous membranes are moist. Oral pharynx without exudates, no evidence of thrush. NECK Supple, no apparent thyromegaly or masses. RESP Clear to auscultation, no wheezes, rales or rhonchi. Symmetric chest movement while on room air. CARDIO/VASC S1/S2 auscultated. Regular rate without appreciable murmurs, rubs, or gallops. Peripheral pulses equal bilaterally and palpable. No peripheral edema. GI Abdomen is soft without significant tenderness, masses, or guarding. Bowel sounds are normoactive.  No costovertebral angle tenderness. MSK No gross joint deformities. SKIN Normal coloration, warm, dry. NEURO Cranial nerves appear grossly intact, normal speech, no lateralizing weakness. PSYCH Awake, alert, oriented x 4. Affect appropriate.     Medications:   Medications:    insulin lispro  0-18 Units SubCUTAneous TID     insulin lispro  0-9 Units SubCUTAneous Nightly    insulin glargine  45 Units SubCUTAneous Nightly    insulin lispro  5 Units SubCUTAneous TID     amLODIPine  10 mg Oral Daily    apixaban  5 mg Oral BID    aspirin EC  81 mg Oral Daily    cyclobenzaprine  10 mg Oral Nightly    lidocaine  1 patch TransDERmal Nightly    lidocaine  1 patch TransDERmal Daily    losartan  25 mg Oral Daily    magnesium oxide  400 mg Oral BID    metoprolol succinate  50 mg Oral Daily    pantoprazole  40 mg Oral QAM AC    torsemide  20 mg Oral BID      Infusions:   PRN Meds: glucose, 15 g, PRN  glucagon (rDNA), 1 mg, PRN  fleet, 1 enema, Daily PRN  nicotine polacrilex, 2 mg, Q1H PRN  polyethylene glycol, 17 g, Daily PRN  acetaminophen, 650 mg, Q4H PRN  albuterol sulfate HFA, 1 puff, Q4H PRN  dextrose bolus (hypoglycemia), 125 mL, PRN   Or  dextrose bolus (hypoglycemia), 250 mL, PRN            Electronically signed by RAMY Christianson NP on 1/24/2022 at 7:35 AM

## 2022-01-24 NOTE — FLOWSHEET NOTE
Physical Therapy Treatment Note   Date: 2022 Room: [unfilled]   Name: Bard Park : 1945   MRN: [id number] Admission Date:2022    Primary Problem:   Past Medical History:   Diagnosis Date    Acid reflux     Arrhythmia     Arthritis     \"Back, Hands, Fingers\"    CAD (coronary artery disease)     2000 non-critical;  no signigicante change, Sees Dr. Shea Jaffe CH[facility]congestive heart failure) ([facility])     Chronic back pain     CKD (chronic kidney disease) stage 3, GFR 30-59 ml/min ([facility] [address] 2015    Sees Dr. Ace Stanton    COPD (chronic obstructive pulmonary disease) ([facility] [address] 11/16/15    Diabetes mellitus ([facility] [address] Dx     Glaucoma     \"Both Eyes\"    H/O 24 hour EKG monitoring ,2017    Conclusion abnormal 48 hours of cardiac monitor finding consistent with sinus rhythm with physiological heart rate variation frequent complex ventricular ectopy. Patient did not report any symptoms for correlation    H/O cardiac catheterization 2009    Patent coronary arteries with ectasia and minimal coronary luminal irregularities and preserved left ventricular function.  H/O cardiovascular stress test 2018    Lexiscan Cardiolite. ECG portion of test is negative for ischemia, normal ventricular contractility, no infarct or ischemia noted, normal stress myocardial perfusion, EF 58%. Normal study.  H/O complete electrocardiogram 2012    H/O Doppler ultrasound 2007    Bormal study suggestive of lack of evidence of any significant peripheral arterial disease. Patient'a symptoms are very suggestive of non-ischemic and non-vascular etiology. When compared to the previous study of , ther is no significant change.  H/O Doppler ultrasound (Lower extremity) 2017    Native arteries in the examined lower extremity(ies) are patent, with no elevated velocities. No aneurysms are noted.     H/O percutaneous left heart catheterization 2016    Multivessel CAD with 100 % occluded RCA. 80 % to 90%  circumflex diffusely diseased. 80% mid LAD focal lesion.  Heat syncope 9/15/2016    [place] (hard of hearing)     Bilateral Ears    Hx of echocardiogram 2/6/19; 10/27/2016    2/6/19  LV function and size normal, mild concentric LVH, no regional wall motion abnormalities, normal diastolic filling pattern for age, mildly dilated LA, sclerotic but non-stenotic aortic valve, mitral annular calcification, RVSP= 27 mmHg, EF 55-60%.  Hyperlipidemia     Hypertension     Macular degeneration     Bilateral Eyes    Other activity(E029.9) 04/05/2012    48 Holter Monitor. Normal sinus rhythm with frequent low-grade supraventricular ectopy, without clinically significant arrhythmias.  PA[facility]oxysmal atrial fibrillation) ([facility])     PVD (peripheral vascular disease) ([facility] [address]     S/P CABG x 3 10/25/2016    10/3/16 LIMA-LAD, SVG-PDA, SVG-Cx + Maze+Appendage resection Dr Chapito Roberts 2000's    Nose    SOB (shortness of breath) on exertion 8/12/2014    Teeth missing     Upper And Lower    Ventricular ectopy     supraventricular and ventricular ectopy    Wears dentures     Full Upper    Wears glasses      Past Surgical History:   Procedure Laterality Date    CARDIAC SURGERY  10/03/2016    CABG (3 Bypasses)    COLONOSCOPY  Last Done In 2000's    Polyps Removed In Past    CORONARY ARTERY BYPASS GRAFT  10/03/2016    Coronary Artery Bypass Graft x 3. LIMA to LAD. SVG to PDA. SVG to Cx. MVR with CG ring. LA appendage resection.  Epi and endocardial MAZE.    CYST REMOVAL  2000's    Back    DENTAL SURGERY      Teeth Extracted In Past    EYE SURGERY Bilateral 2015    Cataracts With Lens Implants    FRACTURE SURGERY Left 01/11/2016    Broken Left Hip With Hardware    OTHER SURGICAL HISTORY  10/03/2017    sternal plate    SKIN CANCER EXCISION  2000's    Nose    THORACENTESIS Left 10/06/2016    [facility]- Dr Vira Gordon     Rehabilitation Diagnosis: Derrell Santiago is a 68year old male that presents with increased weakness and falls. Pt would benefit from skilled PT in order to address functional mobility, strength, and endurance. Strongly recommend neuro consult at discharge and B AFOs. Restrictions/Precautions: fall risk, LE giving out,  Walk prior to exercises    Subjective: Patient presented sitting in the chair and reports that he would like to get therapy 4-5x/day. He reports he wants to get stronger and go home. Initial Pain level: 3/10 chronic pain    Goals:                   Short term goals  Time Frame for Short term goals: 1 week or until d/c  Short term goal 1: Pt will demonstrate bed mobilty Mod I  Short term goal 2: pt will demonstrate Mod I sit to/from stand  Short term goal 3: Pt will demonstrate 50 ft ambulation with RW  Short term goal 4: Pt will demonstrate dynamic standing balance for 10 minutes  Short term goal 5: Pt will complete 10 consecutive minutes on NuStep in order to improve endurance               Plan of Care:             Times per week: Mon- Sat 5+/wk; recommend two short session per day if possible            Current Treatment Recommendations: ROM,Strengthening,Transfer Training,ADL/Self-care Training,Functional Mobility Training,Balance Training,Gait Training,Endurance Training,Neuromuscular Re-education,Equipment Evaluation, Education, & procurement,IADL Training,Home Exercise Program,Safety Education & Training,Positioning (ther ex, ther act, bed mobilty)      Objective Findings:    Date: 1/22/22 Date: 1/24/22 Date:  Date:    Bed mobility Not performed Not performed. Sit to stand transfer Trial 1: mod A x2  Trial 2: min-mod A x2  Trial 3: Mod A x1 Not performed. Aide reports it required max A x3 today      Stand to sit transfer Min A x1-2 with cues to hand placement and to avoid plopping  Not performed. Commode transfer Not performed. Not performed.       Standing tolerance Pt stood with CGA to use urinal and later to pull up pants with A Not performed. Ambulation Pt ambulated 20' and 25' with RW, CGA, and chair follow the first trial. Seated rest break required. Pt demonstrated some difficulty starting and ataxia in L LE Not performed. Stairs         Exercises:   Exercise/Equipment/Modalities  Date: 1/22/22 Date: 1/24/22 Date:    HR/TR 1x15 B LE 1x15 B LE    LAQ 1x10 B LE 5\" 1x10 5\" B LE    Marching 1x10 B LE  1x20 B LE    Hip Abduction 1x10 YTB B LE     Hip Adduction 1x10 manual resistance B LE     HS Curls 1x10 YTB     glute set 1x10 5\"     HS stretch  Seated 3x30\" B LE                   Modality/intervention used:   [x] Therapeutic Exercise   [ ] Modalities:   [x] Therapeutic Activity     [ ] Ultrasound   [ ] Elec Stim   [ ] Gait Training     [ ] Cervical Traction  [ ] Lumbar Traction   [ ] Neuromuscular Re-education   [ ] Cold/hotpack  [ ] Iontophoresis   [ ] Instruction in HEP     [ ] Suanne Gene   [ ] Manual Therapy   [ ] Aquatic Therapy     Other Therapeutic Activities: none    Home Exercise Program/Education provided to patient: Pt again reminded to perform the exercises on his own during the day. Manual Treatments: x    Communication with other providers:  Aide entered PT gym and reported after shower pt wanted PT. PT explained that is would only be a short sesison as he had refused earlier, and then been in the shower. Adverse reactions to treatment: None noted    Treatment/Activity Tolerance:   [x] Patient limited by fatigue [ ] Patient limited by pain [x] Patient limited by other medical complications [ ] Patient tolerated therapy well  [ ] Other:     Post Tx Pain Rating: None rated 4/10 chronic pain, pt reports it was normal for him.      Safety Precautions:   [ ] left in bed  [x] left in chair [x] call light within reach  [ ] bed alarm on  [x] personal alarm on  [ ] other staff present:    Plan:   [x] Continue per plan of care [ ] Aron Oppenheim current plan   [ ] Plan of care initiated [ ] Hold pending MD visit [ ] Discharge Plan for Next Session: Continue per POC, address standing balance and endurance    Time In: 4:17 pm  Time Out:  4:29 pm  Total Treatment Minutes: 13''  Billed Units: 1 therapeutic exercise    Signed: Aurelio Navarro PT, DPT [id number]  1/24/2022, 5:28 PM

## 2022-01-24 NOTE — PLAN OF CARE
Problem: Infection:  Goal: Will remain free from infection  Description: Will remain free from infection  Outcome: Ongoing     Problem: Safety:  Goal: Free from accidental physical injury  Description: Free from accidental physical injury  Outcome: Ongoing  Goal: Free from intentional harm  Description: Free from intentional harm  Outcome: Ongoing     Problem: Daily Care:  Goal: Daily care needs are met  Description: Daily care needs are met  Outcome: Ongoing     Problem: Pain:  Goal: Patient's pain/discomfort is manageable  Description: Patient's pain/discomfort is manageable  Outcome: Ongoing     Problem: Skin Integrity:  Goal: Skin integrity will stabilize  Description: Skin integrity will stabilize  Outcome: Ongoing     Problem: Falls - Risk of:  Goal: Will remain free from falls  Description: Will remain free from falls  Outcome: Ongoing  Goal: Absence of physical injury  Description: Absence of physical injury  Outcome: Ongoing

## 2022-01-25 NOTE — PLAN OF CARE
Problem: Infection:  Goal: Will remain free from infection  Description: Will remain free from infection  1/24/2022 2349 by Nicolasa Estrada RN  Outcome: Ongoing  1/24/2022 1028 by Gina Aj RN  Outcome: Ongoing     Problem: Safety:  Goal: Free from accidental physical injury  Description: Free from accidental physical injury  1/24/2022 2349 by Nicolasa Estrada RN  Outcome: Ongoing  1/24/2022 1028 by Gina Aj RN  Outcome: Ongoing  Goal: Free from intentional harm  Description: Free from intentional harm  1/24/2022 2349 by Nicolasa Estrada RN  Outcome: Ongoing  1/24/2022 1028 by Gina Aj RN  Outcome: Ongoing     Problem: Daily Care:  Goal: Daily care needs are met  Description: Daily care needs are met  1/24/2022 2349 by Nicolasa Estrada RN  Outcome: Ongoing  1/24/2022 1028 by Gina Aj RN  Outcome: Ongoing     Problem: Pain:  Goal: Patient's pain/discomfort is manageable  Description: Patient's pain/discomfort is manageable  1/24/2022 2349 by Nicolasa Estrada RN  Outcome: Ongoing  1/24/2022 1028 by Gina Aj RN  Outcome: Ongoing     Problem: Skin Integrity:  Goal: Skin integrity will stabilize  Description: Skin integrity will stabilize  1/24/2022 2349 by Nicolasa Estrada RN  Outcome: Ongoing  1/24/2022 1028 by Gina Aj RN  Outcome: Ongoing     Problem: Discharge Planning:  Goal: Patients continuum of care needs are met  Description: Patients continuum of care needs are met  1/24/2022 2349 by Nicolasa Estrada RN  Outcome: Ongoing  1/24/2022 1028 by Gina Aj RN  Outcome: Ongoing     Problem: Falls - Risk of:  Goal: Will remain free from falls  Description: Will remain free from falls  1/24/2022 2349 by Nicolasa Estrada RN  Outcome: Ongoing  1/24/2022 1028 by Gina Aj RN  Outcome: Ongoing  Goal: Absence of physical injury  Description: Absence of physical injury  1/24/2022 2349 by Nicolasa Estrada RN  Outcome: Ongoing  1/24/2022 1028 by Gina Aj RN  Outcome: Ongoing

## 2022-01-25 NOTE — PROGRESS NOTES
Pt assisted with max assist to standing position. Pt very unsteady. Pt assisted 3 feet to bed. Pt used urinal and had staff hold urinal as he states he is unable to stand and hold urinal and balance all at the same time. Pt unable also to doff pants or pull up stating \"I can't. \"  Pt assisted into bed and staff lifted legs in as pt states \"go ahead throw them legs in for me. \"  Pt then repositioned in bed with max assist.  Pt stated he was unable to lift hips to straighten the pad underneath of him. Pt was rolled side to side with much effort and pad was straightened. Pt is in bed with side rails up x2, call light within reach and bed alarm on.

## 2022-01-25 NOTE — FLOWSHEET NOTE
Physical Therapy Treatment Note   Date: 2022 Room: [unfilled]   Name: Ricci Valero : 1945   MRN: [id number] Admission Date:2022    Primary Problem:   Past Medical History:   Diagnosis Date    Acid reflux     Arrhythmia     Arthritis     \"Back, Hands, Fingers\"    CAD (coronary artery disease)     2000 non-critical;  no signigicante change, Sees Dr. Carlos Aguilera CHF (congestive heart failure) ([facility])     Chronic back pain     CKD (chronic kidney disease) stage 3, GFR 30-59 ml/min ([facility] [address] 2015    Sees Dr. Alla Ovalle    COPD (chronic obstructive pulmonary disease) ([facility] [address] 11/16/15    Diabetes mellitus ([facility] [address] Dx     Glaucoma     \"Both Eyes\"    H/O 24 hour EKG monitoring ,2017    Conclusion abnormal 48 hours of cardiac monitor finding consistent with sinus rhythm with physiological heart rate variation frequent complex ventricular ectopy. Patient did not report any symptoms for correlation    H/O cardiac catheterization 2009    Patent coronary arteries with ectasia and minimal coronary luminal irregularities and preserved left ventricular function.  H/O cardiovascular stress test 2018    Lexiscan Cardiolite. ECG portion of test is negative for ischemia, normal ventricular contractility, no infarct or ischemia noted, normal stress myocardial perfusion, EF 58%. Normal study.  H/O complete electrocardiogram 2012    H/O Doppler ultrasound 2007    Bormal study suggestive of lack of evidence of any significant peripheral arterial disease. Patient'a symptoms are very suggestive of non-ischemic and non-vascular etiology. When compared to the previous study of , ther is no significant change.  H/O Doppler ultrasound (Lower extremity) 2017    Native arteries in the examined lower extremity(ies) are patent, with no elevated velocities. No aneurysms are noted.     H/O percutaneous left heart catheterization 2016    Multivessel CAD with 100 % occluded RCA. 80 % to 90%  circumflex diffusely diseased. 80% mid LAD focal lesion.  Heat syncope 9/15/2016    [place] (hard of hearing)     Bilateral Ears    Hx of echocardiogram 2/6/19; 10/27/2016    2/6/19  LV function and size normal, mild concentric LVH, no regional wall motion abnormalities, normal diastolic filling pattern for age, mildly dilated LA, sclerotic but non-stenotic aortic valve, mitral annular calcification, RVSP= 27 mmHg, EF 55-60%.  Hyperlipidemia     Hypertension     Macular degeneration     Bilateral Eyes    Other activity(E029.9) 04/05/2012    48 Holter Monitor. Normal sinus rhythm with frequent low-grade supraventricular ectopy, without clinically significant arrhythmias.  PAF [facility]paroxysmal atrial fibrillation) ([facility])     PVD (peripheral vascular disease) ([facility] [address]     S/P CABG x 3 10/25/2016    10/3/16 LIMA-LAD, SVG-PDA, SVG-Cx + Maze+Appendage resection Dr Aarti Ochoa 2000's    Nose    SOB (shortness of breath) on exertion 8/12/2014    Teeth missing     Upper And Lower    Ventricular ectopy     supraventricular and ventricular ectopy    Wears dentures     Full Upper    Wears glasses      Past Surgical History:   Procedure Laterality Date    CARDIAC SURGERY  10/03/2016    CABG (3 Bypasses)    COLONOSCOPY  Last Done In 2000's    Polyps Removed In Past    CORONARY ARTERY BYPASS GRAFT  10/03/2016    Coronary Artery Bypass Graft x 3. LIMA to LAD. SVG to PDA. SVG to Cx. MVR with CG ring. LA appendage resection.  Epi and endocardial MAZE.    CYST REMOVAL  2000's    Back    DENTAL SURGERY      Teeth Extracted In Past    EYE SURGERY Bilateral 2015    Cataracts With Lens Implants    FRACTURE SURGERY Left 01/11/2016    Broken Left Hip With Hardware    OTHER SURGICAL HISTORY  10/03/2017    sternal plate    SKIN CANCER EXCISION  2000's    Nose    THORACENTESIS Left 10/06/2016    [facility]- Dr Kenyatta Au     Rehabilitation Diagnosis: Samantha Anton is a 68year old male that presents with increased weakness and falls. Pt would benefit from skilled PT in order to address functional mobility, strength, and endurance. Strongly recommend neuro consult at discharge and B AFOs. Restrictions/Precautions: fall risk, LE giving out,  Walk prior to exercises    Subjective: Patient up in chair upon entering and agreeable to working with therapy. Initial Pain level:  Not rated    Goals:                   Short term goals  Time Frame for Short term goals: 1 week or until d/c  Short term goal 1: Pt will demonstrate bed mobilty Mod I  Short term goal 2: pt will demonstrate Mod I sit to/from stand  Short term goal 3: Pt will demonstrate 50 ft ambulation with RW  Short term goal 4: Pt will demonstrate dynamic standing balance for 10 minutes  Short term goal 5: Pt will complete 10 consecutive minutes on NuStep in order to improve endurance               Plan of Care:             Times per week: Mon- Sat 5+/wk; recommend two short session per day if possible            Current Treatment Recommendations: ROM,Strengthening,Transfer Training,ADL/Self-care Training,Functional Mobility Training,Balance Training,Gait Training,Endurance Training,Neuromuscular Re-education,Equipment Evaluation, Education, & procurement,IADL Training,Home Exercise Program,Safety Education & Training,Positioning (ther ex, ther act, bed mobilty)      Objective Findings:    Date: 1/22/22 Date: 1/24/22 Date: 1/25/22 Date: 1/25/2022   Bed mobility Not performed Not performed. Not performed. Not performed   Sit to stand transfer Trial 1: mod A x2  Trial 2: min-mod A x2  Trial 3: Mod A x1 Not performed. Aide reports it required max A x3 today  Trial 1: mod A x2  Subsequent trials:  Min A x2  Cues for hand placement  Min A of 1-2   Stand to sit transfer Min A x1-2 with cues to hand placement and to avoid plopping  Not performed. Min A x2 to Rober x1 and CGA x1 with cues for hand placement.   Min A of 1-2   Commode transfer Not performed. Not performed. Not performed. Not performed   Standing tolerance Pt stood with CGA to use urinal and later to pull up pants with A Not performed. Not performed. During amb   Ambulation Pt ambulated 20' and 25' with RW, CGA, and chair follow the first trial. Seated rest break required. Pt demonstrated some difficulty starting and ataxia in L LE Not performed. Pt ambulated 30' x2 with RW, and CGA x1 with a seated rest break and W/C follow. Pt continues to have difficulty moving L LE with shaking.   Amb w/RW and CGA of 1 SBA of another 33ftx2    Stairs   Not performed Not performed     Exercises:   Exercise/Equipment/Modalities  Date: 1/22/22 Date: 1/24/22 Date: 1/25/22 1/25/2022   HR/TR 1x15 B LE 1x15 B LE HEP provided 20x   LAQ 1x10 B LE 5\" 1x10 5\" B LE HEP provided 10x B   Marching 1x10 B LE  1x20 B LE HEP provided 20x B   Hip Abduction 1x10 YTB B LE   YTB 10x B   Hip Adduction 1x10 manual resistance B LE   Pillow 10x B   HS Curls 1x10 YTB      glute set 1x10 5\"  HEP provided 10x3\"   HS stretch  Seated 3x30\" B LE     Sit to stand   1x5 with min A x2              Modality/intervention used:   [x] Therapeutic Exercise   [ ] Modalities:   [x] Therapeutic Activity     [ ] Ultrasound   [ ] Homer C Jones [place]   [ ] Gait Training     [ ] Cervical Traction  [ ] Lumbar Traction   [ ] Neuromuscular Re-education   [ ] Cold/hotpack  [ ] Iontophoresis   [ ] Instruction in HEP     [ ] Nessa Stewart   [ ] Manual Therapy   [ ] Aquatic Therapy     Other Therapeutic Activities:       Home Exercise Program/Education provided to patient:       Manual Treatments: x    Communication with other providers:  Okay to see per nursing   Co-Tx w/LACEY    Adverse reactions to treatment: None noted    Treatment/Activity Tolerance:   [x] Patient limited by fatigue [ ] Patient limited by pain [x] Patient limited by other medical complications [ ] Patient tolerated therapy well  [ ] Other:     Post Tx Pain Rating: None rated       Safety Precautions:   [ ] left in bed  [x] left in chair [x] call light within reach  [ ] bed alarm on  [x] personal alarm on  [ ] other staff present:    Plan:   [x] Continue per plan of care [ ] Mary Saini current plan   [ ] Plan of care initiated [ ] Stephen De Anda pending MD visit [ ] Discharge     Plan for Next Session: Continue per POC, address standing balance and endurance    Time In: 0800  Time Out:  0830  Total Treatment Minutes: 30'  Billed Units:  1te 1gt    Signed: Tori Ziegler PTA 1/25/2022, 2:44 PM

## 2022-01-25 NOTE — FLOWSHEET NOTE
Physical Therapy Treatment Note   Date: 2022 Room: [unfilled]   Name: Guy Craven : 1945   MRN: [id number] Admission Date:2022    Primary Problem:   Past Medical History:   Diagnosis Date    Acid reflux     Arrhythmia     Arthritis     \"Back, Hands, Fingers\"    CAD (coronary artery disease)     2000 non-critical;  no signigicante change, Sees Dr. Kam Perez [facility]     Chronic back pain     CKD (chronic kidney disease) stage 3, GFR 30-59 ml/min ([facility] [address] 2015    Sees Dr. Yared García    COPD (chronic obstructive pulmonary disease) ([facility] [address] 11/16/15    Diabetes mellitus ([facility] [address] Dx     Glaucoma     \"Both Eyes\"    H/O 24 hour EKG monitoring ,2017    Conclusion abnormal 48 hours of cardiac monitor finding consistent with sinus rhythm with physiological heart rate variation frequent complex ventricular ectopy. Patient did not report any symptoms for correlation    H/O cardiac catheterization 2009    Patent coronary arteries with ectasia and minimal coronary luminal irregularities and preserved left ventricular function.  H/O cardiovascular stress test 2018    Lexiscan Cardiolite. ECG portion of test is negative for ischemia, normal ventricular contractility, no infarct or ischemia noted, normal stress myocardial perfusion, EF 58%. Normal study.  H/O complete electrocardiogram 2012    H/O Doppler ultrasound 2007    Bormal study suggestive of lack of evidence of any significant peripheral arterial disease. Patient'a symptoms are very suggestive of non-ischemic and non-vascular etiology. When compared to the previous study of , ther is no significant change.  H/O Doppler ultrasound (Lower extremity) 2017    Native arteries in the examined lower extremity(ies) are patent, with no elevated velocities. No aneurysms are noted.     H/O percutaneous left heart catheterization 2016    Multivessel CAD with 100 % occluded RCA. 80 % to 90%  circumflex diffusely diseased. 80% mid LAD focal lesion.  Heat syncope 9/15/2016    [place] (hard of hearing)     Bilateral Ears    Hx of echocardiogram 2/6/19; 10/27/2016    2/6/19  LV function and size normal, mild concentric LVH, no regional wall motion abnormalities, normal diastolic filling pattern for age, mildly dilated LA, sclerotic but non-stenotic aortic valve, mitral annular calcification, RVSP= 27 mmHg, EF 55-60%.  Hyperlipidemia     Hypertension     Macular degeneration     Bilateral Eyes    Other activity(E029.9) 04/05/2012    48 Holter Monitor. Normal sinus rhythm with frequent low-grade supraventricular ectopy, without clinically significant arrhythmias.  [facility]ox[facility] fibrillation) ([facility]     PVD (peripheral vascular disease) ([facility] [address]     S/P CABG x 3 10/25/2016    10/3/16 LIMA-LAD, SVG-PDA, SVG-Cx + Maze+Appendage resection Dr Bethany Edmondson 2000's    Nose    SOB (shortness of breath) on exertion 8/12/2014    Teeth missing     Upper And Lower    Ventricular ectopy     supraventricular and ventricular ectopy    Wears dentures     Full Upper    Wears glasses      Past Surgical History:   Procedure Laterality Date    CARDIAC SURGERY  10/03/2016    CABG (3 Bypasses)    COLONOSCOPY  Last Done In 2000's    Polyps Removed In Past    CORONARY ARTERY BYPASS GRAFT  10/03/2016    Coronary Artery Bypass Graft x 3. LIMA to LAD. SVG to PDA. SVG to Cx. MVR with CG ring. LA appendage resection.  Epi and endocardial MAZE.    CYST REMOVAL  2000's    Back    DENTAL SURGERY      Teeth Extracted In Past    EYE SURGERY Bilateral 2015    Cataracts With Lens Implants    FRACTURE SURGERY Left 01/11/2016    Broken Left Hip With Hardware    OTHER SURGICAL HISTORY  10/03/2017    sternal plate    SKIN CANCER EXCISION  2000's    Nose    THORACENTESIS Left 10/06/2016    [facility] Dr Rhys Randolph     Rehabilitation Diagnosis: Bhavani Friend is a 68year old male that presents with increased weakness and falls. Pt would benefit from skilled PT in order to address functional mobility, strength, and endurance. Strongly recommend neuro consult at discharge and B AFOs. Restrictions/Precautions: fall risk, LE giving out,  Walk prior to exercises    Subjective: Patient presented sitting in the chair and reports he walked with [employer] before breakfast. He also reports his sister  [place]. Initial Pain level: 5/10 chronic pain    Goals:                   Short term goals  Time Frame for Short term goals: 1 week or until d/c  Short term goal 1: Pt will demonstrate bed mobilty Mod I  Short term goal 2: pt will demonstrate Mod I sit to/from stand  Short term goal 3: Pt will demonstrate 50 ft ambulation with RW  Short term goal 4: Pt will demonstrate dynamic standing balance for 10 minutes  Short term goal 5: Pt will complete 10 consecutive minutes on NuStep in order to improve endurance               Plan of Care:             Times per week: Mon- Sat 5+/wk; recommend two short session per day if possible            Current Treatment Recommendations: ROM,Strengthening,Transfer Training,ADL/Self-care Training,Functional Mobility Training,Balance Training,Gait Training,Endurance Training,Neuromuscular Re-education,Equipment Evaluation, Education, & procurement,IADL Training,Home Exercise Program,Safety Education & Training,Positioning (ther ex, ther act, bed mobilty)      Objective Findings:    Date: 22 Date: 22 Date: 22 Date:    Bed mobility Not performed Not performed. Not performed. Sit to stand transfer Trial 1: mod A x2  Trial 2: min-mod A x2  Trial 3: Mod A x1 Not performed. Aide reports it required max A x3 today  Trial 1: mod A x2  Subsequent trials:  Min A x2  Cues for hand placement     Stand to sit transfer Min A x1-2 with cues to hand placement and to avoid plopping  Not performed.   Min A x2 to Rober x1 and CGA x1 with cues for hand placement. Commode transfer Not performed. Not performed. Not performed. Standing tolerance Pt stood with CGA to use urinal and later to pull up pants with A Not performed. Not performed. Ambulation Pt ambulated 20' and 25' with RW, CGA, and chair follow the first trial. Seated rest break required. Pt demonstrated some difficulty starting and ataxia in L LE Not performed. Pt ambulated 30' x2 with RW, and CGA x1 with a seated rest break and W/C follow. Pt continues to have difficulty moving L LE with shaking. Stairs   Not performed      Exercises:   Exercise/Equipment/Modalities  Date: 1/22/22 Date: 1/24/22 Date: 1/25/22   HR/TR 1x15 B LE 1x15 B LE HEP provided   LAQ 1x10 B LE 5\" 1x10 5\" B LE HEP provided   Marching 1x10 B LE  1x20 B LE HEP provided   Hip Abduction 1x10 YTB B LE     Hip Adduction 1x10 manual resistance B LE     HS Curls 1x10 YTB     glute set 1x10 5\"  HEP provided   HS stretch  Seated 3x30\" B LE    Sit to stand   1x5 with min A x2            Modality/intervention used:   [x] Therapeutic Exercise   [ ] Modalities:   [x] Therapeutic Activity     [ ] Ultrasound   [ ] Priscilla Crenshaw   [ ] Gait Training     [ ] Cervical Traction  [ ] Lumbar Traction   [ ] Neuromuscular Re-education   [ ] Cold/hotpack  [ ] Iontophoresis   [ ] Instruction in HEP     [ ] Maral Hough   [ ] Manual Therapy   [ ] Aquatic Therapy     Other Therapeutic Activities: Pt required seated rest breaks. Home Exercise Program/Education provided to patient: Pt provided with a HEP handout and reminded to perform the exercises on his own during the day and to use a check sheet. Pt also educated to put on his call light and ask to ambulate with aide and staff so that he does not sit too long. Manual Treatments: x    Communication with other providers:  SPT present.   PTA reports she has already ambulated with and exercised with pt this am.     Adverse reactions to treatment: None noted    Treatment/Activity Tolerance:   [x] Patient limited by fatigue [ ] Patient limited by pain [x] Patient limited by other medical complications [ ] Patient tolerated therapy well  [ ] Other:     Post Tx Pain Rating: None rated 5/10 chronic pain, pt reports it was normal for him.      Safety Precautions:   [ ] left in bed  [x] left in chair [x] call light within reach  [ ] bed alarm on  [x] personal alarm on  [ ] other staff present:    Plan:   [x] Continue per plan of care [ ] Cecille Meyers current plan   [ ] Plan of care initiated [ ] Hold pending MD visit [ ] Discharge     Plan for Next Session: Continue per POC, address standing balance and endurance    Time In: 9:05 am  Time Out: 9:30 am  Total Treatment Minutes: 25'  Billed Units: 2 therapeutic exercise    Signed: Aurelio Navarro PT, DPT [id number]  1/25/2022, 10:27 AM

## 2022-01-25 NOTE — PROGRESS NOTES
Occupational Therapy    Occupational Therapy Treatment Note  Name: Guy Craven MRN: [id number] :   1945   Date:  2022   Admission Date: 2022 Room:  [address] [place] [address]   Restrictions/Precautions:  Restrictions/Precautions  Restrictions/Precautions: Fall Risk  Required Braces or Orthoses?: No  Implants present? :  (brace in chest, screws in hip)     Communication with other providers:   Cleared for treatment by RN. Subjective:  Patient states:  \"I need to get my legs stronger\"  Pain:   Location, Type, Intensity (0/10 to 10/10): No pain    Objective:    Observation:  Pt alert and oriented    Treatment, including education:  Transfers    Sit to stand :Min A from recliner chair  Stand to sit :CGA  SPT:CGA      Therapeutic Exercise: Pt completed UE exercises to increase strength and ROM to facilitate increase for ADLs and functional mobility. Pt completed B UEs with 3# dumbbell exercises with curls, shoulder presses, punch, stirring and wrist curls x 20 reps with mod rest breaks due to fatigue. Pt completed red theraband pulls horizontally, punches and upwards x 20 reps with mod rest breaks. Therapeutic Activity Training: Pt completed functional mobility with RW x CGA with 33' x 2 with standing rest breaks. Pt declined PM session and reports \"I'm going home tomorrow\". Safety Measures: Gait belt used, up in chair, Pull/Bed Alarm activated and call light left in reach        Assessment / Impression:        Patient's tolerance of treatment: Good   Adverse Reaction: None  Significant change in status and impact:  None  Barriers to improvement:  Dec strength and endurance    Plan for Next Session:    Continue with POC.     Time in:  803  Time out:  833  Total treatment time:  33  Billed Units: 1 TA, 1 TE  Electronically signed by:    Shantelle Gaston, [address]    2022, 9:12 AM    Previously filed values:  Patient Goals   Patient goals : to return home [facility]  Short term goals  Time Frame for Short term goals: Until DC or goals met  Short term goal 1: Pt will be Mod I for functional adl transfers to chair, toilet, or bed w/o LOB or falls using good walker safety  Short term goal 2: Pt will be SBA/S for UB bathing, min A LB bathing using necessary a.e. Short term goal 3: Pt will be SBA UB dressing/ Mod A LB dressing using necessary a.e. Short term goal 4: Pt will be SBA toileting  Short term goal 5: Pt will complete 10+ min of BUE therex to increase strength/endurance for transfers, using WC and ADLs.

## 2022-01-25 NOTE — CARE COORDINATION
CM met with the patient for follow-up discharge planning. Patient stated that his sister passed away and her viewing and  services are  & . Patient stated that he would like to be discharged from the swing bed program tomorrow (). Patient requested that a referral be made to [facility] to resume the skilled nursing visits he was receiving prior to admission as well as adding on PT/OT. CM will make referral.  CM notified patient's nurse Laura Marquez RN of the patient's anticipated discharge . CM will follow. 1 PM  CM faxed the [place] [address] order and supporting documentation to [facility] to initiate the referral in order to resume [place] [address] services the patient was receiving prior to admission with the addition of PT/OT services. CM will follow. 1:04 PM  CM left a voicemail for the patient's PASSPORT  Ha Mccall ([phone]) to notify her of the patient's anticipated discharge  in order for her to restart his personal aide services through [address] following discharge. CM will follow. 1:09 PM  CM spoke with Andre Amaya to notify them the patient will be discharged tomorrow. CM advised to fax the patient's discharge instructions to them at [phone]. CM will follow.

## 2022-01-25 NOTE — PLAN OF CARE
Problem: Infection:  Goal: Will remain free from infection  Description: Will remain free from infection  1/25/2022 0807 by Alena Diez RN  Outcome: Ongoing  1/24/2022 2349 by Alejandra Ordoñez RN  Outcome: Ongoing     Problem: Safety:  Goal: Free from accidental physical injury  Description: Free from accidental physical injury  1/25/2022 0807 by Alena Diez RN  Outcome: Ongoing  1/24/2022 2349 by Alejandra Ordoñez RN  Outcome: Ongoing  Goal: Free from intentional harm  Description: Free from intentional harm  1/25/2022 0807 by Alena Diez RN  Outcome: Ongoing  1/24/2022 2349 by Alejandra Ordoñez RN  Outcome: Ongoing     Problem: Daily Care:  Goal: Daily care needs are met  Description: Daily care needs are met  1/25/2022 5547 by Alena Diez RN  Outcome: Ongoing  1/24/2022 2349 by Alejandra Ordoñez RN  Outcome: Ongoing     Problem: Pain:  Goal: Patient's pain/discomfort is manageable  Description: Patient's pain/discomfort is manageable  1/25/2022 0807 by Alena Diez RN  Outcome: Ongoing  1/24/2022 2349 by Alejandra Ordoñez RN  Outcome: Ongoing     Problem: Skin Integrity:  Goal: Skin integrity will stabilize  Description: Skin integrity will stabilize  1/25/2022 0807 by Alena Diez RN  Outcome: Ongoing  1/24/2022 2349 by Alejandra Ordoñez RN  Outcome: Ongoing     Problem: Discharge Planning:  Goal: Patients continuum of care needs are met  Description: Patients continuum of care needs are met  1/25/2022 0807 by Alena Diez RN  Outcome: Ongoing  1/24/2022 2349 by Alejandra Ordoñez RN  Outcome: Ongoing     Problem: Falls - Risk of:  Goal: Will remain free from falls  Description: Will remain free from falls  1/25/2022 0807 by Alena Diez RN  Outcome: Ongoing  1/24/2022 2349 by Alejandra Ordoñez RN  Outcome: Ongoing  Goal: Absence of physical injury  Description: Absence of physical injury  1/25/2022 0807 by Alena Diez RN  Outcome: Ongoing  1/24/2022 2349 by Alejandra Ordoñez RN  Outcome: Ongoing

## 2022-01-26 NOTE — PROGRESS NOTES
Called and spoke with Marisa Corral at [facility] to advise patient is being discharged today.

## 2022-01-26 NOTE — PROGRESS NOTES
Went over discharge paperwork and medication. Patient will call and schedule his follow up appointment with his PCP. Patient went home. Called [place] with [facility] and advised patient was discharged today.

## 2022-01-26 NOTE — CARE COORDINATION
SWING BED WEEKLY TEAM Matilde Brooks   2022 WEEK # 1    Care Management    Issues to be resolved before discharge: Increased strength, balance, and mobility. Family Education: Patient/staff to update family     [facility]   Patient/Family Adjustment: N/A     Goals of previous week:   [] 1st team   [] met   [] partially met    [x] not met             Why goals were not met: Continued weakness     Skilled Level of Care Remains   [x] yes   [] no    Estimated length of stay: Patient has voluntarily chosen to be discharged from the swing bed program 22 due to the recent death of his sister in order to attend her viewing and  services.      Patient/Family Concerns/input: None     Patient/Family  Signature _________________  2022       Care Management Signature:  Josse Curtis RN

## 2022-01-26 NOTE — DISCHARGE SUMMARY
Sylvia Morales MD, [address]   Internal Medicine Hospitalist  Discharge Summary    Park Looney  :  1945  MRN:  [id number]    Admit date:  2022  Discharge date:    Admitting Physician:  Cheryle Sol, MD    Discharge Diagnoses:    Patient Active Problem List   Diagnosis    Palpitations    PA[facility] ([facility]oxysmal atrial fibrillation) [facility]    DJD (degenerative joint disease), multiple sites    History of colonic polyps    Family history of colon cancer    Statin intolerance    SOB (shortness of breath) on exertion    Type 2 diabetes mellitus with complication ([facility]    Essential hypertension    Dyslipidemia    S/P CABG x 3    Anemia    [facility]    Chronic kidney disease (CKD) stage G4/A3, severely decreased glomerular filtration rate (GFR) between 15-29 mL/min/1.73 square meter and albuminuria creatinine ratio greater than 300 mg/g (HCC)    DM (diabetes mellitus) ([facility]    CAD (coronary artery disease)    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    Proteinuria    Closed fracture of left ankle with routine healing    Closed nondisplaced fracture of third metatarsal bone of left foot    Closed nondisplaced fracture of second metatarsal bone of left foot    Closed nondisplaced fracture of fourth metatarsal bone of left foot    Debility    Physical debility       Admission Condition:  fair    Discharged Condition:  fair    Hospital Course:     (copied from admission history)  This is a 68-year-old male that initially presented with debility multiple falls as well as acute respiratory distress with noted bronchiectasis and fibrotic changes on CT scan. Patient progressed very well during the course of his hospitalization not requiring any antibiotic therapy remained afebrile. He did progress slowly with physical therapy who did recommend admission to the swing bed for which the patient was admitted.  Patient seen and examined this morning was doing very well with no chest pain nausea vomiting shortness of breath. States that his sputum production is decreased significantly and overall is feeling very well excited to start working physical therapy. 2022 I saw patient today he wants to go home as he has a  to attend which is very important for him. He has home health care lives by himself is able to ambulate with the help of a walker. Understand the risks so we'll be discharging him home today. Hospital Course:  (copied from last soap)  1. Debility: Patient continues to work with PT OT daily plan will be to discharge home when appropriate. 2. Bronchiectasis, fibrotic changes in the lower portion of the lung patient will need follow-up with pulmonologist for PFTs as outpatient will also need follow-up for the 1.2 cm nodule component noted on the CT scan of his chest follow-up with pulmonology and PCP. Sri need repeat scan in 3 months.  Patient is doing well he is on room air. 3. Acute respiratory distress resolved  4. History COPD, he states he is never formally diagnosed with COPD he states he does have an inhaler. 5. Debility with multiple falls, he is in the swing bed program continue working with PT OT  6. Chronic constipation as needed laxatives, Colace nightly  7. KAREEM on CKD 3  8. Hyper kalemia resolved  9. Atrial fibrillation rate is controlled continue Eliquis  10. Diabetes mellitus type 2 continue sliding scale and Lantus  11. Hyperlipidemia patient is not on any statin unsure if he had myopathy in the past, he will need to follow-up with his primary care provider  12. History of coronary artery disease, patient does not have any chest pain he does have a history of a CABG continue current home medications. Follow up appointment / plans: Needs to be seen within 7 days by his/her physician, patient to call for an appointment.   On examination today  Heart is RRR, Lungs are CTA, abdomen is soft and non tender, CNS is grossly intact. Please see detailed consultation notes and radiology dictations as below. Vitals: Blood pressure (!) 134/49, pulse 85, temperature 98.6 °F (37 °C), temperature source Infrared, resp. rate 18, height 5' 8\" (1.727 m), weight 187 lb (84.8 kg), SpO2 97 %.     Discharge Medications:        Medication List      CHANGE how you take these medications    apixaban 5 MG Tabs tablet  Commonly known as: ELIQUIS  Take 1 tablet by mouth 2 times daily  What changed:   · medication strength  · how much to take  · how to take this  · when to take this  · additional instructions     insulin lispro 100 UNIT/ML injection vial  Commonly known as: HUMALOG  Inject 0-12 Units into the skin 3 times daily (with meals)  What changed:   · how much to take  · when to take this  · additional instructions     metoprolol succinate 50 MG extended release tablet  Commonly known as: TOPROL XL  Take 1 tablet by mouth daily  What changed: medication strength     torsemide 20 MG tablet  Commonly known as: Demadex  Take 1 tablet by mouth 2 times daily  What changed: when to take this        CONTINUE taking these medications    acetaminophen 500 MG tablet  Commonly known as: Acetaminophen Extra Strength  Take 2 tablets by mouth every 4 hours as needed for Pain Do not take more than 6 tablets per day     ALBUTEROL IN     amLODIPine 10 MG tablet  Commonly known as: NORVASC  Take 1 tablet by mouth daily     aspirin EC 81 MG EC tablet  Take 1 tablet by mouth daily     cyclobenzaprine 10 MG tablet  Commonly known as: FLEXERIL     guaiFENesin 600 MG extended release tablet  Commonly known as: MUCINEX     ICAPS PO     Icy Hot Advanced Relief 16-11 % Crea  Generic drug: Menthol-Camphor     losartan 25 MG tablet  Commonly known as: COZAAR  TAKE 1 TABLET BY MOUTH DAILY     magnesium oxide 400 MG tablet  Commonly known as: MAG-OX  TAKE ONE TABLET TWO TIMES A DAY     nitroGLYCERIN 0.4 MG SL tablet  Commonly known as: NITROSTAT     omeprazole 40 MG delayed release capsule  Commonly known as: PRILOSEC     Praluent 75 MG/ML Soaj injection pen  Generic drug: alirocumab  INJECT 1 ML INTO THE SKIN EVERY 14 DAYS     REFRESH DRY EYE THERAPY OP     SENOKOT PO     SITagliptin 50 MG tablet  Commonly known as: JANUVIA     Tresiba FlexTouch 100 UNIT/ML Sopn  Generic drug: Insulin Degludec        STOP taking these medications    traZODone 100 MG tablet  Commonly known as: DESYREL           Where to Get Your Medications      You can get these medications from any pharmacy    Bring a paper prescription for each of these medications  · apixaban 5 MG Tabs tablet  · metoprolol succinate 50 MG extended release tablet       Disposition:   home  All the above has been explained to patient and or family. Patient would need F/U with his/her PCP in 7 days. Explained that it is the patients responsibility to have blood work or radiology testing done as an out patient. He/She has to take the discharge papers from the hospital for out patient follow up visit with the PCP/Physician. Time spent  >30 minutes. The above chart was partly created by using Dragon dictation system, it may contain dictation errors given the limitations of this technology.      Signed:  Tayla Pulido MD MD, FACP  1/26/2022, 9:20 AM

## 2022-02-05 PROBLEM — U07.1 ACUTE HYPOXEMIC RESPIRATORY FAILURE DUE TO COVID-19 (HCC): Status: ACTIVE | Noted: 2022-01-01

## 2022-02-05 PROBLEM — J12.82 PNEUMONIA DUE TO COVID-19 VIRUS: Status: ACTIVE | Noted: 2022-01-01

## 2022-02-05 PROBLEM — U07.1 PNEUMONIA DUE TO COVID-19 VIRUS: Status: ACTIVE | Noted: 2022-01-01

## 2022-02-05 PROBLEM — J96.01 ACUTE HYPOXEMIC RESPIRATORY FAILURE DUE TO COVID-19 (HCC): Status: ACTIVE | Noted: 2022-01-01

## 2022-02-05 PROBLEM — R09.02 HYPOXIA: Status: ACTIVE | Noted: 2022-01-01

## 2022-02-05 NOTE — ED NOTES
Bladder scanned bladder 121 cc in bladder , pt.  States he has no desire to urinate at this time     Rosangela White RN  02/05/22 1916

## 2022-02-05 NOTE — ED TRIAGE NOTES
Pt arrives via EMS for back pain and problems urinating. He states that he has only been able to get out \"a few dribbles\"    Per report he was in the hospital being treated for kidney infection and left AMA but the patient does not recall that. Noted to be 79% on room air upon arrival with no complaints of shortness of breath.  Placed on 5L NC, increased to 92%

## 2022-02-05 NOTE — ED PROVIDER NOTES
Triage Chief Complaint:   Back Pain and Dysuria    [place]:  Maria G Chen is a 68 y.o. male that presents with concern for kidney and back pain has been going on for 2 years. He states the pain is in his bilateral lower back. He also states that he is having difficulty urinating and has not made much urine lately but did pee today. He feels like he is unable to completely empty his bladder. He also states that he is generally weak and is having difficulty walking and this has been going on for a while. He was here at [place] and to be admitted for rehab, but patient left in order to go to a .  Patient denies any shortness of breath and he is not on oxygen at home but was noted to be significantly hypoxic upon arrival to the emergency room. Patient states that he does have a cough that is occasionally productive. He denies any chest pain. He states there is some swelling in his lower extremities occasionally but it is equal bilaterally. No history of blood clots. Patient was vaccinated for COVID but he has not yet received the COVID booster. ROS:   Review of Systems   Constitutional: Positive for fatigue. Negative for chills and fever. HENT: Negative for congestion, rhinorrhea and sore throat. Eyes: Negative for redness and visual disturbance. Respiratory: Positive for cough. Negative for shortness of breath. Cardiovascular: Positive for leg swelling (bilateral lower extremities). Negative for chest pain. Gastrointestinal: Negative for abdominal pain, nausea and vomiting. Genitourinary: Positive for difficulty urinating and enuresis. Negative for dysuria and frequency. Musculoskeletal: Positive for back pain (lower x 2 years). Negative for arthralgias. Skin: Negative for rash and wound. Neurological: Positive for weakness (generalized). Negative for syncope, numbness and headaches. Psychiatric/Behavioral: Negative for hallucinations and suicidal ideas.        Past Medical History:   Diagnosis Date    Acid reflux     Arrhythmia     Arthritis     \"Back, Hands, Fingers\"    CAD (coronary artery disease)     09/2000 non-critical; 2004 no signigicante change, Sees Dr. Nehemiah Amanda CHF (congestive heart failure) ([facility])     Chronic back pain     CKD (chronic kidney disease) stage 3, GFR 30-59 ml/min ([facility] [address] 12/03/2015    Sees Dr. Castillo Moran COPD (chronic obstructive pulmonary disease) ([facility] [address] 11/16/15    Diabetes mellitus ([facility] [address] Dx 1990's    Glaucoma     \"Both Eyes\"    H/O 24 hour EKG monitoring 7/161/8,01/26/2017    Conclusion abnormal 48 hours of cardiac monitor finding consistent with sinus rhythm with physiological heart rate variation frequent complex ventricular ectopy. Patient did not report any symptoms for correlation    H/O cardiac catheterization 12/27/2009    Patent coronary arteries with ectasia and minimal coronary luminal irregularities and preserved left ventricular function.  H/O cardiovascular stress test 08/09/2018    Lexiscan Cardiolite. ECG portion of test is negative for ischemia, normal ventricular contractility, no infarct or ischemia noted, normal stress myocardial perfusion, EF 58%. Normal study.  H/O complete electrocardiogram 04/05/2012    H/O Doppler ultrasound 09/13/2007    Bormal study suggestive of lack of evidence of any significant peripheral arterial disease. Patient'a symptoms are very suggestive of non-ischemic and non-vascular etiology. When compared to the previous study of 2005, ther is no significant change.  H/O Doppler ultrasound (Lower extremity) 01/30/2017    Native arteries in the examined lower extremity(ies) are patent, with no elevated velocities. No aneurysms are noted.  H/O percutaneous left heart catheterization 09/23/2016    Multivessel CAD with 100 % occluded RCA. 80 % to 90%  circumflex diffusely diseased. 80% mid LAD focal lesion.     Heat syncope 9/15/2016    [place] (hard of hearing)     Bilateral Ears    Hx of echocardiogram 2/6/19; 10/27/2016    2/6/19  LV function and size normal, mild concentric LVH, no regional wall motion abnormalities, normal diastolic filling pattern for age, mildly dilated LA, sclerotic but non-stenotic aortic valve, mitral annular calcification, RVSP= 27 mmHg, EF 55-60%.  Hyperlipidemia     Hypertension     Macular degeneration     Bilateral Eyes    Other activity(E029.9) 04/05/2012    48 Holter Monitor. Normal sinus rhythm with frequent low-grade supraventricular ectopy, without clinically significant arrhythmias.  PAF (paroxysmal atrial fibrillation) (HCC)     PVD (peripheral vascular disease) [facility] [address]     S/P CABG x 3 10/25/2016    10/3/16 LIMA-LAD, SVG-PDA, SVG-Cx + Maze+Appendage resection Dr Farah Postal 2000's    Nose    SOB (shortness of breath) on exertion 8/12/2014    Teeth missing     Upper And Lower    Ventricular ectopy     supraventricular and ventricular ectopy    Wears dentures     Full Upper    Wears glasses      Past Surgical History:   Procedure Laterality Date    CARDIAC SURGERY  10/03/2016    CABG (3 Bypasses)    COLONOSCOPY  Last Done In 2000's    Polyps Removed In Past    CORONARY ARTERY BYPASS GRAFT  10/03/2016    Coronary Artery Bypass Graft x 3. LIMA to LAD. SVG to PDA. SVG to Cx. MVR with CG ring. LA appendage resection.  Epi and endocardial MAZE.    CYST REMOVAL  2000's    Back    DENTAL SURGERY      Teeth Extracted In Past    EYE SURGERY Bilateral 2015    Cataracts With Lens Implants    FRACTURE SURGERY Left 01/11/2016    Broken Left Hip With Hardware    OTHER SURGICAL HISTORY  10/03/2017    sternal plate    SKIN CANCER EXCISION  2000's    Nose    THORACENTESIS Left 10/06/2016    [facility]- Dr Ramirez Less     Family History   Problem Relation Age of Onset    Early Death Mother 48        Liver Cancer    Cancer Mother         Liver Cancer    Cancer Father         Prostate Cancer    Diabetes Father     Other Father         \"Black Lung\"    Cancer Sister         Breast Cancer    Breast Cancer Sister     Cancer Son         Prostate Cancer     Social History     Socioeconomic History    Marital status:      Spouse name: Not on file    Number of children: Not on file    Years of education: Not on file    Highest education level: Not on file   Occupational History    Not on file   Tobacco Use    Smoking status: Former Smoker     Packs/day: 2.00     Years: 30.00     Pack years: 60.00     Types: Cigarettes     Start date:      Quit date:      Years since quittin.1    Smokeless tobacco: Never Used   Vaping Use    Vaping Use: Never used   Substance and Sexual Activity    Alcohol use: No    Drug use: No    Sexual activity: Never   Other Topics Concern    Not on file   Social History Narrative    Not on file     Social Determinants of Health     Financial Resource Strain:     Difficulty of Paying Living Expenses: Not on file   Food Insecurity:     Worried About [address] in the Last Year: Not on file    Precious of Food in the Last Year: Not on file   Transportation Needs:     Lack of Transportation (Medical): Not on file    Lack of Transportation (Non-Medical):  Not on file   Physical Activity:     Days of Exercise per Week: Not on file    Minutes of Exercise per Session: Not on file   Stress:     Feeling of Stress : Not on file   Social Connections:     Frequency of Communication with Friends and Family: Not on file    Frequency of Social Gatherings with Friends and Family: Not on file    Attends Advent Services: Not on file    Active Member of Clubs or Organizations: Not on file    Attends Club or Organization Meetings: Not on file    Marital Status: Not on file   Intimate Partner Violence:     Fear of Current or Ex-Partner: Not on file    Emotionally Abused: Not on file    Physically Abused: Not on file    Sexually Abused: Not on file   Housing Stability:     Unable to Pay for Housing in the Last Year: Not on file    Number of Places Lived in the Last Year: Not on file    Unstable Housing in the Last Year: Not on file     No current facility-administered medications for this encounter. Current Outpatient Medications   Medication Sig Dispense Refill    apixaban (ELIQUIS) 5 MG TABS tablet Take 1 tablet by mouth 2 times daily 60 tablet 0    metoprolol succinate (TOPROL XL) 50 MG extended release tablet Take 1 tablet by mouth daily 30 tablet 0    losartan (COZAAR) 25 MG tablet TAKE 1 TABLET BY MOUTH DAILY 90 tablet 3    magnesium oxide (MAG-OX) 400 MG tablet TAKE ONE TABLET TWO TIMES A  tablet 3    PRALUENT 75 MG/ML SOAJ injection pen INJECT 1 ML INTO THE SKIN EVERY 14 DAYS 2 mL 6    torsemide (DEMADEX) 20 MG tablet Take 1 tablet by mouth 2 times daily (Patient taking differently: Take 20 mg by mouth daily ) 180 tablet 3    acetaminophen (ACETAMINOPHEN EXTRA STRENGTH) 500 MG tablet Take 2 tablets by mouth every 4 hours as needed for Pain Do not take more than 6 tablets per day 240 tablet 1    amLODIPine (NORVASC) 10 MG tablet Take 1 tablet by mouth daily 90 tablet 5    omeprazole (PRILOSEC) 40 MG delayed release capsule Take 40 mg by mouth daily      guaiFENesin (MUCINEX) 600 MG extended release tablet Take 600 mg by mouth 2 times daily       cyclobenzaprine (FLEXERIL) 10 MG tablet Take 10 mg by mouth nightly       nitroGLYCERIN (NITROSTAT) 0.4 MG SL tablet Place 0.4 mg under the tongue every 5 minutes as needed for Chest pain up to max of 3 total doses. If no relief after 1 dose, call 911.       Glycerin-Polysorbate 80 (REFRESH DRY EYE THERAPY OP) Apply to eye      Insulin Degludec (TRESIBA FLEXTOUCH) 100 UNIT/ML SOPN Inject 40 Units into the skin nightly       Menthol-Camphor (ICY HOT ADVANCED RELIEF) 16-11 % CREA Apply topically as needed       Sennosides (SENOKOT PO) Take by mouth as needed Over The Counter       ALBUTEROL IN Inhale into the lungs as needed      Multiple Vitamins-Minerals (ICAPS PO) Take by mouth every morning Over The Counter      SITagliptin (JANUVIA) 50 MG tablet Take 50 mg by mouth every morning       aspirin EC 81 MG EC tablet Take 1 tablet by mouth daily 30 tablet 3    insulin lispro (HUMALOG) 100 UNIT/ML injection vial Inject 0-12 Units into the skin 3 times daily (with meals) (Patient taking differently: Inject into the skin Per Sliding Scale) 1 vial 3     Allergies   Allergen Reactions    Aldactone [Spironolactone]      \"Developed Pain In The  Breasts And Knots In The Breasts\"    Crestor [Rosuvastatin Calcium]      \"Leg Cramps\"    Lipitor      \"Leg Cramps\"    Zocor [Simvastatin - High Dose]      \"Leg Cramps\"       Nursing Notes Reviewed     Physical Exam:   ED Triage Vitals [02/05/22 1325]   Enc Vitals Group      /63      Pulse 88      Resp 18      Temp 99.4 °F (37.4 °C)      Temp Source Oral      SpO2 (!) 80 %      Weight       Height       Head Circumference       Peak Flow       Pain Score       Pain Loc       Pain Edu? Excl. in [address]? BP (!) 135/90   Pulse 76   Temp 99.4 °F (37.4 °C) (Oral)   Resp 20   Wt 187 lb (84.8 kg)   SpO2 90%   BMI 28.43 kg/m²   My pulse ox interpretation is  abnormal  Physical Exam  Vitals and nursing note reviewed. Constitutional:       General: He is not in acute distress. Appearance: He is not diaphoretic. Comments: Elderly appearing male who appears to feel unwell   HENT:      Head: Normocephalic and atraumatic. Eyes:      General:         Right eye: No discharge. Left eye: No discharge. Conjunctiva/sclera: Conjunctivae normal.   Cardiovascular:      Rate and Rhythm: Normal rate and regular rhythm. Pulses: Normal pulses. Radial pulses are 2+ on the right side and 2+ on the left side. Pulmonary:      Effort: Pulmonary effort is normal. No respiratory distress. Breath sounds: Examination of the right-lower field reveals decreased breath sounds. Examination of the left-lower field reveals decreased breath sounds. Decreased breath sounds present. No wheezing or rales. Comments: Requiring 4 L of oxygen via nasal cannula. Abdominal:      General: There is no distension. Tenderness: There is no abdominal tenderness. Musculoskeletal:         General: No swelling or tenderness. Normal range of motion. Right lower leg: Edema ( 1+ edema from the mid calf distally ) present. Left lower leg: Edema ( 1+ edema from the mid calf distally ) present. Skin:     General: Skin is warm and dry. Neurological:      General: No focal deficit present. Mental Status: He is alert. Cranial Nerves: No cranial nerve deficit.    Psychiatric:         Mood and Affect: Mood normal.         Behavior: Behavior normal.         I have reviewed and interpreted all of the currently available lab results from this visit (if applicable):  Results for orders placed or performed during the hospital encounter of 02/05/22   COVID-19, Rapid    Specimen: Nasopharyngeal   Result Value Ref Range    Source THROAT     SARS-CoV-2, NAAT DETECTED (A) NOT DETECTED   CBC Auto Differential   Result Value Ref Range    WBC 6.2 4.0 - 10.5 K/CU MM    RBC 3.92 (L) 4.6 - 6.2 M/CU MM    Hemoglobin 11.3 (L) 13.5 - 18.0 GM/DL    Hematocrit 35.0 (L) 42 - 52 %    MCV 89.3 78 - 100 FL    MCH 28.8 27 - 31 PG    MCHC 32.3 32.0 - 36.0 %    RDW 15.8 (H) 11.7 - 14.9 %    Platelets 232 321 - 467 K/CU MM    MPV 8.9 7.5 - 11.1 FL    Differential Type AUTOMATED DIFFERENTIAL     Segs Relative 73.0 (H) 36 - 66 %    Lymphocytes % 14.6 (L) 24 - 44 %    Monocytes % 10.7 (H) 0 - 4 %    Eosinophils % 0.2 0 - 3 %    Basophils % 0.2 0 - 1 %    Segs Absolute 4.5 K/CU MM    Lymphocytes Absolute 0.9 K/CU MM    Monocytes Absolute 0.7 K/CU MM    Eosinophils Absolute 0.0 K/CU MM    Basophils Absolute 0.0 K/CU MM    Immature Neutrophil % 1.3 (H) 0 - 0.43 %    Total Immature Neutrophil 0.08 K/CU MM   Comprehensive Metabolic Panel w/ Reflex to MG   Result Value Ref Range    Sodium 132 (L) 135 - 145 MMOL/L    Potassium 4.5 3.5 - 5.1 MMOL/L    Chloride 94 (L) 99 - 110 mMol/L    CO2 26 21 - 32 MMOL/L    BUN 47 (H) 6 - 23 MG/DL    CREATININE 2.8 (H) 0.9 - 1.3 MG/DL    Glucose 184 (H) 70 - 99 MG/DL    Calcium 8.4 8.3 - 10.6 MG/DL    Albumin 3.3 (L) 3.4 - 5.0 GM/DL    Total Protein 6.3 (L) 6.4 - 8.2 GM/DL    Total Bilirubin 0.3 0.0 - 1.0 MG/DL    ALT 14 10 - 40 U/L    AST 19 15 - 37 IU/L    Alkaline Phosphatase 100 40 - 129 IU/L    GFR Non- 22 (L) >60 mL/min/1.73m2    GFR  27 (L) >60 mL/min/1.73m2    Anion Gap 12 4 - 16   Troponin   Result Value Ref Range    Troponin T 0.041 (H) <0.01 NG/ML   Brain Natriuretic Peptide   Result Value Ref Range    Pro-BNP 2,478 (H) <300 PG/ML   Lactic acid, plasma   Result Value Ref Range    Lactate 1.8 0.4 - 2.0 mMOL/L   POCT Venous   Result Value Ref Range    pH, Nikita 7.41 7.32 - 7.42    pCO2, Nikita 43.6 38 - 52 mmHG    pO2, Nikita 38.8 28 - 48 mmHG    Base Exc, Mixed 2.6 (H) 0 - 1.2    Base Excess HIDE 0 - 3.3    HCO3, Venous 27.7 (H) 19 - 25 MMOL/L    O2 Sat, Nikita 73.5 (H) 50 - 70 %    CO2 Content 29.0 (H) 19 - 24 MMOL/L    Source: Venous    EKG 12 Lead   Result Value Ref Range    Ventricular Rate 83 BPM    Atrial Rate 83 BPM    P-R Interval 152 ms    QRS Duration 116 ms    Q-T Interval 404 ms    QTc Calculation (Bazett) 474 ms    P Axis 79 degrees    R Axis -37 degrees    T Axis -8 degrees    Diagnosis       Normal sinus rhythm  Left axis deviation  Incomplete right bundle branch block  Nonspecific ST abnormality  Abnormal ECG  When compared with ECG of 15-TAMIKA-2022 13:40,  No significant change was found        Radiographs (if obtained):  [] The following radiograph was interpreted by myself in the absence of a radiologist:  [x]Radiologist's Report Reviewed:  XR CHEST PORTABLE   Final Result   Patchy airspace opacities bilaterally, may be related to mild pulmonary edema versus pneumonia. Stable mild cardiomegaly. EKG (if obtained): (All EKG's are interpreted by myself in the absence of a cardiologist)  Normal sinus rhythm with a rate of 83. NH interval 152, , QTc 474. Nonspecific ST segments and T waves. Incomplete right bundle branch block. Left axis deviation. Impression: Abnormal EKG. When compared to previous EKG from 1/15/2022, there are no significant changes. MDM:  Differential diagnoses considered include but are not limited to generalized weakness, electrolyte abnormality, deconditioning, COVID-19, pneumonia, pneumothorax, pulmonary embolism, urinary tract infection, urinary retention, enuresis, fluid overload, KAREEM. Patient presents with hypoxia upon arrival to the emergency department and complaint of difficulty urinating. He has significant enuresis on exam with soaked clothing. Basic labs are obtained and show an KAREEM and this decreased sodium. EKG is not concerning for acute ischemia. Patient troponin is slightly elevated which I suspect is due to the elevated creatinine level. Bladder scan does not show significant of urine in his bladder but again patient had large amount of urine on his close. Chest x-ray shows patchy airspace opacities bilaterally. Rapid Covid was obtained and is positive. I suspect patient's hypoxia is due to his Covid positive status. Also suspect some of his generalized weakness is exacerbated by Covid. Patient given Decadron for hypoxia. Plan to admit him to the hospital for further evaluation and treatment as well as likely PT and OT evaluation. Due to patient's Covid positive status and hypoxia, plan to transfer him to [facility] for further evaluation and treatment. Patient accepted in transfer by Dr. Chelsea Dorman. Plan of care explained to patient. All questions and concerns were addressed to the patient's satisfaction. Patient understood and agreed with plan.     I did don appropriate PPE (including face mask, protective eye ware/safety glasses and gloves), as recommended by the health facility/national standard best practice, during my bedside interactions with the patient. Clinical Impression:  1. Pneumonia due to COVID-19 virus    2. Hypoxia    3. Generalized weakness    4. KAREEM (acute kidney injury) ([facility] [address]          Jodi Lopez MD       Please note that portions of this note may have been complete with a voice recognition program.  Effortswere made to edit the dictations, but occasional words are mis-transcribed.           Jodi Lopez MD  02/05/22 [id number]

## 2022-02-05 NOTE — ED NOTES
Pt. Incontinent of a lg amt of urine, bed linen changed. Pt has redness to coccyx the size of a quarter.  No open areas     Jennifer FLAVIA Pacheco  02/05/22 [id number]

## 2022-02-06 NOTE — CONSULTS
Hospitalist Consult Note      Name:  Bard Park /Age/Sex: 1945  (68 y.o. male)   MRN & CSN:  [id number] & [id number] Admission Date/Time: 2022  1:22 PM   Location:   PCP: Yahaira Anderson MD       Hospital Day: 1    Assessment and Plan:   Bard Park is a 68 y.o.  male  who presents with Acute hypoxemic respiratory failure due to COVID-19 [facility])  Hospitalist Team consulted for: Medical Management    #Acute hypoxemic respiratory failure due to COVID-19/pneumonia due to COVID-19(vaccinated)   -Patient arrived to the ED with SPO2 of 80% on room air, currently requiring 5 L with saturation of 95%. Maintain saturation he was above 90%. RT is to assist with oxygen management   -COVID-19 detected on 2022.   -Supportive care with decadron   -Encouraged deep breathing, ambulation and especially pronation   -Follow inflammatory markers as indicated   -DVT prophylaxis covered with patient being on Eliquis   -PPI prophylaxis with Protonix    #Diabetes mellitus type 2   -Hold home regimen   -Insulin/scale while inpatient    #Atrial fib/acute on chronic congestive heart failure of unspecified type   -A. fib seems to be stable continue with anticoagulation and rate control medications   -Patient will receive IV Lasix for exacerbation of heart failure   -Monitor labs closely     #Chronic renal disease stage III   -Currently notably acute due to prerenal with fluid overload   -Treat underlying cause   -Avoid nephrotoxic agents       Diet ADULT DIET;  Regular; 5 carb choices (75 gm/meal)   DVT Prophylaxis [x] Eliquis[]  Heparin, [] SCDs, [] Ambulation   GI Prophylaxis [x] PPI,  [] H2 Blocker,  [] Carafate,  [] Diet/Tube Feeds   Code Status Full Code   Disposition Patient requires continued admission due to Acute hypoxemic respiratory failure due to COVID-19 ([facility] [address]   MDM [] Low, [] Moderate,[x]  High  Patient's risk as above due to Acute hypoxemic respiratory failure due to COVID-19 [facility])     History of Present Illness:     Chief Complaint: Acute hypoxemic respiratory failure due to COVID-19 [facility])  Reshma Orozco is a 68 y.o.  male  who presents with congestive heart failure, atrial fib, COPD, diabetes mellitus type 2, chronic back pain and chronic renal disease stage 3 presented to the emergency department today with complaints of weakness and chronic back pain. Patient denies for shortness of breath, chest pain, wheezing, cough, dizziness, fever, chills, nausea, vomiting nor diarrhea. He reported to have a saturation of 80% upon arrival.  He required 5 units of insulin to maintain sats above 90%. Patient is vaccinated with 2 vaccine a year ago. His only complaint are weakness, back pain and dysuria. Patient also reported that he was hospitalized and was sent to rehab facility but decided to leave the rehab facility after 1 week due to a  that he needed to attend. He now returns with complaining of persistent weakness. In the ED, the patient had multiple lab work noted for slight abnormal sodium at 132, BUN was elevated at 47, creatinine was 2.8 glucose is 184. He was noted to have a BNP of 2478. No leukocytosis noted. Chest x-ray noted with infiltrate consistent with pneumonia and pulmonary edema. He was swabbed for COVID-19 and was noted to be positive. Patient did have low saturation and require supplemental oxygen. He is pending for transfer to [facility]. We were asked to evaluate and manage the patient's medical status while he is boarding in the ED       Ten point ROS reviewed negative, unless as noted above    Objective:   No intake or output data in the 24 hours ending 22 2210   Vitals:   Vitals:    22 2130   BP: (!) 142/53   Pulse: 67   Resp: 17   Temp:    SpO2:      Physical Exam:   GEN Awake male, sitting upright in bed in no apparent distress. Appears given age. EYES Pupils are equally round.   No scleral erythema, discharge, or conjunctivitis. HENT Mucous membranes are moist. Oral pharynx without exudates, no evidence of thrush. NECK Supple, no apparent thyromegaly or masses. RESP breath sounds scattered rhonchi. Symmetric chest movement. CARDIO/VASC S1/S2 auscultated. Regular rate without appreciable murmurs, rubs, or gallops. No JVD or carotid bruits. Peripheral pulses equal bilaterally and palpable. Trace peripheral edema. GI Abdomen is soft without significant tenderness, masses, or guarding. Bowel sounds are normoactive. MSK No gross joint deformities. SKIN Normal coloration, warm, dry. NEURO Cranial nerves appear grossly intact, normal speech, no lateralizing weakness. No focal deficit  PSYCH Awake, alert, oriented x 4. Affect appropriate.     Medications:   Medications:    insulin lispro  0-18 Units SubCUTAneous 4x Daily AC & HS    [START ON 2/6/2022] amLODIPine  10 mg Oral Daily    apixaban  5 mg Oral BID    [START ON 2/6/2022] aspirin EC  81 mg Oral Daily    cyclobenzaprine  10 mg Oral Nightly    [START ON 2/6/2022] losartan  25 mg Oral Daily    [START ON 2/6/2022] metoprolol succinate  50 mg Oral Daily    [START ON 2/6/2022] pantoprazole  40 mg Oral QAM AC    [START ON 2/6/2022] dexamethasone  6 mg Oral Daily    furosemide  40 mg IntraVENous BID      Infusions:    dextrose       PRN Meds: glucose, 15 g, PRN  dextrose, 12.5 g, PRN  glucagon (rDNA), 1 mg, PRN  dextrose, 100 mL/hr, PRN  acetaminophen, 650 mg, Q6H PRN  ondansetron, 4 mg, Q6H PRN  oxyCODONE, 5 mg, Q4H PRN  benzonatate, 100 mg, TID PRN  melatonin, 3 mg, Nightly PRN  ALPRAZolam, 0.25 mg, Nightly PRN  polyethylene glycol, 17 g, Daily PRN  aluminum & magnesium hydroxide-simethicone, 30 mL, Q6H PRN  HYDROcodone-chlorpheniramine, 5 mL, Q12H PRN          Electronically signed by Syeda Harrison PA-C on 2/5/2022 at 10:10 PM

## 2022-02-06 NOTE — PROGRESS NOTES
Pharmacy Consult for Baricitinib Initiation per Dr. Jumana Singh per [facility] THE [facility] P&T Committee  **All criteria need to be met to receive   Baricitinib for COVID-19 patients**   Age    >5years old     Yes   Laboratory Results    Confirmed positive COVID-19     PLUS    ANY elevation in biomarkers   (CRP, D-dimer, LDH, ferritin)       Yes     Concomitant Therapy    Receiving systemic steroids for treatment of COVID 19     Yes   Oxygen Status        Requiring supplemental oxygen   (>1 L NC, HFNC, Heated Vapotherm, biPAP, mechanical ventilation)        Yes   Special Considerations    Pregnancy  Severe Hepatic Impairment  Chronic/Recurrent Infections   Hemoglobin <8         Clarify risk vs. benefit with provider if criteria met     Contraindications    1. Invasive active mycobacterial or fungal infection  2. Lymphocyte count <200  3. Neutrophil count, ANC <500   4. Significant immunosuppression    ?     None     Dosing Recommendations:    Baricitinib 1 mg PO daily x 14 days (or until hospital discharge)    Renal dose adjustment:  -eGFR > 60: no adjustment necessary   -eGFR 30 - 60: 2 mg PO daily   -eGFR 15 - 30: 1 mg PO daily   -eGFR <15: not recommended   - HD, PD, CRRT: no recommendations at this time     Thank you for the consult,  Ronald Gonzalez, DarrenD, [facility]  2/6/2022  12:23 PM

## 2022-02-06 NOTE — CONSULTS
Hospitalist Consult Note      Name:  Nancy Diaz /Age/Sex: 1945  (68 y.o. male)   MRN & CSN:  [id number] & [id number] Admission Date/Time: 2022  1:22 PM   Location:   PCP: Lesvia Almazan MD       Hospital Day: 2    Assessment and Plan:   Nancy Diaz is a 68 y.o.  male  who presents with Acute hypoxemic respiratory failure due to COVID-19 [facility])  Hospitalist Team consulted for: Medical Management     1. Acute hypoxemic respiratory failure due to COVID-19 pneumonia patient is vaccinated. Patient is requiring 5 L nasal cannula with O2 sat of 95% respiratory therapy is to manage oxygen. Decadron, incentive spirometer, deep breathing ambulation and pronation, ordered follow inflammatory markers DVT prophylaxis patient is on Eliquis patient is awaiting transfer to [facility]. Pharmacy has been consulted for baricitinib dosing. Procalcitonin and CRP ordered  2. Diabetes mellitus type 2, patient has been placed on insulin sliding scale before meals and at bedtime  3. Atrial fibrillation, continue current medications  4. Acute on chronic CHF IV Lasix continue other home medications monitor labs  5. CKD stage III acute on chronic creatinine was 2.9 earlier today. Continue to monitor closely, needs nephrology consultation once transferred to [facility]. Diet ADULT DIET; Regular; 5 carb choices (75 gm/meal)   DVT Prophylaxis [] Lovenox, []  Heparin, [] SCDs, [] Ambulation   GI Prophylaxis [] PPI,  [] H2 Blocker,  [] Carafate,  [] Diet/Tube Feeds   Code Status Full Code             History of Present Illness:     Chief Complaint: This 70-year-old male who presented to the ER with complaints of weakness he was found to be hypoxic is also tested positive for COVID-19. He was found to have pneumonia and questionable decompensated heart failure. Continue IV Lasix for 1-2 more doses and reassess. We will continue with Decadron.   Patient is resting comfortably at this time on 5 L nasal cannula no acute distress. Patient is awaiting transfer to [facility]. Ten point ROS reviewed negative, unless as noted above    Objective:   No intake or output data in the 24 hours ending 02/06/22 1501   Vitals:   Vitals:    02/06/22 1331   BP: (!) 138/54   Pulse: 92   Resp: 21   Temp:    SpO2: 93%     Physical Exam:   GEN Awake male, sitting upright in bed in no apparent distress. Appears given age. EYES Pupils are equally round. No scleral erythema, discharge, or conjunctivitis. HENT Mucous membranes are moist. Oral pharynx without exudates, no evidence of thrush. NECK Supple, no apparent thyromegaly or masses. RESP bilateral bibasilar crackles scattered rhonchi. Symmetric chest movement while on room 5 L  CARDIO/VASC S1/S2 auscultated. Regular rate without appreciable murmurs, rubs, or gallops. No JVD or carotid bruits. Peripheral pulses equal bilaterally and palpable. Bilateral lower extremity edema. GI Abdomen is soft without significant tenderness, masses, or guarding. Bowel sounds are normoactive. Rectal exam deferred.  No costovertebral angle tenderness. MSK No gross joint deformities. SKIN Normal coloration, warm, dry. NEURO Cranial nerves appear grossly intact, normal speech, no lateralizing weakness. PSYCH Awake, alert, oriented x 4. Affect appropriate.     Medications:   Medications:    baricitinib  1 mg Oral Daily    insulin lispro  0-18 Units SubCUTAneous 4x Daily AC & HS    amLODIPine  10 mg Oral Daily    apixaban  5 mg Oral BID    aspirin EC  81 mg Oral Daily    cyclobenzaprine  10 mg Oral Nightly    losartan  25 mg Oral Daily    metoprolol succinate  50 mg Oral Daily    pantoprazole  40 mg Oral QAM AC    dexamethasone  6 mg Oral Daily    furosemide  40 mg IntraVENous BID    phenazopyridine  100 mg Oral Once      Infusions:    dextrose       PRN Meds: glucose, 15 g, PRN  dextrose, 12.5 g, PRN  glucagon (rDNA), 1 mg, PRN  dextrose, 100 mL/hr, PRN  acetaminophen, 650 mg, Q6H PRN  ondansetron, 4 mg, Q6H PRN  oxyCODONE, 5 mg, Q4H PRN  benzonatate, 100 mg, TID PRN  polyethylene glycol, 17 g, Daily PRN  aluminum & magnesium hydroxide-simethicone, 30 mL, Q6H PRN  HYDROcodone-chlorpheniramine, 5 mL, Q12H PRN          Electronically signed by RAMY Whelan NP on 2/6/2022 at 3:01 PM

## 2022-02-06 NOTE — ED NOTES
Pt coughed up a bright red sputum , pt states he has done two weeks ago while he was in the hospital     Ruben White, FLAVIA  02/06/22 4281

## 2022-02-06 NOTE — ED NOTES
Spoke w/ med trans , they will transport pt to [facility] at [address] [place] [address]  02/06/22 3484

## 2022-02-06 NOTE — ED NOTES
Placed pt. In a hospital bed , call light w/in reach. Changed male pure wick , emptied 600cc  of urine  From canister.  Pt. Had a moderate soft BM on bedside commode     Ramana Saunders RN  02/06/22 1034

## 2022-02-06 NOTE — ED NOTES
Med trans here to pick pt up  To be transferred to rm [id number] at [facility], [address] [facility]  02/06/22 0048

## 2022-02-06 NOTE — ED NOTES
Access center called and they are going to try again tomorrow for transport as none is available bailey Real  02/05/22 6032

## 2022-02-07 NOTE — PROGRESS NOTES
Hospitalist Progress Note      Name:  Diana Melendrez /Age/Sex: 1945  (68 y.o. male)   MRN & CSN:  [id number] & [id number] Admission Date/Time: 2022  7:43 PM   Location:  -A PCP: Odell Mcdaniel MD         Hospital Day: 2  Discharge barrier/Reason for continued hospitalization: Transfer to ICU for insulin gtt. hyperglycemia greater than 600    Assessment and Plan:   Diana Melendrez is a 68 y.o.  male hypertension, chronic diastolic CHF, CKD 4 due to diabetic nephropathy, A. fib on chronic anticoagulation with Eliquis, type 2 diabetes, dyslipidemia, who presented to the ED on 2022 with shortness of breath and cough and was diagnosed with Pneumonia due to COVID-19 virus. He is fully vaccinated against Covid    1. Sepsis due to COVID-19 pneumonia: POA. Acute organ dysfunction as evidenced by acute hypoxic respiratory failure. CXR with patchy airspace disease bilaterally  Steroids, baricitinib (renal dosing), isolation    2. COVID-19 acute hypoxic respiratory failure: POA. O2 sats <89% on room air  Currently on 4 L. Not on home O2  Wean off as tolerated    3. Hyperglycemia due to type 2 diabetes: Worsened by steroids  Start insulin gtt. transfer to ICU. Endocrinology consult. Continue Lantus nightly  Transition to basal bolus when glucose is less than 250    4. Chronic diastolic CHF: Acute decompensation ruled out  Switch IV diuresis to home oral diuretic regimen. 5.  Hypertension: Continue Norvasc, Toprol-XL. Agree with holding losartan for now    6. CKD 4: Due to diabetic nephropathy. Stable    7. Paroxysmal A. fib: Continue Eliquis. Continue Toprol-XL    Diet ADULT DIET; Regular; Low Sodium (2 gm)   DVT Prophylaxis [] Lovenox, []  Heparin, [] SCDs, [] Ambulation.   Eliquis   GI Prophylaxis [] PPI,  [] H2 Blocker,  [] Carafate,  [x] Diet/Tube Feeds   Code Status Full Code   MDM [] Low, [] Moderate,[x]  High       History of Present Illness:     Chief Complaint: Pneumonia due to COVID-19 virus     Jessica Bautista is a 68 y.o.  male  who presents with shortness of breath and cough. Patient is seen and examined today, cough is somewhat better but still very short of breath. He denies fevers or chills. His appetite is somewhat okay       Ten point ROS reviewed negative, unless as noted above    Objective: Intake/Output Summary (Last 24 hours) at 2/7/2022 1356  Last data filed at 2/7/2022 1128  Gross per 24 hour   Intake 10 ml   Output 1000 ml   Net -990 ml        Vitals:   Vitals:    02/06/22 1858 02/07/22 0644 02/07/22 0907   BP: (!) 179/79 (!) 157/65 (!) 153/62   Pulse: 85 84 93   Resp: 16 18 22   Temp: 96.4 °F (35.8 °C) 97.1 °F (36.2 °C) 98.1 °F (36.7 °C)   TempSrc: Oral Oral Oral   SpO2: 92% 94% 92%        Physical Exam:   GEN: Awake male, alert and oriented x3 in mild respiratory distress. Appears given age. HEENT: Normal   RESP: Diminished BS bilaterally. Symmetric chest movement while on 4 L  CVS: RRR, S1, S2  GI/: Abdomen is soft, nontender, no organomegaly. . Bowel sounds normal, rectal exam deferred. No CVA tenderness. MSK: No gross joint deformities. No tenderness  SKIN: Normal coloration, warm, dry. NEURO: Cranial nerves appear grossly intact, normal speech, no lateralizing weakness. PSYCH:  Affect appropriate.     Medications:   Medications:    baricitinib  1 mg Oral Daily    insulin glargine  20 Units SubCUTAneous Nightly    [Held by provider] insulin lispro  0.08 Units/kg SubCUTAneous TID WC    [Held by provider] insulin lispro  0-12 Units SubCUTAneous TID WC    [Held by provider] insulin lispro  0-6 Units SubCUTAneous Nightly    benzonatate  100 mg Oral TID    guaiFENesin  200 mg Oral Q4H    sodium chloride flush  5-40 mL IntraVENous 2 times per day    dexamethasone  6 mg IntraVENous Q24H    amLODIPine  10 mg Oral Daily    apixaban  5 mg Oral BID    aspirin EC  81 mg Oral Daily    cyclobenzaprine  10 mg Oral Nightly    metoprolol succinate  50 mg Oral Daily    pantoprazole  40 mg Oral QAM AC    senna  1 tablet Oral BID    furosemide  40 mg IntraVENous Q12H      Infusions:    dextrose      insulin      dextrose      sodium chloride      dextrose       PRN Meds: dextrose, 100 mL/hr, PRN  glucose, 15 g, PRN  dextrose, 12.5 g, PRN  glucagon (rDNA), 1 mg, PRN  dextrose, 100 mL/hr, PRN  sodium chloride flush, 5-40 mL, PRN  sodium chloride, 25 mL, PRN  ondansetron, 4 mg, Q8H PRN   Or  ondansetron, 4 mg, Q6H PRN  polyethylene glycol, 17 g, Daily PRN  acetaminophen, 650 mg, Q6H PRN   Or  acetaminophen, 650 mg, Q6H PRN  dextrose, 100 mL/hr, PRN  acetaminophen, 500 mg, Q4H PRN  albuterol sulfate HFA, 2 puff, Q4H PRN  dextrose bolus (hypoglycemia), 125 mL, PRN   Or  dextrose bolus (hypoglycemia), 250 mL, PRN          Electronically signed by Palmer Miller MD on 2/7/2022 at 1:56 PM

## 2022-02-07 NOTE — PROGRESS NOTES
DAY 2    Pharmacy Consult for Baricitinib Initiation per Dr. Yanelis Bower per [facility] THE [facility] P&T Committee  **All criteria need to be met to receive   Baricitinib for COVID-19 patients**   Age    >5years old     Yes   Laboratory Results    Confirmed positive COVID-19     PLUS    ANY elevation in biomarkers   (CRP, D-dimer, LDH, ferritin)       Yes     Concomitant Therapy    Receiving systemic steroids for treatment of COVID 19     Yes   Oxygen Status        Requiring supplemental oxygen   (>1 L NC, HFNC, Heated Vapotherm, biPAP, mechanical ventilation)        Yes   Special Considerations    Pregnancy  Severe Hepatic Impairment  Chronic/Recurrent Infections   Hemoglobin <8         Clarify risk vs. benefit with provider if criteria met     Contraindications    1. Invasive active mycobacterial or fungal infection  2. Lymphocyte count <200  3. Neutrophil count, ANC <500   4. Significant immunosuppression    ?     None     Dosing Recommendations:    Baricitinib 4 mg PO daily x 14 days (or until hospital discharge)    Renal dose adjustment:  -eGFR 15 - 30: 1 mg PO daily       Thank you for the consult,  Cristy Swenson RPh  2/07/22  4:38 AM

## 2022-02-07 NOTE — PROGRESS NOTES
2/7. Pt admitted to unit assessment completed, RAFIQ GOLDEN MD notified new order obtained to give 10 units more then recheck  After 30 minutes at 590 then an hour after 470. Pt resting quietly.  This morning  Patient blood sugar is 480, message sent to MD. Will cont to monitor

## 2022-02-07 NOTE — H&P
Hospital Medicine History and Physical    2022    Date of Admission: 2022    Date of Service: Pt seen/examined on 2022 and admitted to inpatient. Assessment/plan: Active Problems:    Hypoxia    Pneumonia due to COVID-19 virus  Resolved Problems:    * No resolved hospital problems. *      1. Acute respiratory failure with hypoxia  Secondary to COVID-19 pneumonia  Continue nasal cannula oxygen, wean as tolerated  Start on Decadron  Pharmacy consult for evaluation of baricitinib  Anticoagulation, breathing treatment, multivitamin cocktail  Monitor informatory markers    2. Acute on chronic CHF  Heart failure with preserved EF  Continue on IV Lasix  Strict I/os, daily weight, fluid restriction  Cardiology consult    3. Acute on chronic renal failure  Monitor creatinine and urine output  Avoid nephrotoxins  Nephrology consult    4. Atrial fibrillation  Rate controlled  Continue on Eliquis  Restart home meds    5. Catheter extremities  Hyperglycemic  Lantus, sliding scale insulin  ADA diet      Activities: Up with assist  Prophylaxis: On Eliquis  Code status: Full    ==========================================================  Chief complaint:  No chief complaint on file. History of Presenting Illness: This is a pleasant 68 y.o. male with a past medical history of atrial fibrillation, CHF, COPD, diabetes mellitus, CKD presenting to the hospital with complaints of weakness and back pain. Patient reports that weakness has been worsening, reports having been hospitalized recently after which he was sent to rehab facility but left after a week due to  engagement. States her weakness has persistent and has been worsening. Denies any headache or chest pain or shortness of breath or cough or fever or chills or vomiting. Patient is vaccinated. Work-up at over the ER shows stable vital signs, labs significant for creatinine 2.8, elevated blood glucose, BNP 2478. Covid was positive. D-dimer 2000. Patient noted to be hypoxic, with oxygen saturation in the 80s, requiring 4 L of nasal cannula oxygen. Patient has been transferred to [address] [place] [address] for further care. Past Medical History:      Diagnosis Date    Acid reflux     Arrhythmia     Arthritis     \"Back, Hands, Fingers\"    CAD (coronary artery disease)     09/2000 non-critical; 2004 no signigicante change, Sees Dr. Gwen Monique CHF (congestive heart failure) ([facility])     Chronic back pain     CKD (chronic kidney disease) stage 3, GFR 30-59 ml/min ([facility] [address] 12/03/2015    Sees Dr. Symone Menon COPD (chronic obstructive pulmonary disease) ([facility] [address] 11/16/15    Diabetes mellitus ([facility] [address] Dx 1990's    Glaucoma     \"Both Eyes\"    H/O 24 hour EKG monitoring 7/161/8,01/26/2017    Conclusion abnormal 48 hours of cardiac monitor finding consistent with sinus rhythm with physiological heart rate variation frequent complex ventricular ectopy. Patient did not report any symptoms for correlation    H/O cardiac catheterization 12/27/2009    Patent coronary arteries with ectasia and minimal coronary luminal irregularities and preserved left ventricular function.  H/O cardiovascular stress test 08/09/2018    Lexiscan Cardiolite. ECG portion of test is negative for ischemia, normal ventricular contractility, no infarct or ischemia noted, normal stress myocardial perfusion, EF 58%. Normal study.  H/O complete electrocardiogram 04/05/2012    H/O Doppler ultrasound 09/13/2007    Bormal study suggestive of lack of evidence of any significant peripheral arterial disease. Patient'a symptoms are very suggestive of non-ischemic and non-vascular etiology. When compared to the previous study of 2005, ther is no significant change.  H/O Doppler ultrasound (Lower extremity) 01/30/2017    Native arteries in the examined lower extremity(ies) are patent, with no elevated velocities. No aneurysms are noted.     H/O percutaneous left heart catheterization 09/23/2016 Multivessel CAD with 100 % occluded RCA. 80 % to 90%  circumflex diffusely diseased. 80% mid LAD focal lesion.  Heat syncope 9/15/2016    [place] (hard of hearing)     Bilateral Ears    Hx of echocardiogram 2/6/19; 10/27/2016    2/6/19  LV function and size normal, mild concentric LVH, no regional wall motion abnormalities, normal diastolic filling pattern for age, mildly dilated LA, sclerotic but non-stenotic aortic valve, mitral annular calcification, RVSP= 27 mmHg, EF 55-60%.  Hyperlipidemia     Hypertension     Macular degeneration     Bilateral Eyes    Other activity(E029.9) 04/05/2012    48 Holter Monitor. Normal sinus rhythm with frequent low-grade supraventricular ectopy, without clinically significant arrhythmias.  PA[facility]oxysmal [facility] fibrillation) ([facility])     PVD (peripheral vascular disease) ([facility] [address]     S/P CABG x 3 10/25/2016    10/3/16 LIMA-LAD, SVG-PDA, SVG-Cx + Maze+Appendage resection Dr Sharita Selby 2000's    Nose    SOB (shortness of breath) on exertion 8/12/2014    Teeth missing     Upper And Lower    Ventricular ectopy     supraventricular and ventricular ectopy    Wears dentures     Full Upper    Wears glasses        Past Surgical History:      Procedure Laterality Date    CARDIAC SURGERY  10/03/2016    CABG (3 Bypasses)    COLONOSCOPY  Last Done In 2000's    Polyps Removed In Past    CORONARY ARTERY BYPASS GRAFT  10/03/2016    Coronary Artery Bypass Graft x 3. LIMA to LAD. SVG to PDA. SVG to Cx. MVR with CG ring. LA appendage resection.  Epi and endocardial MAZE.    CYST REMOVAL  2000's    Back    DENTAL SURGERY      Teeth Extracted In Past    EYE SURGERY Bilateral 2015    Cataracts With Lens Implants    FRACTURE SURGERY Left 01/11/2016    Broken Left Hip With Hardware    OTHER SURGICAL HISTORY  10/03/2017    sternal plate    SKIN CANCER EXCISION  2000's    Nose    THORACENTESIS Left 10/06/2016    [facility]- Dr Sheila Person       Medications (prior to admission):  Prior to Admission medications    Medication Sig Start Date End Date Taking? Authorizing Provider   apixaban (ELIQUIS) 5 MG TABS tablet Take 1 tablet by mouth 2 times daily 1/26/22   Mando Patiño MD   metoprolol succinate (TOPROL XL) 50 MG extended release tablet Take 1 tablet by mouth daily 1/26/22   Mando Patiño MD   losartan (COZAAR) 25 MG tablet TAKE 1 TABLET BY MOUTH DAILY 1/10/22   Elizabeth Reis MD   magnesium oxide (MAG-OX) 400 MG tablet TAKE ONE TABLET TWO TIMES A DAY 9/13/21   Elizabeth Reis MD   PRALUENT 75 MG/ML SOAJ injection pen INJECT 1 ML INTO THE SKIN EVERY 14 DAYS 8/2/21   RAMY Neal CNP   torsemide (DEMADEX) 20 MG tablet Take 1 tablet by mouth 2 times daily  Patient taking differently: Take 20 mg by mouth daily  7/12/21   Elizabeth Reis MD   acetaminophen (ACETAMINOPHEN EXTRA STRENGTH) 500 MG tablet Take 2 tablets by mouth every 4 hours as needed for Pain Do not take more than 6 tablets per day 6/14/21   RAMY Neal CNP   amLODIPine (NORVASC) 10 MG tablet Take 1 tablet by mouth daily 1/6/21   Elizabeth Reis MD   omeprazole (PRILOSEC) 40 MG delayed release capsule Take 40 mg by mouth daily    Historical Provider, MD   guaiFENesin (MUCINEX) 600 MG extended release tablet Take 600 mg by mouth 2 times daily     Historical Provider, MD   cyclobenzaprine (FLEXERIL) 10 MG tablet Take 10 mg by mouth nightly     Historical Provider, MD   nitroGLYCERIN (NITROSTAT) 0.4 MG SL tablet Place 0.4 mg under the tongue every 5 minutes as needed for Chest pain up to max of 3 total doses. If no relief after 1 dose, call 911.     Historical Provider, MD   Glycerin-Polysorbate 80 (REFRESH DRY EYE THERAPY OP) Apply to eye    Historical Provider, MD   Insulin Degludec (TRESIBA FLEXTOUCH) 100 UNIT/ML SOPN Inject 40 Units into the skin nightly     Historical Provider, MD   Menthol-Camphor (ICY HOT ADVANCED RELIEF) 16-11 % CREA Apply topically as needed Historical Provider, MD   Sennosides (SENOKOT PO) Take by mouth as needed Over The Counter     Historical Provider, MD   ALBUTEROL IN Inhale into the lungs as needed    Historical Provider, MD   Multiple Vitamins-Minerals (ICAPS PO) Take by mouth every morning Over The Counter    Historical Provider, MD   SITagliptin (JANUVIA) 50 MG tablet Take 50 mg by mouth every morning     Historical Provider, MD   aspirin EC 81 MG EC tablet Take 1 tablet by mouth daily 10/11/16   Vivian Edward MD   insulin lispro (HUMALOG) 100 UNIT/ML injection vial Inject 0-12 Units into the skin 3 times daily (with meals)  Patient taking differently: Inject into the skin Per Sliding Scale 10/11/16   M Amandeep Angulo MD       Allergy(ies):  Aldactone [spironolactone], Crestor [rosuvastatin calcium], Lipitor, and Zocor [simvastatin - high dose]    Social History:  TOBACCO:  reports that he quit smoking about 27 years ago. His smoking use included cigarettes. He started smoking about 57 years ago. He has a 60.00 pack-year smoking history. He has never used smokeless tobacco.  ETOH:  reports no history of alcohol use. Family History:      Problem Relation Age of Onset    Early Death Mother 48        Liver Cancer    Cancer Mother         Liver Cancer    Cancer Father         Prostate Cancer    Diabetes Father     Other Father         \"Black Lung\"    Cancer Sister         Breast Cancer    Breast Cancer Sister     Cancer Son         Prostate Cancer       Review of Systems:  Pertinent positives are listed in HPI. At least 10-point ROS reviewed and were negative. Vitals and physical examination:  BP (!) 179/79   Pulse 85   Temp 96.4 °F (35.8 °C) (Oral)   Resp 16   SpO2 92%   Gen/overall appearance: Not in acute distress. Alert. Head: Normocephalic, atraumatic  Eyes: EOMI, good acuity  ENT: Oral mucosa moist  Neck: No JVD, thyromegaly  CVS: Nml S1S2, no MRG, RRR  Pulm: Clear bilaterally.  No crackles/wheezes  Gastrointestinal: Soft, NT/ND, +BS  Musculoskeletal: No edema. Warm  Neuro: No focal deficit. Moves extremity spontaneously. Psychiatry: Appropriate affect. Not agitated. Skin: Warm, dry with normal turgor. No rash  Capillary refill: Brisk,< 3 seconds   Peripheral Pulses: +2 palpable, equal bilaterally      Labs/imaging/EKG:  CBC:   Recent Labs     02/05/22  1345 02/06/22  0950   WBC 6.2 2.6*   HGB 11.3* 10.2*    143     BMP:    Recent Labs     02/05/22  1345 02/06/22  0904 02/06/22  0950   *  --  131*   K 4.5  --  5.2*   CL 94*  --  93*   CO2 26  --  26   BUN 47*  --  59*   CREATININE 2.8*  --  2.9*   GLUCOSE 184* 444 380*     Hepatic:   Recent Labs     02/05/22  1345 02/06/22  0950   AST 19 14*   ALT 14 12   BILITOT 0.3 0.3   ALKPHOS 100 91       Echocardiogram complete 2D with doppler with color    Result Date: 1/17/2022  Transthoracic Echocardiography Report (TTE)  Demographics   Patient Name       Migdalia Reyez    Date of Study       01/17/2022   Date of Birth      1945         Gender              Male   Age                68 year(s)         Race                   Patient Number     [id number]         Room Number         008   Visit Number       [id number]   Corporate ID       [id number]   Accession Number   [id number]         George Marks RDMS   Ordering Physician Bhavana Santiago   Interpreting        Lata Roque MD                 Physician           Kole SANCHEZ  Procedure Type of Study   TTE procedure:ECHOCARDIOGRAM COMPLETE 2D W DOPPLER W COLOR. Procedure Date Date: 01/17/2022 Start: 09:52 AM Study Location: Portable Technical Quality: Fair visualization Indications:Congestive heart failure. Patient Status: Routine Height: 68 inches Weight: 196 pounds BSA: 2.03 m2 BMI: 29.8 kg/m2 HR: 84 bpm BP: 154/80 mmHg  Conclusions   Summary  Technically difficult examination.   Left ventricular systolic function is normal.  Ejection fraction is visually estimated at 50-55%. Sclerotic, but non-stenotic aortic valve. Heavily calcified mitral valve with moderate mitral stenosis; mean PmmHg. No evidence of any pericardial effusion. Compared to  study no significant change. Signature   ------------------------------------------------------------------  Electronically signed by Parris Bhakta MD  (Interpreting physician) on 2022 at 12:53 PM  ------------------------------------------------------------------   Findings   Left Ventricle  Left ventricular systolic function is normal.  Ejection fraction is visually estimated at 50-55%. Mild left ventricular hypertrophy. Left ventricle size is normal.  No regional wall motion abnormalites. Indeterminate diastolic function; E/A flow reversal is noted. Left Atrium  Essentially normal left atrium. Right Atrium  Essentially normal right atrium. Right Ventricle  Essentially normal right ventricle. Aortic Valve  Sclerotic, but non-stenotic aortic valve. Mitral Valve  Heavily calcified mitral valve with moderate mitral stenosis; mean PmmHg. Tricuspid Valve  Mild tricuspid regurgitation; normal RVSP. Pulmonic Valve  The pulmonic valve was not well visualized. Pericardial Effusion  No evidence of any pericardial effusion. Pleural Effusion  No evidence of pleural effusion. Miscellaneous  IVC and abdominal aorta were not well visualized.   M-Mode/2D Measurements & Calculations   LV Diastolic Dimension:  LV Systolic Dimension:  LA Dimension: 3.8 cmAO Root  3.1 cm                   2.4 cm                  Dimension: 3.3 cmLA Area:  LV FS:22.6 %             LV Volume Diastolic:    69.2 cm2  LV PW Diastolic: 1.7 cm  82.7 ml  Septum Diastolic: 1.6 cm LV Volume Systolic:  CO: 4.98 l/min           36.2 ml  CI: 3.71 l/m*m2          LV EDV/LV EDV Index:    RV Diastolic Dimension: 3.5                           79.3 ml/39 m2LV ESV/LV  cm  LV Area Diastolic: 16.3  ESV Index: 36.2 ml/18  cm2                      m2                      LA/Aorta: 4.33  LV Area Systolic: 15.6   EF Calculated (A4C):  cm2                      54.4 %                  LA volume/Index: 42.8 ml                           EF Calculated (2D):     /21m2                           46.7 %                            LV Length: 7.1 cm                            LVOT: 2.2 cm  Doppler Measurements & Calculations   MV Peak E-Wave: 178     AV Peak Velocity: 131 cm/s  LVOT Peak Velocity: 115  cm/s                    AV Peak Gradient: 6.86 mmHg cm/s  MV Peak A-Wave: 144     AV Mean Velocity: 90.9 cm/s LVOT Mean Velocity: 82.6  cm/s                    AV Mean Gradient: 4 mmHg    cm/s  MV E/A Ratio: 1.24      AV VTI: 25.5 cm             LVOT Peak Gradient: 5  MV Peak Gradient: 12.67 AV Area (Continuity):3.52   mmHgLVOT Mean Gradient:  mmHg                    cm2                         3 mmHg  MV Mean Gradient: 6  mmHg                    LVOT VTI: 23.6 cm  MV Mean Velocity: 121  cm/s                                                TR Velocity:233 cm/s  MV P1/2t: 52 msec                                   TR Gradient:21.72 mmHg  MVA by PHT:4.23 cm2  MV Area (continuity):  1.98 cm2  MV E' Septal Velocity:  5.8 cm/s  MV E' Lateral Velocity:  7.35 cm/s  MV E/E' septal: 30.69  MV E/E' lateral: 24.22      CT ABDOMEN PELVIS WO CONTRAST Additional Contrast? None    Result Date: 1/15/2022  EXAMINATION: CT ABDOMEN AND PELVIS WITHOUT CONTRAST 1/15/2022 11:08 pm TECHNIQUE: CT of the abdomen and pelvis was performed without the administration of intravenous contrast. Multiplanar reformatted images are provided for review. Dose modulation, iterative reconstruction, and/or weight based adjustment of the mA/kV was utilized to reduce the radiation dose to as low as reasonably achievable. COMPARISON: None. HISTORY: ORDERING SYSTEM PROVIDED HISTORY: abd pain and urinary ret.  TECHNOLOGIST PROVIDED HISTORY: Reason for exam:->abd pain and urinary ret. Additional Contrast?->None Decision Support Exception - unselect if not a suspected or confirmed emergency medical condition->Emergency Medical Condition ([place]) Reason for Exam: abd pain, urinary retention FINDINGS: Note the lack of IV contrast limits evaluation of the solid organs. Lung Bases: Bilateral lower lobe bronchiectasis, bronchial wall thickening, and peripheral reticular opacities suggestive of fibrotic change. Right lower lobe linear atelectasis/scarring with a 1.2 cm nodular component (series 3, image 11). Trace left pleural effusion. The heart is normal in size. Prior mitral valve replacement. Coronary artery calcifications. Liver: The liver is normal in contour. Scattered calcified hepatic granulomas. . Biliary and Gallbladder: No biliary ductal dilation. Cholelithiasis without evidence of acute cholecystitis. Spleen: Calcified splenic granulomas. Pancreas: The pancreas is unremarkable. Adrenal Glands: The adrenal glands are normal in appearance. Kidneys: Bilateral symmetric perinephric stranding, similar to 2016 prior and likely age related. There is a 1.0 cm indeterminate lesion arising from the superior pole of the right kidney (series 3, image 55), not significantly changed in size since 2016 CT. A simple renal cyst and a hemorrhagic renal cyst arise from the inferior pole of left kidney. No hydronephrosis. No renal or ureteral calculi. Bladder: Decompressed with Reyes catheter in place. Abdominal Vasculature: Moderate to severe atherosclerotic calcifications of the abdominal aorta and its branches without aneurysm. Gastrointestinal Tract: No evidence of bowel obstruction. Normal appendix. Peritoneum/Mesentery/Retroperitoneum: No peritoneal or retroperitoneal masses. Lymph Nodes: No retroperitoneal or pelvic lymphadenopathy. Musculoskeletal: No suspicious osseous findings. Fixation screws are seen within the proximal left femur. acute fracture the proximal lower leg. Extensive vascular calcifications are noted. No focal soft tissue swelling. Surgical clips are noted along the medial tibial plateau Knee: No fracture about the knee. The knee joint is anatomically aligned with mild tricompartmental degenerative changes and moderate medial compartment joint space narrowing. Trace knee joint effusion. Extensive vascular calcifications. No acute osseous abnormality of the left knee, lower leg, or ankle. XR TIBIA FIBULA LEFT (2 VIEWS)    Result Date: 1/15/2022  EXAMINATION: THREE XRAY VIEWS OF THE LEFT ANKLE; TWO XRAY VIEWS OF THE LEFT KNEE; 2 XRAY VIEWS OF THE LEFT TIBIA AND FIBULA 1/15/2022 2:17 pm COMPARISON: None. HISTORY: ORDERING SYSTEM PROVIDED HISTORY: fall, pain TECHNOLOGIST PROVIDED HISTORY: Reason for exam:->fall, pain Reason for Exam: fall, pain Additional signs and symptoms: fall, pain Relevant Medical/Surgical History: fall, pain FINDINGS: Ankle: Osseous fragments are noted along the medial talar dome, unchanged in comparison to 01/23/2019 and consistent with sequela of remote trauma. Otherwise, no fractures identified. The joints of the ankle are anatomically aligned with preserved joint spaces. No significant soft tissue swelling. No definite joint effusion. Mild degenerative changes of the subtalar joint. Extensive vascular calcifications are noted. Tibia/fibula: No acute fracture the proximal lower leg. Extensive vascular calcifications are noted. No focal soft tissue swelling. Surgical clips are noted along the medial tibial plateau Knee: No fracture about the knee. The knee joint is anatomically aligned with mild tricompartmental degenerative changes and moderate medial compartment joint space narrowing. Trace knee joint effusion. Extensive vascular calcifications. No acute osseous abnormality of the left knee, lower leg, or ankle.      XR TIBIA FIBULA RIGHT (2 VIEWS)    Result Date: 1/15/2022  EXAMINATION: THREE XRAY VIEWS OF THE RIGHT ANKLE;   XRAY VIEWS OF THE RIGHT TIBIA AND FIBULA 1/15/2022 2:17 pm COMPARISON: None. HISTORY: ORDERING SYSTEM PROVIDED HISTORY: fall, pain TECHNOLOGIST PROVIDED HISTORY: Reason for exam:->fall, pain Reason for Exam: fall, pain Additional signs and symptoms: fall, pain Relevant Medical/Surgical History: fall, pain FINDINGS: Ankle: Osseous fragments along the medial aspect of the talar dome, with adjacent soft tissue swelling. Osseous irregularity is also noted at the tip of the medial malleolus. Joints of the ankle are anatomically aligned. Additional soft tissue swelling is noted laterally. Small tibiotalar joint effusion. Extensive vascular calcifications. Tibia/fibula: No fracture. The knee joint is anatomically aligned with mild medial compartment joint space narrowing. Extensive vascular calcifications are noted. No focal soft tissue swelling. Ankle: Osseous fragment along the medial aspect of the talar dome with adjacent soft tissue swelling, as well as irregularity at the tip of the medial malleolus. Findings may represent age indeterminate ligamentous avulsion injury, and correlation with point tenderness is requested. Additional lateral soft tissue swelling with small tibiotalar joint effusion. Tibia/fibula: No acute osseous abnormality in the proximal lower leg. XR ANKLE LEFT (MIN 3 VIEWS)    Result Date: 1/15/2022  EXAMINATION: THREE XRAY VIEWS OF THE LEFT ANKLE; TWO XRAY VIEWS OF THE LEFT KNEE; 2 XRAY VIEWS OF THE LEFT TIBIA AND FIBULA 1/15/2022 2:17 pm COMPARISON: None.  HISTORY: ORDERING SYSTEM PROVIDED HISTORY: fall, pain TECHNOLOGIST PROVIDED HISTORY: Reason for exam:->fall, pain Reason for Exam: fall, pain Additional signs and symptoms: fall, pain Relevant Medical/Surgical History: fall, pain FINDINGS: Ankle: Osseous fragments are noted along the medial talar dome, unchanged in comparison to 01/23/2019 and consistent with sequela of remote trauma. Otherwise, no fractures identified. The joints of the ankle are anatomically aligned with preserved joint spaces. No significant soft tissue swelling. No definite joint effusion. Mild degenerative changes of the subtalar joint. Extensive vascular calcifications are noted. Tibia/fibula: No acute fracture the proximal lower leg. Extensive vascular calcifications are noted. No focal soft tissue swelling. Surgical clips are noted along the medial tibial plateau Knee: No fracture about the knee. The knee joint is anatomically aligned with mild tricompartmental degenerative changes and moderate medial compartment joint space narrowing. Trace knee joint effusion. Extensive vascular calcifications. No acute osseous abnormality of the left knee, lower leg, or ankle. XR ANKLE RIGHT (MIN 3 VIEWS)    Result Date: 1/15/2022  EXAMINATION: THREE XRAY VIEWS OF THE RIGHT ANKLE;   XRAY VIEWS OF THE RIGHT TIBIA AND FIBULA 1/15/2022 2:17 pm COMPARISON: None. HISTORY: ORDERING SYSTEM PROVIDED HISTORY: fall, pain TECHNOLOGIST PROVIDED HISTORY: Reason for exam:->fall, pain Reason for Exam: fall, pain Additional signs and symptoms: fall, pain Relevant Medical/Surgical History: fall, pain FINDINGS: Ankle: Osseous fragments along the medial aspect of the talar dome, with adjacent soft tissue swelling. Osseous irregularity is also noted at the tip of the medial malleolus. Joints of the ankle are anatomically aligned. Additional soft tissue swelling is noted laterally. Small tibiotalar joint effusion. Extensive vascular calcifications. Tibia/fibula: No fracture. The knee joint is anatomically aligned with mild medial compartment joint space narrowing. Extensive vascular calcifications are noted. No focal soft tissue swelling. Ankle: Osseous fragment along the medial aspect of the talar dome with adjacent soft tissue swelling, as well as irregularity at the tip of the medial malleolus.   Findings may represent age indeterminate ligamentous avulsion injury, and correlation with point tenderness is requested. Additional lateral soft tissue swelling with small tibiotalar joint effusion. Tibia/fibula: No acute osseous abnormality in the proximal lower leg. XR SHOULDER LEFT (MIN 2 VIEWS)    Result Date: 1/15/2022  EXAMINATION: THREE XRAY VIEWS OF THE LEFT SHOULDER 1/15/2022 2:17 pm COMPARISON: 01/10/2016, chest radiograph 09/28/2017 HISTORY: ORDERING SYSTEM PROVIDED HISTORY: fall, pain TECHNOLOGIST PROVIDED HISTORY: Reason for exam:->fall, pain Reason for Exam: fall, pain Additional signs and symptoms: fall, pain Relevant Medical/Surgical History: fall, pain FINDINGS: There is diffuse demineralization, which limits fracture detection. No visible fracture or dislocation. Variation the humeral head on internal rotation is likely due to suboptimal positioning. Overlying soft tissues are grossly unremarkable. Mild acromioclavicular arthrosis. Chest findings reported separately. No acute osseous abnormality. XR CHEST PORTABLE    Result Date: 2/5/2022  EXAMINATION: ONE XRAY VIEW OF THE CHEST 2/5/2022 1:49 pm COMPARISON: January 17, 2022 HISTORY: ORDERING SYSTEM PROVIDED HISTORY: shortness of breath TECHNOLOGIST PROVIDED HISTORY: Reason for exam:->shortness of breath Reason for Exam: sob Additional signs and symptoms: pt complains of back pain and Dysuria FINDINGS: The cardiomediastinal silhouette is stable. There are patchy airspace opacities bilaterally, may be related to mild pulmonary edema versus pneumonia. There is no pleural effusion. There is no pneumothorax. There is no acute osseous abnormality. Patchy airspace opacities bilaterally, may be related to mild pulmonary edema versus pneumonia. Stable mild cardiomegaly.      XR CHEST PORTABLE    Result Date: 1/15/2022  EXAMINATION: ONE XRAY VIEW OF THE CHEST 1/15/2022 2:17 pm COMPARISON: Chest radiograph 07/29/2021 HISTORY: ORDERING SYSTEM PROVIDED HISTORY: dyspnea TECHNOLOGIST PROVIDED HISTORY: Reason for exam:->dyspnea Reason for Exam: dyspnea Additional signs and symptoms: dyspnea Relevant Medical/Surgical History: dyspnea FINDINGS: Cardiomediastinal silhouette is unchanged. No pleural effusion or pneumothorax. Patchy bilateral airspace opacities with lower lobe predominance. Prior fixation of the sternum. No acute osseous abnormality. Patchy bilateral airspace opacities with lower lobe predominance, concerning for multifocal pneumonia. Of note, this finding can be seen in the setting of COVID-19 pneumonia. US RETROPERITONEAL LIMITED    Result Date: 1/18/2022  EXAMINATION: ULTRASOUND OF THE KIDNEYS 1/18/2022 1:00 pm COMPARISON: None. HISTORY: ORDERING SYSTEM PROVIDED HISTORY: renal failure- obtruction TECHNOLOGIST PROVIDED HISTORY: Reason for exam:->renal failure- obtruction Reason for Exam: crf/     r/o obstruction FINDINGS: The right kidney measures 11.2 x 5.5 x 6.2 cm and the kidney measures 11.8 x 5.3 x 5.7 cm Kidneys demonstrate normal cortical echogenicity. No hydronephrosis or intrarenal stones. No focal lesions. Multiple small bilateral renal cysts are noted measuring up to approximately 11 mm     Normal sonographic appearance of the kidneys. No hydronephrosis. EKG:   I reviewed EKG. Discussed with ER provider.       Thank you Carlie Dyer MD for the opportunity to be involved in this patient's care.    -----------------------------  Tory Morin MD  [facility]

## 2022-02-08 NOTE — CONSULTS
CARDIOLOGY CONSULT NOTE   Reason for consultation: SOB    Referring physician:  Alysia Mayes MD     Primary care physician: Zoë Fung MD      Dear Alysia Mayes MD  Thanks for the consult. History of present illness:Gentry is a 68 y. o.year old who  presents with for shortness of breath which is moderate to severe, for few weeks, intermittent, self limiting, not associated with cough or fever, gets worse with activity and better with rest, he is diagnosed with COVID 19, CARDIOLOGY consult is called for elevated pro BNP OF 2478, his last proBNP was 787.9 last month, has history of afib, CABG, MAZE procedure, CKD stage 3 with creatitine 2.6 and is on torsemide 20mg tablets BID. Blood pressure, cholesterol, blood glucose and weight are well controlled. Past medical history:    has a past medical history of Acid reflux, Arrhythmia, Arthritis, CAD (coronary artery disease), CHF (congestive heart failure) ([facility]), Chronic back pain, CKD (chronic kidney disease) stage 3, GFR 30-59 ml/min ([facility]), COPD (chronic obstructive pulmonary disease) ([facility] [address] Diabetes mellitus ([facility] [address] Glaucoma, H/O 24 hour EKG monitoring, H/O cardiac catheterization, H/O cardiovascular stress test, H/O complete electrocardiogram, H/O Doppler ultrasound, H/O Doppler ultrasound (Lower extremity), H/O percutaneous left heart catheterization, Heat syncope, [place] (hard of hearing), Hx of echocardiogram, Hyperlipidemia, Hypertension, Macular degeneration, Other activity(E029.9), PAF (paroxysmal atrial fibrillation) ([facility] [address] PVD (peripheral vascular disease) ([facility] [address] S/P CABG x 3, Skin Cancer, SOB (shortness of breath) on exertion, Teeth missing, Ventricular ectopy, Wears dentures, and Wears glasses. Past surgical history:   has a past surgical history that includes cyst removal (2000's); Thoracentesis (Left, 10/06/2016); Coronary artery bypass graft (10/03/2016); Skin cancer excision (2000's); eye surgery (Bilateral, 2015);  Dental surgery; Colonoscopy (Last Done In 2000's); fracture surgery (Left, 01/11/2016); Cardiac surgery (10/03/2016); and other surgical history (10/03/2017). Social History:   reports that he quit smoking about 27 years ago. His smoking use included cigarettes. He started smoking about 57 years ago. He has a 60.00 pack-year smoking history. He has never used smokeless tobacco. He reports that he does not drink alcohol and does not use drugs.   Family history:   no family history of CAD, STROKE of DM    Allergies   Allergen Reactions    Aldactone [Spironolactone]      \"Developed Pain In The  Breasts And Knots In The Breasts\"    Crestor [Rosuvastatin Calcium]      \"Leg Cramps\"    Lipitor      \"Leg Cramps\"    Zocor [Simvastatin - High Dose]      \"Leg Cramps\"       insulin lispro (HUMALOG) injection vial 15 Units, TID WC  insulin lispro (HUMALOG) injection vial 0-12 Units, 2 times per day  insulin glargine (LANTUS) injection vial 40 Units, Nightly  baricitinib (OLUMIANT) tablet 1 mg, Daily  dextrose 5 % solution, PRN  insulin lispro (HUMALOG) injection vial 0-12 Units, TID WC  insulin regular (HUMULIN R;NOVOLIN R) 100 Units in sodium chloride 0.9 % 100 mL infusion, Continuous  glucose (GLUTOSE) 40 % oral gel 15 g, PRN  dextrose 50 % IV solution, PRN  glucagon (rDNA) injection 1 mg, PRN  dextrose 5 % solution, PRN  benzonatate (TESSALON) capsule 100 mg, TID  guaiFENesin (ROBITUSSIN) 100 MG/5ML oral solution 200 mg, Q4H  benzocaine-menthol (CEPACOL SORE THROAT) lozenge 1 lozenge, Q2H PRN  sodium chloride flush 0.9 % injection 5-40 mL, 2 times per day  sodium chloride flush 0.9 % injection 5-40 mL, PRN  0.9 % sodium chloride infusion, PRN  ondansetron (ZOFRAN-ODT) disintegrating tablet 4 mg, Q8H PRN   Or  ondansetron (ZOFRAN) injection 4 mg, Q6H PRN  polyethylene glycol (GLYCOLAX) packet 17 g, Daily PRN  acetaminophen (TYLENOL) tablet 650 mg, Q6H PRN   Or  acetaminophen (TYLENOL) suppository 650 mg, Q6H PRN  dexamethasone (PF) (DECADRON) injection 6 mg, Q24H  dextrose 5 % solution, PRN  acetaminophen (TYLENOL) tablet 500 mg, Q4H PRN  albuterol sulfate  (90 Base) MCG/ACT inhaler 2 puff, Q4H PRN  amLODIPine (NORVASC) tablet 10 mg, Daily  apixaban (ELIQUIS) tablet 5 mg, BID  aspirin EC tablet 81 mg, Daily  cyclobenzaprine (FLEXERIL) tablet 10 mg, Nightly  metoprolol succinate (TOPROL XL) extended release tablet 50 mg, Daily  pantoprazole (PROTONIX) tablet 40 mg, QAM AC  senna (SENOKOT) tablet 8.6 mg, BID  dextrose bolus (hypoglycemia) 10% 125 mL, PRN   Or  dextrose bolus (hypoglycemia) 10% 250 mL, PRN      Current Facility-Administered Medications   Medication Dose Route Frequency Provider Last Rate Last Admin    insulin lispro (HUMALOG) injection vial 15 Units  15 Units SubCUTAneous TID  Douglas Peoples MD   15 Units at 02/08/22 1236    insulin lispro (HUMALOG) injection vial 0-12 Units  0-12 Units SubCUTAneous 2 times per day Douglas Peoples MD        insulin glargine (LANTUS) injection vial 40 Units  40 Units SubCUTAneous Nightly M Kelly Crystal MD        [facility] tablet 1 mg  1 mg Oral Daily Nic Galaviz MD   1 mg at 02/08/22 0911    dextrose 5 % solution  100 mL/hr IntraVENous PRN Renetta Bruno MD        insulin lispro (HUMALOG) injection vial 0-12 Units  0-12 Units SubCUTAneous TID  Renetta Bruno MD   8 Units at 02/08/22 1236    insulin regular (HUMULIN R;NOVOLIN R) 100 Units in sodium chloride 0.9 % 100 mL infusion  1-50 Units/hr IntraVENous Continuous Douglas Peoples MD   Stopped at 02/08/22 0900    glucose (GLUTOSE) 40 % oral gel 15 g  15 g Oral PRN Renetta Bruno MD        dextrose 50 % IV solution  12.5 g IntraVENous PRN Renetta Bruno MD        glucagon (rDNA) injection 1 mg  1 mg IntraMUSCular PRN Renetta Bruno MD        dextrose 5 % solution  100 mL/hr IntraVENous PRN Renetta Bruno MD        benzonatate (TESSALON) capsule 100 mg  100 mg Oral TID Eseoghene 40 mg  40 mg Oral QAM AC Nic Galaviz MD   40 mg at 02/08/22 0911    senna (SENOKOT) tablet 8.6 mg  1 tablet Oral BID Nic Galaviz MD   8.6 mg at 02/08/22 0911    dextrose bolus (hypoglycemia) 10% 125 mL  125 mL IntraVENous PRN Nic Galaviz MD        Or    dextrose bolus (hypoglycemia) 10% 250 mL  250 mL IntraVENous PRN Nic Galaviz MD         Review of Systems:   · Constitutional: No Fever or Weight Loss   · Eyes: No Decreased Vision  · ENT: No Headaches, Hearing Loss or Vertigo  · Cardiovascular: No chest pain,+ dyspnea on exertion, palpitations or loss of consciousness  · Respiratory: + cough or wheezing    · Gastrointestinal: No abdominal pain, appetite loss, blood in stools, constipation, diarrhea or heartburn  · Genitourinary: No dysuria, trouble voiding, or hematuria  · Musculoskeletal:  No gait disturbance, weakness or joint complaints  · Integumentary: No rash or pruritis  · Neurological: No TIA or stroke symptoms  · Psychiatric: No anxiety or depression  · Endocrine: No malaise, fatigue or temperature intolerance  · Hematologic/Lymphatic: No bleeding problems, blood clots or swollen lymph nodes  · Allergic/Immunologic: No nasal congestion or hives  All systems negative except as marked. ·   ·      Physical Examination:    Vitals:    02/08/22 1200   BP: 128/60   Pulse: 98   Resp: 18   Temp:    SpO2: 97%      Wt Readings from Last 3 Encounters:   02/07/22 192 lb 14.4 oz (87.5 kg)   02/05/22 187 lb (84.8 kg)   01/19/22 187 lb (84.8 kg)     Body mass index is 29.33 kg/m². General Appearance:  No distress, conversant    Constitutional:  Well developed, Well nourished, No acute distress, Non-toxic appearance. HENT:  Normocephalic, Atraumatic, Bilateral external ears normal, Oropharynx moist, No oral exudates, Nose normal. Neck- Normal range of motion, No tenderness, Supple, No stridor,no apical-carotid delay, no carotid bruit  Eyes:  PERRL, EOMI, Conjunctiva normal, No discharge.    Respiratory: Normal breath sounds, No respiratory distress, No wheezing, No chest tenderness. ,no use of accessory muscles, diaphragm movement is normal  Cardiovascular: (PMI) apex non displaced,no lifts no thrills, no s3,no s4, Normal heart rate, Normal rhythm, No murmurs, No rubs, No gallops. Carotid arteries pulse and amplitude are normal no bruit, no abdominal bruit noted ( normal abdominal aorta ausculation), femoral arteries pulse and amplitude are normal no bruit, pedal pulses are normal  GI:  Bowel sounds normal, Soft, No tenderness, No masses, No pulsatile masses, no hepatosplenomegally, no bruits  : External genitalia appear normal, No masses or lesions. No discharge. No CVA tenderness. Musculoskeletal:  Intact distal pulses, No edema, No tenderness, No cyanosis, No clubbing. Good range of motion in all major joints. No tenderness to palpation or major deformities noted. Back- No tenderness. Integument:  Warm, Dry, No erythema, No rash. Skin: no rash, no ulcers  Lymphatic:  No lymphadenopathy noted. Neurologic:  Alert & oriented x 3, Normal motor function, Normal sensory function, No focal deficits noted. Psychiatric:  Affect normal, Judgment normal, Mood normal.   Lab Review   Recent Labs     02/07/22  2124   WBC 6.0   HGB 11.1*   HCT 34.5*         Recent Labs     02/07/22  1642   *   K 4.4   CL 89*   CO2 24   BUN 71*   CREATININE 2.9*     Recent Labs     02/07/22  1642   AST 19   ALT 13   BILITOT 0.3   ALKPHOS 105     No results for input(s): TROPONINI in the last 72 hours. Lab Results   Component Value Date    BNP 79.22 11/25/2015     Lab Results   Component Value Date    INR 1.03 09/28/2017    PROTIME 11.7 09/28/2017         EKG:nsr    Chest Xray:    ECHO: normal LVEF  Labs, echo, meds reviewed  Assessment: 68 y. o.year old with PMH of  has a past medical history of Acid reflux, Arrhythmia, Arthritis, CAD (coronary artery disease), CHF (congestive heart failure) ([facility] [address] Chronic back pain, CKD (chronic kidney disease) stage 3, GFR 30-59 ml/min ([facility]), COPD (chronic obstructive pulmonary disease) ([facility] [address] Diabetes mellitus ([facility] [address] Glaucoma, H/O 24 hour EKG monitoring, H/O cardiac catheterization, H/O cardiovascular stress test, H/O complete electrocardiogram, H/O Doppler ultrasound, H/O Doppler ultrasound (Lower extremity), H/O percutaneous left heart catheterization, Heat syncope, [place] (hard of hearing), Hx of echocardiogram, Hyperlipidemia, Hypertension, Macular degeneration, Other activity(E029.9), PAF (paroxysmal atrial fibrillation) ([facility] [address] [facility] S/P CABG x 3, Skin Cancer, SOB (shortness of breath) on exertion, Teeth missing, Ventricular ectopy, Wears dentures, and Wears glasses. Recommendations:    1. Gilson Mckinley: mild fluid overload with covid 19 and chronic renal faiure, mild diastolic CHF, start lasix drip 5mg.hr and will watch his creatiine and out put  2. Moderate mitral stenosis; heavily calcified valve, mean gradient 6mmhg, Continue BB and lasix  3. CAD\" h/o CABG, stable, contineu apsirin, add statins  4. Afib\" h/o maze,now in sinus, continue eliquis and bb  5. covid 19: admitted to ICU, not requiring BIAPAP, chest xray reviewed,  6. DM:' contiue insulin, recommend JAardiance  7. COPD: stable, may use inhalers as neede  8. HTN:stable, contiue bb  9. Health maintenance: exerise and diet  All labs, medications and tests reviewed, continue all other medications of all above medical condition listed as is.          Darell Moran MD, 2/8/2022 12:53 PM

## 2022-02-08 NOTE — CONSULTS
Patient:   Mariel Robin    Date:  22  :  1945, 68 y.o. Nephrologist: Lorraine Valentin MD  Provider: Shama Cortes MD    Reason for Consult: Acute kidney injury with underlying CKD stage IV A3    Consult requested by : Dr Cecille Torres MD    Chief Complaint:   Back pain urinary symptoms    HISTORY OF PRESENT ILLNESS:   Mr. Alban Darden is a 66-year-old male, who was brought to the emergency department in [place] on 2022 with above-mentioned symptoms. Unfortunately he was tested positive for Covid in the emergency department, he was also hypoxic with elevated proBNP level. He was subsequently to [facility] for further evaluation. He was treated with oral diuretics and protocol driven therapy for [id number]. His creatinine remained 2.80 2.9 mg/dL. When I saw him he was requiring 15 L/min of oxygen, he is on insulin drip with very high blood sugar. Looks like originally he was presented to emergency department in January 15, 2022 after sustaining a fall at home. He remained in the hospital until 2022, and was transferred to with\" swing bed\" in [place], where he stayed until 2022, and was discharged home. Apparently had bronchiectasis by CT, the follow-up with pulmonologist was made as an outpatient. Unfortunately his sister , and he did attend the  not long ago. He thinks he might have been contracted COVID-19 from there. He is vaccinated    When I saw him in the morning, he is alert awake and oriented, but with very high oxygen requirement. He had good urine output about 2.25 L with po torsemide     I am very familiar with him. Please see below for renal progression data. My last encounter with him was on 2021. His blood pressure was 132/71 at that time with losartan on board. He was also on SGLT2 inhibitors.   His creatinine was 2.4-nataliia with little more than 1 g/day of proteinuria      Renal data :   Cr was 1.0  in 2013 - slowly increased now 2.5   Also had KAREEM after CABG      PAST MEDICAL HISTORY:   1. Coronary artery disease, status post CABG x3 vessels. In 10/16 -relatively preserved left ventricular ejection fraction by resting echo parameter  2. Valvular surgery, status post valve repair. 3. A-fib. He had a maze procedure at this time. 4. CKD, Stage  IV with recurrent acute kidney injury. 5. Diabetes mellitus diagnosed since [address] [place] [address] initially he was on oral  medication, he has been on insulin therapy since 2000. He does not have known  retinopathy, but does have proteinuria.    6. Hyperlipidemia. 7. Hypertension. 8. Paroxysmal atrial fibrillation as mentioned earlier.      PAST SURGICAL HISTORY:  1.  CABG.  2016 than plate was placed for sternal dehiscence 10/17  2. Valvular repair. 3. Maze procedure. 4. Left hip surgery. 5. He did have a cardiac cath.     HABITS:  He quit smoking  20 years ago, but roughly had 35 pack year history.    No history of illicit drug abuse or alcohol abuse.      SOCIAL HISTORY:  The patient is  since 2001. [facility] lives in [facility] 32  is a retired .  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  Both his sister and brother  are on dialysis apparently. Alma Gabriel of the brothers also has diabetes.             REVIEW OF SYSTEMS:     All pertinent ROS neg except   Fall, urinary symptoms, shortness of breath    Current Facility-Administered Medications   Medication Dose Route Frequency Provider Last Rate Last Admin    insulin lispro (HUMALOG) injection vial 15 Units  15 Units SubCUTAneous TID  Hernan Tena MD   15 Units at 02/08/22 1236    insulin lispro (HUMALOG) injection vial 0-12 Units  0-12 Units SubCUTAneous 2 times per day Hernan Tena MD        insulin glargine (LANTUS) injection vial 40 Units  40 Units SubCUTAneous Nightly BRIANNE Sorensen MD        baricitinib [facility]) tablet 1 mg  1 mg Oral Daily Nic Galaviz MD   1 mg at 02/08/22 0911    dextrose 5 % solution  100 mL/hr IntraVENous PRN Adalberto Chau MD        insulin lispro (HUMALOG) injection vial 0-12 Units  0-12 Units SubCUTAneous TID WC Adalberto Chau MD   8 Units at 02/08/22 1236    insulin regular (HUMULIN R;NOVOLIN R) 100 Units in sodium chloride 0.9 % 100 mL infusion  1-50 Units/hr IntraVENous Ela Saul MD   Stopped at 02/08/22 0900    glucose (GLUTOSE) 40 % oral gel 15 g  15 g Oral PRN Adalberto Chau MD        dextrose 50 % IV solution  12.5 g IntraVENous PRN Adalberto Chau MD        glucagon (rDNA) injection 1 mg  1 mg IntraMUSCular PRN Adalberto Chau MD        dextrose 5 % solution  100 mL/hr IntraVENous PRN Adalberto Chau MD        benzonatate (TESSALON) capsule 100 mg  100 mg Oral TID Adalberto Chau MD   100 mg at 02/08/22 0912    guaiFENesin (ROBITUSSIN) 100 MG/5ML oral solution 200 mg  200 mg Oral Q4H Daniela Yeh MD   200 mg at 02/08/22 0910    benzocaine-menthol (CEPACOL SORE THROAT) lozenge 1 lozenge  1 lozenge Oral Q2H PRN Adalberto Chau MD        sodium chloride flush 0.9 % injection 5-40 mL  5-40 mL IntraVENous 2 times per day Nic Galaviz MD   10 mL at 02/08/22 0912    sodium chloride flush 0.9 % injection 5-40 mL  5-40 mL IntraVENous PRN Nic Galaviz MD        0.9 % sodium chloride infusion  25 mL IntraVENous PRN Nic Galaviz MD        ondansetron (ZOFRAN-ODT) disintegrating tablet 4 mg  4 mg Oral Q8H PRN Nic Galaviz MD        Or    ondansetron (ZOFRAN) injection 4 mg  4 mg IntraVENous Q6H PRN Nic Galaviz MD        polyethylene glycol (GLYCOLAX) packet 17 g  17 g Oral Daily PRN Nic Galaviz MD        acetaminophen (TYLENOL) tablet 650 mg  650 mg Oral Q6H PRN Nic Galaviz MD        Or    acetaminophen (TYLENOL) suppository 650 mg  650 mg Rectal Q6H PRN Nic Moralesema, MD        dexamethasone (PF) (DECADRON) injection 6 mg  6 mg IntraVENous Q24H Jenn Guerrero MD   6 mg at 02/07/22 2057    dextrose 5 % solution  100 mL/hr IntraVENous PRN Nic Galaviz MD        acetaminophen (TYLENOL) tablet 500 mg  500 mg Oral Q4H PRN Nic Galaviz MD        albuterol sulfate  (90 Base) MCG/ACT inhaler 2 puff  2 puff Inhalation Q4H PRN Nic Galaviz MD        amLODIPine (NORVASC) tablet 10 mg  10 mg Oral Daily Nic Galaviz MD   10 mg at 02/08/22 0911    apixaban (ELIQUIS) tablet 5 mg  5 mg Oral BID Nic Galaviz MD   5 mg at 02/08/22 0911    aspirin EC tablet 81 mg  81 mg Oral Daily Nic Galaviz MD   81 mg at 02/08/22 0912    cyclobenzaprine (FLEXERIL) tablet 10 mg  10 mg Oral Nightly Nic Galaviz MD   10 mg at 02/07/22 2056    metoprolol succinate (TOPROL XL) extended release tablet 50 mg  50 mg Oral Daily Nic Galaviz MD   50 mg at 02/08/22 0911    pantoprazole (PROTONIX) tablet 40 mg  40 mg Oral QAM AC Nic Galaviz MD   40 mg at 02/08/22 0911    senna (SENOKOT) tablet 8.6 mg  1 tablet Oral BID Nic Galaviz MD   8.6 mg at 02/08/22 0911    dextrose bolus (hypoglycemia) 10% 125 mL  125 mL IntraVENous PRN Nic Galaviz MD        Or    dextrose bolus (hypoglycemia) 10% 250 mL  250 mL IntraVENous PRN Nic Galaviz MD           /60   Pulse 98   Temp 97.8 °F (36.6 °C) (Oral)   Resp 18   Wt 192 lb 14.4 oz (87.5 kg)   SpO2 97%   BMI 29.33 kg/m²     PHYSICAL EXAM:  General appearance: Alert, awake and oriented  HEENT: No gross conjunctival pallor  Neck: Supple  Heart: Irregularly irregular rhythm-well-healed incision from previous sternotomy  LUNGS: Positive adventitious breath sound  Abdomen: Soft  Extremities: Trace edema    LABS:  CBC:   Lab Results   Component Value Date    WBC 6.0 02/07/2022    WBC 2.6 02/06/2022    WBC 6.2 02/05/2022    HGB 11.1 02/07/2022    HGB 10.2 02/06/2022    HGB 11.3 02/05/2022     02/07/2022     02/06/2022     02/05/2022     Renal Panel:   Lab Results   Component Value Date     02/07/2022     02/07/2022     02/06/2022    K 4.4 02/07/2022    K 4.4 02/07/2022    K 5.2 02/06/2022    CL 89 02/07/2022    CL 89 02/07/2022    CL 93 02/06/2022    CO2 24 02/07/2022    CO2 24 02/07/2022    CO2 26 02/06/2022    BUN 71 02/07/2022    BUN 70 02/07/2022    BUN 59 02/06/2022    CREATININE 2.9 02/07/2022    CREATININE 2.9 02/07/2022    CREATININE 2.9 02/06/2022    GFRAA 26 02/07/2022    GFRAA 26 02/07/2022    GFRAA 26 02/06/2022    LABGLOM 21 02/07/2022    LABGLOM 21 02/07/2022    LABGLOM 21 02/06/2022    LABALBU 3.2 02/07/2022    LABALBU 3.2 02/07/2022    LABALBU 3.0 02/06/2022         Calcium:    Lab Results   Component Value Date    CALCIUM 8.0 02/07/2022    PTH 18 04/01/2019    PTH  04/01/2019     (NOTE)         +-------+-------+-------+-------+--------+  P   400 +  . . . . . . S              P. . . . . +  T       + . . . . . . .S             P. . . . . .+  H   300 +  . . . . . . S            P. . . . . .P+         + . . . . . . .S           P. . . . . .P +  I   200 +. . . . . . . S          P. . . . .  P  +  N       + . . . . . . .S          P . . . . P    +  T   100 +. . . . . . . S         P . . . . P     +  A       + . . . . . . .S         P. . . .P       +  C    75 +. . . . . . Clement Fly SDDDDDDDDDDP . .P         +  T       +S S S S S F-Q-Z----------+P. P           +      50 +          +   NORMAL     +              +  p       +          +   Ca/PTH     +              +  g    30 +          +              +              +  /       +H H H H H H-------------M M M M M M M M +  m    10 +. . . . . .H H          M . . . . . . . +  L       + . . . . . Kaitlyn DECKER  . . . . . . .+         +. . . . . .  . H         M . . . . . . . +       0 ++---/--+-------+-------+-------+---/---++          4      8       9      10      11      15                     Total Calcium (mg/dL)                                                                          Box = Reference Interval                           <*> = Patient Result P = Primary Hyperparathyroidism                    S = Secondary Hyperparathyroidism                  D = Renal Insufficiency or                             Vitamin D Deficiency                           H = Hypoparathyroidism                             M = Hypercalcemia of Malignancy  Performed by [facility] [address], [id number] [facility] [phone]  www. Laure Salguero MD, Lab. Director       Phosphorus:    Lab Results   Component Value Date    PHOS 3.8 07/19/2021       U/A:    Lab Results   Component Value Date    PROTEINU 100 02/05/2022    NITRU NEGATIVE 02/05/2022    COLORU YELLOW 02/05/2022    WBCUA 5 TO 9 02/05/2022    RBCUA RARE 02/05/2022    MUCUS NEGATIVE 03/29/2021    TRICHOMONAS NONE SEEN 09/28/2017    YEAST OCCASIONAL 02/13/2016    BACTERIA MODERATE 02/05/2022    CLARITYU CLOUDY 02/05/2022    SPECGRAV 1.032 02/05/2022    UROBILINOGEN 0.2 02/05/2022    BILIRUBINUR NEGATIVE 02/05/2022    BLOODU TRACE 02/05/2022    KETUA NEGATIVE 02/05/2022           IMPRESSION:  1. Acute kidney injury with underlying CKD stage IV [id number] from secondary to COVID-19 as well as secondary pulmonary edema-his urine on February 5, 2022 did not have any active sediment . 2.  Coronary artery disease with likely pulmonary edema-could be precipitated by SARS-CoV-2 virus  3. Breakthrough COVID-19 with pneumonitis  4. Diabetes mellitus with very high blood sugar likely precipitated by steroid therapy  5. Underlying multiple chronic disease including valvular disease    PLAN:    He was on oral torsemide  But looks like Lasix continuous infusion has been initiated  We will watch for iatrogenic nosocomial complication  .   A proBNP level  Good glycemic control  Watch for fluid status closely  Stop any nonessential medication  Follow clinically and biochemically

## 2022-02-08 NOTE — PROGRESS NOTES
Hospitalist Progress Note      Name:  Aliya Mesa /Age/Sex: 1945  (68 y.o. male)   MRN & CSN:  [id number] & [id number] Admission Date/Time: 2022  7:43 PM   Location:  -A PCP: Zo Lizarraga MD         Hospital Day: 3  Discharge barrier/Reason for continued hospitalization: Transfer to ICU for insulin gtt. hyperglycemia greater than 600    Assessment and Plan:   Aliya Mesa is a 68 y.o.  male hypertension, chronic diastolic CHF, CKD 4 due to diabetic nephropathy, A. fib on chronic anticoagulation with Eliquis, type 2 diabetes, dyslipidemia, who presented to the ED on 2022 with shortness of breath and cough and was diagnosed with Pneumonia due to COVID-19 virus. He is fully vaccinated against Covid    1. Sepsis due to COVID-19 pneumonia: POA. Acute organ dysfunction as evidenced by acute hypoxic respiratory failure. CXR with patchy airspace disease bilaterally  Steroids, baricitinib (renal dosing), isolation  o2 at 4L via NC, wean as able  Pul toilette    2. COVID-19 acute hypoxic respiratory failure: POA. O2 sats <89% on room air  Currently on 4 L. Not on home O2  Wean off as tolerated    3. DM2 with hyperosmolar state  Hyperglycemia worsened by steroids  Start insulin gtt. Transition to lantus and SSI  Appreciate enndocrinology consult. A1c 8.9 from 22  AG closed and BS well controlled currently  Monitor and titrate    4. Acute on Chronic diastolic CHF: uncompensated  On  IV diuresis , switch to  oral diuretic regimen soon  Daily weight  I's and O's strictly    5. Hypertension: Continue Norvasc, Toprol-XL. Agree with holding losartan for now  monitor    6. CKD 4: Due to diabetic nephropathy. Avoid nephrotoxic agents   Stable    7. Paroxysmal A. fib: Continue Eliquis. Continue Toprol-XL    Diet ADULT DIET; Regular; Low Sodium (2 gm)   DVT Prophylaxis [] Lovenox, []  Heparin, [] SCDs, [x] Ambulation.   Eliquis   GI Prophylaxis [x] PPI,  [] H2 Blocker, [] Carafate,  [x] Diet/Tube Feeds   Code Status Full Code   MDM [] Low, [] Moderate,[x]  High       History of Present Illness:     Chief Complaint: Pneumonia due to COVID-19 virus     Bryan Melendrez is a 68 y.o.  male  who presents with shortness of breath and cough. Patient is seen and examined today  Cough and sob improving  Denies chest pain/fever/sob  Strength is also improving  Wants to go home when better  BS better, remains in insulin gtt         Ten point ROS reviewed negative, unless as noted above    Objective: Intake/Output Summary (Last 24 hours) at 2/8/2022 0741  Last data filed at 2/8/2022 0631  Gross per 24 hour   Intake    Output 2250 ml   Net -2250 ml        Vitals:   Vitals:    02/07/22 2045 02/08/22 0026 02/08/22 0253 02/08/22 0406   BP:  (!) 134/55  115/66   Pulse:  74  82   Resp:  10  15   Temp:  97.6 °F (36.4 °C)  97.8 °F (36.6 °C)   TempSrc:  Oral  Oral   SpO2:  96% 91% 90%   Weight: 192 lb 14.4 oz (87.5 kg)           Physical Exam:   GEN: Awake male, alert and oriented x3 in mild respiratory distress. Appears given age. HEENT: Normal   RESP: Diminished BS bilaterally. Symmetric chest movement while on 4 L  CVS: RRR, S1, S2  GI/: Abdomen is soft, nontender, no organomegaly. . Bowel sounds normal, rectal exam deferred. No CVA tenderness. MSK: No gross joint deformities. No tenderness  SKIN: Normal coloration, warm, dry. NEURO: Cranial nerves appear grossly intact, normal speech, no lateralizing weakness. PSYCH:  Affect appropriate.     Medications:   Medications:    insulin lispro  15 Units SubCUTAneous TID WC    insulin lispro  0-12 Units SubCUTAneous 2 times per day    insulin glargine  40 Units SubCUTAneous Nightly    baricitinib  1 mg Oral Daily    insulin lispro  0-12 Units SubCUTAneous TID WC    benzonatate  100 mg Oral TID    guaiFENesin  200 mg Oral Q4H    torsemide  20 mg Oral BID    sodium chloride flush  5-40 mL IntraVENous 2 times per day    dexamethasone

## 2022-02-09 NOTE — PROGRESS NOTES
Hospitalist Progress Note      Name:  Barbara Hampton /Age/Sex: 1945  (68 y.o. male)   MRN & CSN:  [id number] & [id number] Admission Date/Time: 2022  7:43 PM   Location:  -A PCP: Nuris Luna MD         Hospital Day: 4  Discharge barrier/Reason for continued hospitalization: Transfer to ICU for insulin gtt. hyperglycemia greater than 600    Assessment and Plan:   Barbara Hampton is a 68 y.o.  male hypertension, chronic diastolic CHF, CKD 4 due to diabetic nephropathy, A. fib on chronic anticoagulation with Eliquis, type 2 diabetes, dyslipidemia, who presented to the ED on 2022 with shortness of breath and cough and was diagnosed with Pneumonia due to COVID-19 virus. He is fully vaccinated against Covid    1. Sepsis due to COVID-19 pneumonia: POA. , now resolved  Acute organ dysfunction as evidenced by acute hypoxic respiratory failure. CXR with patchy airspace disease bilaterally  Steroids, baricitinib (renal dosing), isolation  o2 at 4L via NC, wean as able  Pul toilette    2. COVID-19 acute hypoxic respiratory failure: POA. O2 sats <89% on room air  Currently on 4 L. Not on home O2  Regular Is use  Wean to RA  as tolerated    3. DM2 with hyperosmolar state  Hyperglycemia worsened by steroids  Off  insulin gtt. Transitioned to lantus and SSI  A1c 8.9 from 22  AG closed and BS well controlled currently  D/w endocrine today  Monitor and titrate    4. Acute on chronic diastolic CHF: uncompensated  On lasix gtt,   Switch IV diuresis to home oral diuretic regimen soon. 5.  Hypertension: Continue Norvasc, Toprol-XL. Agree with holding losartan for now  monitor    6. CKD 4: Due to diabetic nephropathy. Avoid nephrotoxic agents   Stable    7. Paroxysmal A. fib: Continue Eliquis. Continue Toprol-XL    Diet ADULT DIET; Regular; Low Sodium (2 gm)   DVT Prophylaxis [] Lovenox, []  Heparin, [] SCDs, [x] Ambulation.   Eliquis   GI Prophylaxis [x] PPI,  [] H2 Blocker,  [] Carafate,  [x] Diet/Tube Feeds   Code Status Full Code   MDM [] Low, [] Moderate,[x]  High       History of Present Illness:     Chief Complaint: Pneumonia due to COVID-19 virus     Christopher Ying is a 68 y.o.  male  who presents with shortness of breath and cough. Patient is seen and examined today  Cough and sob still thereng  Denies chest pain/fever/sob  Strength is also improving  Wants to go home , not SNF  BS better, off insulin gtt  On lasix gtt per renal         Ten point ROS reviewed negative, unless as noted above    Objective: Intake/Output Summary (Last 24 hours) at 2/9/2022 0836  Last data filed at 2/9/2022 0615  Gross per 24 hour   Intake 10 ml   Output 2215 ml   Net -2205 ml        Vitals:   Vitals:    02/08/22 2033 02/09/22 0433 02/09/22 0705 02/09/22 0749   BP: (!) 140/68 (!) 130/112  (!) 150/61   Pulse: 93 98  87   Resp: 18 18 16 24   Temp: 97.6 °F (36.4 °C) 98.1 °F (36.7 °C)     TempSrc: Oral Oral     SpO2: 91% 94% 94% 96%   Weight:            Physical Exam:   GEN: Awake male, alert and oriented x3 in mild respiratory distress. Appears given age. HEENT: Normal   RESP: Diminished BS bilaterally. Symmetric chest movement while on o2 via NC  CVS: RRR, S1, S2  GI/: Abdomen is soft, nontender, no organomegaly. . Bowel sounds normal, rectal exam deferred. No CVA tenderness. MSK: No gross joint deformities. No tenderness  SKIN: Normal coloration, warm, dry. NEURO: Cranial nerves appear grossly intact, normal speech, no lateralizing weakness. PSYCH:  Affect appropriate.     Medications:   Medications:    insulin glargine  50 Units SubCUTAneous Nightly    insulin lispro  0-18 Units SubCUTAneous TID WC    insulin lispro  0-18 Units SubCUTAneous 2 times per day    magic (miracle) mouthwash  5 mL Swish & Swallow TID    insulin lispro  15 Units SubCUTAneous TID WC    baricitinib  1 mg Oral Daily    benzonatate  100 mg Oral TID    guaiFENesin  200 mg Oral Q4H    sodium chloride flush 5-40 mL IntraVENous 2 times per day    dexamethasone  6 mg IntraVENous Q24H    amLODIPine  10 mg Oral Daily    apixaban  5 mg Oral BID    aspirin EC  81 mg Oral Daily    cyclobenzaprine  10 mg Oral Nightly    metoprolol succinate  50 mg Oral Daily    pantoprazole  40 mg Oral QAM AC    senna  1 tablet Oral BID      Infusions:    furosemide (LASIX) 1mg/ml infusion 5 mg/hr (02/08/22 1502)    dextrose      dextrose      sodium chloride      dextrose       PRN Meds: dextrose, 100 mL/hr, PRN  glucose, 15 g, PRN  dextrose, 12.5 g, PRN  glucagon (rDNA), 1 mg, PRN  dextrose, 100 mL/hr, PRN  benzocaine-menthol, 1 lozenge, Q2H PRN  sodium chloride flush, 5-40 mL, PRN  sodium chloride, 25 mL, PRN  ondansetron, 4 mg, Q8H PRN   Or  ondansetron, 4 mg, Q6H PRN  polyethylene glycol, 17 g, Daily PRN  acetaminophen, 650 mg, Q6H PRN   Or  acetaminophen, 650 mg, Q6H PRN  dextrose, 100 mL/hr, PRN  acetaminophen, 500 mg, Q4H PRN  albuterol sulfate HFA, 2 puff, Q4H PRN  dextrose bolus (hypoglycemia), 125 mL, PRN   Or  dextrose bolus (hypoglycemia), 250 mL, PRN      Recent Labs     02/07/22  2124 02/06/22  0950 02/05/22  1345   WBC 6.0 2.6* 6.2   HGB 11.1* 10.2* 11.3*   HCT 34.5* 31.9* 35.0*   MCV 90.6 89.6 89.3    143 154     Lab Results   Component Value Date     02/07/2022    K 4.4 02/07/2022    CL 89 02/07/2022    CO2 24 02/07/2022    BUN 71 02/07/2022    CREATININE 2.9 02/07/2022    GLUCOSE 569 02/07/2022    CALCIUM 8.0 02/07/2022      Lab Results   Component Value Date    INR 1.03 09/28/2017    INR 1.14 10/03/2016    INR 1.07 09/29/2016    PROTIME 11.7 09/28/2017    PROTIME 13.1 (H) 10/03/2016    PROTIME 12.2 09/29/2016           Electronically signed by Ricco Cornelius MD on 2/9/2022 at 8:36 AM

## 2022-02-09 NOTE — FLOWSHEET NOTE
Dr. Summers Medicine at bedside. Updated on UOP, pt HR somewhat elevated. Lasix gtt d/c and port cxr ordered at this time. RN to continue to monitor.

## 2022-02-09 NOTE — PROGRESS NOTES
Nephrology Progress Note  2/9/2022 10:39 AM        Subjective:   Admit Date: 2/6/2022  PCP: Denise Maya MD    Interval History: He looks and doing better  With lower oxygen requirement  Good urine output with continuous IV diuretics    Diet: Reported eating little bit better    ROS: He reports less short of breath  He was on 4 L/min of oxygen supplementation when I examined  He made 2.215 L for the last 24 hours  No fever      Data:     Current meds:    insulin glargine  50 Units SubCUTAneous Nightly    insulin lispro  0-18 Units SubCUTAneous TID WC    insulin lispro  0-18 Units SubCUTAneous 2 times per day    magic (miracle) mouthwash  5 mL Swish & Swallow TID    insulin lispro  15 Units SubCUTAneous TID WC    baricitinib  1 mg Oral Daily    benzonatate  100 mg Oral TID    guaiFENesin  200 mg Oral Q4H    sodium chloride flush  5-40 mL IntraVENous 2 times per day    dexamethasone  6 mg IntraVENous Q24H    amLODIPine  10 mg Oral Daily    apixaban  5 mg Oral BID    aspirin EC  81 mg Oral Daily    cyclobenzaprine  10 mg Oral Nightly    metoprolol succinate  50 mg Oral Daily    pantoprazole  40 mg Oral QAM AC    senna  1 tablet Oral BID      furosemide (LASIX) 1mg/ml infusion 5 mg/hr (02/09/22 0858)    dextrose      dextrose      sodium chloride      dextrose           I/O last 3 completed shifts:   In: 10 [I.V.:10]  Out: 3715 [[id number]]    CBC:   Recent Labs     02/07/22  2124 02/09/22  0830   WBC 6.0 8.4   HGB 11.1* 11.6*    217          Recent Labs     02/07/22  1235 02/07/22  1642   * 128*   K 4.4 4.4   CL 89* 89*   CO2 24 24   BUN 70* 71*   CREATININE 2.9* 2.9*   GLUCOSE 710* 569*       Lab Results   Component Value Date    CALCIUM 8.0 (L) 02/07/2022    PHOS 4.5 02/09/2022       Objective:     Vitals: BP (!) 149/70   Pulse 88   Temp 98 °F (36.7 °C)   Resp 22   Wt 192 lb 14.4 oz (87.5 kg)   SpO2 (!) 88%   BMI 29.33 kg/m²     General appearance: Alert, awake and oriented  HEENT: No conjunctival pallor  Neck: Supple  Lungs: Adventitious breath sound  Heart: Seemed irregular-he has healed incision from previous sternotomy  Abdomen: Soft  Extremities: Less lower extremity edema now      Problem List :         Impression :     1. Acute kidney injury on top of CKD stage IV A3-mainly from aftermath of COVID-19 and perhaps renal vein congestion-stable so far  2. Breakthrough COVID-19 as well as acute decompensated heart failure-with underlying coronary artery disease-likely precipitated by acute infection  3. Underlying diabetes, and multiple other chronic disease including valvular disease  4. Diabetes mellitus with uncontrolled diabetes-causing spuriously low sodium    Recommendation/Plan  :     1. He will need good sugar control-mainly from steroid therapy-  2. .  Periodic proBNP level  3. Watch for adequate \"capillary plasma refill\"  4. Daily accurate weight and urine output if possible  5. Watch for iatrogenic nosocomial complication  6. Avoid any nonessential medication  7.  Follow clinically and biochemically      Elton Antonio MD MD

## 2022-02-09 NOTE — FLOWSHEET NOTE
Pt x1 assissted up to chair. Pt does display some weakness when going from sitting to standing. Pt did use walker, gait slow and steps small. HR slightly elevated during transfer but pt did not seem in any distress.

## 2022-02-09 NOTE — PROGRESS NOTES
Today's plan: better today afer 2L diuresis with lasix, ok to stop iv LASIX drip today, repeat CXR, CREATINE IS STABLE, START ORAL LASIX 40MG DAILY      Admit Date:  2/6/2022    Subjective:OK      Chief complaints on admission: sob      History of present illness:Gentry is a 68 y. o.year old who  presents with  SOB  Past medical history:    has a past medical history of Acid reflux, Arrhythmia, Arthritis, CAD (coronary artery disease), CHF (congestive heart failure) ([facility]), Chronic back pain, CKD (chronic kidney disease) stage 3, GFR 30-59 ml/min ([facility]), COPD (chronic obstructive pulmonary disease) ([facility] [address] Diabetes mellitus ([facility] [address] Glaucoma, H/O 24 hour EKG monitoring, H/O cardiac catheterization, H/O cardiovascular stress test, H/O complete electrocardiogram, H/O Doppler ultrasound, H/O Doppler ultrasound (Lower extremity), H/O percutaneous left heart catheterization, Heat syncope, [place] (hard of hearing), Hx of echocardiogram, Hyperlipidemia, Hypertension, Macular degeneration, Other activity(E029.9), PAF (paroxysmal atrial fibrillation) ([facility] [address] PVD (peripheral vascular disease) ([facility] [address] S/P CABG x 3, Skin Cancer, SOB (shortness of breath) on exertion, Teeth missing, Ventricular ectopy, Wears dentures, and Wears glasses. Past surgical history:   has a past surgical history that includes cyst removal (2000's); Thoracentesis (Left, 10/06/2016); Coronary artery bypass graft (10/03/2016); Skin cancer excision (2000's); eye surgery (Bilateral, 2015); Dental surgery; Colonoscopy (Last Done In 2000's); fracture surgery (Left, 01/11/2016); Cardiac surgery (10/03/2016); and other surgical history (10/03/2017). Social History:   reports that he quit smoking about 27 years ago. His smoking use included cigarettes. He started smoking about 57 years ago. He has a 60.00 pack-year smoking history. He has never used smokeless tobacco. He reports that he does not drink alcohol and does not use drugs.   Family history:  family history includes Breast Cancer in his sister; Cancer in his father, mother, sister, and son; Diabetes in his father; Early Death (age of onset: 48) in his mother; Other in his father. Allergies   Allergen Reactions    Aldactone [Spironolactone]      \"Developed Pain In The  Breasts And Knots In The Breasts\"    Crestor [Rosuvastatin Calcium]      \"Leg Cramps\"    Lipitor      \"Leg Cramps\"    Zocor [Simvastatin - High Dose]      \"Leg Cramps\"         Objective:   BP (!) 149/70   Pulse 115   Temp 98.2 °F (36.8 °C) (Oral)   Resp 13   Wt 192 lb 14.4 oz (87.5 kg)   SpO2 (!) 85%   BMI 29.33 kg/m²       Intake/Output Summary (Last 24 hours) at 2/9/2022 1159  Last data filed at 2/9/2022 2487  Gross per 24 hour   Intake    Output 2065 ml   Net -2065 ml       TELEMETRY: Sinus     Physical Exam:  Constitutional:  Well developed, Well nourished, No acute distress, Non-toxic appearance. HENT:  Normocephalic, Atraumatic, Bilateral external ears normal, Oropharynx moist, No oral exudates, Nose normal. Neck- Normal range of motion, No tenderness, Supple, No stridor. Eyes:  PERRL, EOMI, Conjunctiva normal, No discharge. Respiratory:  Normal breath sounds, No respiratory distress, No wheezing, No chest tenderness. ,no use of accessory muscles, diaphragm movement is normal  Cardiovascular: (PMI) apex non displaced,no lifts no thrills, no s3,no s4, Normal heart rate, Normal rhythm, No murmurs, No rubs, No gallops. Carotid arteries pulse and amplitude are normal no bruit, no abdominal bruit noted ( normal abdominal aorta ausculation), femoral arteries pulse and amplitude are normal no bruit, pedal pulses are normal  GI:  Bowel sounds normal, Soft, No tenderness, No masses, No pulsatile masses. : External genitalia appear normal, No masses or lesions. No discharge. No CVA tenderness. Musculoskeletal:  Intact distal pulses, No edema, No tenderness, No cyanosis, No clubbing. Good range of motion in all major joints.  No tenderness to palpation or major deformities noted. Back- No tenderness. Integument:  Warm, Dry, No erythema, No rash. Lymphatic:  No lymphadenopathy noted. Neurologic:  Alert & oriented x 3, Normal motor function, Normal sensory function, No focal deficits noted.    Psychiatric:  Affect normal, Judgment normal, Mood normal.     Medications:    insulin glargine  50 Units SubCUTAneous Nightly    insulin lispro  0-18 Units SubCUTAneous TID WC    insulin lispro  0-18 Units SubCUTAneous 2 times per day    magic (miracle) mouthwash  5 mL Swish & Swallow TID    insulin lispro  15 Units SubCUTAneous TID WC    baricitinib  1 mg Oral Daily    benzonatate  100 mg Oral TID    guaiFENesin  200 mg Oral Q4H    sodium chloride flush  5-40 mL IntraVENous 2 times per day    dexamethasone  6 mg IntraVENous Q24H    amLODIPine  10 mg Oral Daily    apixaban  5 mg Oral BID    aspirin EC  81 mg Oral Daily    cyclobenzaprine  10 mg Oral Nightly    metoprolol succinate  50 mg Oral Daily    pantoprazole  40 mg Oral QAM AC    senna  1 tablet Oral BID      dextrose      dextrose      sodium chloride      dextrose       dextrose, glucose, dextrose, glucagon (rDNA), dextrose, benzocaine-menthol, sodium chloride flush, sodium chloride, ondansetron **OR** ondansetron, polyethylene glycol, acetaminophen **OR** acetaminophen, dextrose, acetaminophen, albuterol sulfate HFA, dextrose bolus (hypoglycemia) **OR** dextrose bolus (hypoglycemia)    Lab Data:  CBC:   Recent Labs     02/07/22 2124 02/09/22  0830   WBC 6.0 8.4   HGB 11.1* 11.6*   HCT 34.5* 35.9*   MCV 90.6 88.9    217     BMP:   Recent Labs     02/07/22  1235 02/07/22  1642 02/09/22  0830   * 128* 139   K 4.4 4.4 4.1   CL 89* 89* 95*   CO2 24 24 27   PHOS  --   --  4.5   BUN 70* 71* 78*   CREATININE 2.9* 2.9* 2.4*     LIVER PROFILE:   Recent Labs     02/07/22  1235 02/07/22  1642 02/09/22  0830   AST 15 19 59*   ALT 12 13 48*   BILITOT 0.3 0.3 0.5 ALKPHOS 97 105 120     PT/INR: No results for input(s): PROTIME, INR in the last 72 hours. APTT: No results for input(s): APTT in the last 72 hours. BNP:  No results for input(s): BNP in the last 72 hours. TROPONIN: @TROPONINI:3@      Assessment:  68 y. o.year old who is admitted for          Plan:  1. EEVATED PROBNP: mild fluid overload BETTER NOW,with covid 19 and chronic renal faiure, mild diastolic CHF, D/C lasix drip 5mg.hr and will watch his creatiine and out put  2. Moderate mitral stenosis; heavily calcified valve, mean gradient 6mmhg, Continue BB and lasix  3. CAD\" h/o CABG, stable, contineu apsirin, add statins  4. Afib\" h/o maze,now in sinus, continue eliquis and bb  5. covid 19: admitted to ICU, not requiring BIPAP, chest xray reviewed,  6. DM:' contiue insulin, recommend JAardiance  7. COPD: stable, may use inhalers as neede  8. HTN:stable, contiue bb    All labs, medications and tests reviewed, continue all other medications of all above medical condition listed as is.       Kim Regan MD, MD 2/9/2022 11:59 AM

## 2022-02-09 NOTE — PROGRESS NOTES
mL IntraVENous 2 times per day    dexamethasone  6 mg IntraVENous Q24H    amLODIPine  10 mg Oral Daily    apixaban  5 mg Oral BID    aspirin EC  81 mg Oral Daily    cyclobenzaprine  10 mg Oral Nightly    metoprolol succinate  50 mg Oral Daily    pantoprazole  40 mg Oral QAM AC    senna  1 tablet Oral BID      Infusions:    furosemide (LASIX) 1mg/ml infusion 5 mg/hr (02/08/22 1502)    dextrose      insulin Stopped (02/08/22 0900)    dextrose      sodium chloride      dextrose           Objective:   Vitals: BP (!) 140/68   Pulse 93   Temp 97.6 °F (36.4 °C) (Oral)   Resp 18   Wt 192 lb 14.4 oz (87.5 kg)   SpO2 91%   BMI 29.33 kg/m²   General appearance: alert and cooperative with exam  Neck: no JVD or bruit  Thyroid : Normal lobes   Lungs: Has Vesicular Breath sounds some wheezing and some rales  Heart:  regular rate and rhythm  Abdomen: soft, non-tender; bowel sounds normal; no masses,  no organomegaly  Musculoskeletal: Normal  Extremities: extremities normal, , no edema  Neurologic:  Awake, alert, oriented to name, place and time. Cranial nerves II-XII are grossly intact. Motor is  intact. Sensory is intact. ,  and gait is normal.    Assessment:     Patient Active Problem List:     Palpitations     PAF (paroxysmal atrial fibrillation) ([facility])     DJD (degenerative joint disease), multiple sites     History of colonic polyps     Family history of colon cancer     Statin intolerance     SOB (shortness of breath) on exertion     Type 2 diabetes mellitus with complication ([facility])     Essential hypertension     Dyslipidemia     S/P CABG x 3     Anemia     Cardiomyopathy ([facility])     Chronic kidney disease (CKD) stage G4/A3, severely decreased glomerular filtration rate (GFR) between 15-29 mL/min/1.73 square meter and albuminuria creatinine ratio greater than 300 mg/g (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 Reviewed POC blood glucose . Labs and X ray results   2. Reviewed Current Medicines   3. Discontinue IV insulin drip  4. We will start on meal/ Correction bolus Humalog/ Basal Lantus Insulin regime /   5. Monitor Blood glucose frequently   6. Modified  the dose of Insulin/ other medicines as needed   7. Will follow     .      Catarina Corral MD, MD

## 2022-02-09 NOTE — FLOWSHEET NOTE
Pt x2 assisted to bathroom with walker and gait belt. Pt very weak to standing position, pt gait very slow but steady. PT/OT ordered per Dr. Bonita Almendarez.

## 2022-02-10 NOTE — PROGRESS NOTES
Hospitalist Progress Note      Name:  Tanisha Moyer /Age/Sex: 1945  (68 y.o. male)   MRN & CSN:  [id number] & [id number] Admission Date/Time: 2022  7:43 PM   Location:  -A PCP: Ruben Troncoso MD         Hospital Day: 5  Discharge barrier/Reason for continued hospitalization: Transfer to ICU for insulin gtt. hyperglycemia greater than 600    Assessment and Plan:   Tanisha Moyer is a 68 y.o.  male hypertension, chronic diastolic CHF, CKD 4 due to diabetic nephropathy, A. fib on chronic anticoagulation with Eliquis, type 2 diabetes, dyslipidemia, who presented to the ED on 2022 with shortness of breath and cough and was diagnosed with Pneumonia due to COVID-19 virus. He is fully vaccinated against Covid    1. Sepsis due to COVID-19 pneumonia: POA. now resolved  Acute organ dysfunction as evidenced by acute hypoxic respiratory failure. CXR with patchy airspace disease bilaterally  Steroids, baricitinib (renal dosing), isolation  o2 at 4L via NC, wean as able  Home o2 eval prior to discharge  [facility]    2. COVID-19 acute hypoxic respiratory failure: POA. O2 sats <89% on room air  Currently on 4 L. Not on home O2  Regular Is use  Wean to RA  as tolerated    3. DM2 with hyperosmolar state  Hyperglycemia worsened by steroids  Off  insulin gtt. Transitioned to lantus and SSI  A1c 8.9 from 22  AG closed and BS well controlled currently  Endocrine following  Monitor and titrate    4. Acute on chronic diastolic CHF: uncompensated  Hx of CAD s/p CABG  Mod Mitral stenosis  C/w cardioprotective meds  off lasix gtt,   Switch IV diuresis to home oral diuretic regimen soon. Cardio and renal following  5. Hypertension:   Continue Norvasc, Toprol-XL. Hold nephrotoxic agents  Monitor and itrate    6. CKD 4:   Due to diabetic nephropathy. Avoid nephrotoxic agents   Stable  monitor  Renal following       7. Paroxysmal A. fib: Continue Eliquis.     Continue Toprol-XL      Disposition; SNF    Diet ADULT DIET; Regular; Low Sodium (2 gm)   DVT Prophylaxis [] Lovenox, []  Heparin, [] SCDs, [x] Ambulation. Eliquis   GI Prophylaxis [x] PPI,  [] H2 Blocker,  [] Carafate,  [x] Diet/Tube Feeds   Code Status Full Code   MDM [] Low, [] Moderate,[x]  High       History of Present Illness:     Chief Complaint: Pneumonia due to COVID-19 virus     Estephanie Baxter is a 68 y.o.  male  who presents with shortness of breath and cough. Patient is seen and examined this noon  Cough and sob still thereng  Denies chest pain/fever/sob  Strength is also improving  Wants to go home , not SNF  Off insulin gtt and off  lasix gtt   BS better, creatinine stable  Remains on 4L via NC         Ten point ROS reviewed negative, unless as noted above    Objective: Intake/Output Summary (Last 24 hours) at 2/10/2022 0916  Last data filed at 2/10/2022 0004  Gross per 24 hour   Intake    Output 1450 ml   Net -1450 ml        Vitals:   Vitals:    02/09/22 1956 02/10/22 0004 02/10/22 0503 02/10/22 0848   BP: (!) 156/78  (!) 140/67 (!) 146/70   Pulse: 111 98 110 90   Resp: 22 14 24 17   Temp:   97.9 °F (36.6 °C) 98.1 °F (36.7 °C)   TempSrc:   Oral Oral   SpO2: (!) 88% 94% (!) 87% 94%   Weight:            Physical Exam:   GEN: Awake male, alert and oriented x3 in mild respiratory distress. Appears given age. HEENT: Normal   RESP: Diminished BS bilaterally. Symmetric chest movement while on o2 via NC  CVS: RRR, S1, S2  GI/: Abdomen is soft, nontender, no organomegaly. . Bowel sounds normal, rectal exam deferred. No CVA tenderness. MSK: No gross joint deformities. No tenderness  SKIN: Normal coloration, warm, dry. NEURO: Cranial nerves appear grossly intact, normal speech, no lateralizing weakness. PSYCH:  Affect appropriate.     Medications:   Medications:    insulin glargine  50 Units SubCUTAneous Nightly    insulin lispro  0-18 Units SubCUTAneous TID WC    insulin lispro  0-18 Units SubCUTAneous 2 times per day    magic (miracle) mouthwash  5 mL Swish & Swallow TID    furosemide  40 mg Oral Daily    insulin lispro  15 Units SubCUTAneous TID WC    baricitinib  1 mg Oral Daily    benzonatate  100 mg Oral TID    guaiFENesin  200 mg Oral Q4H    sodium chloride flush  5-40 mL IntraVENous 2 times per day    dexamethasone  6 mg IntraVENous Q24H    amLODIPine  10 mg Oral Daily    apixaban  5 mg Oral BID    aspirin EC  81 mg Oral Daily    cyclobenzaprine  10 mg Oral Nightly    metoprolol succinate  50 mg Oral Daily    pantoprazole  40 mg Oral QAM AC    senna  1 tablet Oral BID      Infusions:    dextrose      dextrose      sodium chloride      dextrose       PRN Meds: dextrose, 100 mL/hr, PRN  glucose, 15 g, PRN  dextrose, 12.5 g, PRN  glucagon (rDNA), 1 mg, PRN  dextrose, 100 mL/hr, PRN  benzocaine-menthol, 1 lozenge, Q2H PRN  sodium chloride flush, 5-40 mL, PRN  sodium chloride, 25 mL, PRN  ondansetron, 4 mg, Q8H PRN   Or  ondansetron, 4 mg, Q6H PRN  polyethylene glycol, 17 g, Daily PRN  acetaminophen, 650 mg, Q6H PRN   Or  acetaminophen, 650 mg, Q6H PRN  dextrose, 100 mL/hr, PRN  acetaminophen, 500 mg, Q4H PRN  albuterol sulfate HFA, 2 puff, Q4H PRN  dextrose bolus (hypoglycemia), 125 mL, PRN   Or  dextrose bolus (hypoglycemia), 250 mL, PRN      Recent Labs     02/09/22  0830 02/07/22  2124 02/06/22  0950   WBC 8.4 6.0 2.6*   HGB 11.6* 11.1* 10.2*   HCT 35.9* 34.5* 31.9*   MCV 88.9 90.6 89.6    179 143     Lab Results   Component Value Date     02/09/2022    K 4.1 02/09/2022    CL 95 02/09/2022    CO2 27 02/09/2022    BUN 78 02/09/2022    CREATININE 2.4 02/09/2022    GLUCOSE 158 02/09/2022    CALCIUM 8.4 02/09/2022      Lab Results   Component Value Date    INR 1.03 09/28/2017    INR 1.14 10/03/2016    INR 1.07 09/29/2016    PROTIME 11.7 09/28/2017    PROTIME 13.1 (H) 10/03/2016    PROTIME 12.2 09/29/2016           Electronically signed by Xavier Stevens MD on

## 2022-02-10 NOTE — CONSULTS
[address] [facility], 1945, 2001/2001-A, 2/10/2022    History  [place]:  There were no encounter diagnoses. Patient  has a past medical history of Acid reflux, Arrhythmia, Arthritis, CAD (coronary artery disease), CHF (congestive heart failure) ([facility]), Chronic back pain, CKD (chronic kidney disease) stage 3, GFR 30-59 ml/min ([facility]), COPD (chronic obstructive pulmonary disease) ([address] Diabetes mellitus ([address] Glaucoma, H/O 24 hour EKG monitoring, H/O cardiac catheterization, H/O cardiovascular stress test, H/O complete electrocardiogram, H/O Doppler ultrasound, H/O Doppler ultrasound (Lower extremity), H/O percutaneous left heart catheterization, Heat syncope, [place] (hard of hearing), Hx of echocardiogram, Hyperlipidemia, Hypertension, Macular degeneration, Other activity(E029.9), PAF (paroxysmal atrial fibrillation) ([facility] [address] PVD (peripheral vascular disease) ([facility] [address] S/P CABG x 3, Skin Cancer, SOB (shortness of breath) on exertion, Teeth missing, Ventricular ectopy, Wears dentures, and Wears glasses. Patient  has a past surgical history that includes cyst removal (2000's); Thoracentesis (Left, 10/06/2016); Coronary artery bypass graft (10/03/2016); Skin cancer excision (2000's); eye surgery (Bilateral, 2015); Dental surgery; Colonoscopy (Last Done In 2000's); fracture surgery (Left, 01/11/2016); Cardiac surgery (10/03/2016); and other surgical history (10/03/2017). Subjective:  Patient states:  \"Well I was already up in the chair today, I'm tired\". Pt requires increased encouragement to participate. Pain:  Pt without c/o . Communication with other providers:  Handoff to RN, co-eval with OTAlisha for pt endurance/tolerance. Discussed pt 02 needs and level of mobility with RN prior to session.     Restrictions: (+)COVID, fall risk, on 5L02, desat with mobility    Home Setup/Prior level of function  Social/Functional History  Lives With: Alone  Type of Home: Apartment  Home Layout: One level  Home Access: Level entry  Bathroom Shower/Tub: Tub/Shower unit (pt sponge bathes)  Bathroom Toilet: Standard  Bathroom Equipment: Shower chair,Grab bars around toilet  Home Equipment: [id number] (pt uses RW more often)  ADL Assistance: Needs assistance (Aide 3 hrs a day, helps with bathing, ambulation, cooking)  Homemaking Assistance: Needs assistance  Homemaking Responsibilities: No  Ambulation Assistance: Needs assistance  Transfer Assistance: Needs assistance (pt can AMB to BR and transfer to commode)  Active : No (aide)  Additional Comments: Pt reports several falls \"my legs give out\"    Examination of body systems (includes body structures/functions, activity/participation limitations):  · Observation:  Pt is resting in semi fowlers with n/c on upon arrival  · Vision:  Glasses  · Hearing:  pushdBROI-lighting  · Cardiopulmonary: On 5L 02, with increased exertion desat to 78%, up to 89% after ~1min of rest and cues for deep breathing  · Cognition: Overall pt is oriented, increased lethargy, see OT/SLP note for further evaluation. Musculoskeletal  · ROM R/L:  WFL BLE. · Strength R/L:  JACOB quads 4-/5, HS 3+/5, DF 3/5, HF 3/5, decreased in function and endurance. · Neuro:  Neuropathy BLE  · Gait pattern: Deferred for safety this session d/t fatigue and desat. Mobility:  · Supine to sit:  Mod x2   · Transfers: Mod x2  · Sitting balance:  CGA. · Standing balance:  Min A with LE blocking. · Gait: Deferred    [facility] 6 Clicks Inpatient Mobility:  AM-PAC Inpatient Mobility Raw Score : 12    Treatment:    Bed mobility: PT encourages sup>sit, provides v/c for sequencing. Pt demonstrates poor ability to advance LE, states his aide assists with this. Requires Mod x2 A for LE/ hips to EOB and Mod A x2 for trunk to upright. Overall increased time and effort.  PT v/c for scooting to EOB, pt requires CGA for safety, increased time and repetitions to complete. End of session pt very fatigued and requests return to supine, Mod A x2 with cues for lowering trunk to bed. Max A x2 for scooting to [facility]. HOB raised to 70* for pt comfort. Sitting balance: Seated EOB pt demonstrates fair- balance, BUE support required for maintaining upright. Pt without notable increase in sway, pt denies need for BR and refused transfer to chair d/t fatigue. Pt tolerates seated EOB ~ 5 min during PT assessment, vitals assessment, preparation for transfer. Cues for deep breathing intermittently d/t pt tendency to desat. Sit<>Stand: Pt performed STS from EOB to RW x1 rep with PT blocking L knee and foot OT blocking R foot, Mod A x2, v/c for proper sequencing. Pt demonstrates increased time to upright, increased difficulty achieving knee extension, PT provided Mod A force at L knee to promote extension. Pt tolerates standing at EOB ~1 min, PT cues for upright posture and deep breathing, fair carryover. Pt requests to return back to sitting. Return to seated EOB with CGA pt demonstrates fair- control, v/c for safe sequencing. Education: PT educated on role of PT, importance of mobility for lung and cardiovascular health, discussed d/c plan. Pt is open to SNF. End of session pt left in semi fowlers with lines managed, call light, phone, exit alarm, tray, all needs, RN aware. Assessment:    Pt is a 69 y/o male admitted 2/6 with c/o  PNA d/t COVID. Patient with significant h/o atrial fibrillation, CHF, COPD, diabetes mellitus, CKD , see chart. Per pt report pt has been performing ADLs/IADLs with assist from aide for most ADLs and mobility, use of RW or w/c. At this time pt appears to be functioning below baseline. Pt is now presenting with impairments in BLE strength, functional endurance, safety awareness, balance, respiratory endurance with 02 reliance, gait and mobility deficits.  Pt would benefit from skilled PT services in order to address impairments and promote return to PLOF. PT to recommend d/c to SNF for rehab. Complexity: Moderate  Prognosis: Good, no significant barriers to participation at this time. Plan Times per week: 2-3/week, 1 week,   Discharge Recommendations: Subacute/Skilled Nursing Facility  Equipment: Defer to next LOC    Goals:  Short term goals  Time Frame for Short term goals: 1 week  Short term goal 1: Pt will perform STS from EOB to RW x3 reps with Min A x2 and BLE blocking for safety. Short term goal 2: Pt will tolerate standing balance activity x5' with BUE support and Min A  Short term goal 3: Pt will AMB x10 ft in room with chair follow, RW, Mod A, vitals stable. Short term goal 4: Pt will perform SPT from EOB>chair with RW and Mod A. Treatment plan:  Bed mobility, transfers, balance, gait, TA, TX    Recommendations for NURSING mobility: Mod A x2 SPT chair<>bed.     Time:   Time in: 10:34  Time out: 10:59  Timed treatment minutes: 14  Total time: 25    Electronically signed by:    Jenn Roberts, PT  2/79/4424, 5:91 PM  PT Lic #: [id number]

## 2022-02-10 NOTE — PROGRESS NOTES
Nephrology Progress Note  2/10/2022 8:42 AM        Subjective:   Admit Date: 2/6/2022  PCP: Marysol Gil MD    Interval History: Slowly improving    Diet: Eating well        ROS: 4 L/min of oxygen  Lasix drip has been stopped  Urine output recorded 1.7 L for the last 24 hours        Data:     Current meds:    insulin glargine  50 Units SubCUTAneous Nightly    insulin lispro  0-18 Units SubCUTAneous TID WC    insulin lispro  0-18 Units SubCUTAneous 2 times per day    magic (miracle) mouthwash  5 mL Swish & Swallow TID    furosemide  40 mg Oral Daily    insulin lispro  15 Units SubCUTAneous TID WC    baricitinib  1 mg Oral Daily    benzonatate  100 mg Oral TID    guaiFENesin  200 mg Oral Q4H    sodium chloride flush  5-40 mL IntraVENous 2 times per day    dexamethasone  6 mg IntraVENous Q24H    amLODIPine  10 mg Oral Daily    apixaban  5 mg Oral BID    aspirin EC  81 mg Oral Daily    cyclobenzaprine  10 mg Oral Nightly    metoprolol succinate  50 mg Oral Daily    pantoprazole  40 mg Oral QAM AC    senna  1 tablet Oral BID      dextrose      dextrose      sodium chloride      dextrose           I/O last 3 completed shifts:  In: -   Out: 3165 [Urine:3165]    CBC:   Recent Labs     02/07/22  2124 02/09/22  0830   WBC 6.0 8.4   HGB 11.1* 11.6*    217          Recent Labs     02/07/22  1235 02/07/22  1642 02/09/22  0830   * 128* 139   K 4.4 4.4 4.1   CL 89* 89* 95*   CO2 24 24 27   BUN 70* 71* 78*   CREATININE 2.9* 2.9* 2.4*   GLUCOSE 710* 569* 158*       Lab Results   Component Value Date    CALCIUM 8.4 02/09/2022    PHOS 4.5 02/09/2022       Objective:     Vitals: BP (!) 140/67   Pulse 110   Temp 97.9 °F (36.6 °C) (Oral)   Resp 24   Wt 192 lb 14.4 oz (87.5 kg)   SpO2 (!) 87%   BMI 29.33 kg/m²     General appearance: Alert, awake and oriented  HEENT: At least 2+ conjunctival pallor  Neck: Supple  Lungs: Positive adventitious breath sound  Heart: Tachycardia this morning, healed scar from previous sternotomy  Abdomen: Soft, nontender  Extremities: Trace lower extremity edema      Problem List :         Impression :     1. Acute kidney injury with underlying CKD stage IV A3-recovering by creatinine criteria and has acceptable urine output  2. Probable acute decompensated heart failure with underlying coronary artery disease  3. Recent COVID-19 pneumonitis  4. Multiple underlying comorbidities which include but not limited to diabetes mellitus, atherosclerotic cardiovascular disease-blood sugar seems to be better  5. Hypertension    Recommendation/Plan  :     1. Probably would do better with long-acting loop as he was taking at home such as torsemide 20 mg twice a day-also has better oral bioavailability  2. Watch for iatrogenic nosocomial comp occasion  3. Follow clinically and biochemically in good sugar control  4.  Hopefully he will continue to improve and can discharge soon      Lorraine Valentin MD MD

## 2022-02-10 NOTE — PROGRESS NOTES
Occupational [address] [place] [address] CARE OCCUPATIONAL THERAPY EVALUATION  Miller Serve, 1945, 2001/2001-A, 2/10/2022    History  [place]:  There were no encounter diagnoses. Patient  has a past medical history of Acid reflux, Arrhythmia, Arthritis, CAD (coronary artery disease), CHF (congestive heart failure) ([facility]), Chronic back pain, CKD (chronic kidney disease) stage 3, GFR 30-59 ml/min ([facility]), COPD (chronic obstructive pulmonary disease) ([facility] [address] Diabetes mellitus ([facility] [address] Glaucoma, H/O 24 hour EKG monitoring, H/O cardiac catheterization, H/O cardiovascular stress test, H/O complete electrocardiogram, H/O Doppler ultrasound, H/O Doppler ultrasound (Lower extremity), H/O percutaneous left heart catheterization, Heat syncope, [place] (hard of hearing), Hx of echocardiogram, Hyperlipidemia, Hypertension, Macular degeneration, Other activity(E029.9), PAF (paroxysmal atrial fibrillation) ([facility] [address] PVD (peripheral vascular disease) ([facility] [address] S/P CABG x 3, Skin Cancer, SOB (shortness of breath) on exertion, Teeth missing, Ventricular ectopy, Wears dentures, and Wears glasses. Patient  has a past surgical history that includes cyst removal (2000's); Thoracentesis (Left, 10/06/2016); Coronary artery bypass graft (10/03/2016); Skin cancer excision (2000's); eye surgery (Bilateral, 2015); Dental surgery; Colonoscopy (Last Done In 2000's); fracture surgery (Left, 01/11/2016); Cardiac surgery (10/03/2016); and other surgical history (10/03/2017). Subjective:  Patient states:  \"I'm worn out\". Pain:  No.    Communication with other providers:  Handoff to RN, co-eval with PT.    Restrictions: Droplet Plus, General Precautions, Fall Risk    Home Setup/Prior level of function  Social/Functional History  Lives With: Alone  Type of Home: Apartment  Home Layout: One level  Home Access: Level entry  Bathroom Shower/Tub: Tub/Shower unit (pt sponge bathes)  Bathroom Toilet: Standard  Bathroom Equipment: Cari Estrella bars around toilet  Home Equipment: [id number] [address] (pt uses RW more often)  ADL Assistance: Needs assistance (Aide 3 hrs a day, helps with bathing, ambulation, cooking)  Homemaking Assistance: Needs assistance  Homemaking Responsibilities: No  Ambulation Assistance: Needs assistance  Transfer Assistance: Needs assistance (pt can AMB to BR and transfer to commode)  Active : No (aide)  Additional Comments: Pt reports several falls \"my legs give out\"    Examination of body systems (includes body structures/functions, activity/participation limitations):  · Observation:  Supine in bed upon arrival, agreeable to therapy  · Vision:  Glasses  · Hearing:  Payvment  · Cardiopulmonary:  4L of 02. 02 saturation decreased ~85% w/ increased activity, increased ~92% w/ 1 minute of pursed lip breathing after all increased mobility      Body Systems and functions:  · ROM R/L:  WFL. · Strength R/L:  4-/5,   · Sensation: WFL  · Tone: Normal  · Coordination: WFL  · Perception: WNL    Activities of Daily Living (ADLs):  · Feeding: Jamal  · Grooming: CGA (washing hands/face w/ warm washcloth)  · UB bathing: SBA  · LB bathing: maxA  · UB dressing: SBA  · LB dressing: maxA (donning socks supine in bed)  · Toileting: maxA    Cognitive and Psychosocial Functioning:  · Overall cognitive status: WFL  · Affect: Normal        Mobility:  · Supine to sit:  modA x 2  · Transfers:  modA x 2 STS from EOB up to RW  · Sitting balance:  CGA. · Standing balance:  Rober w/ RW ~1 minute before fatiguing.   · Toilet/Shower Transfers: DNT  · Sit to supine: modA x 2  · Scooting to HOB: modA x 2             AM-PAC Daily Activity Inpatient   How much help for putting on and taking off regular lower body clothing?: Total  How much help for Bathing?: A Lot  How much help for Toileting?: Total  How much help for putting on and taking off regular upper body clothing?: A Little  How much help for taking care of personal grooming?: A Little  How much help for eating meals?: None  AM-PAC Inpatient Daily Activity Raw Score: 14  AM-PAC Inpatient ADL T-Scale Score : 33.39  ADL Inpatient CMS 0-100% Score: 59.67  ADL Inpatient CMS G-Code Modifier : CK    Treatment:  Self Care Training:   Cues were given for safety, sequence, UE/LE placement, visual cues, and balance. Activities performed today included grooming, LB dressing    Safety: patient left in bed with bed alarm, call light within reach, RN notified, gait belt used. Assessment:  Pt is a 69 yo male admitted from home for pneumonia. Pt at baseline needs assistance for ADLs needs assistance for high level IADLs and needs assistance for functional transfers/mobility w/ AD. Pt currently presents w/ deficits in ADL and high level IADL independence, functional activity tolerance, dynamic sitting and standing balance and tolerance and functional transfers, BUE strength, SOB and OOB activity. Pt would benefit from continued acute care OT services w/ discharge to SNF  Complexity: Moderate  Prognosis: Good, no significant barriers to participation at this time.    Plan  Times per week: 3x+  Times per day: Daily  Current Treatment Recommendations: Strengthening,Endurance Training,Patient/Caregiver Education & Training,Equipment Evaluation, Education, & procurement,Self-Care / [facility] Training,ROM,Functional Mobility Training,Positioning,Safety Education & Training     Equipment: defer    Goals:  Pt goal: go home  Time Frame for STGs: discharge  Goal 1: Pt will perform UE ADLs Supervision  Goal 2: Pt will perform LE ADLs Rober w/ AD  Goal 3: Pt will perform toileting Rober  Goal 4: Pt will perform functional transfer w/ AD Rober  Goal 5: Pt will perform functional mobility w/ AD Rober  Goal 6: Pt will perform therex/theract in order to increase functional activity tolerance and dynamic standing balance  Goal 7: Pt will perform all aspects of bed mobility

## 2022-02-10 NOTE — PROGRESS NOTES
Progress Note( Dr. Emily Martinez)  2/9/2022  Subjective:   Admit Date: 2/6/2022  PCP: Carlie Dyer MD    Admitted For :Shortness of breath, hypoxia and Covid pneumonia       Consulted For: Better control of blood glucose       Interval History: Patient feels much better   Discontinue IV insulin infusion drip     Denies any chest pains,   Yes SOB . Denies nausea or vomiting. No new bowel or bladder symptoms.        Intake/Output Summary (Last 24 hours) at 2/9/2022 2332  Last data filed at 2/9/2022 2132  Gross per 24 hour   Intake    Output 2725 ml   Net -2725 ml       DATA    CBC:   Recent Labs     02/07/22  2124 02/09/22  0830   WBC 6.0 8.4   HGB 11.1* 11.6*    217    CMP:  Recent Labs     02/07/22  1235 02/07/22  1642 02/09/22  0830   * 128* 139   K 4.4 4.4 4.1   CL 89* 89* 95*   CO2 24 24 27   BUN 70* 71* 78*   CREATININE 2.9* 2.9* 2.4*   CALCIUM 7.9* 8.0* 8.4   PROT 5.6* 5.6* 6.1*   LABALBU 3.2* 3.2* 3.6   BILITOT 0.3 0.3 0.5   ALKPHOS 97 105 120   AST 15 19 59*   ALT 12 13 48*     Lipids:   Lab Results   Component Value Date    CHOL 151 10/04/2021    CHOL 170 12/27/2016    HDL 41 10/04/2021    TRIG 148 10/04/2021     Glucose:  Recent Labs     02/09/22  1128 02/09/22  1758 02/09/22 2122   POCGLU 156* 141* 136*     [id number]:  Lab Results   Component Value Date    LABA1C 8.9 02/07/2022     High Sensitivity TSH:   Lab Results   Component Value Date    TSHHS 2.240 01/17/2022     Free T3: No results found for: FT3  Free T4:  Lab Results   Component Value Date    T4FREE 1.26 10/26/2021       XR CHEST PORTABLE   Final Result   Stable chest.              Scheduled Medicines   Medications:    insulin glargine  50 Units SubCUTAneous Nightly    insulin lispro  0-18 Units SubCUTAneous TID WC    insulin lispro  0-18 Units SubCUTAneous 2 times per day    magic (miracle) mouthwash  5 mL Swish & Swallow TID    furosemide  40 mg Oral Daily    insulin lispro  15 Units SubCUTAneous TID WC    baricitinib 1 mg Oral Daily    benzonatate  100 mg Oral TID    guaiFENesin  200 mg Oral Q4H    sodium chloride flush  5-40 mL IntraVENous 2 times per day    dexamethasone  6 mg IntraVENous Q24H    amLODIPine  10 mg Oral Daily    apixaban  5 mg Oral BID    aspirin EC  81 mg Oral Daily    cyclobenzaprine  10 mg Oral Nightly    metoprolol succinate  50 mg Oral Daily    pantoprazole  40 mg Oral QAM AC    senna  1 tablet Oral BID      Infusions:    dextrose      dextrose      sodium chloride      dextrose           Objective:   Vitals: BP (!) 156/78   Pulse 111   Temp 98.6 °F (37 °C) (Oral)   Resp 22   Wt 192 lb 14.4 oz (87.5 kg)   SpO2 (!) 88%   BMI 29.33 kg/m²   General appearance: alert and cooperative with exam  Neck: no JVD or bruit  Thyroid : Normal lobes   Lungs: Has Vesicular Breath sounds some wheezing and some rales  Heart:  regular rate and rhythm  Abdomen: soft, non-tender; bowel sounds normal; no masses,  no organomegaly  Musculoskeletal: Normal  Extremities: extremities normal, , no edema  Neurologic:  Awake, alert, oriented to name, place and time. Cranial nerves II-XII are grossly intact. Motor is  intact. Sensory is intact. ,  and gait is normal.    Assessment:     Patient Active Problem List:     Palpitations     PAF (paroxysmal atrial fibrillation) ([facility])     DJD (degenerative joint disease), multiple sites     History of colonic polyps     Family history of colon cancer     Statin intolerance     SOB (shortness of breath) on exertion     Type 2 diabetes mellitus with complication ([facility])     Essential hypertension     Dyslipidemia     S/P CABG x 3     Anemia     Cardiomyopathy (HCC)     Chronic kidney disease (CKD) stage G4/A3, severely decreased glomerular filtration rate (GFR) between 15-29 mL/min/1.73 square meter and albuminuria creatinine ratio greater than 300 mg/g (HCC)     DM (diabetes mellitus) ([facility] [address]     CAD (coronary artery disease)     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 [address]     Pneumonia due to COVID-19 virus      Plan:     1. Reviewed POC blood glucose . Labs and X ray results   2. Reviewed Current Medicines   3. Discontinue IV insulin drip  4. We will start on meal/ Correction bolus Humalog/ Basal Lantus Insulin regime /   5. Monitor Blood glucose frequently   6. Modified  the dose of Insulin/ other medicines as needed   7. Will follow     .      Rosangela Madera MD, MD

## 2022-02-10 NOTE — PROGRESS NOTES
Today's plan: better today afer 2L diuresis with lasix,  repeat CXR, CREATINE IS STABLE, continue ORAL LASIX 40MG DAILY      Admit Date:  2/6/2022    Subjective:OK      Chief complaints on admission: sob      History of present illness:Gentry is a 68 y. o.year old who  presents with  SOB  Past medical history:    has a past medical history of Acid reflux, Arrhythmia, Arthritis, CAD (coronary artery disease), CHF (congestive heart failure) ([facility]), Chronic back pain, CKD (chronic kidney disease) stage 3, GFR 30-59 ml/min ([facility]), COPD (chronic obstructive pulmonary disease) ([facility] [address] Diabetes mellitus ([facility] [address] Glaucoma, H/O 24 hour EKG monitoring, H/O cardiac catheterization, H/O cardiovascular stress test, H/O complete electrocardiogram, H/O Doppler ultrasound, H/O Doppler ultrasound (Lower extremity), H/O percutaneous left heart catheterization, Heat syncope, [place] (hard of hearing), Hx of echocardiogram, Hyperlipidemia, Hypertension, Macular degeneration, Other activity(E029.9), PAF (paroxysmal atrial fibrillation) ([facility] [address] PVD (peripheral vascular disease) ([facility] [address] S/P CABG x 3, Skin Cancer, SOB (shortness of breath) on exertion, Teeth missing, Ventricular ectopy, Wears dentures, and Wears glasses. Past surgical history:   has a past surgical history that includes cyst removal (2000's); Thoracentesis (Left, 10/06/2016); Coronary artery bypass graft (10/03/2016); Skin cancer excision (2000's); eye surgery (Bilateral, 2015); Dental surgery; Colonoscopy (Last Done In 2000's); fracture surgery (Left, 01/11/2016); Cardiac surgery (10/03/2016); and other surgical history (10/03/2017). Social History:   reports that he quit smoking about 27 years ago. His smoking use included cigarettes. He started smoking about 57 years ago. He has a 60.00 pack-year smoking history. He has never used smokeless tobacco. He reports that he does not drink alcohol and does not use drugs.   Family history:  family history includes Breast Cancer in his noted. Back- No tenderness. Integument:  Warm, Dry, No erythema, No rash. Lymphatic:  No lymphadenopathy noted. Neurologic:  Alert & oriented x 3, Normal motor function, Normal sensory function, No focal deficits noted.    Psychiatric:  Affect normal, Judgment normal, Mood normal.     Medications:    insulin glargine  50 Units SubCUTAneous Nightly    insulin lispro  0-18 Units SubCUTAneous TID WC    insulin lispro  0-18 Units SubCUTAneous 2 times per day    magic (miracle) mouthwash  5 mL Swish & Swallow TID    furosemide  40 mg Oral Daily    insulin lispro  15 Units SubCUTAneous TID WC    baricitinib  1 mg Oral Daily    benzonatate  100 mg Oral TID    guaiFENesin  200 mg Oral Q4H    sodium chloride flush  5-40 mL IntraVENous 2 times per day    dexamethasone  6 mg IntraVENous Q24H    amLODIPine  10 mg Oral Daily    apixaban  5 mg Oral BID    aspirin EC  81 mg Oral Daily    cyclobenzaprine  10 mg Oral Nightly    metoprolol succinate  50 mg Oral Daily    pantoprazole  40 mg Oral QAM AC    senna  1 tablet Oral BID      dextrose      dextrose      sodium chloride      dextrose       dextrose, glucose, dextrose, glucagon (rDNA), dextrose, benzocaine-menthol, sodium chloride flush, sodium chloride, ondansetron **OR** ondansetron, polyethylene glycol, acetaminophen **OR** acetaminophen, dextrose, acetaminophen, albuterol sulfate HFA, dextrose bolus (hypoglycemia) **OR** dextrose bolus (hypoglycemia)    Lab Data:  CBC:   Recent Labs     02/07/22 2124 02/09/22  0830   WBC 6.0 8.4   HGB 11.1* 11.6*   HCT 34.5* 35.9*   MCV 90.6 88.9    217     BMP:   Recent Labs     02/07/22  1642 02/09/22  0830   * 139   K 4.4 4.1   CL 89* 95*   CO2 24 27   PHOS  --  4.5   BUN 71* 78*   CREATININE 2.9* 2.4*     LIVER PROFILE:   Recent Labs     02/07/22  1642 02/09/22  0830   AST 19 59*   ALT 13 48*   BILITOT 0.3 0.5   ALKPHOS 105 120     PT/INR: No results for input(s): PROTIME, INR in the last 72 hours.  APTT: No results for input(s): APTT in the last 72 hours. BNP:  No results for input(s): BNP in the last 72 hours. TROPONIN: @TROPONINI:3@      Assessment:  68 y. o.year old who is admitted for          Plan:  1. EEVATED PROBNP: mild fluid overload BETTER NOW,with covid 19 and chronic renal faiure, mild diastolic CHF, D/C lasix drip 5mg.hr and will watch his creatiine and out put  2. Moderate mitral stenosis; heavily calcified valve, mean gradient 6mmhg, Continue BB and lasix  3. CAD\" h/o CABG, stable, contineu apsirin, add statins  4. Afib\" h/o maze,now in sinus, continue eliquis and bb  5. covid 19: admitted to ICU, not requiring BIPAP, chest xray reviewed,  6. DM:' contiue insulin, recommend JAardiance  7. COPD: stable, may use inhalers as neede  8. HTN:stable, contiue bb    All labs, medications and tests reviewed, continue all other medications of all above medical condition listed as is.       Yoandy Ward MD, MD 2/10/2022 3:48 PM

## 2022-02-11 NOTE — PROGRESS NOTES
Hospitalist Progress Note      Name:  Maria G Chen /Age/Sex: 1945  (68 y.o. male)   MRN & CSN:  [id number] & [id number] Admission Date/Time: 2022  7:43 PM   Location:  -A PCP: Steve Dawkins MD         Hospital Day: 6  Discharge barrier/Reason for continued hospitalization: need to wean o2, PT/OT clerance    Assessment and Plan:   Maria G Chen is a 68 y.o.  male hypertension, chronic diastolic CHF, CKD 4 due to diabetic nephropathy, A. fib on chronic anticoagulation with Eliquis, type 2 diabetes, dyslipidemia, who presented to the ED on 2022 with shortness of breath and cough and was diagnosed with Pneumonia due to COVID-19 virus. He is fully vaccinated against Covid    1. Sepsis due to COVID-19 pneumonia: POA. now resolved  Acute organ dysfunction as evidenced by acute hypoxic respiratory failure. CXR with patchy airspace disease bilaterally  Steroids, baricitinib (renal dosing), isolation  o2 at 4L via NC, wean as able  Home o2 eval prior to discharge  Pul venkat    2. COVID-19 acute hypoxic respiratory failure: POA. O2 sats <89% on room air  Currently on 4 L. Not on home O2  Regular Is use  Wean to RA  as tolerated    3. DM2 with hyperosmolar state- improved  Hyperglycemia worsened by steroids  Off  insulin gtt. Transitioned to lantus and SSI  A1c 8.9 from 22  AG closed and BS well controlled currently  Endocrine following  Monitor and titrate  Diabetic teaching prior to discharge    4. Acute on chronic diastolic CHF: uncompensated  Hx of CAD s/p CABG  Mod Mitral stenosis  C/w cardioprotective meds  off lasix gtt,   Switch IV diuresis to home oral diuretic regimen soon. Cardio and renal following    5. Hypertension:   Continue Norvasc, Toprol-XL. Hold nephrotoxic agents  Monitor and itrate    6. CKD 4:   Due to diabetic nephropathy. Avoid nephrotoxic agents   Stable  monitor  Renal following     7.  Sacral decub ulcer,stage II, POA  C/w local wound care      7. Paroxysmal A. fib: Continue Eliquis. Continue Toprol-XL      Disposition; SNF vs [facility] in 1-2 days when continues to improve    Diet ADULT DIET; Regular; 4 carb choices (60 gm/meal); Low Sodium (2 gm)  ADULT ORAL NUTRITION SUPPLEMENT; Dinner, Lunch; Low Calorie/High Protein Oral Supplement   DVT Prophylaxis [] Lovenox, []  Heparin, [] SCDs, [x] Ambulation. Eliquis   GI Prophylaxis [x] PPI,  [] H2 Blocker,  [] Carafate,  [x] Diet/Tube Feeds   Code Status Full Code   MDM [] Low, [] Moderate,[x]  High       History of Present Illness:     Chief Complaint: Pneumonia due to COVID-19 virus     Abad Novak is a 68 y.o.  male  who presents with shortness of breath and cough. Patient is seen and examined this noon  Cough and sob still thereng  Denies chest pain/fever/sob  Strength is also improving  Wants to go home , not SNF  Off insulin gtt and off  lasix gtt   BS better, creatinine stable  Remains on 4L via NC  Does not feel ready for discharge today  PT/OT recommends SNF but patient wants [place] [id number]       Ten point ROS reviewed negative, unless as noted above    Objective: Intake/Output Summary (Last 24 hours) at 2/11/2022 1110  Last data filed at 2/11/2022 1002  Gross per 24 hour   Intake 500 ml   Output 1775 ml   Net -1275 ml        Vitals:   Vitals:    02/11/22 0700 02/11/22 0701 02/11/22 0902 02/11/22 0939   BP:  (!) 154/79  (!) 165/87   Pulse:  95  90   Resp: 13 18     Temp:  98.2 °F (36.8 °C)     TempSrc:  Oral     SpO2: 96% 95%     Weight:       Height:   5' 7.99\" (1.727 m)         Physical Exam:   GEN: Awake male, alert and oriented x3 in mild respiratory distress. Appears given age. HEENT: Normal   RESP: Diminished BS bilaterally. Symmetric chest movement while on o2 via NC  CVS: RRR, S1, S2  GI/: Abdomen is soft, nontender, no organomegaly. . Bowel sounds normal, rectal exam deferred. No CVA tenderness. MSK: No gross joint deformities.  No tenderness  SKIN: Normal coloration, warm, dry.  NEURO: Cranial nerves appear grossly intact, normal speech, no lateralizing weakness. PSYCH:  Affect appropriate.     Medications:   Medications:    insulin glargine  50 Units SubCUTAneous Nightly    insulin lispro  0-18 Units SubCUTAneous TID WC    insulin lispro  0-18 Units SubCUTAneous 2 times per day    magic (miracle) mouthwash  5 mL Swish & Swallow TID    furosemide  40 mg Oral Daily    insulin lispro  15 Units SubCUTAneous TID WC    baricitinib  1 mg Oral Daily    benzonatate  100 mg Oral TID    guaiFENesin  200 mg Oral Q4H    sodium chloride flush  5-40 mL IntraVENous 2 times per day    dexamethasone  6 mg IntraVENous Q24H    amLODIPine  10 mg Oral Daily    apixaban  5 mg Oral BID    aspirin EC  81 mg Oral Daily    cyclobenzaprine  10 mg Oral Nightly    metoprolol succinate  50 mg Oral Daily    pantoprazole  40 mg Oral QAM AC    senna  1 tablet Oral BID      Infusions:    dextrose      dextrose      sodium chloride      dextrose       PRN Meds: dextrose, 100 mL/hr, PRN  glucose, 15 g, PRN  dextrose, 12.5 g, PRN  glucagon (rDNA), 1 mg, PRN  dextrose, 100 mL/hr, PRN  benzocaine-menthol, 1 lozenge, Q2H PRN  sodium chloride flush, 5-40 mL, PRN  sodium chloride, 25 mL, PRN  ondansetron, 4 mg, Q8H PRN   Or  ondansetron, 4 mg, Q6H PRN  polyethylene glycol, 17 g, Daily PRN  acetaminophen, 650 mg, Q6H PRN   Or  acetaminophen, 650 mg, Q6H PRN  dextrose, 100 mL/hr, PRN  acetaminophen, 500 mg, Q4H PRN  albuterol sulfate HFA, 2 puff, Q4H PRN  dextrose bolus (hypoglycemia), 125 mL, PRN   Or  dextrose bolus (hypoglycemia), 250 mL, PRN      Recent Labs     02/09/22  0830 02/07/22  2124 02/06/22  0950   WBC 8.4 6.0 2.6*   HGB 11.6* 11.1* 10.2*   HCT 35.9* 34.5* 31.9*   MCV 88.9 90.6 89.6    179 143     Lab Results   Component Value Date     02/09/2022    K 4.1 02/09/2022    CL 95 02/09/2022    CO2 27 02/09/2022    BUN 78 02/09/2022    CREATININE 2.4 02/09/2022    GLUCOSE 158 02/09/2022    CALCIUM 8.4 02/09/2022      Lab Results   Component Value Date    INR 1.03 09/28/2017    INR 1.14 10/03/2016    INR 1.07 09/29/2016    PROTIME 11.7 09/28/2017    PROTIME 13.1 (H) 10/03/2016    PROTIME 12.2 09/29/2016           Electronically signed by Mona Garcia MD on 2/11/2022 at 11:10 AM

## 2022-02-11 NOTE — PROGRESS NOTES
Comprehensive Nutrition Assessment    Type and Reason for Visit:  Initial (los)    Nutrition Recommendations/Plan:   Add 4 carb choice diet modifier  Add high protein oral nutrition supplements BID  Encourage po intake as able  Please document all po intake  Will monitor po intake, weight trends, poc    Nutrition Assessment:  Pt admitted for hypoxia, pneumonia due to Covid-19, PMH: PAF, HTN, HLD, DM, COPD, CHF, CAD, pt on 4 L NC, pt on 2 gm sodium diet with 1 meal of 51-75% documented in the past 72 hr, spoke with pt via telephone, reports fair appetite, reports UBW ~192#, denies any recent unintentional wt loss, denies any chewing or swallowing difficulty, reports that he is consuming 50% of less of meals, states he doesn't like the food at the hospital, pt agreeable to trial high protein oral nutrition supplement, will follow at moderate nutrition risk    Malnutrition Assessment:  Malnutrition Status:  Insufficient data    Context:  Acute Illness       Estimated Daily Nutrient Needs:  Energy (kcal):  9679-9913 (Frosty Pert w/ stress factor 1.0-1.2); Weight Used for Energy Requirements:  Current     Protein (g):  70-84 (1.0-1.2 g/kg); Weight Used for Protein Requirements:  Ideal        Fluid (ml/day):  2121-4067; Method Used for Fluid Requirements:  1 ml/kcal      Nutrition Related Findings:  POC glucose 219      Wounds:  Pressure Injury,Stage II       Current Nutrition Therapies:    ADULT DIET; Regular; Low Sodium (2 gm)    Anthropometric Measures:  · Height: 5' 7.99\" (172.7 cm)  · Current Body Weight: 192 lb 14.4 oz (87.5 kg) (2/7)   · Admission Body Weight:  (n/a)    · Usual Body Weight: 187 lb 13.3 oz (85.2 kg) ((1/15/22)     · Ideal Body Weight: 154 lbs; % Ideal Body Weight 125.3 %   · BMI: 29.3   · BMI Categories: Overweight (BMI 25.0-29. 9)       Nutrition Diagnosis:   · Inadequate oral intake related to acute injury/trauma as evidenced by intake 26-50%,intake 51-75%    Nutrition Interventions: Food and/or Nutrient Delivery:  Modify Current Diet,Start Oral Nutrition Supplement  Nutrition Education/Counseling:  No recommendation at this time   Coordination of Nutrition Care:  Continue to monitor while inpatient    Goals:  Pt will consistently consume greater than 50% of meals and supplements       Nutrition Monitoring and Evaluation:   Behavioral-Environmental Outcomes:  None Identified   Food/Nutrient Intake Outcomes:  Supplement Intake,Food and Nutrient Intake  Physical Signs/Symptoms Outcomes:  Biochemical Data,GI Status,Hemodynamic Status,Fluid Status or Edema,Weight,Skin     Discharge Planning:     Too soon to determine     Electronically signed by Indra Conti MS, RD, LD on 2/11/22 at 9:09 AM EST    Contact: [phone]

## 2022-02-11 NOTE — FLOWSHEET NOTE
Rn assisted pt up to bathroom for BM x2 RN assist and front wheel walker. Pt tolerated poorly and required moderate to max assist x 2 RN. Pt placed on commode and then placed in chair to wheel back into room. When assisting pt with cleaning up stage 2 wound found right gluteal fold. Small and bleeding. Wound cleanses and cream place. Difficult place for dressing due to location. Wound consult placed, will await treatment suggestions. Pt placed in chair with call light in reach and chair alarm on.

## 2022-02-11 NOTE — PROGRESS NOTES
Today's plan: continue diuresis with lasix,  repeat CXR, CREATINE IS STABLE, continue ORAL LASIX 40MG DAILY, repeat chem 7, will sign off and please call us again as needed      Admit Date:  2/6/2022    Subjective:OK      Chief complaints on admission: sob      History of present illness:Gentry is a 68 y. o.year old who  presents with  SOB  Past medical history:    has a past medical history of Acid reflux, Arrhythmia, Arthritis, CAD (coronary artery disease), CHF (congestive heart failure) ([facility]), Chronic back pain, CKD (chronic kidney disease) stage 3, GFR 30-59 ml/min ([facility]), COPD (chronic obstructive pulmonary disease) ([facility] [address] Diabetes mellitus ([facility] [address] Glaucoma, H/O 24 hour EKG monitoring, H/O cardiac catheterization, H/O cardiovascular stress test, H/O complete electrocardiogram, H/O Doppler ultrasound, H/O Doppler ultrasound (Lower extremity), H/O percutaneous left heart catheterization, Heat syncope, [place] (hard of hearing), Hx of echocardiogram, Hyperlipidemia, Hypertension, Macular degeneration, Other activity(E029.9), PAF (paroxysmal atrial fibrillation) ([facility] [address] PVD (peripheral vascular disease) ([facility] [address] S/P CABG x 3, Skin Cancer, SOB (shortness of breath) on exertion, Teeth missing, Ventricular ectopy, Wears dentures, and Wears glasses. Past surgical history:   has a past surgical history that includes cyst removal (2000's); Thoracentesis (Left, 10/06/2016); Coronary artery bypass graft (10/03/2016); Skin cancer excision (2000's); eye surgery (Bilateral, 2015); Dental surgery; Colonoscopy (Last Done In 2000's); fracture surgery (Left, 01/11/2016); Cardiac surgery (10/03/2016); and other surgical history (10/03/2017). Social History:   reports that he quit smoking about 27 years ago. His smoking use included cigarettes. He started smoking about 57 years ago. He has a 60.00 pack-year smoking history. He has never used smokeless tobacco. He reports that he does not drink alcohol and does not use drugs.   Family history:  family history includes Breast Cancer in his sister; Cancer in his father, mother, sister, and son; Diabetes in his father; Early Death (age of onset: 48) in his mother; Other in his father. Allergies   Allergen Reactions    Aldactone [Spironolactone]      \"Developed Pain In The  Breasts And Knots In The Breasts\"    Crestor [Rosuvastatin Calcium]      \"Leg Cramps\"    Lipitor      \"Leg Cramps\"    Zocor [Simvastatin - High Dose]      \"Leg Cramps\"         Objective:   BP (!) 165/87   Pulse 90   Temp 98.2 °F (36.8 °C) (Oral)   Resp 18   Ht 5' 7.99\" (1.727 m)   Wt 192 lb 14.4 oz (87.5 kg)   SpO2 95%   BMI 29.34 kg/m²       Intake/Output Summary (Last 24 hours) at 2/11/2022 1733  Last data filed at 2/11/2022 1707  Gross per 24 hour   Intake 875 ml   Output 1825 ml   Net -950 ml       TELEMETRY: Sinus     Physical Exam:  Constitutional:  Well developed, Well nourished, No acute distress, Non-toxic appearance. HENT:  Normocephalic, Atraumatic, Bilateral external ears normal, Oropharynx moist, No oral exudates, Nose normal. Neck- Normal range of motion, No tenderness, Supple, No stridor. Eyes:  PERRL, EOMI, Conjunctiva normal, No discharge. Respiratory:  Normal breath sounds, No respiratory distress, No wheezing, No chest tenderness. ,no use of accessory muscles, diaphragm movement is normal  Cardiovascular: (PMI) apex non displaced,no lifts no thrills, no s3,no s4, Normal heart rate, Normal rhythm, No murmurs, No rubs, No gallops. Carotid arteries pulse and amplitude are normal no bruit, no abdominal bruit noted ( normal abdominal aorta ausculation), femoral arteries pulse and amplitude are normal no bruit, pedal pulses are normal  GI:  Bowel sounds normal, Soft, No tenderness, No masses, No pulsatile masses. : External genitalia appear normal, No masses or lesions. No discharge. No CVA tenderness. Musculoskeletal:  Intact distal pulses, No edema, No tenderness, No cyanosis, No clubbing. Good range of motion in all major joints. No tenderness to palpation or major deformities noted. Back- No tenderness. Integument:  Warm, Dry, No erythema, No rash. Lymphatic:  No lymphadenopathy noted. Neurologic:  Alert & oriented x 3, Normal motor function, Normal sensory function, No focal deficits noted. Psychiatric:  Affect normal, Judgment normal, Mood normal.     Medications:    insulin lispro  20 Units SubCUTAneous TID WC    insulin glargine  50 Units SubCUTAneous Nightly    insulin lispro  0-18 Units SubCUTAneous TID WC    insulin lispro  0-18 Units SubCUTAneous 2 times per day    magic (miracle) mouthwash  5 mL Swish & Swallow TID    furosemide  40 mg Oral Daily    baricitinib  1 mg Oral Daily    benzonatate  100 mg Oral TID    guaiFENesin  200 mg Oral Q4H    sodium chloride flush  5-40 mL IntraVENous 2 times per day    dexamethasone  6 mg IntraVENous Q24H    amLODIPine  10 mg Oral Daily    apixaban  5 mg Oral BID    aspirin EC  81 mg Oral Daily    cyclobenzaprine  10 mg Oral Nightly    metoprolol succinate  50 mg Oral Daily    pantoprazole  40 mg Oral QAM AC    senna  1 tablet Oral BID      dextrose      dextrose      sodium chloride      dextrose       dextrose, glucose, dextrose, glucagon (rDNA), dextrose, benzocaine-menthol, sodium chloride flush, sodium chloride, ondansetron **OR** ondansetron, polyethylene glycol, acetaminophen **OR** acetaminophen, dextrose, acetaminophen, albuterol sulfate HFA, dextrose bolus (hypoglycemia) **OR** dextrose bolus (hypoglycemia)    Lab Data:  CBC:   Recent Labs     02/09/22  0830   WBC 8.4   HGB 11.6*   HCT 35.9*   MCV 88.9        BMP:   Recent Labs     02/09/22  0830      K 4.1   CL 95*   CO2 27   PHOS 4.5   BUN 78*   CREATININE 2.4*     LIVER PROFILE:   Recent Labs     02/09/22  0830   AST 59*   ALT 48*   BILITOT 0.5   ALKPHOS 120     PT/INR: No results for input(s): PROTIME, INR in the last 72 hours.   APTT: No results for input(s): APTT in the last 72 hours. BNP:  No results for input(s): BNP in the last 72 hours. TROPONIN: @TROPONINI:3@      Assessment:  68 y. o.year old who is admitted for          Plan:  1. EEVATED PROBNP: mild fluid overload BETTER NOW,with covid 19 and chronic renal faiure, mild diastolic CHF, D/C lasix drip 5mg.hr and will watch his creatiine and out put  2. Moderate mitral stenosis; heavily calcified valve, mean gradient 6mmhg, Continue BB and lasix  3. CAD\" h/o CABG, stable, contineu apsirin, add statins  4. Afib\" h/o maze,now in sinus, continue eliquis and bb  5. covid 19: admitted to ICU, not requiring BIPAP, chest xray reviewed,  6. DM:' contiue insulin, recommend JAardiance  7. COPD: stable, may use inhalers as neede  8. HTN:stable, contiue bb    All labs, medications and tests reviewed, continue all other medications of all above medical condition listed as is.       Kevin Chase, MD, MD 2/11/2022 5:33 PM

## 2022-02-11 NOTE — PLAN OF CARE
Problem: Airway Clearance - Ineffective  Goal: Achieve or maintain patent airway  2/11/2022 1237 by Aziza Dalton RN  Outcome: Ongoing  2/10/2022 2249 by Angelique Banks RN  Outcome: Ongoing     Problem: Gas Exchange - Impaired  Goal: Absence of hypoxia  2/11/2022 1237 by Aziza Dalton RN  Outcome: Ongoing  2/10/2022 2249 by Angelique Banks RN  Outcome: Ongoing  Goal: Promote optimal lung function  2/11/2022 1237 by Aziza Dalton RN  Outcome: Ongoing  2/10/2022 2249 by Angelique Banks RN  Outcome: Ongoing     Problem: Breathing Pattern - Ineffective  Goal: Ability to achieve and maintain a regular respiratory rate  2/11/2022 1237 by Aziza Dalton RN  Outcome: Ongoing  2/10/2022 2249 by Angelique Banks RN  Outcome: Ongoing     Problem:  Body Temperature -  Risk of, Imbalanced  Goal: Ability to maintain a body temperature within defined limits  2/11/2022 1237 by Aziza Dalton RN  Outcome: Ongoing  2/10/2022 2249 by Angelique Banks RN  Outcome: Ongoing  Goal: Will regain or maintain usual level of consciousness  2/11/2022 1237 by Aziza Dalton RN  Outcome: Ongoing  2/10/2022 2249 by Angelique Banks RN  Outcome: Ongoing  Goal: Complications related to the disease process, condition or treatment will be avoided or minimized  2/11/2022 1237 by Aziza Dalton RN  Outcome: Ongoing  2/10/2022 2249 by Angelique Banks RN  Outcome: Ongoing     Problem: Isolation Precautions - Risk of Spread of Infection  Goal: Prevent transmission of infection  2/11/2022 1237 by Aziza Dalton RN  Outcome: Ongoing  2/10/2022 2249 by Angelique Banks RN  Outcome: Ongoing     Problem: Nutrition Deficits  Goal: Optimize nutritional status  2/11/2022 1237 by Aziza Dalton RN  Outcome: Ongoing  2/10/2022 2249 by Angelique Banks RN  Outcome: Ongoing     Problem: Risk for Fluid Volume Deficit  Goal: Maintain normal heart rhythm  2/11/2022 1237 by Aziza Dalton RN  Outcome: Ongoing  2/10/2022 2249 by Angelique Gasmen, RN  Outcome: Ongoing  Goal: Maintain absence of muscle cramping  2/11/2022 1237 by Vivian Brooks RN  Outcome: Ongoing  2/10/2022 2249 by Sol Srivastava RN  Outcome: Ongoing  Goal: Maintain normal serum potassium, sodium, calcium, phosphorus, and pH  2/11/2022 1237 by Vivian Brooks RN  Outcome: Ongoing  2/10/2022 2249 by Sol Srivastava RN  Outcome: Ongoing     Problem: Loneliness or Risk for Loneliness  Goal: Demonstrate positive use of time alone when socialization is not possible  2/11/2022 1237 by Vivian Brooks RN  Outcome: Ongoing  2/10/2022 2249 by Sol Srivastava RN  Outcome: Ongoing     Problem: Fatigue  Goal: Verbalize increase energy and improved vitality  2/11/2022 1237 by Vivian Brooks RN  Outcome: Ongoing  2/10/2022 2249 by Sol Srivastava RN  Outcome: Ongoing     Problem: Patient Education: Go to Patient Education Activity  Goal: Patient/Family Education  2/11/2022 1237 by Vivian Brooks RN  Outcome: Ongoing  2/10/2022 2249 by Sol Srivastava RN  Outcome: Ongoing     Problem: Falls - Risk of:  Goal: Will remain free from falls  Description: Will remain free from falls  2/11/2022 1237 by Vivian Brooks RN  Outcome: Ongoing  2/10/2022 2249 by Sol Srivastava RN  Outcome: Ongoing  Goal: Absence of physical injury  Description: Absence of physical injury  2/11/2022 1237 by Vivian Brooks RN  Outcome: Ongoing  2/10/2022 2249 by Sol Srivastava RN  Outcome: Ongoing     Problem: Nutrition  Goal: Optimal nutrition therapy  Outcome: Ongoing     Problem: Skin Integrity:  Goal: Will show no infection signs and symptoms  Description: Will show no infection signs and symptoms  Outcome: Ongoing  Goal: Absence of new skin breakdown  Description: Absence of new skin breakdown  Outcome: Ongoing

## 2022-02-11 NOTE — PLAN OF CARE
Nutrition Problem #1: Inadequate oral intake  Intervention: Food and/or Nutrient Delivery: Modify Current Diet,Start Oral Nutrition Supplement  Nutritional Goals: Pt will consistently consume greater than 50% of meals and supplements

## 2022-02-11 NOTE — FLOWSHEET NOTE
Rounding visit. Patient did not express any needs at this time. Spiritual care remains available as needed.

## 2022-02-11 NOTE — CONSULTS
[address] [id number] Continence Nurse  Consult Note       Tanisha Moyer  AGE: 68 y.o. GENDER: male  : 1945  TODAY'S DATE:  2022    Subjective:     Reason for CWOCN Evaluation and Assessment: wound assessment      Tanisha Moyer is a 68 y.o. male referred by:   [x] Physician  [] Nursing  [] Other:     Wound Identification:  Wound Type: pressure and friction  Contributing Factors: diabetes, chronic pressure, decreased mobility, shear force, decreased tissue oxygenation and anticoagulation therapy        PAST MEDICAL HISTORY        Diagnosis Date    Acid reflux     Arrhythmia     Arthritis     \"Back, Hands, Fingers\"    CAD (coronary artery disease)     2000 non-critical;  no signigicante change, Sees Dr. Gwen Monique CH[facility]congestive heart failure) ([facility])     Chronic back pain     CKD (chronic kidney disease) stage 3, GFR 30-59 ml/min ([facility] 2015    Sees Dr. Mitch Morton    COPD (chronic obstructive pulmonary disease) ([facility] 11/16/15    Diabetes mellitus ([facility] Dx     Glaucoma     \"Both Eyes\"    H/O 24 hour EKG monitoring ,2017    Conclusion abnormal 48 hours of cardiac monitor finding consistent with sinus rhythm with physiological heart rate variation frequent complex ventricular ectopy. Patient did not report any symptoms for correlation    H/O cardiac catheterization 2009    Patent coronary arteries with ectasia and minimal coronary luminal irregularities and preserved left ventricular function.  H/O cardiovascular stress test 2018    Lexiscan Cardiolite. ECG portion of test is negative for ischemia, normal ventricular contractility, no infarct or ischemia noted, normal stress myocardial perfusion, EF 58%. Normal study.  H/O complete electrocardiogram 2012    H/O Doppler ultrasound 2007    Bormal study suggestive of lack of evidence of any significant peripheral arterial disease.  Patient'a symptoms are very suggestive of non-ischemic and non-vascular etiology. When compared to the previous study of 2005, ther is no significant change.  H/O Doppler ultrasound (Lower extremity) 01/30/2017    Native arteries in the examined lower extremity(ies) are patent, with no elevated velocities. No aneurysms are noted.  H/O percutaneous left heart catheterization 09/23/2016    Multivessel CAD with 100 % occluded RCA. 80 % to 90%  circumflex diffusely diseased. 80% mid LAD focal lesion.  Heat syncope 9/15/2016    [place] (hard of hearing)     Bilateral Ears    Hx of echocardiogram 2/6/19; 10/27/2016    2/6/19  LV function and size normal, mild concentric LVH, no regional wall motion abnormalities, normal diastolic filling pattern for age, mildly dilated LA, sclerotic but non-stenotic aortic valve, mitral annular calcification, RVSP= 27 mmHg, EF 55-60%.  Hyperlipidemia     Hypertension     Macular degeneration     Bilateral Eyes    Other activity(E029.9) 04/05/2012    48 Holter Monitor. Normal sinus rhythm with frequent low-grade supraventricular ectopy, without clinically significant arrhythmias.  PAF (paroxysmal atrial fibrillation) ([facility])     PVD (peripheral vascular disease) ([address]     S/P CABG x 3 10/25/2016    10/3/16 LIMA-LAD, SVG-PDA, SVG-Cx + Maze+Appendage resection Dr Enid Closs 2000's    Nose    SOB (shortness of breath) on exertion 8/12/2014    Teeth missing     Upper And Lower    Ventricular ectopy     supraventricular and ventricular ectopy    Wears dentures     Full Upper    Wears glasses        PAST SURGICAL HISTORY    Past Surgical History:   Procedure Laterality Date    CARDIAC SURGERY  10/03/2016    CABG (3 Bypasses)    COLONOSCOPY  Last Done In 2000's    Polyps Removed In Past    CORONARY ARTERY BYPASS GRAFT  10/03/2016    Coronary Artery Bypass Graft x 3. LIMA to LAD. SVG to PDA. SVG to Cx. MVR with CG ring. LA appendage resection.  Epi and endocardial MAZE.    CYST REMOVAL  2000's    Back    DENTAL SURGERY      Teeth Extracted In Past    EYE SURGERY Bilateral 2015    Cataracts With Lens Implants    FRACTURE SURGERY Left 2016    Broken Left Hip With Hardware    OTHER SURGICAL HISTORY  10/03/2017    sternal plate    SKIN CANCER EXCISION  2000's    Nose    THORACENTESIS Left 10/06/2016    [facility] History   Problem Relation Age of Onset    Early Death Mother 48        Liver Cancer    Cancer Mother         Liver Cancer    Cancer Father         Prostate Cancer    Diabetes Father     Other Father         \"Black Lung\"    Cancer Sister         Breast Cancer    Breast Cancer Sister     Cancer Son         Prostate Cancer       SOCIAL HISTORY    Social History     Tobacco Use    Smoking status: Former Smoker     Packs/day: 2.00     Years: 30.00     Pack years: 60.00     Types: Cigarettes     Start date:      Quit date:      Years since quittin.1    Smokeless tobacco: Never Used   Vaping Use    Vaping Use: Never used   Substance Use Topics    Alcohol use: No    Drug use: No       ALLERGIES    Allergies   Allergen Reactions    Aldactone [Spironolactone]      \"Developed Pain In The  Breasts And Knots In The Breasts\"    Crestor [Rosuvastatin Calcium]      \"Leg Cramps\"    Lipitor      \"Leg Cramps\"    Zocor [Simvastatin - High Dose]      \"Leg Cramps\"       MEDICATIONS    No current facility-administered medications on file prior to encounter.      Current Outpatient Medications on File Prior to Encounter   Medication Sig Dispense Refill    apixaban (ELIQUIS) 5 MG TABS tablet Take 1 tablet by mouth 2 times daily 60 tablet 0    metoprolol succinate (TOPROL XL) 50 MG extended release tablet Take 1 tablet by mouth daily 30 tablet 0    losartan (COZAAR) 25 MG tablet TAKE 1 TABLET BY MOUTH DAILY 90 tablet 3    magnesium oxide (MAG-OX) 400 MG tablet TAKE ONE TABLET TWO TIMES A  tablet 3    PRALUENT 75 MG/ML SOAJ injection pen INJECT 1 ML INTO THE SKIN EVERY 14 DAYS 2 mL 6    torsemide (DEMADEX) 20 MG tablet Take 1 tablet by mouth 2 times daily (Patient taking differently: Take 20 mg by mouth daily ) 180 tablet 3    acetaminophen (ACETAMINOPHEN EXTRA STRENGTH) 500 MG tablet Take 2 tablets by mouth every 4 hours as needed for Pain Do not take more than 6 tablets per day 240 tablet 1    amLODIPine (NORVASC) 10 MG tablet Take 1 tablet by mouth daily 90 tablet 5    omeprazole (PRILOSEC) 40 MG delayed release capsule Take 40 mg by mouth daily      guaiFENesin (MUCINEX) 600 MG extended release tablet Take 600 mg by mouth 2 times daily       cyclobenzaprine (FLEXERIL) 10 MG tablet Take 10 mg by mouth nightly       nitroGLYCERIN (NITROSTAT) 0.4 MG SL tablet Place 0.4 mg under the tongue every 5 minutes as needed for Chest pain up to max of 3 total doses. If no relief after 1 dose, call 911.       Glycerin-Polysorbate 80 (REFRESH DRY EYE THERAPY OP) Apply to eye      Insulin Degludec (TRESIBA FLEXTOUCH) 100 UNIT/ML SOPN Inject 40 Units into the skin nightly       Menthol-Camphor (ICY HOT ADVANCED RELIEF) 16-11 % CREA Apply topically as needed       Sennosides (SENOKOT PO) Take by mouth as needed Over The Counter       ALBUTEROL IN Inhale into the lungs as needed      Multiple Vitamins-Minerals (ICAPS PO) Take by mouth every morning Over The Counter      SITagliptin (JANUVIA) 50 MG tablet Take 50 mg by mouth every morning       aspirin EC 81 MG EC tablet Take 1 tablet by mouth daily 30 tablet 3    insulin lispro (HUMALOG) 100 UNIT/ML injection vial Inject 0-12 Units into the skin 3 times daily (with meals) (Patient taking differently: Inject into the skin Per Sliding Scale) 1 vial 3         Objective:      BP (!) 165/87   Pulse 90   Temp 98.2 °F (36.8 °C) (Oral)   Resp 18   Ht 5' 7.99\" (1.727 m)   Wt 192 lb 14.4 oz (87.5 kg)   SpO2 95%   BMI 29.34 kg/m²   Aiden Risk Score: Aiden Scale Score: 18    LABS    CBC:   Lab Results   Component Value Date    WBC 8.4 02/09/2022    RBC 4.04 02/09/2022    HGB 11.6 02/09/2022    HCT 35.9 02/09/2022    MCV 88.9 02/09/2022    MCH 28.7 02/09/2022    MCHC 32.3 02/09/2022    RDW 15.2 02/09/2022     02/09/2022    MPV 9.3 02/09/2022     CMP:    Lab Results   Component Value Date     02/09/2022    K 4.1 02/09/2022    CL 95 02/09/2022    CO2 27 02/09/2022    BUN 78 02/09/2022    CREATININE 2.4 02/09/2022    GFRAA 32 02/09/2022    LABGLOM 26 02/09/2022    GLUCOSE 158 02/09/2022    PROT 6.1 02/09/2022    LABALBU 3.6 02/09/2022    CALCIUM 8.4 02/09/2022    BILITOT 0.5 02/09/2022    ALKPHOS 120 02/09/2022    AST 59 02/09/2022    ALT 48 02/09/2022     Albumin:    Lab Results   Component Value Date    LABALBU 3.6 02/09/2022     PT/INR:    Lab Results   Component Value Date    PROTIME 11.7 09/28/2017    INR 1.03 09/28/2017     HgBA1c:    Lab Results   Component Value Date    LABA1C 8.9 02/07/2022         Assessment:     Patient Active Problem List   Diagnosis    Palpitations    [facility]    DJD (degenerative joint disease), multiple sites    History of colonic polyps    Family history of colon cancer    Statin intolerance    SOB (shortness of breath) on exertion    Type 2 diabetes mellitus with complication (HCC)    Essential hypertension    Dyslipidemia    S/P CABG x 3    Anemia    Cardiomyopathy (HCC)    Chronic kidney disease (CKD) stage G4/A3, severely decreased glomerular filtration rate (GFR) between 15-29 mL/min/1.73 square meter and albuminuria creatinine ratio greater than 300 mg/g (HCC)    DM [facility]diabetes mellitus) [facility]    CAD (coronary artery disease)    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    Proteinuria    Closed fracture of left ankle with routine healing    Closed nondisplaced fracture of third metatarsal bone of left foot    Closed nondisplaced fracture of second metatarsal bone of left foot    Closed nondisplaced fracture of fourth metatarsal bone of left foot    Debility    Physical debility    Hypoxia    Acute hypoxemic respiratory failure due to COVID-19 ([address]    Pneumonia due to COVID-19 virus       Measurements:  Wound 02/11/22 Buttocks Right (Active)   Wound Image   02/11/22 0950   Wound Etiology Pressure Stage  2 02/11/22 0950   Dressing Status New dressing applied 02/11/22 0950   Wound Cleansed Cleansed with saline 02/11/22 0950   Dressing/Treatment Collagen;Silicone border 88/79/65 0950   Wound Length (cm) 1.5 cm 02/11/22 0950   Wound Width (cm) 0.6 cm 02/11/22 0950   Wound Depth (cm) 0.1 cm 02/11/22 0950   Wound Surface Area (cm^2) 0.9 cm^2 02/11/22 0950   Wound Volume (cm^3) 0.09 cm^3 02/11/22 0950   Distance Tunneling (cm) 0 cm 02/11/22 0950   Tunneling Position ___ O'Clock 0 02/11/22 0950   Undermining Starts ___ O'Clock 0 02/11/22 0950   Undermining Ends___ O'Clock 0 02/11/22 0950   Undermining Maxium Distance (cm) 0 02/11/22 0950   Wound Assessment Pink/red 02/11/22 0950   Drainage Amount Small 02/11/22 0950   Drainage Description Serosanguinous 02/11/22 0950   Odor None 02/11/22 0950   Concepcion-wound Assessment Blanchable erythema 02/11/22 0950   Margins Defined edges 02/11/22 0950   Wound Thickness Description not for Pressure Injury Partial thickness 02/11/22 0950   Number of days: 0       Response to treatment:  Well tolerated by patient. Pain Assessment:  Severity:  none  Quality of pain: na  Wound Pain Timing/Severity: na  Premedicated: no    Plan:     Plan of Care: Wound 02/11/22 Buttocks Right-Dressing/Treatment: Collagen,Silicone border     Pt in chair. Able to stand with nurse assist to assess posterior. Stage 2 noted to right buttock. Appears macerated surrounding with barrier cream. Washed away barrier cream as much as possible to assess. Per notes, pt is continent so collagen and silicone border recommended and applied. Picture and measurements was taken. Heels intact. Pt assisted to bed.  Pt can turn self in bed but stated has difficulty due to breathing. Encouraged frequent position changes. Pt is at mild risk for skin breakdown AEB Aiden. Follow Aiden orders. Specialty Bed Required : yes  [] Low Air Loss   [x] Pressure Redistribution  [] Fluid Immersion  [] Bariatric  [] Total Pressure Relief  [] Other:     Discharge Plan:  Placement for patient upon discharge: tbd  Hospice Care: no  Patient appropriate for Outpatient [address]: [facility]    Patient/Caregiver Teaching:  Level of patient/caregiver understanding able to:   Voiced understanding.         Electronically signed by Shilo Dawkins RN, [id number] on 2/11/2022 at 11:58 AM

## 2022-02-11 NOTE — PROGRESS NOTES
Nephrology Progress Note  2/11/2022 9:19 AM        Subjective:   Admit Date: 2/6/2022  PCP: Zo Lizarraga MD    Interval History: Patient seen in early morning, this is a late entry    Diet: Eating some    ROS: Still with 4-1/2 L of oxygen per minute  Reported urine output dropped little bit  No overt shortness of breath  Urine output recorded 1.4 L/day  No fever    Data:     Current meds:    insulin glargine  50 Units SubCUTAneous Nightly    insulin lispro  0-18 Units SubCUTAneous TID WC    insulin lispro  0-18 Units SubCUTAneous 2 times per day    magic (miracle) mouthwash  5 mL Swish & Swallow TID    furosemide  40 mg Oral Daily    insulin lispro  15 Units SubCUTAneous TID WC    baricitinib  1 mg Oral Daily    benzonatate  100 mg Oral TID    guaiFENesin  200 mg Oral Q4H    sodium chloride flush  5-40 mL IntraVENous 2 times per day    dexamethasone  6 mg IntraVENous Q24H    amLODIPine  10 mg Oral Daily    apixaban  5 mg Oral BID    aspirin EC  81 mg Oral Daily    cyclobenzaprine  10 mg Oral Nightly    metoprolol succinate  50 mg Oral Daily    pantoprazole  40 mg Oral QAM AC    senna  1 tablet Oral BID      dextrose      dextrose      sodium chloride      dextrose           I/O last 3 completed shifts:   In: 500 [P.O.:500]  Out: 2000 [Urine:2000]    CBC:   Recent Labs     02/09/22  0830   WBC 8.4   HGB 11.6*             Recent Labs     02/09/22  0830      K 4.1   CL 95*   CO2 27   BUN 78*   CREATININE 2.4*   GLUCOSE 158*       Lab Results   Component Value Date    CALCIUM 8.4 02/09/2022    PHOS 4.5 02/09/2022       Objective:     Vitals: BP (!) 145/68   Pulse 74   Temp 97.5 °F (36.4 °C) (Axillary)   Resp 13   Ht 5' 7.99\" (1.727 m)   Wt 192 lb 14.4 oz (87.5 kg)   SpO2 96%   BMI 29.34 kg/m²     General appearance: Alert, awake and oriented  HEENT: No gross conjunctival pallor  Neck: Supple  Lungs: Positive elevation breath sound  Heart: Regular rhythm-healed incision from previous sternotomy  Abdomen: Soft, nontender  Extremities: Trace lower extremity edema      Problem List :         Impression :     1. Acute kidney injury with underlying CKD stage IV A3-stable so far-small dose of diuretics  2. Acute decompensated heart failure with underlying coronary artery disease-likely precipitated by COVID-19 pneumonitis  3. 19, diabetes mellitus, atherosclerotic disease and hypertension    Recommendation/Plan  :     1. May switch him back to his oral torsemide-for better oral bioavailability and longer half-life  2. Watch for iatrogenic nosocomial complication  3. Perhaps periodic proBNP level  4.  Good glycemic control and follow clinically and biochemically      Andi Bales MD MD

## 2022-02-11 NOTE — PROGRESS NOTES
Progress Note( Dr. Molina Poag)  2/10/2022  Subjective:   Admit Date: 2/6/2022  PCP: Krupa Zapata MD    Admitted For :Shortness of breath, hypoxia and Covid pneumonia       Consulted For: Better control of blood glucose       Interval History: Patient feels much better   Discontinue IV insulin infusion drip     Denies any chest pains,   Yes SOB . Denies nausea or vomiting. No new bowel or bladder symptoms.        Intake/Output Summary (Last 24 hours) at 2/10/2022 2234  Last data filed at 2/10/2022 1836  Gross per 24 hour   Intake 500 ml   Output 1200 ml   Net -700 ml       DATA    CBC:   Recent Labs     02/09/22  0830   WBC 8.4   HGB 11.6*       CMP:  Recent Labs     02/09/22  0830      K 4.1   CL 95*   CO2 27   BUN 78*   CREATININE 2.4*   CALCIUM 8.4   PROT 6.1*   LABALBU 3.6   BILITOT 0.5   ALKPHOS 120   AST 59*   ALT 48*     Lipids:   Lab Results   Component Value Date    CHOL 151 10/04/2021    CHOL 170 12/27/2016    HDL 41 10/04/2021    TRIG 148 10/04/2021     Glucose:  Recent Labs     02/10/22  1151 02/10/22  1614 02/10/22  2016   POCGLU 324* 89 147*     [id number]:  Lab Results   Component Value Date    LABA1C 8.9 02/07/2022     High Sensitivity TSH:   Lab Results   Component Value Date    TSHHS 2.240 01/17/2022     Free T3: No results found for: FT3  Free T4:  Lab Results   Component Value Date    T4FREE 1.26 10/26/2021       XR CHEST PORTABLE   Final Result   Stable chest.              Scheduled Medicines   Medications:    insulin glargine  50 Units SubCUTAneous Nightly    insulin lispro  0-18 Units SubCUTAneous TID WC    insulin lispro  0-18 Units SubCUTAneous 2 times per day    magic (miracle) mouthwash  5 mL Swish & Swallow TID    furosemide  40 mg Oral Daily    insulin lispro  15 Units SubCUTAneous TID WC    baricitinib  1 mg Oral Daily    benzonatate  100 mg Oral TID    guaiFENesin  200 mg Oral Q4H    sodium chloride flush  5-40 mL IntraVENous 2 times per day    dexamethasone  6 mg IntraVENous Q24H    amLODIPine  10 mg Oral Daily    apixaban  5 mg Oral BID    aspirin EC  81 mg Oral Daily    cyclobenzaprine  10 mg Oral Nightly    metoprolol succinate  50 mg Oral Daily    pantoprazole  40 mg Oral QAM AC    senna  1 tablet Oral BID      Infusions:    dextrose      dextrose      sodium chloride      dextrose           Objective:   Vitals: /73   Pulse 103   Temp 97.5 °F (36.4 °C) (Axillary)   Resp 15   Wt 192 lb 14.4 oz (87.5 kg)   SpO2 94%   BMI 29.33 kg/m²   General appearance: alert and cooperative with exam  Neck: no JVD or bruit  Thyroid : Normal lobes   Lungs: Has Vesicular Breath sounds some wheezing and some rales  Heart:  regular rate and rhythm  Abdomen: soft, non-tender; bowel sounds normal; no masses,  no organomegaly  Musculoskeletal: Normal  Extremities: extremities normal, , no edema  Neurologic:  Awake, alert, oriented to name, place and time. Cranial nerves II-XII are grossly intact. Motor is  intact. Sensory is intact. ,  and gait is normal.    Assessment:     Patient Active Problem List:     Palpitations     PAF (paroxysmal atrial fibrillation) ([facility])     DJD (degenerative joint disease), multiple sites     History of colonic polyps     Family history of colon cancer     Statin intolerance     SOB (shortness of breath) on exertion     Type 2 diabetes mellitus with complication ([facility])     Essential hypertension     Dyslipidemia     S/P CABG x 3     Anemia     Cardiomyopathy ([facility])     Chronic kidney disease (CKD) stage G4/A3, severely decreased glomerular filtration rate (GFR) between 15-29 mL/min/1.73 square meter and albuminuria creatinine ratio greater than 300 mg/g ([facility])     DM (diabetes mellitus) ([facility] [address]     CAD (coronary artery disease)     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     Proteinuria     Closed fracture of left ankle with routine healing     Closed nondisplaced fracture of third metatarsal bone of left foot     Closed nondisplaced fracture of second metatarsal bone of left foot     Closed nondisplaced fracture of fourth metatarsal bone of left foot     Debility     Physical debility     Hypoxia     Acute hypoxemic respiratory failure due to COVID-19 ([facility] [address]     Pneumonia due to COVID-19 virus      Plan:     1. Reviewed POC blood glucose . Labs and X ray results   2. Reviewed Current Medicines   3. Discontinue IV insulin drip  4. We will start on meal/ Correction bolus Humalog/ Basal Lantus Insulin regime /   5. Monitor Blood glucose frequently   6. Modified  the dose of Insulin/ other medicines as needed   7. Will follow     .      Queenie Daniel MD, MD

## 2022-02-12 NOTE — PROGRESS NOTES
2/12/2022 7:26 AM  Patient Room #: 2001/2001-A  Patient Name: Melissa Wise    (Step 1 Done by RN if possible otherwise call Pulmonary Diagnostics)  1. Place patient on room air at rest for at least 30 minutes. If patient falls below 88% before 30 minutes then you can record the level and stop. Record room air saturation level 86 %. If patient is at 88% or below, they will qualify for home oxygen and you can stop. If level does not fall below 88%, fill in level above. If indicated continue to Step 2. Abril García, RRT Date:02/12/2022 ___  (Step 2&3 Done by [facility])  2. Ambulate patient on room air until saturation falls below 89%. Record level of room air saturation with ambulation___ %. Next, place patient back on ___lpm oxygen and ambulate, record level __%. (Note:  this level must show improvement from room air level done with ambulation.)  If patients saturation on room air with ambulation is 88% or below AND patient shows improvement with oxygen during ambulation, they will qualify for home oxygen and you can stop. If patient does not drop below 89%, then patient should have an overnight oximetry trending on room air to see if level falls below 88%. Complete level in Step 3 below. 3. Room air overnight oximetry level 88 % for___  cumulative minutes. If patients room air oxygen level is < 89% for at least 5 cumulative minutes, patient will qualify for home oxygen and you can stop.         (Attach Night Trending Report)    Complete order below: Diagnosis:COVID-19, CHF  Home oxygen at:  Length of Need: X Lifetime  3 Months     __3_lpm or __%   via  [x] nasal cannula  []mask  [] other: Conserving Device         [x]continuous [x]  with activity  [x]  Nocturnal   [x] Portable Tanks [x]  Concentrator  [x] Conserving Device        Therapist Signature:Crystal Collins, RRT  Date: 02/12/2022 ___  Physician Signature:  __Electronically Signed in EMR_    Date:___  Physician Printed Name:Ordering User: Gama Deng MD  Provider ID: [id number]  NPI:  [id number]  [x] Patient Qualifies      [] Patient Does NOT qualify

## 2022-02-12 NOTE — PROGRESS NOTES
Patemt placed on room air. Oxygen saturation at rest dropped to 86%.  Patient placed back on nasal canula of 4l, oxygen saturation in creased to 94% 1.82

## 2022-02-12 NOTE — PROGRESS NOTES
Progress Note( Dr. David Lizama)  2/11/2022  Subjective:   Admit Date: 2/6/2022  PCP: Jerri Mejía MD    Admitted For :Shortness of breath, hypoxia and Covid pneumonia       Consulted For: Better control of blood glucose     Interval History: Patient feels much better     Denies any chest pains,   Yes SOB . Only on oxygen  Denies nausea or vomiting. No new bowel or bladder symptoms.        Intake/Output Summary (Last 24 hours) at 2/11/2022 2041  Last data filed at 2/11/2022 1707  Gross per 24 hour   Intake 875 ml   Output 1575 ml   Net -700 ml       DATA    CBC:   Recent Labs     02/09/22  0830   WBC 8.4   HGB 11.6*       CMP:  Recent Labs     02/09/22  0830      K 4.1   CL 95*   CO2 27   BUN 78*   CREATININE 2.4*   CALCIUM 8.4   PROT 6.1*   LABALBU 3.6   BILITOT 0.5   ALKPHOS 120   AST 59*   ALT 48*     Lipids:   Lab Results   Component Value Date    CHOL 151 10/04/2021    CHOL 170 12/27/2016    HDL 41 10/04/2021    TRIG 148 10/04/2021     Glucose:  Recent Labs     02/11/22  1256 02/11/22  1653 02/11/22 2023   POCGLU 355* 248* 210*     [id number]:  Lab Results   Component Value Date    LABA1C 8.9 02/07/2022     High Sensitivity TSH:   Lab Results   Component Value Date    TSHHS 2.240 01/17/2022     Free T3: No results found for: FT3  Free T4:  Lab Results   Component Value Date    T4FREE 1.26 10/26/2021       XR CHEST PORTABLE   Final Result   Stable chest.              Scheduled Medicines   Medications:    insulin lispro  20 Units SubCUTAneous TID WC    insulin glargine  50 Units SubCUTAneous Nightly    insulin lispro  0-18 Units SubCUTAneous TID WC    insulin lispro  0-18 Units SubCUTAneous 2 times per day    magic (miracle) mouthwash  5 mL Swish & Swallow TID    furosemide  40 mg Oral Daily    baricitinib  1 mg Oral Daily    benzonatate  100 mg Oral TID    guaiFENesin  200 mg Oral Q4H    sodium chloride flush  5-40 mL IntraVENous 2 times per day    dexamethasone  6 mg IntraVENous Q24H  amLODIPine  10 mg Oral Daily    apixaban  5 mg Oral BID    aspirin EC  81 mg Oral Daily    cyclobenzaprine  10 mg Oral Nightly    metoprolol succinate  50 mg Oral Daily    pantoprazole  40 mg Oral QAM AC    senna  1 tablet Oral BID      Infusions:    dextrose      dextrose      sodium chloride      dextrose           Objective:   Vitals: BP (!) 165/87   Pulse 90   Temp 98.2 °F (36.8 °C) (Oral)   Resp 18   Ht 5' 7.99\" (1.727 m)   Wt 192 lb 14.4 oz (87.5 kg)   SpO2 95%   BMI 29.34 kg/m²   General appearance: alert and cooperative with exam  Neck: no JVD or bruit  Thyroid : Normal lobes   Lungs: Has Vesicular Breath sounds some wheezing and some rales  Heart:  regular rate and rhythm  Abdomen: soft, non-tender; bowel sounds normal; no masses,  no organomegaly  Musculoskeletal: Normal  Extremities: extremities normal, , no edema  Neurologic:  Awake, alert, oriented to name, place and time. Cranial nerves II-XII are grossly intact. Motor is  intact. Sensory is intact. ,  and gait is normal.    Assessment:     Patient Active Problem List:     Palpitations     PAF (paroxysmal atrial fibrillation) ([facility])     DJD (degenerative joint disease), multiple sites     History of colonic polyps     Family history of colon cancer     Statin intolerance     SOB (shortness of breath) on exertion     Type 2 diabetes mellitus with complication ([facility])     Essential hypertension     Dyslipidemia     S/P CABG x 3     Anemia     Cardiomyopathy ([facility])     Chronic kidney disease (CKD) stage G4/A3, severely decreased glomerular filtration rate (GFR) between 15-29 mL/min/1.73 square meter and albuminuria creatinine ratio greater than 300 mg/g ([facility])     DM (diabetes mellitus) ([facility] [address]     CAD (coronary artery disease)     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 Reviewed POC blood glucose . Labs and X ray results   2. On meal/ Correction bolus Humalog/ Basal Lantus Insulin regime /   3. Monitor Blood glucose frequently   4. Modified  the dose of Insulin/ other medicines as needed   5. Will follow     .      Macy Crmu MD, MD

## 2022-02-12 NOTE — PROGRESS NOTES
Doctor Please copy and paste below in your progress note per DME requirements. Patient was seen in hospital for CHF, COVID-19   . I am prescribing oxygen because the diagnosis and testing requires the patient to have oxygen in the home. Conditions will improve or be benefited by oxygen use. The patient is able to perform good mobility and therefore requires the use of a portable oxygen system for ambulation.

## 2022-02-12 NOTE — PROGRESS NOTES
Hospitalist Progress Note      Name:  Guy Craven /Age/Sex: 1945  (68 y.o. male)   MRN & CSN:  [id number] & [id number] Admission Date/Time: 2022  7:43 PM   Location:  -A PCP: Isidoro Del Cid MD         Hospital Day: 7  Discharge barrier/Reason for continued hospitalization: need to wean o2, PT/OT clerance    Assessment and Plan:   Guy Craven is a 68 y.o.  male hypertension, chronic diastolic CHF, CKD 4 due to diabetic nephropathy, A. fib on chronic anticoagulation with Eliquis, type 2 diabetes, dyslipidemia, who presented to the ED on 2022 with shortness of breath and cough and was diagnosed with Pneumonia due to COVID-19 virus. He is fully vaccinated against Covid    1. Acute on chronic diastolic CHF: uncompensated  Hx of CAD s/p CABG  Mod Mitral stenosis  off lasix gtt,   Place back on IV lasix 80mg bid  Fluid restriction  Strict I's and O's  Cardio and renal following    2. A fib with RVR   has hx of Paroxysmal A. fib:   Rate in 130s this AM  S/p IV cardizem x1  Increase toprol xl bid  Add cardizem with holding paramerters  Continue Eliquis. Cardio consulted on the board    3   Sepsis due to COVID-19 pneumonia: POA. - now resolved  Acute organ dysfunction as evidenced by acute hypoxic respiratory failure. CXR with patchy airspace disease bilaterally  Steroids, baricitinib (renal dosing), isolation  o2 at 4L via NC, wean as able  Home o2 eval prior to discharge  Pul venkat    4. COVID-19 acute hypoxic respiratory failure: POA. O2 sats <89% on room air  Currently on 4 L. Not on home O2  Regular Is use  Wean to RA  as tolerated    5. DM2 with hyperosmolar state- improved  Hyperglycemia worsened by steroids  Off  insulin gtt. Transitioned to lantus and SSI  A1c 8.9 from 22  AG closed and BS well controlled currently  Endocrine following  Monitor and titrate  Diabetic teaching prior to discharge      6. Hypertension:   Continue Norvasc, Toprol-XL.   Hold nephrotoxic agents  Monitor and itrate    7. CKD 4:   Due to diabetic nephropathy. Avoid nephrotoxic agents   Stable  monitor  Renal following     8. Sacral decub ulcer,stage II, POA  C/w local wound care        Disposition; SNF vs C  when continues to improve    Diet ADULT DIET; Regular; 4 carb choices (60 gm/meal); Low Sodium (2 gm)  ADULT ORAL NUTRITION SUPPLEMENT; Dinner, Lunch; Low Calorie/High Protein Oral Supplement   DVT Prophylaxis [] Lovenox, []  Heparin, [] SCDs, [x] Ambulation. Eliquis   GI Prophylaxis [x] PPI,  [] H2 Blocker,  [] Carafate,  [x] Diet/Tube Feeds   Code Status Full Code   MDM [] Low, [] Moderate,[x]  High       History of Present Illness:     Chief Complaint: Pneumonia due to COVID-19 virus     Aliya Mesa is a 68 y.o.  male  who presents with shortness of breath and cough. Patient is seen and examined this noon  Cough and sob still thereng  Denies chest pain/fever/sob      Off insulin gtt and off  lasix gtt   BS better, creatinine stable  Remains on 4L via NC and qualifed for Home o2 at 3L    Had bloody sputum this noon, not feeling well  HR in 130s     PT[facility]OT [facility]  Not doing well with PT/OT      Ten point ROS reviewed negative, unless as noted above    Objective: Intake/Output Summary (Last 24 hours) at 2/12/2022 1031  Last data filed at 2/12/2022 6922  Gross per 24 hour   Intake 705 ml   Output 1075 ml   Net -370 ml        Vitals:   Vitals:    02/11/22 2017 02/12/22 0402 02/12/22 0737 02/12/22 0738   BP: (!) 145/64 (!) 145/99     Pulse: 91 77     Resp: 16 16 28 14   Temp: 98.2 °F (36.8 °C) 98.3 °F (36.8 °C)     TempSrc: Oral Oral     SpO2: 94% 95% 95% 94%   Weight:       Height:            Physical Exam:   GEN: Awake male, alert and oriented x3 in mild respiratory distress. Appears given age. HEENT: Normal   RESP: Diminished BS bilaterally.  Symmetric chest movement while on o2 via NC  CVS: RRR, S1, S2  GI/: Abdomen is soft, nontender, no organomegaly. . Bowel sounds normal, rectal exam deferred. No CVA tenderness. MSK: No gross joint deformities. No tenderness  SKIN: Normal coloration, warm, dry. NEURO: Cranial nerves appear grossly intact, normal speech, no lateralizing weakness. PSYCH:  Affect appropriate.     Medications:   Medications:    insulin lispro  20 Units SubCUTAneous TID WC    insulin glargine  50 Units SubCUTAneous Nightly    insulin lispro  0-18 Units SubCUTAneous TID WC    insulin lispro  0-18 Units SubCUTAneous 2 times per day    magic (miracle) mouthwash  5 mL Swish & Swallow TID    furosemide  40 mg Oral Daily    baricitinib  1 mg Oral Daily    benzonatate  100 mg Oral TID    guaiFENesin  200 mg Oral Q4H    sodium chloride flush  5-40 mL IntraVENous 2 times per day    dexamethasone  6 mg IntraVENous Q24H    amLODIPine  10 mg Oral Daily    apixaban  5 mg Oral BID    aspirin EC  81 mg Oral Daily    cyclobenzaprine  10 mg Oral Nightly    metoprolol succinate  50 mg Oral Daily    pantoprazole  40 mg Oral QAM AC    senna  1 tablet Oral BID      Infusions:    dextrose      dextrose      sodium chloride      dextrose       PRN Meds: dextrose, 100 mL/hr, PRN  glucose, 15 g, PRN  dextrose, 12.5 g, PRN  glucagon (rDNA), 1 mg, PRN  dextrose, 100 mL/hr, PRN  benzocaine-menthol, 1 lozenge, Q2H PRN  sodium chloride flush, 5-40 mL, PRN  sodium chloride, 25 mL, PRN  ondansetron, 4 mg, Q8H PRN   Or  ondansetron, 4 mg, Q6H PRN  polyethylene glycol, 17 g, Daily PRN  acetaminophen, 650 mg, Q6H PRN   Or  acetaminophen, 650 mg, Q6H PRN  dextrose, 100 mL/hr, PRN  acetaminophen, 500 mg, Q4H PRN  albuterol sulfate HFA, 2 puff, Q4H PRN  dextrose bolus (hypoglycemia), 125 mL, PRN   Or  dextrose bolus (hypoglycemia), 250 mL, PRN      Recent Labs     02/09/22  0830 02/07/22  2124 02/06/22  0950   WBC 8.4 6.0 2.6*   HGB 11.6* 11.1* 10.2*   HCT 35.9* 34.5* 31.9*   MCV 88.9 90.6 89.6    179 143     Lab Results Component Value Date     02/09/2022    K 4.1 02/09/2022    CL 95 02/09/2022    CO2 27 02/09/2022    BUN 78 02/09/2022    CREATININE 2.4 02/09/2022    GLUCOSE 158 02/09/2022    CALCIUM 8.4 02/09/2022      Lab Results   Component Value Date    INR 1.03 09/28/2017    INR 1.14 10/03/2016    INR 1.07 09/29/2016    PROTIME 11.7 09/28/2017    PROTIME 13.1 (H) 10/03/2016    PROTIME 12.2 09/29/2016           Electronically signed by Denise Rader MD on 2/12/2022 at 10:31 AM

## 2022-02-12 NOTE — PROGRESS NOTES
Progress Note( Dr. Dwain Larson)  2/12/2022  Subjective:   Admit Date: 2/6/2022  PCP: Ventura Stevens MD    Admitted For :Shortness of breath, hypoxia and Covid pneumonia       Consulted For: Better control of blood glucose     Interval History: Patient feels much better     Denies any chest pains,   Yes SOB . Only on oxygen  Denies nausea or vomiting. No new bowel or bladder symptoms. Intake/Output Summary (Last 24 hours) at 2/12/2022 1601  Last data filed at 2/12/2022 0611  Gross per 24 hour   Intake 380 ml   Output 850 ml   Net -470 ml       DATA    CBC:   No results for input(s): WBC, HGB, PLT in the last 72 hours. CMP:  No results for input(s): NA, K, CL, CO2, BUN, CREATININE, GLU, CALCIUM, PROT, LABALBU, BILITOT, ALKPHOS, AST, ALT in the last 72 hours.   Lipids:   Lab Results   Component Value Date    CHOL 151 10/04/2021    CHOL 170 12/27/2016    HDL 41 10/04/2021    TRIG 148 10/04/2021     Glucose:  Recent Labs     02/12/22  0159 02/12/22  0903 02/12/22  1149   POCGLU 124* 133* 126*     LjuhtnazpxZ3H:  Lab Results   Component Value Date    LABA1C 8.9 02/07/2022     High Sensitivity TSH:   Lab Results   Component Value Date    TSHHS 2.240 01/17/2022     Free T3: No results found for: FT3  Free T4:  Lab Results   Component Value Date    T4FREE 1.26 10/26/2021       XR CHEST PORTABLE   Final Result   Stable chest.              Scheduled Medicines   Medications:    insulin lispro  20 Units SubCUTAneous TID WC    insulin glargine  50 Units SubCUTAneous Nightly    insulin lispro  0-18 Units SubCUTAneous TID WC    insulin lispro  0-18 Units SubCUTAneous 2 times per day    magic (miracle) mouthwash  5 mL Swish & Swallow TID    furosemide  40 mg Oral Daily    baricitinib  1 mg Oral Daily    benzonatate  100 mg Oral TID    guaiFENesin  200 mg Oral Q4H    sodium chloride flush  5-40 mL IntraVENous 2 times per day    dexamethasone  6 mg IntraVENous Q24H    amLODIPine  10 mg Oral Daily    apixaban  5 mg Oral BID    aspirin EC  81 mg Oral Daily    cyclobenzaprine  10 mg Oral Nightly    metoprolol succinate  50 mg Oral Daily    pantoprazole  40 mg Oral QAM AC    senna  1 tablet Oral BID      Infusions:    dextrose      dextrose      sodium chloride      dextrose           Objective:   Vitals: BP (!) 145/99   Pulse 77   Temp 98.3 °F (36.8 °C) (Oral)   Resp 14   Ht 5' 7.99\" (1.727 m)   Wt 192 lb 14.4 oz (87.5 kg)   SpO2 94%   BMI 29.34 kg/m²   General appearance: alert and cooperative with exam  Neck: no JVD or bruit  Thyroid : Normal lobes   Lungs: Has Vesicular Breath sounds some wheezing and some rales  Heart:  regular rate and rhythm  Abdomen: soft, non-tender; bowel sounds normal; no masses,  no organomegaly  Musculoskeletal: Normal  Extremities: extremities normal, , no edema  Neurologic:  Awake, alert, oriented to name, place and time. Cranial nerves II-XII are grossly intact. Motor is  intact. Sensory is intact. ,  and gait is normal.    Assessment:     Patient Active Problem List:     Palpitations     PAF (paroxysmal atrial fibrillation) ([facility])     DJD (degenerative joint disease), multiple sites     History of colonic polyps     Family history of colon cancer     Statin intolerance     SOB (shortness of breath) on exertion     Type 2 diabetes mellitus with complication ([facility])     Essential hypertension     Dyslipidemia     S/P CABG x 3     Anemia     Cardiomyopathy ([facility])     Chronic kidney disease (CKD) stage G4/A3, severely decreased glomerular filtration rate (GFR) between 15-29 mL/min/1.73 square meter and albuminuria creatinine ratio greater than 300 mg/g ([facility])     DM (diabetes mellitus) ([facility])     CAD (coronary artery disease)     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 Reviewed POC blood glucose . Labs and X ray results   2. On meal/ Correction bolus Humalog/ Basal Lantus Insulin regime /   3. Monitor Blood glucose frequently   4. Modified  the dose of Insulin/ other medicines as needed   5. Will follow     .      Nicolasa Christian MD, MD

## 2022-02-12 NOTE — PROGRESS NOTES
Patient qualified for Home O2. Awaiting Dr's signatures      1200-all paperwork has been signed and faxed to [facility]. Please DO NOT let patient leave without their portable O2.   Thank you

## 2022-02-12 NOTE — PLAN OF CARE
Problem: Airway Clearance - Ineffective  Goal: Achieve or maintain patent airway  2/11/2022 2208 by Daria Britton RN  Outcome: Ongoing  2/11/2022 1237 by Sonya Renae RN  Outcome: Ongoing     Problem: Gas Exchange - Impaired  Goal: Absence of hypoxia  2/11/2022 2208 by Daria Britton RN  Outcome: Ongoing  2/11/2022 1237 by Sonya Renae RN  Outcome: Ongoing  Goal: Promote optimal lung function  2/11/2022 2208 by Daria Britton RN  Outcome: Ongoing  2/11/2022 1237 by Sonya Renae RN  Outcome: Ongoing     Problem: Breathing Pattern - Ineffective  Goal: Ability to achieve and maintain a regular respiratory rate  2/11/2022 2208 by Daria Britton RN  Outcome: Ongoing  2/11/2022 1237 by Sonya Renae RN  Outcome: Ongoing     Problem:  Body Temperature -  Risk of, Imbalanced  Goal: Ability to maintain a body temperature within defined limits  2/11/2022 2208 by Daria Britton RN  Outcome: Ongoing  2/11/2022 1237 by Sonya Renae RN  Outcome: Ongoing  Goal: Will regain or maintain usual level of consciousness  2/11/2022 2208 by Daria Britton RN  Outcome: Ongoing  2/11/2022 1237 by Sonya Renae RN  Outcome: Ongoing  Goal: Complications related to the disease process, condition or treatment will be avoided or minimized  2/11/2022 2208 by Daria Britton RN  Outcome: Ongoing  2/11/2022 1237 by Sonya Renae RN  Outcome: Ongoing     Problem: Isolation Precautions - Risk of Spread of Infection  Goal: Prevent transmission of infection  2/11/2022 2208 by Daria Britton RN  Outcome: Ongoing  2/11/2022 1237 by Sonya Renae RN  Outcome: Ongoing     Problem: Nutrition Deficits  Goal: Optimize nutritional status  2/11/2022 2208 by Daria Britton RN  Outcome: Ongoing  2/11/2022 1237 by Sonya Renae RN  Outcome: Ongoing     Problem: Risk for Fluid Volume Deficit  Goal: Maintain normal heart rhythm  2/11/2022 2208 by Daria Britton RN  Outcome: Ongoing  2/11/2022 1237 by Sonya Renae RN  Outcome: Ongoing  Goal: Maintain absence of muscle cramping  2/11/2022 2208 by Efraín Cavanaugh RN  Outcome: Ongoing  2/11/2022 1237 by Riley Dowell RN  Outcome: Ongoing  Goal: Maintain normal serum potassium, sodium, calcium, phosphorus, and pH  2/11/2022 2208 by Efraín Cavanaugh RN  Outcome: Ongoing  2/11/2022 1237 by Riley Dowell RN  Outcome: Ongoing     Problem: Loneliness or Risk for Loneliness  Goal: Demonstrate positive use of time alone when socialization is not possible  2/11/2022 2208 by Efraín Cavanaugh RN  Outcome: Ongoing  2/11/2022 1237 by Riley Dowell RN  Outcome: Ongoing     Problem: Fatigue  Goal: Verbalize increase energy and improved vitality  2/11/2022 2208 by Efraín Cavanaugh RN  Outcome: Ongoing  2/11/2022 1237 by Riley Dowell RN  Outcome: Ongoing     Problem: Patient Education: Go to Patient Education Activity  Goal: Patient/Family Education  2/11/2022 2208 by Efraín Cavanaugh RN  Outcome: Ongoing  2/11/2022 1237 by Riley Dowell RN  Outcome: Ongoing     Problem: Falls - Risk of:  Goal: Will remain free from falls  Description: Will remain free from falls  2/11/2022 2208 by Efraín Cavanaugh RN  Outcome: Ongoing  2/11/2022 1237 by Riley Dowell RN  Outcome: Ongoing  Goal: Absence of physical injury  Description: Absence of physical injury  2/11/2022 2208 by Efraín Cavanaugh RN  Outcome: Ongoing  2/11/2022 1237 by Riley Dowell RN  Outcome: Ongoing     Problem: Nutrition  Goal: Optimal nutrition therapy  2/11/2022 2208 by Efraní Cavanaugh RN  Outcome: Ongoing  2/11/2022 1237 by Riley Dowell RN  Outcome: Ongoing     Problem: Skin Integrity:  Goal: Will show no infection signs and symptoms  Description: Will show no infection signs and symptoms  2/11/2022 2208 by Efraín Cavanaugh RN  Outcome: Ongoing  2/11/2022 1237 by Riley Dowell RN  Outcome: Ongoing  Goal: Absence of new skin breakdown  Description: Absence of new skin breakdown  2/11/2022 2208 by Efraín Cavanaugh RN  Outcome: Ongoing  2/11/2022 1237 by Riley Dowell RN  Outcome: Ongoing

## 2022-02-12 NOTE — PROGRESS NOTES
Nephrology Progress Note  2/12/2022 12:30 PM  Subjective: Interval History: Dakota Cronin is a 68 y.o. male with weakness overall weak but tired        Data:   Scheduled Meds:   insulin lispro  20 Units SubCUTAneous TID WC    insulin glargine  50 Units SubCUTAneous Nightly    insulin lispro  0-18 Units SubCUTAneous TID WC    insulin lispro  0-18 Units SubCUTAneous 2 times per day    magic (miracle) mouthwash  5 mL Swish & Swallow TID    furosemide  40 mg Oral Daily    baricitinib  1 mg Oral Daily    benzonatate  100 mg Oral TID    guaiFENesin  200 mg Oral Q4H    sodium chloride flush  5-40 mL IntraVENous 2 times per day    dexamethasone  6 mg IntraVENous Q24H    amLODIPine  10 mg Oral Daily    apixaban  5 mg Oral BID    aspirin EC  81 mg Oral Daily    cyclobenzaprine  10 mg Oral Nightly    metoprolol succinate  50 mg Oral Daily    pantoprazole  40 mg Oral QAM AC    senna  1 tablet Oral BID     Continuous Infusions:   dextrose      dextrose      sodium chloride      dextrose           CBC No results for input(s): WBC, HGB, HCT, PLT in the last 72 hours. BMP No results for input(s): NA, K, CL, CO2, PHOS, BUN, CREATININE, CA in the last 72 hours. Hepatic: No results for input(s): AST, ALT, ALB, BILITOT, ALKPHOS in the last 72 hours. Troponin: No results for input(s): TROPONINI in the last 72 hours. BNP: No results for input(s): BNP in the last 72 hours. Lipids: No results for input(s): CHOL, HDL in the last 72 hours. Invalid input(s): LDLCALCU  ABGs:   Lab Results   Component Value Date    PO2ART 66.3 10/03/2016    VYL3ZJY 38.1 10/03/2016     INR: No results for input(s): INR in the last 72 hours.   Renal Labs  Albumin:    Lab Results   Component Value Date    LABALBU 3.6 02/09/2022     Calcium:    Lab Results   Component Value Date    CALCIUM 8.4 02/09/2022     Phosphorus:    Lab Results   Component Value Date    PHOS 4.5 02/09/2022     U/A:    Lab Results   Component Value Date NITRU NEGATIVE 02/05/2022    COLORU YELLOW 02/05/2022    WBCUA 5 TO 9 02/05/2022    RBCUA RARE 02/05/2022    MUCUS NEGATIVE 03/29/2021    TRICHOMONAS NONE SEEN 09/28/2017    YEAST OCCASIONAL 02/13/2016    BACTERIA MODERATE 02/05/2022    CLARITYU CLOUDY 02/05/2022    SPECGRAV 1.032 02/05/2022    UROBILINOGEN 0.2 02/05/2022    BILIRUBINUR NEGATIVE 02/05/2022    BLOODU TRACE 02/05/2022    KETUA NEGATIVE 02/05/2022     ABG:    Lab Results   Component Value Date    WPS1ZZG 38.1 10/03/2016    PO2ART 66.3 10/03/2016    XEJ9UFA 22.9 10/03/2016     HgBA1c:    Lab Results   Component Value Date    LABA1C 8.9 02/07/2022     Microalbumen/Creatinine ratio:  No components found for: RUCREAT  TSH:  No results found for: TSH  IRON:    Lab Results   Component Value Date    IRON 66 12/30/2016     Iron Saturation:  No components found for: PERCENTFE  TIBC:    Lab Results   Component Value Date    TIBC 357 12/30/2016     FERRITIN:    Lab Results   Component Value Date    FERRITIN 53 12/30/2016     RPR:  No results found for: RPR  KATHRYN:  No results found for: ANATITER, KATHRYN  24 Hour Urine for Creatinine Clearance:  No components found for: CREAT4, UHRS10, UTV10      Objective:   I/O: 02/11 0701 - 02/12 0700  In: 4330 [P.O.:1225; I.V.:30]  Out: 1800 [Urine:1800]  I/O last 3 completed shifts: In: 5247 [P.O.:1225; I.V.:30]  Out: 2200 [Urine:2200]  No intake/output data recorded. Vitals: BP (!) 145/99   Pulse 77   Temp 98.3 °F (36.8 °C) (Oral)   Resp 14   Ht 5' 7.99\" (1.727 m)   Wt 192 lb 14.4 oz (87.5 kg)   SpO2 94%   BMI 29.34 kg/m²  {  General appearance: awake weak  HEENT: Head: Normal, normocephalic, atraumatic.   Neck: supple, symmetrical, trachea midline  Lungs: diminished breath sounds bilaterally  Heart: S1, S2 normal  Abdomen: abnormal findings:  soft nt  Extremities: edema trace  Neurologic: Mental status: alertness: alert        Assessment and Plan:      IMP:  1 CKD 4 with acute renal failure from ATN  #2 congestive heart failure with coronary disease  #3 COVID-19 positive  #4 type 2 diabetes      Plan     #1 overall doing well no acute distress stable urine output  #2 volume stable oxygenation stable  #3 treat in above setting for COVID  #4 glucose monitor stable  Work on discharge Jaylene Trotter MD, MD

## 2022-02-12 NOTE — PROGRESS NOTES
ekg completed per order, also lasix and cardizem ivp given, male purewick applied, updated son on pts room phone. hospitalist perfect served regarding temp 100.3.

## 2022-02-12 NOTE — PROGRESS NOTES
Spoke with hospitalist regarding increased HR, not being on home med (toprol) and pt c/o \"just not feeling well'. Labs still pending, cxr completed.

## 2022-02-13 NOTE — PROGRESS NOTES
Hospitalist Progress Note      Name:  Miko Palomino /Age/Sex: 1945  (68 y.o. male)   MRN & CSN:  [id number] & [id number] Admission Date/Time: 2022  7:43 PM   Location:  -A PCP: Katie Condon MD         Hospital Day: 8  Discharge barrier/Reason for continued hospitalization: need to wean o2, PT/OT clerance    Assessment and Plan:   Miko Palomino is a 68 y.o.  male hypertension, chronic diastolic CHF, CKD 4 due to diabetic nephropathy, A. fib on chronic anticoagulation with Eliquis, type 2 diabetes, dyslipidemia, who presented to the ED on 2022 with shortness of breath and cough and was diagnosed with Pneumonia due to COVID-19 virus. He is fully vaccinated against Covid    1. Suspected sepsis secondary to bacterial pneumonia  Suspect Gram negative/MRSA  Hypoxia worse and now on 10L o2 via NC (from 4L)  CXR with worsening infiltratesmart  Add IV cefepime and vanco>de-escalate as able  Check MRSA DNA probe  Wean o2 prior to discharge  Pharmacy to dose vanco  Pul toilette      2. A fib, paroxysmal   Rate uncontrolled  C/w toprol xl bid  C/w cardizem with holding paramerters  Continue Eliquis. Cardio consulted on the board    3   Sepsis due to COVID-19 pneumonia: POA. - now resolved  Acute organ dysfunction as evidenced by acute hypoxic respiratory failure. CXR with patchy airspace disease bilaterally  Steroids, baricitinib (renal dosing), isolation  o2 at 4L via NC, wean as able  Home o2 eval prior to discharge  Pul toilette    4. COVID-19 acute hypoxic respiratory failure: POA. O2 sats <89% on room air  Currently on 4 L. Not on home O2  Regular Is use  Wean to RA  as tolerated    5. DM2 with hyperosmolar state- improved  Hyperglycemia worsened by steroids  Off  insulin gtt. Transitioned to lantus and SSI  A1c 8.9 from 22  AG closed and BS well controlled currently  Endocrine following  Monitor and titrate  Diabetic teaching prior to discharge      6.   Hypertension: Continue Norvasc, Toprol-XL. Hold nephrotoxic agents  Monitor and itrate    7. CKD 4:   Due to diabetic nephropathy. Avoid nephrotoxic agents   Stable  monitor  Renal following     8. Sacral decub ulcer,stage II, POA  C/w local wound care        Disposition; SNF vs [facility]  when continues to improve    Diet ADULT DIET; Regular; 4 carb choices (60 gm/meal); Low Sodium (2 gm)  ADULT ORAL NUTRITION SUPPLEMENT; Dinner, Lunch; Low Calorie/High Protein Oral Supplement   DVT Prophylaxis [] Lovenox, []  Heparin, [] SCDs, [x] Ambulation. Eliquis   GI Prophylaxis [x] PPI,  [] H2 Blocker,  [] Carafate,  [x] Diet/Tube Feeds   Code Status Full Code   MDM [] Low, [] Moderate,[x]  High       History of Present Illness:     Chief Complaint: Pneumonia due to COVID-19 virus     Mariel Robin is a 68 y.o.  male  who presents with shortness of breath and cough. Patient is seen and examined this noon  Cough and sob worse  o2 need went to 10L via NC today  WBC worse  Recent CXR suggestive of hospital acquired pneumonia  No fever    BS better, creatinine stable  Remains on 4L via NC and qualifed for Home o2 at 3L      PT/OT recommends SNF but patient wants [address] when ready  Not doing well with PT/OT      Ten point ROS reviewed negative, unless as noted above    Objective: Intake/Output Summary (Last 24 hours) at 2/13/2022 1110  Last data filed at 2/13/2022 0610  Gross per 24 hour   Intake 600 ml   Output 1350 ml   Net -750 ml        Vitals:   Vitals:    02/12/22 2310 02/13/22 0145 02/13/22 0330 02/13/22 0659   BP:  (!) 158/60 (!) 158/60    Pulse:  109 127    Resp: 17 21 26 22   Temp:  98.3 °F (36.8 °C) 98 °F (36.7 °C)    TempSrc:  Oral Oral    SpO2: 92% 91% (!) 89% 94%   Weight:       Height:            Physical Exam:   GEN: Awake male, alert and oriented x3 in mild respiratory distress. Appears given age. HEENT: Normal   RESP: Diminished BS bilaterally.  Symmetric chest movement while on o2 via NC  CVS: RRR, S1, S2  GI/: Abdomen is soft, nontender, no organomegaly. . Bowel sounds normal, rectal exam deferred. No CVA tenderness. MSK: No gross joint deformities. No tenderness  SKIN: Normal coloration, warm, dry. NEURO: Cranial nerves appear grossly intact, normal speech, no lateralizing weakness. PSYCH:  Affect appropriate.     Medications:   Medications:    insulin glargine  40 Units SubCUTAneous Nightly    insulin NPH  25 Units SubCUTAneous QAM    furosemide  80 mg IntraVENous TID    metoprolol succinate  50 mg Oral BID    dilTIAZem  120 mg Oral Daily    insulin lispro  20 Units SubCUTAneous TID WC    insulin lispro  0-18 Units SubCUTAneous TID WC    insulin lispro  0-18 Units SubCUTAneous 2 times per day    magic (miracle) mouthwash  5 mL Swish & Swallow TID    baricitinib  1 mg Oral Daily    benzonatate  100 mg Oral TID    guaiFENesin  200 mg Oral Q4H    sodium chloride flush  5-40 mL IntraVENous 2 times per day    dexamethasone  6 mg IntraVENous Q24H    apixaban  5 mg Oral BID    aspirin EC  81 mg Oral Daily    cyclobenzaprine  10 mg Oral Nightly    pantoprazole  40 mg Oral QAM AC    senna  1 tablet Oral BID      Infusions:    dextrose      dextrose      sodium chloride      dextrose       PRN Meds: dextrose, 100 mL/hr, PRN  glucose, 15 g, PRN  dextrose, 12.5 g, PRN  glucagon (rDNA), 1 mg, PRN  dextrose, 100 mL/hr, PRN  benzocaine-menthol, 1 lozenge, Q2H PRN  sodium chloride flush, 5-40 mL, PRN  sodium chloride, 25 mL, PRN  ondansetron, 4 mg, Q8H PRN   Or  ondansetron, 4 mg, Q6H PRN  polyethylene glycol, 17 g, Daily PRN  acetaminophen, 650 mg, Q6H PRN   Or  acetaminophen, 650 mg, Q6H PRN  dextrose, 100 mL/hr, PRN  acetaminophen, 500 mg, Q4H PRN  albuterol sulfate HFA, 2 puff, Q4H PRN  dextrose bolus (hypoglycemia), 125 mL, PRN   Or  dextrose bolus (hypoglycemia), 250 mL, PRN      Recent Labs     02/12/22  1620 02/09/22  0830 02/07/22  2124   WBC 21.2* 8.4 6.0   HGB 11.3* 11.6* 11.1*   HCT 34.4* 35.9* 34.5*   MCV 86.9 88.9 90.6    217 179     Lab Results   Component Value Date     02/12/2022    K 4.3 02/12/2022    CL 95 02/12/2022    CO2 24 02/12/2022    BUN 82 02/12/2022    CREATININE 2.3 02/12/2022    GLUCOSE 258 02/12/2022    CALCIUM 7.9 02/12/2022      Lab Results   Component Value Date    INR 1.03 09/28/2017    INR 1.14 10/03/2016    INR 1.07 09/29/2016    PROTIME 11.7 09/28/2017    PROTIME 13.1 (H) 10/03/2016    PROTIME 12.2 09/29/2016           Electronically signed by Ricco Cornelius MD on 2/13/2022 at 11:10 AM

## 2022-02-13 NOTE — PROGRESS NOTES
Progress Note( Dr. Roseanne Louis)  2/13/2022  Subjective:   Admit Date: 2/6/2022  PCP: Marysol Gil MD    Admitted For :Shortness of breath, hypoxia and Covid pneumonia       Consulted For: Better control of blood glucose     Interval History: Patient feels much better still somewhat short of breath this morning    Denies any chest pains,   Yes SOB . Only on oxygen  Denies nausea or vomiting. No new bowel or bladder symptoms. Intake/Output Summary (Last 24 hours) at 2/13/2022 1540  Last data filed at 2/13/2022 1440  Gross per 24 hour   Intake 240 ml   Output 1550 ml   Net -1310 ml       DATA    CBC:   Recent Labs     02/12/22  1620   WBC 21.2*   HGB 11.3*       CMP:  Recent Labs     02/12/22  1620      K 4.3   CL 95*   CO2 24   BUN 82*   CREATININE 2.3*   CALCIUM 7.9*   PROT 5.8*   LABALBU 3.3*   BILITOT 0.6   ALKPHOS 113   AST 22   ALT 43*     Lipids:   Lab Results   Component Value Date    CHOL 151 10/04/2021    CHOL 170 12/27/2016    HDL 41 10/04/2021    TRIG 148 10/04/2021     Glucose:  Recent Labs     02/13/22  0112 02/13/22  0808 02/13/22  1219   POCGLU 91 67* 199*     [id number]:  Lab Results   Component Value Date    LABA1C 8.9 02/07/2022     High Sensitivity TSH:   Lab Results   Component Value Date    TSHHS 2.240 01/17/2022     Free T3: No results found for: FT3  Free T4:  Lab Results   Component Value Date    T4FREE 1.26 10/26/2021       XR CHEST PORTABLE   Final Result   1. Nonspecific pulmonary infiltrates can be seen with atypical/viral   pneumonia. 2. Left basilar consolidation and pleural effusion; superimposed   community-acquired or nosocomial pneumonia are considerations. Developing   ARDS is a consideration. 3. Calcific atherosclerosis aorta. 4. Cardiomegaly.          XR CHEST PORTABLE   Final Result   Stable chest.              Scheduled Medicines   Medications:    insulin glargine  40 Units SubCUTAneous Nightly    insulin NPH  25 Units SubCUTAneous QAM    furosemide  80 mg IntraVENous TID    metoprolol succinate  50 mg Oral BID    dilTIAZem  120 mg Oral Daily    insulin lispro  20 Units SubCUTAneous TID WC    insulin lispro  0-18 Units SubCUTAneous TID WC    insulin lispro  0-18 Units SubCUTAneous 2 times per day    magic (miracle) mouthwash  5 mL Swish & Swallow TID    baricitinib  1 mg Oral Daily    benzonatate  100 mg Oral TID    guaiFENesin  200 mg Oral Q4H    sodium chloride flush  5-40 mL IntraVENous 2 times per day    dexamethasone  6 mg IntraVENous Q24H    apixaban  5 mg Oral BID    aspirin EC  81 mg Oral Daily    cyclobenzaprine  10 mg Oral Nightly    pantoprazole  40 mg Oral QAM AC    senna  1 tablet Oral BID      Infusions:    dextrose      dextrose      sodium chloride      dextrose           Objective:   Vitals: BP (!) 163/69   Pulse 107   Temp 98.3 °F (36.8 °C) (Axillary)   Resp 24   Ht 5' 7.99\" (1.727 m)   Wt 192 lb 14.4 oz (87.5 kg)   SpO2 91%   BMI 29.34 kg/m²   General appearance: alert and cooperative with exam  Neck: no JVD or bruit  Thyroid : Normal lobes   Lungs: Has Vesicular Breath sounds some wheezing and some rales  Heart:  regular rate and rhythm  Abdomen: soft, non-tender; bowel sounds normal; no masses,  no organomegaly  Musculoskeletal: Normal  Extremities: extremities normal, , no edema  Neurologic:  Awake, alert, oriented to name, place and time. Cranial nerves II-XII are grossly intact. Motor is  intact. Sensory is intact. ,  and gait is normal.    Assessment:     Patient Active Problem List:     Palpitations     PAF (paroxysmal atrial fibrillation) (HCC)     DJD (degenerative joint disease), multiple sites     History of colonic polyps     Family history of colon cancer     Statin intolerance     SOB (shortness of breath) on exertion     Type 2 diabetes mellitus with complication (HCC)     Essential hypertension     Dyslipidemia     S/P CABG x 3     Anemia     Cardiomyopathy ([address]     Chronic kidney disease (CKD) stage G4/A3, severely decreased glomerular filtration rate (GFR) between 15-29 mL/min/1.73 square meter and albuminuria creatinine ratio greater than 300 mg/g (HCC)     DM (diabetes mellitus) (HCC)     CAD (coronary artery disease)     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 Reviewed POC blood glucose . Labs and X ray results   2. On meal/ Correction bolus Humalog/ Basal Lantus Insulin regime /   3. Monitor Blood glucose frequently   4. Modified  the dose of Insulin/ other medicines as needed   5. Will follow     .      Zina Guido MD, MD

## 2022-02-13 NOTE — PROGRESS NOTES
Nephrology Progress Note  2/13/2022 9:41 AM  Subjective: Interval History: Bard Park is a 68 y.o. male doing okay somewhat weak short of breath        Data:   Scheduled Meds:   insulin glargine  40 Units SubCUTAneous Nightly    insulin NPH  25 Units SubCUTAneous QAM    metoprolol succinate  50 mg Oral BID    dilTIAZem  120 mg Oral Daily    furosemide  80 mg IntraVENous BID    [START ON 2/16/2022] furosemide  40 mg Oral BID    insulin lispro  20 Units SubCUTAneous TID WC    insulin lispro  0-18 Units SubCUTAneous TID WC    insulin lispro  0-18 Units SubCUTAneous 2 times per day    magic (miracle) mouthwash  5 mL Swish & Swallow TID    baricitinib  1 mg Oral Daily    benzonatate  100 mg Oral TID    guaiFENesin  200 mg Oral Q4H    sodium chloride flush  5-40 mL IntraVENous 2 times per day    dexamethasone  6 mg IntraVENous Q24H    apixaban  5 mg Oral BID    aspirin EC  81 mg Oral Daily    cyclobenzaprine  10 mg Oral Nightly    pantoprazole  40 mg Oral QAM AC    senna  1 tablet Oral BID     Continuous Infusions:   dextrose      dextrose      sodium chloride      dextrose           CBC   Recent Labs     02/12/22  1620   WBC 21.2*   HGB 11.3*   HCT 34.4*         BMP   Recent Labs     02/12/22  1620      K 4.3   CL 95*   CO2 24   BUN 82*   CREATININE 2.3*     Hepatic:   Recent Labs     02/12/22  1620   AST 22   ALT 43*   BILITOT 0.6   ALKPHOS 113     Troponin: No results for input(s): TROPONINI in the last 72 hours. BNP: No results for input(s): BNP in the last 72 hours. Lipids: No results for input(s): CHOL, HDL in the last 72 hours. Invalid input(s): LDLCALCU  ABGs:   Lab Results   Component Value Date    PO2ART 66.3 10/03/2016    LYK7HGP 38.1 10/03/2016     INR: No results for input(s): INR in the last 72 hours.   Renal Labs  Albumin:    Lab Results   Component Value Date    LABALBU 3.3 02/12/2022     Calcium:    Lab Results   Component Value Date    CALCIUM 7.9 02/12/2022     Phosphorus:    Lab Results   Component Value Date    PHOS 4.5 02/09/2022     U/A:    Lab Results   Component Value Date    NITRU NEGATIVE 02/05/2022    COLORU YELLOW 02/05/2022    WBCUA 5 TO 9 02/05/2022    RBCUA RARE 02/05/2022    MUCUS NEGATIVE 03/29/2021    TRICHOMONAS NONE SEEN 09/28/2017    YEAST OCCASIONAL 02/13/2016    BACTERIA MODERATE 02/05/2022    CLARITYU CLOUDY 02/05/2022    SPECGRAV 1.032 02/05/2022    UROBILINOGEN 0.2 02/05/2022    BILIRUBINUR NEGATIVE 02/05/2022    BLOODU TRACE 02/05/2022    KETUA NEGATIVE 02/05/2022     ABG:    Lab Results   Component Value Date    OVL1GNQ 38.1 10/03/2016    PO2ART 66.3 10/03/2016    QNK9HNQ 22.9 10/03/2016     HgBA1c:    Lab Results   Component Value Date    LABA1C 8.9 02/07/2022     Microalbumen/Creatinine ratio:  No components found for: RUCREAT  TSH:  No results found for: TSH  IRON:    Lab Results   Component Value Date    IRON 66 12/30/2016     Iron Saturation:  No components found for: PERCENTFE  TIBC:    Lab Results   Component Value Date    TIBC 357 12/30/2016     FERRITIN:    Lab Results   Component Value Date    FERRITIN 53 12/30/2016     RPR:  No results found for: RPR  KATHRYN:  No results found for: ANATITER, KATHRYN  24 Hour Urine for Creatinine Clearance:  No components found for: CREAT4, UHRS10, UTV10      Objective:   I/O: 02/12 0701 - 02/13 0700  In: 600 [P.O.:600]  Out: 1350 [Urine:1350]  I/O last 3 completed shifts: In: 200 [P.O.:960; I.V.:20]  Out: 1975 [IGVXC:5913]  No intake/output data recorded. Vitals: BP (!) 158/60   Pulse 127   Temp 98 °F (36.7 °C) (Oral)   Resp 22   Ht 5' 7.99\" (1.727 m)   Wt 192 lb 14.4 oz (87.5 kg)   SpO2 94%   BMI 29.34 kg/m²  {  General appearance: awake weak  HEENT: Head: Normal, normocephalic, atraumatic.   Neck: supple, symmetrical, trachea midline  Lungs: diminished breath sounds bilaterally  Heart: S1, S2 normal  Abdomen: abnormal findings:  soft nt  Extremities: edema trace  Neurologic: Mental status: alertness: alert        Assessment and Plan:      IMP:  1 CKD 4 with acute renal failure from ATN  #2 congestive heart failure with coronary disease  #3 COVID-19 positive  #4 type 2 diabetes      Plan     #1 renal function monitoring above setting not uremic  #2 increase diuretics follow-up repeat labs in a.m.   #3 monitor in positive COVID and treat   #4 monitor glucose  #5 monitor blood pressures try to Eric Stiles MD, MD

## 2022-02-14 NOTE — PROGRESS NOTES
Radiology Tech is at the bedside to obtain a STAT PCXR on Pt to check for ETT placement & a KUB to check for OG tube placement.

## 2022-02-14 NOTE — PROGRESS NOTES
02/14/22 0847   Vent Information   $Ventilation $Initial Day   Vent Type 980   Vent Mode AC/PC   Vt Ordered 0 mL   Pressure Ordered 25   Rate Set 18 bmp   Peak Flow 0 L/min   Pressure Support 0 cmH20   FiO2  100 %   SpO2 (!) 88 %   SpO2/FiO2 ratio 88   Sensitivity 3   PEEP/CPAP 12   I Time/ I Time % 1 s   Humidification Source HME   Vent Patient Data   High Peep/I Pressure 25   Peak Inspiratory Pressure 37 cmH2O   Mean Airway Pressure 22 cmH20   Rate Measured 21 br/min   Vt Exhaled 801 mL   Minute Volume 12.7 Liters   I:E Ratio 1:2.30   Cough/Sputum   Sputum How Obtained Endotracheal;Suctioned   $Obtained Sample $Induced Sputum   Sputum Amount Large   Sputum Color Creamy; Red   Tenacity Thick   Spontaneous Breathing Trial (SBT) RT Doc   Pulse 126   Additional Respiratory  Assessments   Resp 16   Alarm Settings   High Pressure Alarm 50 cmH2O   Low Minute Volume Alarm 2.5 L/min   Apnea (secs) 20 secs   High Respiratory Rate 40 br/min   Low Exhaled Vt  250 mL   ETT (adult)   Placement Date/Time: 02/14/22 (c) 5008   Preoxygenation: Yes  Mask Ventilation: Ventilated by mask (1)  Technique: Video laryngoscopy  Tube Size: 7.5 mm  Blade Size: 3  Location: Oral  Grade View: Full view of the glottis  Insertion attempts: 1  Place. ..   $ Intubation emergent  $ Yes   Secured at 26 cm   Measured From Teeth   ET Placement Right   Secured By [employer] tube low   Site Condition Dry

## 2022-02-14 NOTE — PROGRESS NOTES
Called Lab and talked with Chemistry to find out why Pt's CMP, CBC, & BNP have not resulted yet. They stated Pt's labs are still being run. Will continue to check for Pt's lab work results.

## 2022-02-14 NOTE — ANESTHESIA PROCEDURE NOTES
Airway  Urgency: emergent    Airway not difficult    General Information and Staff    Patient location during procedure: ICU  Anesthesiologist: Moe Sanchez DO    Consent for Airway (if performed for an anesthetic, see related documentation for consents)  Patient identity confirmed: per hospital policy  Consent: The procedure was performed in an emergent situation. Verbal consent not obtained. Written consent not obtained.   Risks and benefits: risks, benefits and alternatives were not discussed      Code status verified:yes  Indications and Patient Condition  Indications for airway management: respiratory failure  Spontaneous ventilation: present  Preoxygenated: yes  Patient position: sniffing  Mask difficulty assessment: vent by bag mask    Final Airway Details  Final airway type: endotracheal airway      Successful airway: ETT  Cuffed: yes   Successful intubation technique: video laryngoscopy  Endotracheal tube insertion site: oral  Blade size: #3  ETT size (mm): 7.5  Cormack-Lehane Classification: grade I - full view of glottis  Placement verified by: chest auscultation and capnometry   Measured from: teeth  ETT to teeth (cm): 23  Number of attempts at approach: 1  Ventilation between attempts: bag mask    no

## 2022-02-14 NOTE — PROGRESS NOTES
Received report on Pt from night shift RN, Janina English. Pt is currently resting in bed with his eyes closed and is wearing Air Vo with the settings on it maxed out and he is also wearing a non-rebreather mask over the air vo. Pt is lethargic and is not really responding- he is only responding to painful stimuli. His O2 sats are staying around 81%. His respirations are slightly labored. His skin is warm and dry. Secured messaged [employer], Dr. Kandace Kan, and informed him of all of this and informed him that Pt is going to need to be intubated ASAP. Night Shift RN, Janina English, stated she talked with Pt's son last and he wants Pt to be intubated if he needs to be intubated. Completed the Pulnology consult and secured messaged the Pulmonologist on call, Dr. Lyle Gonzalez, per [employer] and informed him of Pt's current condition and that he needs to be intubated ASAP. Dr. Lyle Gonzalez has not read this RN's secure message to him on [employer] yet. Currently waiting to hear back from [address] [place] [address]. Called Respiratory Therapist and informed him that Pt is going to need to be intubated ASAP and he stated he is on his way now. This RN will remain at the bedside with Pt and wait until the Hospitalist & Respiratory Therapist arrive at the bedside.

## 2022-02-14 NOTE — PROGRESS NOTES
Patient examined at bedside for severe respiratory distress. HR 130s, laboured breathing and hypertension. RR in 45s and patient severely anxious. Alkalotic with PH of 7.45. Low bicarb. CXR worsening infiltrates. Family OK with intubation. Given ativan, morphine, lopressor and lasix. SPO2 improved to 90s on vapotherm. He appears more calmer. Patient is at high risk for intubation.

## 2022-02-14 NOTE — PROGRESS NOTES
Physical Therapy  Upon chart review, patient is now intubated as of today 2/14/21. Patient not appropriate today for therapy and will follow up with patient periodically to check on status.    Felicity Quispe [place]  9:58 AM  2/14/2022

## 2022-02-14 NOTE — PROGRESS NOTES
[address]  consulted by Dr. Omar Arredondo for monitoring and adjustment. Indication for treatment: Suspected sepsis 2/2 bacterial pneumonia  Goal trough: [] 10-15 mcg/mL or [x] 15-20 mcg/ml  AUC/BILL: [] <500 or [x] 400-600    Pertinent Laboratory Values:   Temp Readings from Last 3 Encounters:   02/13/22 100.5 °F (38.1 °C)   02/06/22 97.6 °F (36.4 °C) (Oral)   01/26/22 98.6 °F (37 °C) (Infrared)     Recent Labs     02/12/22  1620   WBC 21.2*     Recent Labs     02/12/22  1620   BUN 82*   CREATININE 2.3*     Estimated Creatinine Clearance: 29 mL/min (A) (based on SCr of 2.3 mg/dL (H)). Intake/Output Summary (Last 24 hours) at 2/13/2022 2133  Last data filed at 2/13/2022 1440  Gross per 24 hour   Intake --   Output 1450 ml   Net -1450 ml       Pertinent Cultures:  Date    Source    Results  02/13   MRSA Screen (Nasal) To be collected  02/13   Respiratory   To be collected  02/05   COVID-19, Rapid  Detected  02/05   Blood    NGTD           Assessment:  SCr = 2.3, BUN = 82, Output = 1,450 mL  CKD-4  Day(s) of therapy: 1  Vancomycin concentration:   02/15 - To be collected    Plan:  Vancomycin 2,000 mg IV initial dose; Plan for intermittent dosing based on levels  Pharmacy will continue to monitor patient and adjust therapy as indicated    VANCOMYCIN CONCENTRATION SCHEDULED FOR 02/15/2022 @ 6 AM    Thank you for the consult.   Damion Moreno [facility]  2/13/2022 9:33 PM

## 2022-02-14 NOTE — PROGRESS NOTES
Progress Note(  Agnes Son)  2/14/2022  Subjective:   Admit Date: 2/6/2022  PCP: Odell Mcdaniel MD    Admitted For :Shortness of breath, hypoxia and Covid pneumonia       Consulted For: Better control of blood glucose     Interval History:     Pt had [employer] rsponse call for Severe SOB. Reviewed notes . Pt was ultimately sedated intubated and placed on ventilator         Intake/Output Summary (Last 24 hours) at 2/14/2022 0614  Last data filed at 2/14/2022 0346  Gross per 24 hour   Intake    Output 1600 ml   Net -1600 ml       DATA    CBC:   Recent Labs     02/12/22  1620   WBC 21.2*   HGB 11.3*       CMP:  Recent Labs     02/12/22  1620      K 4.3   CL 95*   CO2 24   BUN 82*   CREATININE 2.3*   CALCIUM 7.9*   PROT 5.8*   LABALBU 3.3*   BILITOT 0.6   ALKPHOS 113   AST 22   ALT 43*     Lipids:   Lab Results   Component Value Date    CHOL 151 10/04/2021    CHOL 170 12/27/2016    HDL 41 10/04/2021    TRIG 148 10/04/2021     Glucose:  Recent Labs     02/13/22  2352 02/14/22  0217 02/14/22  0337   POCGLU 196* 166* 192*     [id number]:  Lab Results   Component Value Date    LABA1C 8.9 02/07/2022     High Sensitivity TSH:   Lab Results   Component Value Date    TSHHS 2.240 01/17/2022     Free T3: No results found for: FT3  Free T4:  Lab Results   Component Value Date    T4FREE 1.26 10/26/2021       XR CHEST PORTABLE   Final Result   Worsening bibasilar pulmonary infiltrates. XR CHEST PORTABLE   Final Result   1. Nonspecific pulmonary infiltrates can be seen with atypical/viral   pneumonia. 2. Left basilar consolidation and pleural effusion; superimposed   community-acquired or nosocomial pneumonia are considerations. Developing   ARDS is a consideration. 3. Calcific atherosclerosis aorta. 4. Cardiomegaly.          XR CHEST PORTABLE   Final Result   Stable chest.              Scheduled Medicines   Medications:    insulin glargine  40 Units SubCUTAneous Nightly    insulin NPH  25 Units SubCUTAneous QAM    furosemide  80 mg IntraVENous TID    cefepime  2,000 mg IntraVENous Q12H    vancomycin (VANCOCIN) intermittent dosing (placeholder)   Other See Admin Instructions    metoprolol succinate  50 mg Oral BID    dilTIAZem  120 mg Oral Daily    insulin lispro  20 Units SubCUTAneous TID WC    insulin lispro  0-18 Units SubCUTAneous TID WC    insulin lispro  0-18 Units SubCUTAneous 2 times per day    magic (miracle) mouthwash  5 mL Swish & Swallow TID    baricitinib  1 mg Oral Daily    benzonatate  100 mg Oral TID    guaiFENesin  200 mg Oral Q4H    sodium chloride flush  5-40 mL IntraVENous 2 times per day    dexamethasone  6 mg IntraVENous Q24H    apixaban  5 mg Oral BID    aspirin EC  81 mg Oral Daily    cyclobenzaprine  10 mg Oral Nightly    pantoprazole  40 mg Oral QAM AC    senna  1 tablet Oral BID      Infusions:    dextrose      dextrose      sodium chloride      dextrose           Objective:   Vitals: BP (!) 121/53   Pulse 117   Temp 98.9 °F (37.2 °C) (Oral)   Resp (!) 32   Ht 5' 7.99\" (1.727 m)   Wt 192 lb 14.4 oz (87.5 kg)   SpO2 (!) 80%   BMI 29.34 kg/m²   General appearance:  This is sedated intubated and on ventilator  Neck: no JVD or bruit  Thyroid : Normal lobes   Lungs: Has Vesicular Breath sounds some wheezing and some rales  Intubated and on ventilator  Heart:  regular rate and rhythm  Abdomen: soft, non-tender; bowel sounds normal; no masses,  no organomegaly  Musculoskeletal: Normal  Extremities: extremities normal, , no edema  Neurologic: Changes sedated intubated on ventilator    Assessment:     Patient Active Problem List:     Palpitations     PAF (paroxysmal atrial fibrillation) (HCC)     DJD (degenerative joint disease), multiple sites     History of colonic polyps     Family history of colon cancer     Statin intolerance     SOB (shortness of breath) on exertion     Type 2 diabetes mellitus with complication (HCC)     Essential hypertension Dyslipidemia     S/P CABG x 3     Anemia     Cardiomyopathy (HCC)     Chronic kidney disease (CKD) stage G4/A3, severely decreased glomerular filtration rate (GFR) between 15-29 mL/min/1.73 square meter and albuminuria creatinine ratio greater than 300 mg/g (HCC)     DM (diabetes mellitus) (HCC)     CAD (coronary artery disease)     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 Reviewed POC blood glucose . Labs and X ray results   2. On meal/ Correction bolus Humalog/ Basal Lantus Insulin regime /   3. Monitor Blood glucose frequently   4. Modified  the dose of Insulin/ other medicines as needed   5. Will follow     .      Queenie Daniel MD, MD

## 2022-02-14 NOTE — PROGRESS NOTES
IR is at the bedside to place a CVC in Pt. Pt's son and two other family members are out in the waiting room and Charge RN, Jourdan Ryder, went out and updated them on Pt's current status and informed them that Pt is now intubated and on the vent. Consent for CVC placement was obtained by IR from Pt's son.

## 2022-02-14 NOTE — PROGRESS NOTES
Comprehensive Nutrition Assessment    Type and Reason for Visit:  Reassess,Consult (TF ordering and management)    Nutrition Recommendations/Plan:     Will order vital high protein (peptide based high protein formula) with goal rate 60 mL/hr to provide: 1440 kcal (1857 kcal with current propofol rate), 126 grams of protein and 1203 mL free water. · EN at goal with current propofol rate will meet 90% estimated kcal and protein needs. Nutrition Assessment:  Pt now intubated and sedated on vent as of this morning. RDN consulted for EN order and management. Malnutrition Assessment:  Malnutrition Status:  Insufficient data    Context:  Acute Illness       Estimated Daily Nutrient Needs:  Energy (kcal):  ; Weight Used for Energy Requirements:  Current     Protein (g):  140+ (2+ g/kg IBW); Weight Used for Protein Requirements:  Ideal        Fluid (ml/day):  1 mL/kcal or per MD     Nutrition Related Findings:  Glucose rangin-290, A1C 8.9      Wounds:  Pressure Injury,Stage II       Current Nutrition Therapies:    Diet NPO  ADULT TUBE FEEDING; Orogastric; Peptide Based High Protein; Continuous; 10; Yes; 10; Q 4 hours; 60; 30; Q 4 hours  Current Tube Feeding (TF) Orders:  · Feeding Route: Orogastric  · Formula:  none     Additional Calorie Sources:   Propofol providing 417 lipid kcal    Anthropometric Measures:  · Height: 5' 7.99\" (172.7 cm)  · Current Body Weight: 192 lb 14.4 oz (87.5 kg) ()   · Admission Body Weight:  (n/a)    · Usual Body Weight: 187 lb 13.3 oz (85.2 kg) ((1/15/22)     · Ideal Body Weight: 154 lbs; % Ideal Body Weight 125.3 %   · BMI: 29.3  · BMI Categories: Overweight (BMI 25.0-29. 9)       Nutrition Diagnosis:   · Inadequate oral intake related to acute injury/trauma as evidenced by NPO or clear liquid status due to medical condition,intubation      Nutrition Interventions:   Food and/or Nutrient Delivery:  Start Tube Feeding  Nutrition Education/Counseling:  No recommendation at

## 2022-02-14 NOTE — CONSULTS
Pulmonary Consult Note      Reason for Consult: respiratory failure  Requesting Physician: RAMY Villatoro CNP  Subjective:   CHIEF COMPLAINT :SOB    Patient Active Problem List    Diagnosis Date Noted    Hypoxia 2022    Acute hypoxemic respiratory failure due to COVID-19 ([facility] 2022    Pneumonia due to COVID-19 virus 2022    Physical debility 2022    Debility 01/15/2022    Closed nondisplaced fracture of second metatarsal bone of left foot 2019    Closed nondisplaced fracture of fourth metatarsal bone of left foot 2019    Closed fracture of left ankle with routine healing 2018    Closed nondisplaced fracture of third metatarsal bone of left foot 2018    Proteinuria 01/10/2018    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 2017    Chronic kidney disease (CKD) stage G4/A3, severely decreased glomerular filtration rate (GFR) between 15-29 mL/min/1.73 square meter and albuminuria creatinine ratio greater than 300 mg/g ([facility] 2017    DM (diabetes mellitus) ([facility] 2017    CAD (coronary artery disease) 2017    Cardiomyopathy ([facility] 2017    S/P CABG x 3 10/25/2016     Overview Note:     10/3/16 LIMA-LAD, SVG-PDA, SVG-Cx + Maze+Appendage resection Dr Maher Peer Anemia 10/25/2016     Overview Note:     Post op      Essential hypertension      Overview Note:     On losartan 100 mg a day and Demadex 20 mg a day and amlodipine 5 mg a day and Coreg 12.5 twice a day      Dyslipidemia     Type 2 diabetes mellitus with complication (HCC)     SOB (shortness of breath) on exertion 2014    Statin intolerance 2014    History of colonic polyps 10/11/2013    Family history of colon cancer 10/11/2013     Overview Note:     Father  of a GI malignancy and a Niece  at age 39 of Colon CA      DJD (degenerative joint disease), multiple sites 2013    PAF (paroxysmal atrial fibrillation) [facility])      Overview Note:     S/p surgical Maze 10/2016      Palpitations 12/06/2012        HPI:                The patient is a 68 y.o. male with significant past medical history of atrial fibrillation, CHF, COPD, diabetes mellitus, CKD  presents with complaints of Weakness and back pain. He had KAREEM on CKD. He has been diagnosed with COVID-19. He was hypoxic in the 80's responded to oxygen was on the floor. But today he desaturated and had to be intubated. At this time he is on the vent and sedated. He is on Barcitinib, Decadron, Inhalers, Abx, diuresis. Past Medical History:      Diagnosis Date    Acid reflux     Arrhythmia     Arthritis     \"Back, Hands, Fingers\"    CAD (coronary artery disease)     09/2000 non-critical; 2004 no signigicante change, Sees Dr. Ewelina Enrique CH[facility]congestive heart failure) [facility])     Chronic back pain     CKD (chronic kidney disease) stage 3, GFR 30-59 ml/min ([facility] 12/03/2015    Sees Dr. Cleve Schneider COPD (chronic obstructive pulmonary disease) ([facility] 11/16/15    Diabetes mellitus ([facility] Dx 1990's    Glaucoma     \"Both Eyes\"    H/O 24 hour EKG monitoring 7/161/8,01/26/2017    Conclusion abnormal 48 hours of cardiac monitor finding consistent with sinus rhythm with physiological heart rate variation frequent complex ventricular ectopy. Patient did not report any symptoms for correlation    H/O cardiac catheterization 12/27/2009    Patent coronary arteries with ectasia and minimal coronary luminal irregularities and preserved left ventricular function.  H/O cardiovascular stress test 08/09/2018    Lexiscan Cardiolite. ECG portion of test is negative for ischemia, normal ventricular contractility, no infarct or ischemia noted, normal stress myocardial perfusion, EF 58%. Normal study.  H/O complete electrocardiogram 04/05/2012    H/O Doppler ultrasound 09/13/2007    Bormal study suggestive of lack of evidence of any significant peripheral arterial disease.  Patient'a symptoms are very suggestive of non-ischemic and non-vascular etiology. When compared to the previous study of 2005, ther is no significant change.  H/O Doppler ultrasound (Lower extremity) 01/30/2017    Native arteries in the examined lower extremity(ies) are patent, with no elevated velocities. No aneurysms are noted.  H/O percutaneous left heart catheterization 09/23/2016    Multivessel CAD with 100 % occluded RCA. 80 % to 90%  circumflex diffusely diseased. 80% mid LAD focal lesion.  Heat syncope 9/15/2016    [place] (hard of hearing)     Bilateral Ears    Hx of echocardiogram 2/6/19; 10/27/2016    2/6/19  LV function and size normal, mild concentric LVH, no regional wall motion abnormalities, normal diastolic filling pattern for age, mildly dilated LA, sclerotic but non-stenotic aortic valve, mitral annular calcification, RVSP= 27 mmHg, EF 55-60%.  Hyperlipidemia     Hypertension     Macular degeneration     Bilateral Eyes    Other activity(E029.9) 04/05/2012    48 Holter Monitor. Normal sinus rhythm with frequent low-grade supraventricular ectopy, without clinically significant arrhythmias.  PA[facility]oxysmal [facility] fibrillation) [facility]     PVD (peripheral vascular disease) ([facility]     S/P CABG x 3 10/25/2016    10/3/16 LIMA-LAD, SVG-PDA, SVG-Cx + Maze+Appendage resection Dr Elizabeth Ranks 2000's    Nose    SOB (shortness of breath) on exertion 8/12/2014    Teeth missing     Upper And Lower    Ventricular ectopy     supraventricular and ventricular ectopy    Wears dentures     Full Upper    Wears glasses       Past Surgical History:        Procedure Laterality Date    CARDIAC SURGERY  10/03/2016    CABG (3 Bypasses)    COLONOSCOPY  Last Done In 2000's    Polyps Removed In Past    CORONARY ARTERY BYPASS GRAFT  10/03/2016    Coronary Artery Bypass Graft x 3. LIMA to LAD. SVG to PDA. SVG to Cx. MVR with CG ring. LA appendage resection.  Epi and endocardial MAZE.    CYST REMOVAL 2000's    Back    DENTAL SURGERY      Teeth Extracted In Past    EYE SURGERY Bilateral 2015    Cataracts With Lens Implants    FRACTURE SURGERY Left 01/11/2016    Broken Left Hip With Hardware    OTHER SURGICAL HISTORY  10/03/2017    sternal plate    SKIN CANCER EXCISION  2000's    Nose    THORACENTESIS Left 10/06/2016    [facility]- Dr Yael Cheung     Current Medications:     albuterol sulfate HFA  4 puff Inhalation Q4H    And    ipratropium  4 puff Inhalation Q4H    chlorhexidine  15 mL Mouth/Throat BID    famotidine (PEPCID) injection  20 mg IntraVENous Daily    insulin glargine  40 Units SubCUTAneous Nightly    insulin NPH  25 Units SubCUTAneous QAM    furosemide  80 mg IntraVENous TID    cefepime  2,000 mg IntraVENous Q12H    vancomycin (VANCOCIN) intermittent dosing (placeholder)   Other See Admin Instructions    metoprolol succinate  50 mg Oral BID    dilTIAZem  120 mg Oral Daily    insulin lispro  20 Units SubCUTAneous TID WC    insulin lispro  0-18 Units SubCUTAneous TID WC    insulin lispro  0-18 Units SubCUTAneous 2 times per day    magic (miracle) mouthwash  5 mL Swish & Swallow TID    baricitinib  1 mg Oral Daily    benzonatate  100 mg Oral TID    guaiFENesin  200 mg Oral Q4H    sodium chloride flush  5-40 mL IntraVENous 2 times per day    dexamethasone  6 mg IntraVENous Q24H    apixaban  5 mg Oral BID    aspirin EC  81 mg Oral Daily    cyclobenzaprine  10 mg Oral Nightly    pantoprazole  40 mg Oral QAM AC    senna  1 tablet Oral BID     Allergies:    Social History:    TOBACCO:   reports that he quit smoking about 27 years ago. His smoking use included cigarettes. He started smoking about 57 years ago. He has a 60.00 pack-year smoking history. He has never used smokeless tobacco.  ETOH:   reports no history of alcohol use.   Patient currently lives independently    Family History:       Problem Relation Age of Onset    Early Death Mother 48        Liver Cancer    Cancer Mother Liver Cancer    Cancer Father         Prostate Cancer    Diabetes Father     Other Father         \"Black Lung\"    Cancer Sister         Breast Cancer    Breast Cancer Sister     Cancer Son         Prostate Cancer       REVIEW OF SYSTEMS:    CONSTITUTIONAL:  negative for fevers, chills, diaphoresis, activity change, appetite change, fatigue, night sweats and unexpected weight change. EYES:  negative for blurred vision, eye discharge, visual disturbance and icterus  HEENT:  negative for hearing loss, tinnitus, ear drainage, sinus pressure, nasal congestion, epistaxis and snoring  RESPIRATORY:  See HPI  CARDIOVASCULAR:  negative for chest pain, palpitations, exertional chest pressure/discomfort, edema, syncope  GASTROINTESTINAL:  negative for nausea, vomiting, diarrhea, constipation, blood in stool and abdominal pain  GENITOURINARY:  negative for frequency, dysuria, urinary incontinence, decreased urine volume, and hematuria  HEMATOLOGIC/LYMPHATIC:  negative for easy bruising, bleeding and lymphadenopathy  ALLERGIC/IMMUNOLOGIC:  negative for recurrent infections, angioedema, anaphylaxis and drug reactions  ENDOCRINE:  negative for weight changes and diabetic symptoms including polyuria, polydipsia and polyphagia  MUSCULOSKELETAL:  negative for  pain, joint swelling, decreased range of motion and muscle weakness  NEUROLOGICAL:  negative for headaches, slurred speech, unilateral weakness  PSYCHIATRIC/BEHAVIORAL: negative for hallucinations, behavioral problems, confusion and agitation.      Objective:   PHYSICAL EXAM:      VITALS:  BP (!) 120/50   Pulse 126   Temp 98.9 °F (37.2 °C) (Oral)   Resp (!) 31   Ht 5' 7.99\" (1.727 m)   Wt 192 lb 14.4 oz (87.5 kg)   SpO2 (!) 82%   BMI 29.34 kg/m²   24HR INTAKE/OUTPUT:      Intake/Output Summary (Last 24 hours) at 2/14/2022 1005  Last data filed at 2/14/2022 0346  Gross per 24 hour   Intake    Output 1600 ml   Net -1600 ml     CURRENT PULSE OXIMETRY:  SpO2: (!) 82 %  24HR PULSE OXIMETRY RANGE:  SpO2  Av.2 %  Min: 76 %  Max: 99 %    CONSTITUTIONAL:  On the vent and sedated  NECK:  Supple, symmetrical, trachea midline, no adenopathy, thyroid symmetric, not enlarged and no tenderness, skin normal  LUNGS: Occasional basal crackles  CARDIOVASCULAR:  normal S1 and S2, no edema and no JVD  ABDOMEN:  normal bowel sounds, non-distended and no masses palpated, and no tenderness to palpation. No hepatospleenomegaly  LYMPHADENOPATHY:  no axillary or supraclavicular adenopathy. No cervical adnenopathy  PSYCHIATRIC: Oriented to person place and time. No obvious depression or anxiety. MUSCULOSKELETAL: No obvious misalignment or effusion of the joints. No clubbing, cyanosis of the digits. SKIN:  normal skin color, texture, turgor and no redness, warmth, or swelling.  No palpable nodules    DATA:    Old records have been reviewed  CBC with Differential:    Lab Results   Component Value Date    WBC 21.2 2022    RBC 3.96 2022    HGB 11.3 2022    HCT 34.4 2022     2022    MCV 86.9 2022    MCH 28.5 2022    MCHC 32.8 2022    RDW 14.8 2022    SEGSPCT 87.7 2022    LYMPHOPCT 5.0 2022    MONOPCT 6.4 2022    BASOPCT 0.1 2022    MONOSABS 1.4 2022    LYMPHSABS 1.1 2022    EOSABS 0.0 2022    BASOSABS 0.0 2022    DIFFTYPE AUTOMATED DIFFERENTIAL 2022     BMP:    Lab Results   Component Value Date     2022    K 4.3 2022    CL 95 2022    CO2 24 2022    BUN 82 2022    CREATININE 2.3 2022    CALCIUM 7.9 2022    GFRAA 34 2022    LABGLOM 28 2022    GLUCOSE 258 2022     Hepatic Function Panel:    Lab Results   Component Value Date    ALKPHOS 113 2022    ALT 43 2022    AST 22 2022    PROT 5.8 2022    BILITOT 0.6 2022    BILIDIR 0.2 2016    IBILI 0.2 2016     ABG:    Lab Results   Component Value Date    BNC7CXO 28.2 02/14/2022    DML9XHU 33.0 02/14/2022    PO2ART 40 02/14/2022       Cultures:   Pending    Radiology Review:      Tracheostomy tube projects 2.6 cm above the karan.  Patchy consolidations in   the lung bases.  Possible small bilateral pleural effusions.  No   pneumothorax.  Enteric tube projects below the field of view           Assessment/Plan       Patient Active Problem List    Diagnosis Date Noted    Hypoxia 02/05/2022    Acute hypoxemic respiratory failure due to COVID-19 ([facility] 02/05/2022    Pneumonia due to COVID-19 virus 02/05/2022    Physical debility 01/19/2022    Debility 01/15/2022    Closed nondisplaced fracture of second metatarsal bone of left foot 01/23/2019    Closed nondisplaced fracture of fourth metatarsal bone of left foot 01/23/2019    Closed fracture of left ankle with routine healing 12/26/2018    Closed nondisplaced fracture of third metatarsal bone of left foot 12/26/2018    Proteinuria 01/10/2018    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 09/14/2017    Chronic kidney disease (CKD) stage G4/A3, severely decreased glomerular filtration rate (GFR) between 15-29 mL/min/1.73 square meter and albuminuria creatinine ratio greater than 300 mg/g ([facility] 03/08/2017    DM (diabetes mellitus) ([facility] 03/08/2017    CAD (coronary artery disease) 03/08/2017    Cardiomyopathy ([facility] 02/17/2017    S/P CABG x 3 10/25/2016     Overview Note:     10/3/16 LIMA-LAD, SVG-PDA, SVG-Cx + Maze+Appendage resection Dr Whitney Bigger Anemia 10/25/2016     Overview Note:     Post op      Essential hypertension      Overview Note:     On losartan 100 mg a day and Demadex 20 mg a day and amlodipine 5 mg a day and Coreg 12.5 twice a day      Dyslipidemia     Type 2 diabetes mellitus with complication (HCC)     SOB (shortness of breath) on exertion 08/12/2014    Statin intolerance 01/14/2014    History of colonic polyps 10/11/2013    Family history of colon cancer 10/11/2013     Overview Note:     Father  of a GI malignancy and a Niece  at age 39 of Colon CA      DJD (degenerative joint disease), multiple sites 2013    PAF (paroxysmal atrial fibrillation) (HCC)      Overview Note:     S/p surgical Maze 10/2016      Palpitations 2012     Acute Hypoxic resp failure  Bilateral Pneumonia Sec to COVID-19  VDRF  Leukocytosis  Moderate Mitral Stenosis  Afib rate controlled  CKD    PLAN  1. Rate control of Afib  2. Abxc  3. F/u C&S  4. CKD per renal  5. Diuresis  6. Inhalers  7. Barcitinib  8. Decadron  9. Insulin  10. Inhalers  11. Eliquis  12. DVT and GI Prophylaxis  13. CXR in am  15. SAT and SBT trial when stable  15. Tube feeds  16.  C/w present management    Electronically signed by Toshia Briceño MD on 2022 at 10:05 AM

## 2022-02-14 NOTE — CONSULTS
Palliative Care consult to establish goals of care,code status discussion and for family support. Patient is a 68 y.o. male with past medical history of COPD, DM II, CKD stage 4, CHF,AFIb, HTN, CAD,Hyperlipidemia,GERD,Glaucoma, and chronic back pain. S/P CABG in 2016. EF 22 50-55%. He presented to the [place] ED 22 with complaints of  back back and urination difficulty. Patient was admitted 1/15/22 after a fall with injury. He transferred to a swing bed for rehab in [place] 22. He was then discharged home with home health care 22. He was unable to complete his rehab commitment d/t his brother in-laws death/ . Patient was hypoxic requiring 4L nasal cannula while in [place] ED. He diagnosed with Covid Pneumonia and admitted to [facility]. He was having hyperglycemia requiring an insulin drip and changed to ICU 22. He is now off the insulin drip and on sliding scale and long acting coverage. Jaylan Vang is manageing blood glucose control. He was also diagnosed with KAREEM and is being seen by Wilfredo Carbajal. Karla Sánchez was consulted for elevated pro BNP and extensive cardiac history. Patient's saturations were maintaining on 4L and discharge planning was started with expected home O2 22. He was having sinus tachycardia with PAC's late after noon on 22 and required lasix and cardizem. 22 evening his respiratory status worsened and he was placed on 15L HF. He progressively worsened over night and was placed on vapotherm 100% FiO2 and 60L/min at 0320. Essence Murrell NP evaluated patient d/t his respiratory distress and discussed possible intubation with family. He was medicated with ativan,morphine, and lasix. His symptoms improved short term. His responsiveness declined and he ultimately required intubation this am at [address] [facility]. He is currently sedated on Full vent support AC/PC FIO2 100% and peep of 14. He is tachycardic but otherwise stable vital signs.        Conard Lesch approached me this am stating that patient's son was in here after intubation and is requesting to withdraw care. Romeo Kwong explained to patient's son Maria Elena Mcduffie patient's situation and asked that he give him a few more days.  asked that I please call Maria Elena Mcduffie. I have called and spoke with Maria Elena Mcduffie. Inquired about Luis's understanding of patient's medical condition. Maria Elena Mcduffie stated \" basically he's going to die. \" Maria Elena Mcduffie just lost his uncle to Covid and does not see much hope for his dad. I did discuss the severity of his dads health. Maria Elena Mcduffie reports that patient was living independently with supportive help from home health care. He stated that his dad was having ambulation/balance issues d/t back and leg pain. He was able to ambulate with a walker but had been falling. Patient does not have a living will or POA documents but Maria Elena Mcduffie is his only child. Luis's mother passed away and he also lost a child. I expressed my condolences to Maria Elena Mcduffie as he has suffered a lot of loss. He overall seems to be holding up okay considering. He and his dad have discussed ventilation and Maria Elena Mcduffie stated his dad only wanted to try a ventilator for a few days if needed. Maria Elena Mcduffie wants to respect his dads wishes and does not want him to be on the ventilator for a prolonged period. He stated he is willing to give him 2-3 days to try to recover. He inquired about comfort care. I explained compassionate extubation process as he was not interested in alternative options. He wanted to ensure his dad would not suffer. I discussed code status and explained FULL code to Maria Elena Mcduffie. Maria Elena Mcduffie stated that he had spoken with the doctor earlier about code status. He would like for his dad to remain a full code for now but asked \"Please be careful of his chest because his bones never healed right after his open heart surgery. \" I explained that with CPR there is always the potential for broken ribs and d/t compressions. Maria Elena Mcduffie stated he understands and would still like for his dad to have CPR if his heart stops. He would like to speak to me in a couple of days to discuss comfort care again. No further questions at this time. Will continue to follow and be supportive of patient and family needs.

## 2022-02-14 NOTE — PROGRESS NOTES
Hospitalist, Dr. Amy Grace, and Respiratory Therapist are at the bedside for Pt's intubation. Anesthesiology was secured messaged per [employer] and was informed that Pt needs to be intubated as soon as possible.

## 2022-02-14 NOTE — PLAN OF CARE
Problem: Airway Clearance - Ineffective  Goal: Achieve or maintain patent airway  Outcome: Ongoing     Problem: Gas Exchange - Impaired  Goal: Absence of hypoxia  Outcome: Ongoing  Goal: Promote optimal lung function  Outcome: Ongoing     Problem: Breathing Pattern - Ineffective  Goal: Ability to achieve and maintain a regular respiratory rate  Outcome: Ongoing     Problem:  Body Temperature -  Risk of, Imbalanced  Goal: Ability to maintain a body temperature within defined limits  Outcome: Ongoing  Goal: Will regain or maintain usual level of consciousness  Outcome: Ongoing  Goal: Complications related to the disease process, condition or treatment will be avoided or minimized  Outcome: Ongoing     Problem: Isolation Precautions - Risk of Spread of Infection  Goal: Prevent transmission of infection  Outcome: Ongoing     Problem: Nutrition Deficits  Goal: Optimize nutritional status  Outcome: Ongoing     Problem: Risk for Fluid Volume Deficit  Goal: Maintain normal heart rhythm  Outcome: Ongoing  Goal: Maintain absence of muscle cramping  Outcome: Ongoing  Goal: Maintain normal serum potassium, sodium, calcium, phosphorus, and pH  Outcome: Ongoing     Problem: Loneliness or Risk for Loneliness  Goal: Demonstrate positive use of time alone when socialization is not possible  Outcome: Ongoing     Problem: Fatigue  Goal: Verbalize increase energy and improved vitality  Outcome: Ongoing     Problem: Patient Education: Go to Patient Education Activity  Goal: Patient/Family Education  Outcome: Ongoing     Problem: Falls - Risk of:  Goal: Will remain free from falls  Description: Will remain free from falls  Outcome: Ongoing  Goal: Absence of physical injury  Description: Absence of physical injury  Outcome: Ongoing     Problem: Nutrition  Goal: Optimal nutrition therapy  Outcome: Ongoing     Problem: Skin Integrity:  Goal: Will show no infection signs and symptoms  Description: Will show no infection signs and symptoms  Outcome: Ongoing  Goal: Absence of new skin breakdown  Description: Absence of new skin breakdown  Outcome: Ongoing     Problem: Pain:  Description: Pain management should include both nonpharmacologic and pharmacologic interventions. Goal: Pain level will decrease  Description: Pain level will decrease  Outcome: Ongoing  Goal: Control of acute pain  Description: Control of acute pain  Outcome: Ongoing  Goal: Control of chronic pain  Description: Control of chronic pain  Outcome: Ongoing     Problem: Discharge Planning:  Goal: Participates in care planning  Description: Participates in care planning  Outcome: Ongoing  Goal: Discharged to appropriate level of care  Description: Discharged to appropriate level of care  Outcome: Ongoing     Problem: Anxiety/Stress:  Goal: Level of anxiety will decrease  Description: Level of anxiety will decrease  Outcome: Ongoing     Problem: Aspiration:  Goal: Absence of aspiration  Description: Absence of aspiration  Outcome: Ongoing     Problem:  Bowel Function - Altered:  Goal: Bowel elimination is within specified parameters  Description: Bowel elimination is within specified parameters  Outcome: Ongoing     Problem: Cardiac Output - Decreased:  Goal: Hemodynamic stability will improve  Description: Hemodynamic stability will improve  Outcome: Ongoing     Problem: Fluid Volume - Imbalance:  Goal: Absence of imbalanced fluid volume signs and symptoms  Description: Absence of imbalanced fluid volume signs and symptoms  Outcome: Ongoing     Problem: Nutrition Deficit:  Goal: Ability to achieve adequate nutritional intake will improve  Description: Ability to achieve adequate nutritional intake will improve  Outcome: Ongoing     Problem: Serum Glucose Level - Abnormal:  Goal: Ability to maintain appropriate glucose levels will improve to within specified parameters  Description: Ability to maintain appropriate glucose levels will improve to within specified parameters  Outcome: Ongoing     Problem: Tissue Perfusion - Cardiopulmonary, Altered:  Goal: Absence of angina  Description: Absence of angina  Outcome: Ongoing  Goal: Hemodynamic stability will improve  Description: Hemodynamic stability will improve  Outcome: Ongoing

## 2022-02-14 NOTE — CARE COORDINATION
Received call from [facility] patient's [address] from [place] [phone]. She confirmed that patient has [facility] and HHA's that come from [facility] and has [place] [facility]. CM provided update for her per her request and she voiced understanding. [facility] stated that patient's services can be put on hold for 90 days .  Case Management to follow

## 2022-02-14 NOTE — PROGRESS NOTES
Hospitalist Progress Note      Name:  Melissa Wise /Age/Sex: 1945  (68 y.o. male)   MRN & CSN:  [id number] & [id number] Admission Date/Time: 2022  7:43 PM   Location:  -A PCP: Carlie Dyer MD         Hospital Day: 9  Discharge barrier/Reason for continued hospitalization: need to get extubated    Assessment and Plan:   Melissa Wise is a 68 y.o.  male hypertension, chronic diastolic CHF, CKD 4 due to diabetic nephropathy, A. fib on chronic anticoagulation with Eliquis, type 2 diabetes, dyslipidemia, who presented to the ED on 2022 with shortness of breath and cough and was diagnosed with Pneumonia due to COVID-19 virus. He is fully vaccinated against Covid. He deteriorated  On 22 and night requiring AirVo at Fio2 % and endeded  Up getting intubated on 22. 1. Acute on chronic hypoxic respiratory faiure   Suspected  Multifactorial  Sepsis secondary to bacterial pneumonia/ARDS from COVID19  Suspect Gram negative/MRSA  Hypoxia worse and now on 10L o2 via NC (from 4L)> vent support @ Fio2 100% today  CXR with worsening infiltratesmart  Add IV cefepime and vanco>de-escalate as able  Check MRSA DNA probe  Wean vent per pharmacy  Pharmacy to dose vanco  Pul toilette      2. A fib, paroxysmal   Rate uncontrolled  C/w toprol xl bid  C/w cardizem with holding paramerters  Continue Eliquis. Cardio consulted on the board    3   Sepsis due to COVID-19 pneumonia: POA. - now resolved  Acute organ dysfunction as evidenced by acute hypoxic respiratory failure. CXR with patchy airspace disease bilaterally  On steroid  S/p baricitinib therapy  o2 was weaned to 4L via NC prior to this intubation, wean as able  Home o2 eval prior to discharge    4. COVID-19 acute hypoxic respiratory failure: POA. O2 sats <89% on room air  Currently on 4 L. Not on home O2  Regular Is use  Wean to RA  as tolerated    5.  DM2 with hyperosmolar state- improved  Hyperglycemia worsened by steroids  Off  insulin gtt. Transitioned to lantus and SSI  A1c 8.9 from 2/7/22  AG closed and BS well controlled currently  Endocrine following  Monitor and titrate  Diabetic teaching prior to discharge      6. Hypertension:   Continue Norvasc, Toprol-XL. Hold nephrotoxic agents  Monitor and itrate    7. CKD 4:   Due to diabetic nephropathy. Avoid nephrotoxic agents   Stable  monitor  Renal following     8. Sacral decub ulcer,stage II, POA  C/w local wound care        Disposition; TBD    Diet Diet NPO  ADULT TUBE FEEDING; Orogastric; Peptide Based High Protein; Continuous; 10; Yes; 10; Q 4 hours; 60; 30; Q 4 hours   DVT Prophylaxis [] Lovenox, []  Heparin, [] SCDs, [x] Ambulation. Eliquis   GI Prophylaxis [x] PPI,  [] H2 Blocker,  [] Carafate,  [x] Diet/Tube Feeds   Code Status Full Code   MDM [] Low, [] Moderate,[x]  High     Comments; son and sister was here this morning and updated about his condition. They do not want trach and PEG , will be interested in terminal extubation if he does not improve. History of Present Illness:     Chief Complaint: Pneumonia due to COVID-19 virus     Amilcar Marroquin is a 68 y.o.  male  who presents with shortness of breath and cough. Patient is seen and examined this noon  His breathing got worse over 24hrs and had Rapid response at 3:50Am , on vapotherm (Airvo 90%), then to 100% this AM  Lethargic and confused  Rapid response initiated and got intubated on ventiloar    Ten point ROS unable to do     Objective:        Intake/Output Summary (Last 24 hours) at 2/14/2022 1656  Last data filed at 2/14/2022 0346  Gross per 24 hour   Intake    Output 900 ml   Net -900 ml        Vitals:   Vitals:    02/14/22 1500 02/14/22 1506 02/14/22 1507 02/14/22 1508   BP: 108/60      Pulse: 106  124    Resp: 23 26 26 23   Temp:       TempSrc:       SpO2: 93% 93% 93% 93%   Weight:       Height:            Physical Exam:   GEN: Sedate on vent support, no distress  HEENT: Normal RESP: Diminished BS bilaterally. Symmetric chest movement while on o2 via NC  CVS: RRR, S1, S2  GI/: Abdomen is nontender, no organomegaly. . Bowel sounds normal, rectal exam deferred. MSK: No gross joint deformities. No tenderness  SKIN: Normal coloration, warm, dry. NEURO: limited due to sedation  PSYCH:  Limited due to sedation.     Medications:   Medications:    albuterol sulfate HFA  4 puff Inhalation Q4H    And    ipratropium  4 puff Inhalation Q4H    chlorhexidine  15 mL Mouth/Throat BID    famotidine (PEPCID) injection  20 mg IntraVENous Daily    sennosides  5 mL Oral BID    insulin glargine  40 Units SubCUTAneous Nightly    insulin NPH  25 Units SubCUTAneous QAM    furosemide  80 mg IntraVENous TID    cefepime  2,000 mg IntraVENous Q12H    vancomycin (VANCOCIN) intermittent dosing (placeholder)   Other See Admin Instructions    metoprolol succinate  50 mg Oral BID    dilTIAZem  120 mg Oral Daily    insulin lispro  20 Units SubCUTAneous TID WC    insulin lispro  0-18 Units SubCUTAneous TID WC    insulin lispro  0-18 Units SubCUTAneous 2 times per day    magic (miracle) mouthwash  5 mL Swish & Swallow TID    baricitinib  1 mg Oral Daily    benzonatate  100 mg Oral TID    guaiFENesin  200 mg Oral Q4H    sodium chloride flush  5-40 mL IntraVENous 2 times per day    dexamethasone  6 mg IntraVENous Q24H    apixaban  5 mg Oral BID    aspirin EC  81 mg Oral Daily    cyclobenzaprine  10 mg Oral Nightly    pantoprazole  40 mg Oral QAM AC      Infusions:    fentaNYL 25 mcg/hr (02/14/22 0905)    propofol 30 mcg/kg/min (02/14/22 1414)    dextrose      dextrose      sodium chloride      dextrose       PRN Meds: LORazepam, 0.5 mg, Q6H PRN  dextrose, 100 mL/hr, PRN  glucose, 15 g, PRN  dextrose, 12.5 g, PRN  glucagon (rDNA), 1 mg, PRN  dextrose, 100 mL/hr, PRN  benzocaine-menthol, 1 lozenge, Q2H PRN  sodium chloride flush, 5-40 mL, PRN  sodium chloride, 25 mL, PRN  ondansetron, 4 mg, Q8H PRN   Or  ondansetron, 4 mg, Q6H PRN  polyethylene glycol, 17 g, Daily PRN  acetaminophen, 650 mg, Q6H PRN   Or  acetaminophen, 650 mg, Q6H PRN  dextrose, 100 mL/hr, PRN  acetaminophen, 500 mg, Q4H PRN  albuterol sulfate HFA, 2 puff, Q4H PRN  dextrose bolus (hypoglycemia), 125 mL, PRN   Or  dextrose bolus (hypoglycemia), 250 mL, PRN      Recent Labs     02/12/22  1620 02/09/22  0830 02/07/22  2124   WBC 21.2* 8.4 6.0   HGB 11.3* 11.6* 11.1*   HCT 34.4* 35.9* 34.5*   MCV 86.9 88.9 90.6    217 179     Lab Results   Component Value Date     02/12/2022    K 4.3 02/12/2022    CL 95 02/12/2022    CO2 24 02/12/2022    BUN 82 02/12/2022    CREATININE 2.3 02/12/2022    GLUCOSE 258 02/12/2022    CALCIUM 7.9 02/12/2022      Lab Results   Component Value Date    INR 1.03 09/28/2017    INR 1.14 10/03/2016    INR 1.07 09/29/2016    PROTIME 11.7 09/28/2017    PROTIME 13.1 (H) 10/03/2016    PROTIME 12.2 09/29/2016           Electronically signed by Jessica Randle MD on 2/14/2022 at 4:56 PM

## 2022-02-14 NOTE — PROGRESS NOTES
Pt's CMP, CBC, & BNP have still not resulted. This RN then called Lab and talked with Chemistry. The lady in Chemistry stated Pt's CMP, CBC, & BNP have resulted. Pt's lab work results are not crossing over into Epic into his chart. Lab stated they will print out Pt's results and tube it to this RN. Called the IT Hotline phone number and informed them that Pt's lab work results are not crossing over into Epic into his chart. They took down Pt's information and stated they will call this RN or the Charge RN back in regards to this.  The ticket number for this is: [id number]

## 2022-02-14 NOTE — PROGRESS NOTES
Nephrology Progress Note  2/14/2022 4:37 PM  Subjective: Interval History: Aliya Mesa is a 68 y.o. male overnight got worse with his breathing and had to be intubated      Data:   Scheduled Meds:   albuterol sulfate HFA  4 puff Inhalation Q4H    And    ipratropium  4 puff Inhalation Q4H    chlorhexidine  15 mL Mouth/Throat BID    famotidine (PEPCID) injection  20 mg IntraVENous Daily    sennosides  5 mL Oral BID    insulin glargine  40 Units SubCUTAneous Nightly    insulin NPH  25 Units SubCUTAneous QAM    furosemide  80 mg IntraVENous TID    cefepime  2,000 mg IntraVENous Q12H    vancomycin (VANCOCIN) intermittent dosing (placeholder)   Other See Admin Instructions    metoprolol succinate  50 mg Oral BID    dilTIAZem  120 mg Oral Daily    insulin lispro  20 Units SubCUTAneous TID WC    insulin lispro  0-18 Units SubCUTAneous TID WC    insulin lispro  0-18 Units SubCUTAneous 2 times per day    magic (miracle) mouthwash  5 mL Swish & Swallow TID    baricitinib  1 mg Oral Daily    benzonatate  100 mg Oral TID    guaiFENesin  200 mg Oral Q4H    sodium chloride flush  5-40 mL IntraVENous 2 times per day    dexamethasone  6 mg IntraVENous Q24H    apixaban  5 mg Oral BID    aspirin EC  81 mg Oral Daily    cyclobenzaprine  10 mg Oral Nightly    pantoprazole  40 mg Oral QAM AC     Continuous Infusions:   fentaNYL 25 mcg/hr (02/14/22 0905)    propofol 30 mcg/kg/min (02/14/22 1414)    dextrose      dextrose      sodium chloride      dextrose           CBC   Recent Labs     02/12/22  1620   WBC 21.2*   HGB 11.3*   HCT 34.4*         BMP   Recent Labs     02/12/22  1620      K 4.3   CL 95*   CO2 24   BUN 82*   CREATININE 2.3*     Hepatic:   Recent Labs     02/12/22  1620   AST 22   ALT 43*   BILITOT 0.6   ALKPHOS 113     Troponin: No results for input(s): TROPONINI in the last 72 hours. BNP: No results for input(s): BNP in the last 72 hours.   Lipids: No results for input(s): CHOL, HDL in the last 72 hours. Invalid input(s): LDLCALCU  ABGs:   Lab Results   Component Value Date    PO2ART 40 02/14/2022    DXZ6SIY 33.0 02/14/2022     INR: No results for input(s): INR in the last 72 hours. Renal Labs  Albumin:    Lab Results   Component Value Date    LABALBU 3.3 02/12/2022     Calcium:    Lab Results   Component Value Date    CALCIUM 7.9 02/12/2022     Phosphorus:    Lab Results   Component Value Date    PHOS 4.5 02/09/2022     U/A:    Lab Results   Component Value Date    NITRU NEGATIVE 02/05/2022    COLORU YELLOW 02/05/2022    WBCUA 5 TO 9 02/05/2022    RBCUA RARE 02/05/2022    MUCUS NEGATIVE 03/29/2021    TRICHOMONAS NONE SEEN 09/28/2017    YEAST OCCASIONAL 02/13/2016    BACTERIA MODERATE 02/05/2022    CLARITYU CLOUDY 02/05/2022    SPECGRAV 1.032 02/05/2022    UROBILINOGEN 0.2 02/05/2022    BILIRUBINUR NEGATIVE 02/05/2022    BLOODU TRACE 02/05/2022    KETUA NEGATIVE 02/05/2022     ABG:    Lab Results   Component Value Date    SBK1WUK 33.0 02/14/2022    PO2ART 40 02/14/2022    HON1VOC 28.2 02/14/2022     HgBA1c:    Lab Results   Component Value Date    LABA1C 8.9 02/07/2022     Microalbumen/Creatinine ratio:  No components found for: RUCREAT  TSH:  No results found for: TSH  IRON:    Lab Results   Component Value Date    IRON 66 12/30/2016     Iron Saturation:  No components found for: PERCENTFE  TIBC:    Lab Results   Component Value Date    TIBC 357 12/30/2016     FERRITIN:    Lab Results   Component Value Date    FERRITIN 53 12/30/2016     RPR:  No results found for: RPR  KATHRYN:  No results found for: ANATITER, KATHRYN  24 Hour Urine for Creatinine Clearance:  No components found for: CREAT4, UHRS10, UTV10      Objective:   I/O: 02/13 0701 - 02/14 0700  In: -   Out: 1600 [Urine:1600]  I/O last 3 completed shifts:  In: -   Out: 2350 [Urine:2350]  No intake/output data recorded.   Vitals: /60   Pulse 124   Temp 100.2 °F (37.9 °C) (Oral)   Resp 23   Ht 5' 7.99\" (1.727 m) Wt 192 lb 14.4 oz (87.5 kg)   SpO2 93%   BMI 29.34 kg/m²  {  General appearance: Sedated ET tube  HEENT: Head: Normal, normocephalic, atraumatic.   Neck: supple, symmetrical, trachea midline  Lungs: diminished breath sounds bilaterally  Heart: S1, S2 normal  Abdomen: abnormal findings:  soft nt  Extremities: edema trace  Neurologic: Mental status: alertness: Sedated        Assessment and Plan:      IMP:  1 CKD 4 with acute renal failure from ATN  #2 congestive heart failure with coronary disease  #3 COVID-19 positive  #4 type 2 diabetes      Plan     #1 not had repeat labs we will try to order again now  #2 monitor with congestion try to increase diuresis monitor on the ventilator and wean as able  #3 treat above for Covid  #4 monitor glucose  We will follow             Kevan Hawk MD, MD

## 2022-02-14 NOTE — PROGRESS NOTES
PROCEDURE PERFORMED: Central line placement    PRIMARY INDICATION FOR PROCEDURE:  Medication administration    IR IN THE ROOM AT WHAT TIME:    0900                         INFORMED CONSENT:   Pt unable to sign consent, Tanesha Reilly gave telephone consent with Dr. Raymundo Waldrop and Naomy Banks RN, Ramandeep Hernandez RN Their phone number [phone]. Consent placed in chart. ALEC SCORE PRE PROCEDURE:  2    NURSING ASSESSMENT: Intubated    TIME OUT COMPLETE: 0904    BARRIER PRECAUTIONS & STERILE TECHNIQUE  Pt transferred to the table and positioned for comfort. Warm blankets given. Pt placed on Cardiac Monitor. Pt in the supine position. Pt prepped with chlorhexadine and draped in a sterile fashion.      PAIN/LOCAL ANESTHESIA/SEDATION MANAGEMENT:  Local lidocaine    INTRAOPERATIVE:    ACCESS TIME: 0905  US IMAGES:4  WIRE USED:  SHEATH USED:   CATHETER USED:  FINAL IMAGE TAKEN TO CONFIRM PLACEMENT OF: CATHETER  CONTRAST/CC:   FLUID RETURN: YES    STERILE DRESSINGS:  APPLIED BY SEVERIANO ALEJANDRO    SPECIMENS: NA    EBL: <1CC    FOLLOW- UP X-RAY: YES    COMPLICATIONS: NA    STAFF PRESENT DURING PROCEDURE: Dr Itzel Chase RN, Yadi ALEJANDRO     ALEC SCORE POST PROCEDURE: 2    REPORT CALLED TO: RN    PT LEFT ROOM AT WHAT TIME: 4029 high blood sugars, N/V, numbness and tingling in extremities

## 2022-02-15 NOTE — PROGRESS NOTES
Message sent to [facility], [place] NP verifying metoprolol should be given despite patients blood pressure 97/53. Orders to give metoprolol 50mg.        Jaylen Joya RN

## 2022-02-15 NOTE — PROGRESS NOTES
02/15/22 0706   Vent Information   $Ventilation $Subsequent Day   Equipment Changed HME   Vent Type 980   Vent Mode AC/PC   Vt Ordered 0 mL   Pressure Ordered 25   Rate Set 18 bmp   Peak Flow 0 L/min   Pressure Support 0 cmH20   FiO2  100 %   Sensitivity 3   PEEP/CPAP 14   I Time/ I Time % 1 s   Humidification Source HME   Vent Patient Data   High Peep/I Pressure 25   Peak Inspiratory Pressure 40 cmH2O   Mean Airway Pressure 22 cmH20   Rate Measured 18 br/min   Vt Exhaled 721 mL   Minute Volume 13 Liters   I:E Ratio 1:2.30   Plateau Pressure 36 TGE31   Static Compliance 32 mL/cmH2O   Total PEEP 14 cmH20   Auto PEEP 0.2 cmH20   Cough/Sputum   Sputum How Obtained Endotracheal   $Obtained Sample $Induced Sputum   Sputum Amount Moderate   Sputum Color Creamy   Tenacity Thick   Spontaneous Breathing Trial (SBT) RT Doc   Pulse 98   Additional Respiratory  Assessments   Resp 18   Alarm Settings   High Pressure Alarm 50 cmH2O   Low Minute Volume Alarm 2.5 L/min   Apnea (secs) 20 secs   High Respiratory Rate 40 br/min   Low Exhaled Vt  250 mL   ETT (adult)   Placement Date/Time: 02/14/22 (c) 0686   Preoxygenation: Yes  Mask Ventilation: Ventilated by mask (1)  Technique: Video laryngoscopy  Tube Size: 7.5 mm  Blade Size: 3  Location: Oral  Grade View: Full view of the glottis  Insertion attempts: 1  Place. ..    Secured at 26 cm   Measured From Teeth   ET Placement Right   Secured By Commercial tube low   Site Condition Dry

## 2022-02-15 NOTE — PROGRESS NOTES
[address] [facility]  consulted by Dr. Charles Fuller for monitoring and adjustment. Indication for treatment: Suspected sepsis 2/2 bacterial pneumonia  Goal trough: [] 10-15 mcg/mL or [x] 15-20 mcg/ml  AUC/BILL: [] <500 or [x] 400-600    Pertinent Laboratory Values:   Temp Readings from Last 3 Encounters:   02/15/22 98.8 °F (37.1 °C) (Rectal)   02/06/22 97.6 °F (36.4 °C) (Oral)   01/26/22 98.6 °F (37 °C) (Infrared)     Recent Labs     02/12/22  1620 02/14/22  1849   WBC 21.2* 10.7*     Recent Labs     02/12/22  1620 02/14/22  1849 02/15/22  0538   BUN 82* 97* 114*   CREATININE 2.3* 3.6* 4.4*     Estimated Creatinine Clearance: 15 mL/min (A) (based on SCr of 4.4 mg/dL (H)).     Intake/Output Summary (Last 24 hours) at 2/15/2022 1316  Last data filed at 2/15/2022 1158  Gross per 24 hour   Intake 528.26 ml   Output 590 ml   Net -61.74 ml       Pertinent Cultures:  Date    Source    Results  02/14   MRSA Screen (Nasal)  Pending  02/13   Respiratory   Ordered  02/05   COVID-19, Rapid  Detected  02/05   Blood    NGTD           Assessment:  · KAREEM on CKD 4, Scr trending up  · Day(s) of therapy: 2  · Vancomycin concentration:   · 02/15: 18.4, appropriate to re-dose    Plan:  · Intermittent vancomycin dosing based on levels  · Based on level, plan to re-dose with vancomycin 1250 mg x1  · Repeat level in 2 days  · MRSA nasal ordered to aid in de-escalation  · Pharmacy will continue to monitor patient and adjust therapy as indicated    VANCOMYCIN CONCENTRATION SCHEDULED FOR 02/17/2022 @ 0600    Thank you for the consult,  Karina Ring [facility], PharmD  2/15/2022 1:16 PM

## 2022-02-15 NOTE — PROGRESS NOTES
Hospitalist Progress Note      Name:  Lisy Polanco /Age/Sex: 1945  (68 y.o. male)   MRN & CSN:  [id number] & [id number] Admission Date/Time: 2022  7:43 PM   Location:  -A PCP: Basia Christina MD         Hospital Day: 10  Discharge barrier/Reason for continued hospitalization: need to get extubated    Assessment and Plan:   Lisy Polanco is a 68 y.o.  male hypertension, chronic diastolic CHF, CKD 4 due to diabetic nephropathy, A. fib on chronic anticoagulation with Eliquis, type 2 diabetes, dyslipidemia, who presented to the ED on 2022 with shortness of breath and cough and was diagnosed with Pneumonia due to COVID-19 virus. He is fully vaccinated against Covid. He deteriorated  On 22 and night requiring AirVo at Fio2 % and endeded  Up getting intubated on 22. 1. Acute on chronic hypoxic respiratory faiure   Suspected  Multifactorial  Sepsis secondary to bacterial pneumonia/ARDS from [facility] 854 Gram negative/MRSA  Got intubated on  and remains on  vent support @ Fio2 100%>45% today  On empiric  IV cefepime and vanco>de-escalate as able  Wean vent per pharmacy  Pharmacy to dose vanco  Pul toilette  Pulmonary following      2. A fib, paroxysmal   Rate uncontrolled  BB with holding parameters  cardizem changed to digoxin by cardio  Continue Eliquis. telemonitor  Cardio consulted on the board    3   Sepsis due to COVID-19 pneumonia: POA. - now resolved  Acute organ dysfunction as evidenced by acute hypoxic respiratory failure. CXR with patchy airspace disease bilaterally  On steroid  S/p baricitinib therapy  o2 was weaned to 4L via NC prior to this intubation, wean as able  Home o2 eval prior to discharge  Pul evnkat    4. COVID-19 acute hypoxic respiratory failure: POA. O2 sats <89% on room air  Currently on 4 L. Not on home O2  Regular Is use  Wean to RA  as tolerated    5. DM2 with hyperosmolar state- improved  Off  insulin gtt. Transitioned to lantus and SSI  A1c 8.9 from 2/7/22  AG closed and BS well controlled currently  Endocrine following  Monitor and titrate  Diabetic teaching prior to discharge      6. Hypertension: On meds   Hold nephrotoxic agents  Monitor and itrate    7. KAREEM on CKD 4:   Due to diabetic nephropathy. Creatinine 2.5 at baseline, creatinien 4.4 from 3.6  Likely cardiorenal, cannot exclude ATN  Gentle hydration  Avoid nephrotoxic agents   monitor  Renal following     8. Sacral decub ulcer,stage II, POA  C/w local wound care        Disposition; TBD    Diet Diet NPO  ADULT TUBE FEEDING; Orogastric; Peptide Based High Protein; Continuous; 10; Yes; 10; Q 4 hours; 60; 30; Q 4 hours   DVT Prophylaxis [] Lovenox, []  Heparin, [] SCDs, [x] Ambulation. Eliquis   GI Prophylaxis [x] PPI,  [] H2 Blocker,  [] Carafate,  [x] Diet/Tube Feeds   Code Status Full Code, consult palliative on the board   MDM [] Low, [] Moderate,[x]  High     Comments; son and sister  updated about his condition yesterday. They do not want trach and PEG , will be interested in terminal extubation if he does not improve. History of Present Illness:     Chief Complaint: Pneumonia due to COVID-19 virus     Guy Craven is a 68 y.o.  male  who presents with acute hypoxic respiratory failure    Patient is seen and examined this noon  Got intubated s/p rapid response on 2/14  Fio2 weaned to 45% today from 100%  Remains sedated, Urine output less and creatinine jumpted >4      Ten point ROS unable to do     Objective:        Intake/Output Summary (Last 24 hours) at 2/15/2022 1446  Last data filed at 2/15/2022 1158  Gross per 24 hour   Intake 528.26 ml   Output 590 ml   Net -61.74 ml        Vitals:   Vitals:    02/15/22 1109 02/15/22 1110 02/15/22 1200 02/15/22 1330   BP:   (!) 93/55 111/67   Pulse: 82  81 89   Resp: 18 18 18 18   Temp:   98.8 °F (37.1 °C)    TempSrc:   Rectal    SpO2: 100% 100% 93% 96%   Weight:       Height:            Physical Exam:   GEN: Sedated on vent support, no distress  HEENT: Normal   RESP: Diminished BS bilaterally. Symmetric chest movement while on o2 via NC  CVS: RRR, S1, S2  GI/: Abdomen is nontender, no organomegaly. . Bowel sounds normal, rectal exam deferred. MSK: No gross joint deformities. No tenderness  SKIN: Normal coloration, warm, dry. NEURO: limited due to sedation  PSYCH:  Limited due to sedation.     Medications:   Medications:    pantoprazole  40 mg IntraVENous Daily    digoxin  125 mcg IntraVENous Daily    [START ON 2/16/2022] cefepime  1,000 mg IntraVENous Q24H    albuterol sulfate HFA  4 puff Inhalation Q4H    And    ipratropium  4 puff Inhalation Q4H    chlorhexidine  15 mL Mouth/Throat BID    famotidine (PEPCID) injection  20 mg IntraVENous Daily    sennosides  5 mL Oral BID    insulin glargine  20 Units SubCUTAneous Nightly    insulin lispro  0-12 Units SubCUTAneous Q4H    [Held by provider] insulin NPH  25 Units SubCUTAneous QAM    vancomycin (VANCOCIN) intermittent dosing (placeholder)   Other See Admin Instructions    metoprolol succinate  50 mg Oral BID    [Held by provider] insulin lispro  20 Units SubCUTAneous TID WC    magic (miracle) mouthwash  5 mL Swish & Swallow TID    baricitinib  1 mg Oral Daily    guaiFENesin  200 mg Oral Q4H    sodium chloride flush  5-40 mL IntraVENous 2 times per day    dexamethasone  6 mg IntraVENous Q24H    apixaban  5 mg Oral BID    aspirin EC  81 mg Oral Daily    cyclobenzaprine  10 mg Oral Nightly      Infusions:    norepinephrine Stopped (02/15/22 1149)    fentaNYL 25 mcg/hr (02/15/22 0432)    propofol 40 mcg/kg/min (02/15/22 1235)    dextrose      dextrose      sodium chloride Stopped (02/14/22 2219)    dextrose       PRN Meds: LORazepam, 0.5 mg, Q6H PRN  dextrose, 100 mL/hr, PRN  glucose, 15 g, PRN  dextrose, 12.5 g, PRN  glucagon (rDNA), 1 mg, PRN  dextrose, 100 mL/hr, PRN  benzocaine-menthol, 1 lozenge, Q2H PRN  sodium chloride flush, 5-40 mL, PRN  sodium chloride, 25 mL, PRN  ondansetron, 4 mg, Q8H PRN   Or  ondansetron, 4 mg, Q6H PRN  polyethylene glycol, 17 g, Daily PRN  acetaminophen, 650 mg, Q6H PRN   Or  acetaminophen, 650 mg, Q6H PRN  dextrose, 100 mL/hr, PRN  acetaminophen, 500 mg, Q4H PRN  albuterol sulfate HFA, 2 puff, Q4H PRN  dextrose bolus (hypoglycemia), 125 mL, PRN   Or  dextrose bolus (hypoglycemia), 250 mL, PRN      Recent Labs     02/14/22  1849 02/12/22  1620 02/09/22  0830   WBC 10.7* 21.2* 8.4   HGB 9.3* 11.3* 11.6*   HCT 29.5* 34.4* 35.9*   MCV 88.6 86.9 88.9    423 217     Lab Results   Component Value Date     02/15/2022    K 4.3 02/15/2022    CL 93 02/15/2022    CO2 22 02/15/2022     02/15/2022    CREATININE 4.4 02/15/2022    GLUCOSE 176 02/15/2022    CALCIUM 7.6 02/15/2022      Lab Results   Component Value Date    INR 1.03 09/28/2017    INR 1.14 10/03/2016    INR 1.07 09/29/2016    PROTIME 11.7 09/28/2017    PROTIME 13.1 (H) 10/03/2016    PROTIME 12.2 09/29/2016           Electronically signed by Sammy Conway MD on 2/15/2022 at 2:46 PM

## 2022-02-15 NOTE — PROGRESS NOTES
Nephrology Progress Note  2/15/2022 10:26 AM        Subjective:   Admit Date: 2/6/2022  PCP: Ruben Troncoso MD    Interval History: Events for the last 3 days, levels: Reviewed  He was intubated yesterday, also had A. fib with rapid ventricular rate    Diet: None yet    ROS: He is mechanically intubated with assist control mode, FiO2 75%, PEEP of 14 and peak pressure of 40  Urine output 1.6 L for the last 24 hours  Low-grade fever, T-max was 100.2 on 2/14/2022  No vasopressor  Sedated with appropriate dose of propofol and fentanyl    Data:     Current meds:    pantoprazole  40 mg IntraVENous Daily    albuterol sulfate HFA  4 puff Inhalation Q4H    And    ipratropium  4 puff Inhalation Q4H    chlorhexidine  15 mL Mouth/Throat BID    famotidine (PEPCID) injection  20 mg IntraVENous Daily    sennosides  5 mL Oral BID    insulin glargine  20 Units SubCUTAneous Nightly    insulin lispro  0-12 Units SubCUTAneous Q4H    [Held by provider] insulin NPH  25 Units SubCUTAneous QAM    furosemide  80 mg IntraVENous TID    cefepime  2,000 mg IntraVENous Q12H    vancomycin (VANCOCIN) intermittent dosing (placeholder)   Other See Admin Instructions    metoprolol succinate  50 mg Oral BID    dilTIAZem  120 mg Oral Daily    [Held by provider] insulin lispro  20 Units SubCUTAneous TID WC    magic (miracle) mouthwash  5 mL Swish & Swallow TID    baricitinib  1 mg Oral Daily    benzonatate  100 mg Oral TID    guaiFENesin  200 mg Oral Q4H    sodium chloride flush  5-40 mL IntraVENous 2 times per day    dexamethasone  6 mg IntraVENous Q24H    apixaban  5 mg Oral BID    aspirin EC  81 mg Oral Daily    cyclobenzaprine  10 mg Oral Nightly      norepinephrine      fentaNYL 25 mcg/hr (02/15/22 0432)    propofol 40 mcg/kg/min (02/15/22 1236)    dextrose      dextrose      sodium chloride Stopped (02/14/22 9549)    dextrose           I/O last 3 completed shifts:   In: 528.3 [I.V.:382.1; IV Piggyback:146.1]  Out: labile kidney function  8.  Follow clinically and biochemically      Anastasiya Kahn MD MD

## 2022-02-15 NOTE — PLAN OF CARE
Problem: Airway Clearance - Ineffective  Goal: Achieve or maintain patent airway  Outcome: Ongoing     Problem: Gas Exchange - Impaired  Goal: Absence of hypoxia  Outcome: Ongoing  Goal: Promote optimal lung function  Outcome: Ongoing     Problem: Breathing Pattern - Ineffective  Goal: Ability to achieve and maintain a regular respiratory rate  Outcome: Ongoing     Problem:  Body Temperature -  Risk of, Imbalanced  Goal: Ability to maintain a body temperature within defined limits  Outcome: Ongoing  Goal: Will regain or maintain usual level of consciousness  Outcome: Ongoing  Goal: Complications related to the disease process, condition or treatment will be avoided or minimized  Outcome: Ongoing     Problem: Isolation Precautions - Risk of Spread of Infection  Goal: Prevent transmission of infection  Outcome: Ongoing     Problem: Nutrition Deficits  Goal: Optimize nutritional status  Outcome: Ongoing     Problem: Risk for Fluid Volume Deficit  Goal: Maintain normal heart rhythm  Outcome: Ongoing  Goal: Maintain absence of muscle cramping  Outcome: Ongoing  Goal: Maintain normal serum potassium, sodium, calcium, phosphorus, and pH  Outcome: Ongoing     Problem: Loneliness or Risk for Loneliness  Goal: Demonstrate positive use of time alone when socialization is not possible  Outcome: Ongoing     Problem: Fatigue  Goal: Verbalize increase energy and improved vitality  Outcome: Ongoing     Problem: Patient Education: Go to Patient Education Activity  Goal: Patient/Family Education  Outcome: Ongoing     Problem: Falls - Risk of:  Goal: Will remain free from falls  Description: Will remain free from falls  Outcome: Ongoing  Goal: Absence of physical injury  Description: Absence of physical injury  Outcome: Ongoing     Problem: Nutrition  Goal: Optimal nutrition therapy  Outcome: Ongoing     Problem: Skin Integrity:  Goal: Will show no infection signs and symptoms  Description: Will show no infection signs and symptoms  Outcome: Ongoing  Goal: Absence of new skin breakdown  Description: Absence of new skin breakdown  Outcome: Ongoing     Problem: Pain:  Goal: Pain level will decrease  Description: Pain level will decrease  Outcome: Ongoing  Goal: Control of acute pain  Description: Control of acute pain  Outcome: Ongoing  Goal: Control of chronic pain  Description: Control of chronic pain  Outcome: Ongoing     Problem: Discharge Planning:  Goal: Participates in care planning  Description: Participates in care planning  Outcome: Ongoing  Goal: Discharged to appropriate level of care  Description: Discharged to appropriate level of care  Outcome: Ongoing     Problem: Anxiety/Stress:  Goal: Level of anxiety will decrease  Description: Level of anxiety will decrease  Outcome: Ongoing     Problem: Aspiration:  Goal: Absence of aspiration  Description: Absence of aspiration  Outcome: Ongoing     Problem:  Bowel Function - Altered:  Goal: Bowel elimination is within specified parameters  Description: Bowel elimination is within specified parameters  Outcome: Ongoing     Problem: Cardiac Output - Decreased:  Goal: Hemodynamic stability will improve  Description: Hemodynamic stability will improve  Outcome: Ongoing     Problem: Fluid Volume - Imbalance:  Goal: Absence of imbalanced fluid volume signs and symptoms  Description: Absence of imbalanced fluid volume signs and symptoms  Outcome: Ongoing     Problem: Nutrition Deficit:  Goal: Ability to achieve adequate nutritional intake will improve  Description: Ability to achieve adequate nutritional intake will improve  Outcome: Ongoing     Problem: Serum Glucose Level - Abnormal:  Goal: Ability to maintain appropriate glucose levels will improve to within specified parameters  Description: Ability to maintain appropriate glucose levels will improve to within specified parameters  Outcome: Ongoing     Problem: Tissue Perfusion - Cardiopulmonary, Altered:  Goal: Absence of

## 2022-02-15 NOTE — PROGRESS NOTES
Pulmonary and Critical Care  Progress Note      VITALS:  /60   Pulse 98   Temp 98.4 °F (36.9 °C) (Rectal)   Resp 18   Ht 5' 7.99\" (1.727 m)   Wt 192 lb 14.4 oz (87.5 kg)   SpO2 100%   BMI 29.34 kg/m²     Subjective:   CHIEF COMPLAINT :SOB     HPI:                The patient is on the vent and sedated. He is on 75% fio2 sats 100%. He has minimal response to painful stimuli    Objective:   PHYSICAL EXAM:    LUNGS:Occasional basal crackles  Abd-distended, soft, BS+,NT  Ext- no pedal edema  CVS-s1s2, no murmurs      DATA:    CBC:  Recent Labs     02/12/22  1620 02/14/22  1849   WBC 21.2* 10.7*   RBC 3.96* 3.33*   HGB 11.3* 9.3*   HCT 34.4* 29.5*    242   MCV 86.9 88.6   MCH 28.5 27.9   MCHC 32.8 31.5*   RDW 14.8 15.3*   SEGSPCT 87.7* 78.0*   BANDSPCT  --  8      BMP:  Recent Labs     02/12/22  1620 02/14/22  1849 02/15/22  0538    138 136   K 4.3 4.3 4.3   CL 95* 96* 93*   CO2 24 24 22   BUN 82* 97* 114*   CREATININE 2.3* 3.6* 4.4*   CALCIUM 7.9* 7.5* 7.6*   GLUCOSE 258* 78 176*      ABG:  Recent Labs     02/14/22  0245 02/14/22  0615 02/15/22  0600   PH 7.48* 7.54* 7.54*   PO2ART 37* 40* 180*   OZW9PWW 23.0* 33.0 25.0*   O2SAT 74.9* 79.9* 96.4     BNP  Lab Results   Component Value Date    BNP 79.22 11/25/2015      D-Dimer:  Lab Results   Component Value Date    DDIMER 2682 (H) 02/08/2022      1.  Radiology: Bibasilar pulmonary infiltrates without significant change      Assessment/Plan     Patient Active Problem List    Diagnosis Date Noted    Hypoxia 02/05/2022    Acute hypoxemic respiratory failure due to COVID-19 [facility]) 02/05/2022    Pneumonia due to COVID-19 virus 02/05/2022    Physical debility 01/19/2022    Debility 01/15/2022    Closed nondisplaced fracture of second metatarsal bone of left foot 01/23/2019    Closed nondisplaced fracture of fourth metatarsal bone of left foot 01/23/2019    Closed fracture of left ankle with routine healing 12/26/2018    Closed nondisplaced fracture of third metatarsal bone of left foot 2018    Proteinuria 01/10/2018    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 2017    Chronic kidney disease (CKD) stage G4/A3, severely decreased glomerular filtration rate (GFR) between 15-29 mL/min/1.73 square meter and albuminuria creatinine ratio greater than 300 mg/g [facility] 2017    DM (diabetes mellitus) ([facility] 2017    CAD (coronary artery disease) 2017    Cardiomyopathy ([facility] 2017    S/P CABG x 3 10/25/2016     Overview Note:     10/3/16 LIMA-LAD, SVG-PDA, SVG-Cx + Maze+Appendage resection Dr Olson Champ Anemia 10/25/2016     Overview Note:     Post op      Essential hypertension      Overview Note:     On losartan 100 mg a day and Demadex 20 mg a day and amlodipine 5 mg a day and Coreg 12.5 twice a day      Dyslipidemia     Type 2 diabetes mellitus with complication (HCC)     SOB (shortness of breath) on exertion 2014    Statin intolerance 2014    History of colonic polyps 10/11/2013    Family history of colon cancer 10/11/2013     Overview Note:     Father  of a GI malignancy and a Niece  at age 39 of Colon CA      DJD (degenerative joint disease), multiple sites 2013    PAF (paroxysmal atrial fibrillation) (HCC)      Overview Note:     S/p surgical Maze 10/2016      Palpitations 2012   Acute Hypoxic resp failure  Bilateral Pneumonia Sec to COVID-19  VDRF  Leukocytosis  Moderate Mitral Stenosis  Afib rate controlled  CKD       1. CKD per renal  2. Pressors for a MAP > 65 mmhg  3. Barcitinib  4. Decadron  5. Insulin  6. Inhalers  7. Eliquis  8. Tube feeds  9. Keep sats >88%  10. SAT and SBT as tolerated  11.  C.w present management    Electronically signed by Guanako Palacio MD on 2/15/2022 at 8:33 AM

## 2022-02-16 NOTE — PROGRESS NOTES
Progress Note( Dr. Rey Jose)  2/15/2022  Subjective:   Admit Date: 2/6/2022  PCP: Sharona Castillo MD    Admitted For :Shortness of breath, hypoxia and Covid pneumonia       Consulted For: Better control of blood glucose     Interval History:    Pt had Raaapid rsponse call for Severe SOB. Reviewed notes . Pt was ultimately sedated intubated and placed on ventilator         Intake/Output Summary (Last 24 hours) at 2/15/2022 2231  Last data filed at 2/15/2022 1901  Gross per 24 hour   Intake 528.26 ml   Output 150 ml   Net 378.26 ml       DATA    CBC:   Recent Labs     02/14/22 1849   WBC 10.7*   HGB 9.3*       CMP:  Recent Labs     02/14/22  1849 02/15/22  0538    136   K 4.3 4.3   CL 96* 93*   CO2 24 22   BUN 97* 114*   CREATININE 3.6* 4.4*   CALCIUM 7.5* 7.6*   PROT 4.8*  --    LABALBU 2.4* 2.5*   BILITOT 0.7  --    ALKPHOS 81  --    AST 22  --    ALT 22  --      Lipids:   Lab Results   Component Value Date    CHOL 151 10/04/2021    CHOL 170 12/27/2016    HDL 41 10/04/2021    TRIG 260 02/15/2022     Glucose:  Recent Labs     02/15/22  1223 02/15/22  1642 02/15/22  2026   POCGLU 235* 182* 181*     [id number]:  Lab Results   Component Value Date    LABA1C 8.9 02/07/2022     High Sensitivity TSH:   Lab Results   Component Value Date    TSHHS 2.240 01/17/2022     Free T3: No results found for: FT3  Free T4:  Lab Results   Component Value Date    T4FREE 1.26 10/26/2021       XR CHEST PORTABLE   Final Result   Bibasilar pulmonary infiltrates without significant change. XR CHEST PORTABLE   Final Result   Endotracheal tube projects 2.6 cm above the karan. XR CHEST PORTABLE   Final Result   Right central venous catheter at the expected location of the SVC. No   pneumothorax. Persistent consolidations in the lung bases. XR ABDOMEN (KUB) (SINGLE AP VIEW)   Final Result   Endotracheal tube tip overlies the peripyloric region.          IR NONTUNNELED VASCULAR CATHETER > 5 YEARS Final Result   Successful ultrasound guided non-tunneled 7 [place] triple-lumen central   venous access catheter placement via right internal jugular venous approach. XR CHEST PORTABLE   Final Result   Worsening bibasilar pulmonary infiltrates. XR CHEST PORTABLE   Final Result   1. Nonspecific pulmonary infiltrates can be seen with atypical/viral   pneumonia. 2. Left basilar consolidation and pleural effusion; superimposed   community-acquired or nosocomial pneumonia are considerations. Developing   ARDS is a consideration. 3. Calcific atherosclerosis aorta. 4. Cardiomegaly.          XR CHEST PORTABLE   Final Result   Stable chest.         XR CHEST PORTABLE    (Results Pending)        Scheduled Medicines   Medications:    pantoprazole  40 mg IntraVENous Daily    digoxin  125 mcg IntraVENous Daily    [START ON 2/16/2022] cefepime  1,000 mg IntraVENous Q24H    sodium chloride  500 mL IntraVENous Once    albuterol sulfate HFA  4 puff Inhalation Q4H    And    ipratropium  4 puff Inhalation Q4H    chlorhexidine  15 mL Mouth/Throat BID    famotidine (PEPCID) injection  20 mg IntraVENous Daily    sennosides  5 mL Oral BID    insulin glargine  20 Units SubCUTAneous Nightly    insulin lispro  0-12 Units SubCUTAneous Q4H    [Held by provider] insulin NPH  25 Units SubCUTAneous QAM    vancomycin (VANCOCIN) intermittent dosing (placeholder)   Other See Admin Instructions    metoprolol succinate  50 mg Oral BID    [Held by provider] insulin lispro  20 Units SubCUTAneous TID WC    magic (miracle) mouthwash  5 mL Swish & Swallow TID    baricitinib  1 mg Oral Daily    guaiFENesin  200 mg Oral Q4H    sodium chloride flush  5-40 mL IntraVENous 2 times per day    apixaban  5 mg Oral BID    aspirin EC  81 mg Oral Daily    cyclobenzaprine  10 mg Oral Nightly      Infusions:    norepinephrine Stopped (02/15/22 1149)    fentaNYL 25 mcg/hr (02/15/22 1917)    propofol 40 mcg/kg/min (02/15/22 1753)    dextrose      dextrose      sodium chloride 500 mL (02/15/22 0883)    dextrose           Objective:   Vitals: /67   Pulse 90   Temp 98.1 °F (36.7 °C) (Rectal)   Resp 18   Ht 5' 7.99\" (1.727 m)   Wt 192 lb 14.4 oz (87.5 kg)   SpO2 98%   BMI 29.34 kg/m²   General appearance:  This is sedated intubated and on ventilator  Neck: no JVD or bruit  Thyroid : Normal lobes   Lungs: Has Vesicular Breath sounds some wheezing and some rales  Intubated and on ventilator  Heart:  regular rate and rhythm  Abdomen: soft, non-tender; bowel sounds normal; no masses,  no organomegaly  Musculoskeletal: Normal  Extremities: extremities normal, , no edema  Neurologic: Changes sedated intubated on ventilator    Assessment:     Patient Active Problem List:     Palpitations     PAF (paroxysmal atrial fibrillation) ([facility])     DJD (degenerative joint disease), multiple sites     History of colonic polyps     Family history of colon cancer     Statin intolerance     SOB (shortness of breath) on exertion     Type 2 diabetes mellitus with complication ([facility])     Essential hypertension     Dyslipidemia     S/P CABG x 3     Anemia     Cardiomyopathy ([facility])     Chronic kidney disease (CKD) stage G4/A3, severely decreased glomerular filtration rate (GFR) between 15-29 mL/min/1.73 square meter and albuminuria creatinine ratio greater than 300 mg/g ([facility])     DM (diabetes mellitus) ([facility])     CAD (coronary artery disease)     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     Proteinuria     Closed fracture of left ankle with routine healing     Closed nondisplaced fracture of third metatarsal bone of left foot     Closed nondisplaced fracture of second metatarsal bone of left foot     Closed nondisplaced fracture of fourth metatarsal bone of left foot     Debility     Physical debility     Hypoxia     Acute hypoxemic respiratory failure due to COVID-19 ([facility]     Pneumonia due to COVID-19 virus      Plan: 1. Reviewed POC blood glucose . Labs and X ray results   2. On meal/ Correction bolus Humalog/ Basal Lantus Insulin regime /   3. Monitor Blood glucose frequently   4. Modified  the dose of Insulin/ other medicines as needed   5. Will follow     .      Selam Calvo MD, MD

## 2022-02-16 NOTE — PROGRESS NOTES
02/16/22 0712   Vent Information   $Ventilation $Subsequent Day   Vent Type 980   Vent Mode AC/PC   Vt Ordered 0 mL   Pressure Ordered 20   Rate Set 18 bmp   Peak Flow 0 L/min   Pressure Support 0 cmH20   FiO2  45 %   SpO2 98 %   SpO2/FiO2 ratio 217.78   Sensitivity 3   PEEP/CPAP 12   I Time/ I Time % 1 s   Vent Patient Data   High Peep/I Pressure 20   Peak Inspiratory Pressure 32 cmH2O   Mean Airway Pressure 18 cmH20   Rate Measured 18 br/min   Vt Exhaled 703 mL   Minute Volume 12.6 Liters   I:E Ratio 1:2.30   Plateau Pressure 26 IBX70   Static Compliance 52 mL/cmH2O   Total PEEP 12 cmH20   Auto PEEP 0.2 cmH20   Cough/Sputum   Sputum How Obtained Endotracheal;Suctioned   $Obtained Sample $Induced Sputum   Cough Productive   Frequency Infrequent   Sputum Amount Small   Sputum Color Dark red   Tenacity Thick   Spontaneous Breathing Trial (SBT) RT Doc   Pulse 81   Breath Sounds   Right Upper Lobe Diminished   Right Middle Lobe Diminished   Right Lower Lobe Diminished   Left Upper Lobe Diminished   Left Lower Lobe Diminished   Additional Respiratory  Assessments   Resp 18   Position Semi-Starr's   Oral Care Mouth suctioned   Alarm Settings   High Pressure Alarm 50 cmH2O   Delay Alarm 20 sec(s)   Low Minute Volume Alarm 2.5 L/min   Apnea (secs) 20 secs   High Respiratory Rate 40 br/min   Low Exhaled Vt  250 mL   ETT (adult)   Placement Date/Time: 02/14/22 (c) 1781   Preoxygenation: Yes  Mask Ventilation: Ventilated by mask (1)  Technique: Video laryngoscopy  Tube Size: 7.5 mm  Blade Size: 3  Location: Oral  Grade View: Full view of the glottis  Insertion attempts: 1  Place. ..    Secured at 27 cm   Measured From Lips   ET Placement Right   Secured By Commercial tube low   Site Condition Dry

## 2022-02-16 NOTE — PROGRESS NOTES
Cardiology Progress Note     Today's Plan CPM    Admit Date:  2/6/2022    Consult reason/ Seen today for: HR control    Subjective and  Overnight Events:  Continues to be sedated on vent- rate is better controlled    Telemetry Atrial fib     Assessment / Plan:     Atrial fib with RVR -rate is better controlled with lanoxin: On metoprolol: CCB stopped d/t hypotension:  on AC with eliquis: Respiratory failure secondary to COVID: continues to be vent: receiving Olumiant. VHD with moderate MS- continue to follow: CAD-with h/o CABG- echo 01/2022 no wall motion abnormality :on ASA and BB : significantly elevated BNP 14,123- Oliguric- 355 ml last 24 hours- started on IV diuril and lasix per nephology : KAREEM /CKD - nephrology on board            History of Presenting Illness:    Chief complain on admission : 68 y. o.year old who is admitted for shortness of breath      Past medical history:    has a past medical history of Acid reflux, Arrhythmia, Arthritis, CAD (coronary artery disease), CHF (congestive heart failure) ([facility]), Chronic back pain, CKD (chronic kidney disease) stage 3, GFR 30-59 ml/min ([facility]), COPD (chronic obstructive pulmonary disease) ([facility] [address] Diabetes mellitus ([facility] [address] Glaucoma, H/O 24 hour EKG monitoring, H/O cardiac catheterization, H/O cardiovascular stress test, H/O complete electrocardiogram, H/O Doppler ultrasound, H/O Doppler ultrasound (Lower extremity), H/O percutaneous left heart catheterization, Heat syncope, [place] (hard of hearing), Hx of echocardiogram, Hyperlipidemia, Hypertension, Macular degeneration, Other activity(E029.9), PAF (paroxysmal atrial fibrillation) ([facility] [address] PVD (peripheral vascular disease) ([facility] [address] S/P CABG x 3, Skin Cancer, SOB (shortness of breath) on exertion, Teeth missing, Ventricular ectopy, Wears dentures, and Wears glasses.   Past surgical history:   has a past surgical history that includes cyst removal (2000's); Thoracentesis (Left, 10/06/2016); Coronary artery bypass graft (10/03/2016); Skin cancer excision (2000's); eye surgery (Bilateral, 2015); Dental surgery; Colonoscopy (Last Done In 2000's); fracture surgery (Left, 01/11/2016); Cardiac surgery (10/03/2016); other surgical history (10/03/2017); and IR NONTUNNELED VASCULAR CATHETER > 5 YEARS (2/14/2022). Social History:   reports that he quit smoking about 27 years ago. His smoking use included cigarettes. He started smoking about 57 years ago. He has a 60.00 pack-year smoking history. He has never used smokeless tobacco. He reports that he does not drink alcohol and does not use drugs. Family history:  family history includes Breast Cancer in his sister; Cancer in his father, mother, sister, and son; Diabetes in his father; Early Death (age of onset: 48) in his mother; Other in his father. Allergies   Allergen Reactions    Aldactone [Spironolactone]      \"Developed Pain In The  Breasts And Knots In The Breasts\"    Crestor [Rosuvastatin Calcium]      \"Leg Cramps\"    Lipitor      \"Leg Cramps\"    Zocor [Simvastatin - High Dose]      \"Leg Cramps\"       Review of Systems:   All 14 systems were reviewed and are negative  Except for the positive findings which are documented : obtained per review of medical records     /62   Pulse 81   Temp 97.5 °F (36.4 °C) (Rectal)   Resp 18   Ht 5' 7.99\" (1.727 m)   Wt 192 lb 3.9 oz (87.2 kg)   SpO2 98%   BMI 29.24 kg/m²       Intake/Output Summary (Last 24 hours) at 2/16/2022 0917  Last data filed at 2/16/2022 0520  Gross per 24 hour   Intake 714 ml   Output 355 ml   Net 359 ml       Physical Exam:  Physical Exam  Vitals reviewed. Constitutional:       Appearance: He is ill-appearing. Interventions: He is intubated. Comments: Sedated on vent    HENT:      Mouth/Throat:      Comments: Oral ETT   Oral gastric tube   Cardiovascular:      Rate and Rhythm: Normal rate. Rhythm irregular. dextrose       LORazepam, dextrose, glucose, dextrose, glucagon (rDNA), dextrose, benzocaine-menthol, sodium chloride flush, sodium chloride, ondansetron **OR** ondansetron, polyethylene glycol, acetaminophen **OR** acetaminophen, dextrose, acetaminophen, albuterol sulfate HFA, dextrose bolus (hypoglycemia) **OR** dextrose bolus (hypoglycemia)    Lab Data:  CBC:   Recent Labs     22  1849 22  0545   WBC 10.7* 6.1   HGB 9.3* 8.0*   HCT 29.5* 24.4*   MCV 88.6 87.8    223     BMP:   Recent Labs     22  1849 02/15/22  0538 22  0545    136 135   K 4.3 4.3 4.1   CL 96* 93* 92*   CO2 24 22 19*   PHOS  --  7.5* 8.7*   BUN 97* 114* 140*   CREATININE 3.6* 4.4* 5.3*     PT/INR: No results for input(s): PROTIME, INR in the last 72 hours. BNP:    Recent Labs     22  0545   PROBNP 14,123*     TROPONIN: No results for input(s): TROPONINT in the last 72 hours. Impression:  Principal Problem:    Pneumonia due to COVID-19 virus  Active Problems:    Hypoxia  Resolved Problems:    * No resolved hospital problems. *       All labs, medications and tests reviewed by myself, continue all other medications of all above medical condition listed as is except for changes mentioned above. Thank you   Please call with questions. Electronically signed by RAMY Jimenez CNP on 2022 at 9:17 AM     I have seen ,spoken to  and examined this patient personally, independently of the TENNILLE. I have reviewed the hospital care given to date and reviewed all pertinent labs and imaging. I have spoken with patient, nursing staff and provided written and verbal instructions . The above note has been reviewed     CARDIOLOGY ATTENDING ADDENDUM    HPI:  I have reviewed the above HPI  And agree with above     Pulse Range: Pulse  Av.6  Min: 75  Max: 93    Blood Presuure Range:  Systolic (60ZKR), VCO:314 , Min:93 , REK:046   ; Diastolic (88UDF), XAU:23, Min:55, Max:74      Pulse ox Range: SpO2  Av %  Min: 93 %  Max: 100 %    24hr I & O:    Intake/Output Summary (Last 24 hours) at 2022 1019  Last data filed at 2022 0520  Gross per 24 hour   Intake 714 ml   Output 355 ml   Net 359 ml         /63   Pulse 77   Temp 98.1 °F (36.7 °C) (Rectal)   Resp 18   Ht 5' 7.99\" (1.727 m)   Wt 192 lb 3.9 oz (87.2 kg)   SpO2 96%   BMI 29.24 kg/m²       Physical Exam:  General:  On vent  Head:normal  Eye:normal  Neck:  No JVD   Chest:  Clear to auscultation, respiration easy  Cardiovascular:  RRR S1S2  Abdomen:   nontender  Extremities:  tr edema  Pulses; palpable  Neuro: on vent    MEDICAL DECISION MAKING;    Pt interviewed,examined , all available data was reviewed, following is the plan which was discussed with TENNILLE as well:    1. Patient patient is in A. fib heart rate is better now on Eliquis already, today was loaded with digoxin which is controlled the heart rate  2. Respiratory failure from Covid patient is on ventilator and is being treated with baricitinib  3. Sepsis on antibiotics  4. Diabetes mellitus on insulin coverage blood glucose level well controlled fascia  5. CKD creatinine of around 4 followed by nephrology  6. Valvular heart disease has moderate mitral stenosis will be addressed once he is extubated  7.  Coronary artery disease had CABG with 3 bypass graft done in 2016, last nuclear scintigraphic study in 2018 was unremarkable            Brandie Dent MD [facility] [place]

## 2022-02-16 NOTE — PROGRESS NOTES
Hospitalist Progress Note      Name:  Jessica Bautista /Age/Sex: 1945  (68 y.o. male)   MRN & CSN:  [id number] & [id number] Admission Date/Time: 2022  7:43 PM   Location:  -A PCP: Krupa Zapata MD         Hospital Day: 11  Discharge barrier/Reason for continued hospitalization: need to get extubated    Assessment and Plan:   Jessica Bautista is a 68 y.o.  male hypertension, chronic diastolic CHF, CKD 4 due to diabetic nephropathy, A. fib on chronic anticoagulation with Eliquis, type 2 diabetes, dyslipidemia, who presented to the ED on 2022 with shortness of breath and cough and was diagnosed with Pneumonia due to COVID-19 virus. He is fully vaccinated against Covid. He deteriorated  On 22 and night requiring AirVo at Fio2 % and endeded  Up getting intubated on 22. 1. Acute on chronic hypoxic respiratory faiure   Suspected  Multifactorial  Sepsis secondary to bacterial pneumonia/ARDS from [address]  Suspect Gram negative  Got intubated on  and remains on  vent support @ Fio2 100%>45% currently  On empiric  IV cefepime and vanco>de-escalate as able  Wean vent per pharmacy  C/w cefepime and discontinue vanco due to worsening renal failure  Pul toilette  Wean vent per pulmonary  Pulmonary following      2. KAREEM on CKD 4:   Due to diabetic nephropathy. Creatinine 2.5 at baseline, creatinien 4.4>5.3 from 3.6  Likely cardiorenal vs ATN  BNP 14K and cardio added high dose lasix  Stop vanco as well  Avoid nephrotoxic agents   monitor  Renal following       3   Sepsis due to COVID-19 pneumonia: POA. - now resolved  Acute organ dysfunction as evidenced by acute hypoxic respiratory failure. CXR with patchy airspace disease bilaterally  On steroid  S/p baricitinib therapy  o2 was weaned to 4L via NC prior to this intubation, wean as able  Home o2 eval prior to discharge  Pul toilette    4. COVID-19 acute hypoxic respiratory failure: POA.   O2 sats <89% on room air  Currently on 4 L. Not on home O2  Regular Is use  Wean to RA  as tolerated    5. DM2 with hyperosmolar state- improved  Off  insulin gtt. Transitioned to lantus and SSI  A1c 8.9 from 2/7/22  AG closed and BS well controlled currently  Endocrine following  Monitor and titrate  Diabetic teaching prior to discharge    6. Hypertension: On meds   Hold nephrotoxic agents  Monitor and itrate    7. A fib, paroxysmal   Rate uncontrolled  BB with holding parameters  cardizem changed to digoxin by cardio  Continue Eliquis. telemonitor  Cardio following    8. Sacral decub ulcer,stage II, POA  C/w local wound care        Disposition; TBD    Diet Diet NPO  ADULT TUBE FEEDING; Orogastric; Peptide Based High Protein; Continuous; 10; Yes; 10; Q 4 hours; 60; 500; Q 6 hours   DVT Prophylaxis [] Lovenox, []  Heparin, [] SCDs, [x] Ambulation. Eliquis   GI Prophylaxis [x] PPI,  [] H2 Blocker,  [] Carafate,  [x] Diet/Tube Feeds   Code Status Full Code, consult palliative on the board   MDM [] Low, [] Moderate,[x]  High     Comments; son and sister  On recent ceversation from 2/14 do not want dialysis, trach and PEG , will be interested in terminal extubation if he does not improve. History of Present Illness:     Chief Complaint: Pneumonia due to COVID-19 virus     Temitope De La Rosa is a 68 y.o.  male  who presents with acute hypoxic respiratory failure    Patient is seen and examined this noon  Got intubated s/p rapid response on 2/14  Fio2 weaned to 45%  No weaning trial  Making urine and tolerating trickle feeding  Creatinine keeps getting worse at 5.3 this Am  Otherwise seems stable on vent      Ten point ROS unable to do     Objective:        Intake/Output Summary (Last 24 hours) at 2/16/2022 1351  Last data filed at 2/16/2022 1036  Gross per 24 hour   Intake 714 ml   Output 475 ml   Net 239 ml        Vitals:   Vitals:    02/16/22 1100 02/16/22 1107 02/16/22 1200 02/16/22 1300   BP: 128/67      Pulse: 76 69     Resp: 18 18     Temp:   97.5 °F (36.4 °C) 97.7 °F (36.5 °C)   TempSrc:   Rectal Rectal   SpO2:  97%     Weight:       Height:            Physical Exam:   GEN: Sedated on vent support, no distress  HEENT: Normal   RESP: Diminished BS bilaterally. Symmetric chest movement while on o2 via NC  CVS: RRR, S1, S2  GI/: Abdomen is nontender, no organomegaly. . Bowel sounds normal, rectal exam deferred. MSK: No gross joint deformities. No tenderness  SKIN: Normal coloration, warm, dry. NEURO: limited due to sedation  PSYCH:  Limited due to sedation.     Medications:   Medications:    insulin glargine  30 Units SubCUTAneous Nightly    furosemide  80 mg IntraVENous TID    chlorothiazide (DIURIL) IVPB  500 mg IntraVENous Q12H    pantoprazole  40 mg IntraVENous Daily    digoxin  125 mcg IntraVENous Daily    cefepime  1,000 mg IntraVENous Q24H    sodium chloride  500 mL IntraVENous Once    albuterol sulfate HFA  4 puff Inhalation Q4H    And    ipratropium  4 puff Inhalation Q4H    chlorhexidine  15 mL Mouth/Throat BID    famotidine (PEPCID) injection  20 mg IntraVENous Daily    sennosides  5 mL Oral BID    insulin lispro  0-12 Units SubCUTAneous Q4H    [Held by provider] insulin NPH  25 Units SubCUTAneous QAM    vancomycin (VANCOCIN) intermittent dosing (placeholder)   Other See Admin Instructions    metoprolol succinate  50 mg Oral BID    [Held by provider] insulin lispro  20 Units SubCUTAneous TID WC    magic (miracle) mouthwash  5 mL Swish & Swallow TID    baricitinib  1 mg Oral Daily    [Held by provider] guaiFENesin  200 mg Oral Q4H    sodium chloride flush  5-40 mL IntraVENous 2 times per day    apixaban  5 mg Oral BID    aspirin EC  81 mg Oral Daily    cyclobenzaprine  10 mg Oral Nightly      Infusions:    norepinephrine Stopped (02/15/22 1149)    fentaNYL 25 mcg/hr (02/15/22 1917)    propofol 50 mcg/kg/min (02/16/22 1218)    dextrose      dextrose      sodium chloride 500 mL (02/15/22 1713)    dextrose       PRN Meds: LORazepam, 0.5 mg, Q6H PRN  dextrose, 100 mL/hr, PRN  glucose, 15 g, PRN  dextrose, 12.5 g, PRN  glucagon (rDNA), 1 mg, PRN  dextrose, 100 mL/hr, PRN  benzocaine-menthol, 1 lozenge, Q2H PRN  sodium chloride flush, 5-40 mL, PRN  sodium chloride, 25 mL, PRN  ondansetron, 4 mg, Q8H PRN   Or  ondansetron, 4 mg, Q6H PRN  polyethylene glycol, 17 g, Daily PRN  acetaminophen, 650 mg, Q6H PRN   Or  acetaminophen, 650 mg, Q6H PRN  dextrose, 100 mL/hr, PRN  acetaminophen, 500 mg, Q4H PRN  albuterol sulfate HFA, 2 puff, Q4H PRN  dextrose bolus (hypoglycemia), 125 mL, PRN   Or  dextrose bolus (hypoglycemia), 250 mL, PRN      Recent Labs     02/16/22  0545 02/14/22  1849 02/12/22  1620   WBC 6.1 10.7* 21.2*   HGB 8.0* 9.3* 11.3*   HCT 24.4* 29.5* 34.4*   MCV 87.8 88.6 86.9    242 423     Lab Results   Component Value Date     02/16/2022    K 4.1 02/16/2022    CL 92 02/16/2022    CO2 19 02/16/2022     02/16/2022    CREATININE 5.3 02/16/2022    GLUCOSE 211 02/16/2022    CALCIUM 7.2 02/16/2022      Lab Results   Component Value Date    INR 1.03 09/28/2017    INR 1.14 10/03/2016    INR 1.07 09/29/2016    PROTIME 11.7 09/28/2017    PROTIME 13.1 (H) 10/03/2016    PROTIME 12.2 09/29/2016           Electronically signed by Mihai Chaney MD on 2/16/2022 at 1:51 PM

## 2022-02-16 NOTE — PROGRESS NOTES
Pulmonary and Critical Care  Progress Note      VITALS:  /63   Pulse 77   Temp 98.1 °F (36.7 °C) (Rectal)   Resp 18   Ht 5' 7.99\" (1.727 m)   Wt 192 lb 3.9 oz (87.2 kg)   SpO2 96%   BMI 29.24 kg/m²     Subjective:   CHIEF COMPLAINT :SOB     HPI:                The patient is on the vent and sedated. He has minimal response to painful stimuli. He is on 40% fio2    Objective:   PHYSICAL EXAM:    LUNGS:Occasional basal crackles  Abd-distended, soft, BS+,NT  Ext- no pedal edema  CVS-s1s2, no murmurs      DATA:    CBC:  Recent Labs     02/14/22  1849 02/16/22  0545   WBC 10.7* 6.1   RBC 3.33* 2.78*   HGB 9.3* 8.0*   HCT 29.5* 24.4*    223   MCV 88.6 87.8   MCH 27.9 28.8   MCHC 31.5* 32.8   RDW 15.3* 14.7   SEGSPCT 78.0*  --    BANDSPCT 8  --       BMP:  Recent Labs     02/14/22  1849 02/15/22  0538 02/16/22  0545    136 135   K 4.3 4.3 4.1   CL 96* 93* 92*   CO2 24 22 19*   BUN 97* 114* 140*   CREATININE 3.6* 4.4* 5.3*   CALCIUM 7.5* 7.6* 7.2*   GLUCOSE 78 176* 211*      ABG:  Recent Labs     02/14/22  0615 02/15/22  0600 02/16/22  0600   PH 7.54* 7.54* 7.52*   PO2ART 40* 180* 80   HFJ2KCX 33.0 25.0* 24.0*   O2SAT 79.9* 96.4 94.9*     BNP  Lab Results   Component Value Date    BNP 79.22 11/25/2015      D-Dimer:  Lab Results   Component Value Date    DDIMER 2682 (H) 02/08/2022      Radiology: The ET tube was 3.1 cm above the karan.  The NG tube was at least to the   gastric body.  A right jugular central venous line was noted with the tip in   the superior vena cava.  No pneumothorax was identified.   CABG with   prosthetic mitral valve.  Mild cardiomegaly was noted with patchy alveolar   pneumonia in both lower lobes.  Slight improvement has occurred bilaterally   since 02/15/2022.     1.       Assessment/Plan     Patient Active Problem List    Diagnosis Date Noted    Hypoxia 02/05/2022    Acute hypoxemic respiratory failure due to COVID-19 [facility]) 02/05/2022    Pneumonia due to COVID-19 virus 2022    Physical debility 2022    Debility 01/15/2022    Closed nondisplaced fracture of second metatarsal bone of left foot 2019    Closed nondisplaced fracture of fourth metatarsal bone of left foot 2019    Closed fracture of left ankle with routine healing 2018    Closed nondisplaced fracture of third metatarsal bone of left foot 2018    Proteinuria 01/10/2018    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 2017    S/P CABG x 3 10/25/2016     Overview Note:     10/3/16 LIMA-LAD, SVG-PDA, SVG-Cx + Maze+Appendage resection Dr Diana Verde Anemia 10/25/2016     Overview Note:     Post op      Essential hypertension      Overview Note:     On losartan 100 mg a day and Demadex 20 mg a day and amlodipine 5 mg a day and Coreg 12.5 twice a day      Dyslipidemia     Type 2 diabetes mellitus with complication (HCC)     SOB (shortness of breath) on exertion 2014    Statin intolerance 2014    History of colonic polyps 10/11/2013    Family history of colon cancer 10/11/2013     Overview Note:     Father  of a GI malignancy and a Niece  at age 39 of Colon CA      DJD (degenerative joint disease), multiple sites 2013    PAF (paroxysmal atrial fibrillation) (HCC)      Overview Note:     S/p surgical Maze 10/2016      Palpitations 2012   Acute Hypoxic resp failure  Bilateral Pneumonia Sec to COVID-19  VDRF  Leukocytosis  Moderate Mitral Stenosis  Afib rate controlled  CKD       1. CKD per renal  2. Morphine 2 mg x 1  3. Barcitinib  4. Decadron  5. Insulin  6. Inhalers  7. Eliquis  8.  Tube feeds  9. Keep sats > 88%  10. Daily SAT and SBT trial  11. PT/OT  12.  C/w present management    Electronically signed by Monika Mack MD on 2/16/2022 at 10:27 AM

## 2022-02-16 NOTE — CARE COORDINATION
CM reviewed chart and notes that patient is sedated and on the vent. He was intubated 2/14/22. Patient is from home with CM [facility]. Aldon Loss patient's CM from [facility] [phone]. PT/OT were recommending SNF prior to intubation. Therapy will need consulted again after patient is extubated.

## 2022-02-16 NOTE — PROGRESS NOTES
His proBNP level rather high more than 14,000  Add high-dose IV loop and IV thiazide  See how he responds

## 2022-02-16 NOTE — PROGRESS NOTES
02/16/22 1522   Vent Information   Equipment Changed Suction catheter   Vent Type 980   Vent Mode AC/PC   Vt Ordered 0 mL   Pressure Ordered 15   Rate Set 16 bmp   Peak Flow 0 L/min   Pressure Support 0 cmH20   FiO2  45 %   SpO2 100 %   SpO2/FiO2 ratio 222.22   Sensitivity 3   PEEP/CPAP 12   I Time/ I Time % 1 s   Humidification Source HME   Nitric Oxide/Epoprostenol In Use? No   Vent Patient Data   High Peep/I Pressure 15   Peak Inspiratory Pressure 29 cmH2O   Mean Airway Pressure 18 cmH20   Rate Measured 24 br/min   Vt Exhaled 595 mL   Minute Volume 15.6 Liters   I:E Ratio 1:1.20   Cough/Sputum   Sputum How Obtained Endotracheal;Suctioned   $Obtained Sample $Induced Sputum   Cough Non-productive   Frequency Infrequent   Sputum Amount None   Sputum Color None   Tenacity None   Spontaneous Breathing Trial (SBT) RT Doc   Pulse 91   Breath Sounds   Right Upper Lobe Diminished   Right Middle Lobe Diminished   Right Lower Lobe Diminished   Left Upper Lobe Diminished   Left Lower Lobe Diminished   Additional Respiratory  Assessments   Resp 23   Position Semi-Starr's   Alarm Settings   High Pressure Alarm 50 cmH2O   Delay Alarm 20 sec(s)   Low Minute Volume Alarm 2.5 L/min   Apnea (secs) 20 secs   High Respiratory Rate 40 br/min   Low Exhaled Vt  250 mL   ETT (adult)   Placement Date/Time: 02/14/22 (c) 0158   Preoxygenation: Yes  Mask Ventilation: Ventilated by mask (1)  Technique: Video laryngoscopy  Tube Size: 7.5 mm  Blade Size: 3  Location: Oral  Grade View: Full view of the glottis  Insertion attempts: 1  Place. ..    Secured at 27 cm   Measured From Lips   ET Placement Left   Secured By Commercial tube low   Site Condition Dry

## 2022-02-16 NOTE — PROGRESS NOTES
02/16/22 1107   Vent Information   Equipment Changed HME   Vent Type 980   Vent Mode AC/PC   Vt Ordered 0 mL   Pressure Ordered 20   Rate Set 18 bmp   Peak Flow 0 L/min   Pressure Support 0 cmH20   FiO2  45 %   SpO2 97 %   SpO2/FiO2 ratio 215.56   Sensitivity 3   PEEP/CPAP 10   I Time/ I Time % 1 s   Vent Patient Data   High Peep/I Pressure 20   Peak Inspiratory Pressure 30 cmH2O   Mean Airway Pressure 16 cmH20   Rate Measured 18 br/min   Vt Exhaled 714 mL   Minute Volume 12.8 Liters   I:E Ratio 1:2.30   Spontaneous Breathing Trial (SBT) RT Doc   Pulse 69   Breath Sounds   Right Upper Lobe Diminished   Right Middle Lobe Diminished   Right Lower Lobe Diminished   Left Upper Lobe Diminished   Left Lower Lobe Diminished   Additional Respiratory  Assessments   Resp 18   Position Semi-Starr's   Alarm Settings   High Pressure Alarm 50 cmH2O   Delay Alarm 20 sec(s)   Low Minute Volume Alarm 2.5 L/min   Apnea (secs) 20 secs   High Respiratory Rate 40 br/min   Low Exhaled Vt  250 mL   ETT (adult)   Placement Date/Time: 02/14/22 c) 0195   Preoxygenation: Yes  Mask Ventilation: Ventilated by mask (1)  Technique: Video laryngoscopy  Tube Size: 7.5 mm  Blade Size: 3  Location: Oral  Grade View: Full view of the glottis  Insertion attempts: 1  Place. ..    Secured at 27 cm   Measured From Lips   ET Placement Right   Secured By Commercial tube low   Site Condition Dry

## 2022-02-16 NOTE — PROGRESS NOTES
Nephrology Progress Note  2/16/2022 7:02 AM        Subjective:   Admit Date: 2/6/2022  PCP: Monica Garcia MD    Interval History: Remains on mechanical ventilation      Diet: Tube feeds started at 20 mm an hour per OG    ROS: Assist-control mode with FiO2 of 40%, PEEP of 12 and peak pressure 32  Urine output recorded only 355 cc for the last 24 hours  No fever  Off vasopressor  Sedated with propofol and fentanyl    Data:     Current meds:    insulin glargine  30 Units SubCUTAneous Nightly    pantoprazole  40 mg IntraVENous Daily    digoxin  125 mcg IntraVENous Daily    cefepime  1,000 mg IntraVENous Q24H    sodium chloride  500 mL IntraVENous Once    albuterol sulfate HFA  4 puff Inhalation Q4H    And    ipratropium  4 puff Inhalation Q4H    chlorhexidine  15 mL Mouth/Throat BID    famotidine (PEPCID) injection  20 mg IntraVENous Daily    sennosides  5 mL Oral BID    insulin lispro  0-12 Units SubCUTAneous Q4H    [Held by provider] insulin NPH  25 Units SubCUTAneous QAM    vancomycin (VANCOCIN) intermittent dosing (placeholder)   Other See Admin Instructions    metoprolol succinate  50 mg Oral BID    [Held by provider] insulin lispro  20 Units SubCUTAneous TID WC    magic (miracle) mouthwash  5 mL Swish & Swallow TID    baricitinib  1 mg Oral Daily    [Held by provider] guaiFENesin  200 mg Oral Q4H    sodium chloride flush  5-40 mL IntraVENous 2 times per day    apixaban  5 mg Oral BID    aspirin EC  81 mg Oral Daily    cyclobenzaprine  10 mg Oral Nightly      norepinephrine Stopped (02/15/22 1149)    fentaNYL 25 mcg/hr (02/15/22 1917)    propofol 40 mcg/kg/min (02/16/22 0212)    dextrose      dextrose      sodium chloride 500 mL (02/15/22 1713)    dextrose           I/O last 3 completed shifts:   In: 1242.3 [I.V.:382.1; NG/GT:714; IV Piggyback:146.1]  Out: 705 [Urine:755; Emesis/NG output:160]    CBC:   Recent Labs     02/14/22 1849   WBC 10.7*   HGB 9.3*             Recent Labs 02/14/22  1849 02/15/22  0538    136   K 4.3 4.3   CL 96* 93*   CO2 24 22   BUN 97* 114*   CREATININE 3.6* 4.4*   GLUCOSE 78 176*       Lab Results   Component Value Date    CALCIUM 7.6 (L) 02/15/2022    PHOS 7.5 (H) 02/15/2022       Objective:     Vitals: /62   Pulse 86   Temp 97.5 °F (36.4 °C) (Rectal)   Resp 18   Ht 5' 7.99\" (1.727 m)   Wt 192 lb 3.9 oz (87.2 kg)   SpO2 97%   BMI 29.24 kg/m²     General appearance: Intubated and sedated  HEENT: Mild conjunctival pallor  Neck: Supple-right IJ central venous catheter  Lungs: Coarse breath sound on anterior exam  Heart: Seems regular rate and rhythm-scar from previous sternotomy  Abdomen: Soft  Extremities: Mild ankle edema bilaterally  He has a Reyes catheter      Problem List :         Impression :     1. Acute kidney injury and underlying CKD stage IV A3-urine output significantly dropped-diuretic was on hold and had 500 cc of normal saline bolus, will wait for the rest of the lab including proBNP level, as it is difficult to determine his intravascular volume status. He does have risk for acute interstitial nephritis, along with other toxic and ischemic tubular injury-UA did not show any active sediment yesterday and has very low urine sodium, suggestive of low renal perfusion and had about 1 g/day of proteinuria-unfortunately both hyper and hypovolemia can cause low renal perfusion-so have to wait for other data  2. Breakthrough COVID-19 with respiratory failure and other organ failure now  3. Underlying coronary artery disease, diabetes  4. Mixed metabolic acidosis and respiratory alkalosis    Recommendation/Plan  :     1. Reviewed labs when available  2. I did talk to his son about his condition yesterday,  3. Good glycemic control for now  4. Avoid any nonessential medication  5. After reviewing the lab will determine whether he needs fluid or diuretics  6.  Follow clinically and biochemically      Sierra Persaud MD MD

## 2022-02-16 NOTE — PROGRESS NOTES
CARDIOLOGY  NOTE        Name:  Jessica Bautista /Age/Sex: 1945  (68 y.o. male)   MRN & CSN:  [id number] & [id number] Admission Date/Time: 2022  7:43 PM   Location:  -A PCP: Krupa Zapata MD       Hospital Day: 10        PLAN FROM CARDIOLOGY FOR TODAY:   Heart rate controlled with digoxin loading      - cardiology consult is for: A. fib with rapid ventricle response    -  Interval history: A. fib with rapid ventricular response    · ASSESSMENT/ PLAN:  1. Patient is going into A. fib with rapid ventricular response, hence will be loaded with digoxin, patient is on Eliquis already, patient is also on Lopressor  2. Respiratory failure from Covid patient is on ventilator and is being treated with baricitinib  3. Sepsis on antibiotics  4. Diabetes mellitus on insulin coverage blood glucose level well controlled fascia  5. CKD creatinine of around 4 followed by nephrology  6. Valvular heart disease has moderate mitral stenosis will be addressed once he is extubated  7. Coronary artery disease had CABG with 3 bypass graft done in 2016, last nuclear scintigraphic study in 2018 was unremarkable          Subjective: Todays complain: Patient on vent    HPI:  Prince mitchell is a 68 y. o.year old who and presents with had no chief complaint listed for this encounter. No chief complaint on file. Objective: Temperature:  Current - Temp: 98.1 °F (36.7 °C);  Max - Temp  Av.3 °F (36.8 °C)  Min: 97.8 °F (36.6 °C)  Max: 98.8 °F (37.1 °C)    Respiratory Rate : Resp  Av.2  Min: 18  Max: 23    Pulse Range: Pulse  Av.6  Min: 76  Max: 111    Blood Presuure Range:  Systolic (61XOO), RGT:037 , Min:85 , QZU:121   ; Diastolic (45XAR), DKZ:04, Min:48, Max:68      Pulse ox Range: SpO2  Av.4 %  Min: 93 %  Max: 100 %    24hr I & O:    Intake/Output Summary (Last 24 hours) at 2/15/2022 2135  Last data filed at 2/15/2022 1901  Gross per 24 hour   Intake 528.26 ml   Output 150 ml   Net 378.26 ml         /67   Pulse 90   Temp 98.1 °F (36.7 °C) (Rectal)   Resp 18   Ht 5' 7.99\" (1.727 m)   Wt 192 lb 14.4 oz (87.5 kg)   SpO2 98%   BMI 29.34 kg/m²           Review of Systems: On vent    TELEMETRY: Atrial fibrillation    has a past medical history of Acid reflux, Arrhythmia, Arthritis, CAD (coronary artery disease), CHF (congestive heart failure) ([facility]), Chronic back pain, CKD (chronic kidney disease) stage 3, GFR 30-59 ml/min ([facility]), COPD (chronic obstructive pulmonary disease) ([facility] Diabetes mellitus ([facility] Glaucoma, H/O 24 hour EKG monitoring, H/O cardiac catheterization, H/O cardiovascular stress test, H/O complete electrocardiogram, H/O Doppler ultrasound, H/O Doppler ultrasound (Lower extremity), H/O percutaneous left heart catheterization, Heat syncope, [place] (hard of hearing), Hx of echocardiogram, Hyperlipidemia, Hypertension, Macular degeneration, Other activity(E029.9), PAF (paroxysmal atrial fibrillation) ([facility] PVD (peripheral vascular disease) ([facility] S/P CABG x 3, Skin Cancer, SOB (shortness of breath) on exertion, Teeth missing, Ventricular ectopy, Wears dentures, and Wears glasses. has a past surgical history that includes cyst removal (2000's); Thoracentesis (Left, 10/06/2016); Coronary artery bypass graft (10/03/2016); Skin cancer excision (2000's); eye surgery (Bilateral, 2015); Dental surgery; Colonoscopy (Last Done In 2000's); fracture surgery (Left, 01/11/2016); Cardiac surgery (10/03/2016); other surgical history (10/03/2017); and IR NONTUNNELED VASCULAR CATHETER > 5 YEARS (2/14/2022). Physical Exam:  General: On vent  Head:normal  Eye: Pupils equal and round  Neck:  No JVD, no carotid bruit noted   Chest:  Clear to auscultation, no signs of respiratory distress  Cardiovascular:  Normal rate and rhythm. S1 and S2 noted. No murmurs rubs or gallops  Abdomen:   nontender  Extremities:  tr edema  Pulses; palpable  Neuro:  On vent    Medications:    pantoprazole  40 mg IntraVENous Daily    digoxin  125 mcg IntraVENous Daily    [START ON 2/16/2022] cefepime  1,000 mg IntraVENous Q24H    sodium chloride  500 mL IntraVENous Once    albuterol sulfate HFA  4 puff Inhalation Q4H    And    ipratropium  4 puff Inhalation Q4H    chlorhexidine  15 mL Mouth/Throat BID    famotidine (PEPCID) injection  20 mg IntraVENous Daily    sennosides  5 mL Oral BID    insulin glargine  20 Units SubCUTAneous Nightly    insulin lispro  0-12 Units SubCUTAneous Q4H    [Held by provider] insulin NPH  25 Units SubCUTAneous QAM    vancomycin (VANCOCIN) intermittent dosing (placeholder)   Other See Admin Instructions    metoprolol succinate  50 mg Oral BID    [Held by provider] insulin lispro  20 Units SubCUTAneous TID WC    magic (miracle) mouthwash  5 mL Swish & Swallow TID    baricitinib  1 mg Oral Daily    guaiFENesin  200 mg Oral Q4H    sodium chloride flush  5-40 mL IntraVENous 2 times per day    apixaban  5 mg Oral BID    aspirin EC  81 mg Oral Daily    cyclobenzaprine  10 mg Oral Nightly      norepinephrine Stopped (02/15/22 1149)    fentaNYL 25 mcg/hr (02/15/22 1917)    propofol 40 mcg/kg/min (02/15/22 1712)    dextrose      dextrose      sodium chloride 500 mL (02/15/22 1713)    dextrose       LORazepam, dextrose, glucose, dextrose, glucagon (rDNA), dextrose, benzocaine-menthol, sodium chloride flush, sodium chloride, ondansetron **OR** ondansetron, polyethylene glycol, acetaminophen **OR** acetaminophen, dextrose, acetaminophen, albuterol sulfate HFA, dextrose bolus (hypoglycemia) **OR** dextrose bolus (hypoglycemia)    Lab Data:  CBC:   Recent Labs     02/14/22 1849   WBC 10.7*   HGB 9.3*   HCT 29.5*   MCV 88.6        BMP:   Recent Labs     02/14/22  1849 02/15/22  0538    136   K 4.3 4.3   CL 96* 93*   CO2 24 22   PHOS  --  7.5*   BUN 97* 114*   CREATININE 3.6* 4.4*     LIVER PROFILE:   Recent Labs 02/14/22  1849   AST 22   ALT 22   BILITOT 0.7   ALKPHOS 81     PT/INR: No results for input(s): PROTIME, INR in the last 72 hours. APTT: No results for input(s): APTT in the last 72 hours. BNP:  No results for input(s): BNP in the last 72 hours. TROPONIN: No results for input(s): TROPONINI in the last 72 hours. No results for input(s): TROPONINT in the last 72 hours. Labs, consult, tests reviewed                    Bhavik Kwan MD, PA-C 2/15/2022 9:35 PM     Please note this report has been partially produced using speech recognition software and may contain errors related to that system including errors in grammar, punctuation, and spelling, as well as words and phrases that may be inappropriate. If there are any questions or concerns please feel free to contact the dictating provider for clarification.

## 2022-02-17 NOTE — PROGRESS NOTES
02/17/22 1511   Vent Information   Vent Type 980   Vent Mode AC/PC   Vt Ordered 0 mL   Pressure Ordered 15   Rate Set 16 bmp   Peak Flow 0 L/min   Pressure Support 0 cmH20   FiO2  45 %   Sensitivity 3   PEEP/CPAP 10   I Time/ I Time % 1 s   Humidification Source HME   Nitric Oxide/Epoprostenol In Use? No   Vent Patient Data   High Peep/I Pressure 15   Peak Inspiratory Pressure 25 cmH2O   Mean Airway Pressure 16 cmH20   Rate Measured 24 br/min   Vt Exhaled 649 mL   Minute Volume 14.5 Liters   I:E Ratio 1:1.70   Cough/Sputum   Sputum How Obtained Endotracheal;Suctioned   $Obtained Sample $Induced Sputum   Cough Productive   Frequency Infrequent   Sputum Amount Small   Sputum Color Bright red   Tenacity Thick   Spontaneous Breathing Trial (SBT) RT Doc   Pulse 84   Breath Sounds   Right Upper Lobe Diminished   Right Middle Lobe Diminished   Right Lower Lobe Diminished   Left Upper Lobe Diminished   Left Lower Lobe Diminished   Additional Respiratory  Assessments   Resp 25   Position Semi-Starr's   Oral Care Mouth suctioned   Alarm Settings   High Pressure Alarm 50 cmH2O   Delay Alarm 20 sec(s)   Low Minute Volume Alarm 2.5 L/min   Apnea (secs) 20 secs   High Respiratory Rate 40 br/min   Low Exhaled Vt  250 mL   ETT (adult)   Placement Date/Time: 02/14/22 (c) 7400   Preoxygenation: Yes  Mask Ventilation: Ventilated by mask (1)  Technique: Video laryngoscopy  Tube Size: 7.5 mm  Blade Size: 3  Location: Oral  Grade View: Full view of the glottis  Insertion attempts: 1  Place. ..    Secured at 27 cm   Measured From [address] [place] [address],[address] By [employer] tube low   Site Condition Dry

## 2022-02-17 NOTE — PROGRESS NOTES
Nephrology Progress Note  2/17/2022 9:04 AM        Subjective:   Admit Date: 2/6/2022  PCP: Zo Lizarraga MD    Interval History: Remains on mechanical ventilation    Diet: Tube feed per OG    ROS: With assist control mode, FiO2 45% PEEP of 10 and peak pressure of 25  Sedated with fentanyl and high dose of propofol  Vasopressor has been stopped  Urine output recorded 1.86 L for the last 24 hours  No fever    Data:     Current meds:    insulin lispro  0-18 Units SubCUTAneous Q4H    insulin NPH  15 Units SubCUTAneous QAM    insulin glargine  30 Units SubCUTAneous Nightly    furosemide  80 mg IntraVENous TID    chlorothiazide (DIURIL) IVPB  500 mg IntraVENous Q12H    [START ON 2/18/2022] digoxin  125 mcg IntraVENous Q48H    pantoprazole  40 mg IntraVENous Daily    cefepime  1,000 mg IntraVENous Q24H    sodium chloride  500 mL IntraVENous Once    albuterol sulfate HFA  4 puff Inhalation Q4H    And    ipratropium  4 puff Inhalation Q4H    chlorhexidine  15 mL Mouth/Throat BID    famotidine (PEPCID) injection  20 mg IntraVENous Daily    sennosides  5 mL Oral BID    metoprolol succinate  50 mg Oral BID    [Held by provider] insulin lispro  20 Units SubCUTAneous TID WC    magic (miracle) mouthwash  5 mL Swish & Swallow TID    baricitinib  1 mg Oral Daily    [Held by provider] guaiFENesin  200 mg Oral Q4H    sodium chloride flush  5-40 mL IntraVENous 2 times per day    apixaban  5 mg Oral BID    aspirin EC  81 mg Oral Daily    cyclobenzaprine  10 mg Oral Nightly      norepinephrine Stopped (02/15/22 1149)    fentaNYL 25 mcg/hr (02/17/22 0631)    propofol 60 mcg/kg/min (02/17/22 0654)    dextrose      dextrose      sodium chloride 500 mL (02/15/22 1713)    dextrose           I/O last 3 completed shifts:   In: 2237.3 [I.V.:1373.3; NG/GT:714; IV Piggyback:150]  Out: 2185 [Urine:2185]    CBC:   Recent Labs     02/14/22  1849 02/16/22  0545   WBC 10.7* 6.1   HGB 9.3* 8.0*    223 Recent Labs     02/14/22  1849 02/15/22  0538 02/16/22  0545    136 135   K 4.3 4.3 4.1   CL 96* 93* 92*   CO2 24 22 19*   BUN 97* 114* 140*   CREATININE 3.6* 4.4* 5.3*   GLUCOSE 78 176* 211*       Lab Results   Component Value Date    CALCIUM 7.2 (L) 02/16/2022    PHOS 8.7 (H) 02/16/2022       Objective:     Vitals: BP (!) 129/59   Pulse 93   Temp 99 °F (37.2 °C) (Rectal)   Resp 19   Ht 5' 7.99\" (1.727 m)   Wt 192 lb 3.9 oz (87.2 kg)   SpO2 93%   BMI 29.24 kg/m²     General appearance: Intubated and sedated  HEENT: Positive conjunctival pallor  Neck: Right internal jugular central venous catheter  Lungs: Limited exam, coarse breath sound  Heart: Seemed irregular, well-healed incision from previous sternotomy  Abdomen: Soft, positive bowel sound  Extremities: Bilateral ankle edema  He does have a Reyes catheter      Problem List :         Impression :     1. Acute kidney injury with underlying CKD stage IV A3-responded to IV loop and thiazide-we will continue that, lab is pending  2. Breakthrough COVID-19 with respiratory failure and other organ involvement  3. Underlying coronary artery disease, diabetes  4. Multifactoral anemia and metabolic acidosis    Recommendation/Plan  :     1. Keep all diuretics for now  2. Review all labs  3. Watch for iatrogenic nosocomial complication  4. Good glycemic control  5. If he fails pharmacologic therapy only then consider kidney replacement therapy  6.  Follow clinically and biochemically      Andi Bales MD MD

## 2022-02-17 NOTE — PROGRESS NOTES
healing 2018    Closed nondisplaced fracture of third metatarsal bone of left foot 2018    Proteinuria 01/10/2018    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 2017    Chronic kidney disease (CKD) stage G4/A3, severely decreased glomerular filtration rate (GFR) between 15-29 mL/min/1.73 square meter and albuminuria creatinine ratio greater than 300 mg/g ([facility] 2017    DM (diabetes mellitus) ([facility] 2017    CAD (coronary artery disease) 2017    Cardiomyopathy ([facility] 2017    S/P CABG x 3 10/25/2016     Overview Note:     10/3/16 LIMA-LAD, SVG-PDA, SVG-Cx + Maze+Appendage resection Dr Zaman Bring Anemia 10/25/2016     Overview Note:     Post op      Essential hypertension      Overview Note:     On losartan 100 mg a day and Demadex 20 mg a day and amlodipine 5 mg a day and Coreg 12.5 twice a day      Dyslipidemia     Type 2 diabetes mellitus with complication (HCC)     SOB (shortness of breath) on exertion 2014    Statin intolerance 2014    History of colonic polyps 10/11/2013    Family history of colon cancer 10/11/2013     Overview Note:     Father  of a GI malignancy and a Niece  at age 39 of Colon CA      DJD (degenerative joint disease), multiple sites 2013    PAF (paroxysmal atrial fibrillation) (HCC)      Overview Note:     S/p surgical Maze 10/2016      Palpitations 2012   Acute Hypoxic resp failure  Bilateral Pneumonia Sec to COVID-19  VDRF  Leukocytosis  Moderate Mitral Stenosis  Afib rate controlled  CKD       1. Barcitinib  2. Decadron  3. Insulin  4. Inhalers  5. Eliquis  6. CKD per renal  7. Tube feeds  8. Keep sats >88%  9. Daily SAT and SBT trial  10.  C/w present management    Electronically signed by Kush Charles MD on 2022 at 8:57 AM

## 2022-02-17 NOTE — PROGRESS NOTES
02/17/22 1111   Vent Information   Equipment Changed HME   Vent Type 980   Vent Mode AC/PC   Vt Ordered 0 mL   Pressure Ordered 15   Rate Set 16 bmp   Peak Flow 0 L/min   Pressure Support 0 cmH20   FiO2  50 %   Sensitivity 3   PEEP/CPAP 10   I Time/ I Time % 1 s   Humidification Source HME   Nitric Oxide/Epoprostenol In Use? No   Vent Patient Data   High Peep/I Pressure 15   Peak Inspiratory Pressure 25 cmH2O   Mean Airway Pressure 14 cmH20   Rate Measured 16 br/min   Vt Exhaled 654 mL   Minute Volume 10.8 Liters   I:E Ratio 1:2.80   Breath Sounds   Right Upper Lobe Diminished   Right Middle Lobe Diminished   Right Lower Lobe Diminished   Left Upper Lobe Diminished   Left Lower Lobe Diminished   Additional Respiratory  Assessments   Position Semi-Starr's   Alarm Settings   High Pressure Alarm 50 cmH2O   Delay Alarm 20 sec(s)   Low Minute Volume Alarm 2.5 L/min   Apnea (secs) 20 secs   High Respiratory Rate 40 br/min   Low Exhaled Vt  250 mL   ETT (adult)   Placement Date/Time: 02/14/22 (c) 0224   Preoxygenation: Yes  Mask Ventilation: Ventilated by mask (1)  Technique: Video laryngoscopy  Tube Size: 7.5 mm  Blade Size: 3  Location: Oral  Grade View: Full view of the glottis  Insertion attempts: 1  Place. ..    Secured at 27 cm   Measured From [address] [place] [address],[address] By [employer] tube low   Site Condition Dry

## 2022-02-17 NOTE — PROGRESS NOTES
Hospitalist Progress Note      Name:  Melissa Wise /Age/Sex: 1945  (68 y.o. male)   MRN & CSN:  [id number] & [id number] Admission Date/Time: 2022  7:43 PM   Location:  -A PCP: Carlie Dyer MD         Hospital Day: 12  Discharge barrier/Reason for continued hospitalization: need to get extubated    Assessment and Plan:   Melissa Wise is a 68 y.o.  male hypertension, chronic diastolic CHF, CKD 4 due to diabetic nephropathy, A. fib on chronic anticoagulation with Eliquis, type 2 diabetes, dyslipidemia, who presented to the ED on 2022 with shortness of breath and cough and was diagnosed with Pneumonia due to COVID-19 virus. He is fully vaccinated against Covid. He deteriorated  On 22 and night requiring AirVo at Fio2 % and endeded  Up getting intubated on 22. 1. Acute on chronic hypoxic respiratory faiure   Suspected  Multifactorial  Sepsis secondary to bacterial pneumonia/ARDS from [address]  Suspect Gram negative  Got intubated on  and remains on  vent support @ Fio2 100%>45% currently  On empiric  IV cefepime and vanco>de-escalate as able  Wean vent per pharmacy  C/w cefepime and discontinue vanco due to worsening renal failure  Pul toilette  Wean vent per pulmonary  Pulmonary following      2. KAREEM on CKD 4:   Due to diabetic nephropathy. Creatinine 2.5 at baseline, creatinien 4.4>5.3>5 today  Likely cardiorenal vs ATN  BNP 14K and cardio added high dose lasix  Stop vanco as well  Avoid nephrotoxic agents   monitor  Renal following       3   Sepsis due to COVID-19 pneumonia: POA. - now resolved  Acute organ dysfunction as evidenced by acute hypoxic respiratory failure. CXR with patchy airspace disease bilaterally  On steroid  S/p baricitinib therapy  o2 was weaned to 4L via NC prior to this intubation, wean as able  Home o2 eval prior to discharge  Pul toilette    4. COVID-19 acute hypoxic respiratory failure: POA.   O2 sats <89% on room air  Currently on 4 L. Not on home O2  Regular Is use  Wean to RA  as tolerated    5. DM2 with hyperosmolar state- improved  Off  insulin gtt. Transitioned to lantus and SSI  A1c 8.9 from 2/7/22  AG closed and BS well controlled currently  Endocrine following  Monitor and titrate  Diabetic teaching prior to discharge    6. Hypertension: On meds   Hold nephrotoxic agents  Monitor and itrate    7. A fib, paroxysmal   Rate uncontrolled  BB with holding parameters  cardizem changed to digoxin by cardio  Continue Eliquis. telemonitor  Cardio following    8. Sacral decub ulcer,stage II, POA  C/w local wound care    9. Anemia of chronic disease  With some component of blood loss, no bleeding noted  Hb 7.5 today ( it was 9.3 on 2/14)  Monitor and transfuse for Hb <7    10. Hypocalcemia  Replace and monitor          Disposition; TBD    Diet Diet NPO  ADULT TUBE FEEDING; Orogastric; Peptide Based High Protein; Continuous; 10; Yes; 10; Q 4 hours; 60; 500; Q 6 hours   DVT Prophylaxis [] Lovenox, []  Heparin, [] SCDs, [x] Ambulation. Eliquis   GI Prophylaxis [x] PPI,  [] H2 Blocker,  [] Carafate,  [x] Diet/Tube Feeds   Code Status Full Code, consult palliative on the board   MDM [] Low, [] Moderate,[x]  High     Comments; son and sister  On recent ceversation from 2/14 do not want dialysis, trach and PEG , will be interested in terminal extubation if he does not improve. Palliative consulted on the board for hospice/termial withdrawal if does not wean vent.      History of Present Illness:     Chief Complaint: Pneumonia due to COVID-19 virus     Ricci Valero is a 68 y.o.  male  who presents with acute hypoxic respiratory failure    Patient is seen and examined this noon  Got intubated s/p rapid response on 2/14  Fio2 weaned to 45%, not weaning further  Making urine and tolerating trickle feeding  Creatinine keeps trending down to 5 this Am  Otherwise seems stable on vent  Family members visited today      Ten point ROS unable to do     Objective: Intake/Output Summary (Last 24 hours) at 2/17/2022 0934  Last data filed at 2/17/2022 0835  Gross per 24 hour   Intake 1523.33 ml   Output 2085 ml   Net -561.67 ml        Vitals:   Vitals:    02/17/22 0600 02/17/22 0616 02/17/22 0715 02/17/22 0807   BP: 123/61 (!) 139/56  (!) 129/59   Pulse: 86 76 83 93   Resp: 17 22 24 19   Temp:    99 °F (37.2 °C)   TempSrc:    Rectal   SpO2: 92% 92% 94% 93%   Weight:       Height:            Physical Exam:   GEN: Sedated on vent support, no distress  HEENT: Normal   RESP: Diminished BS bilaterally. Symmetric chest movement while on o2 via NC  CVS: RRR, S1, S2  GI/: Abdomen is nontender, no organomegaly. . Bowel sounds normal, rectal exam deferred. MSK: No gross joint deformities. No tenderness  SKIN: Normal coloration, warm, dry. NEURO: limited due to sedation  PSYCH:  Limited due to sedation.     Medications:   Medications:    insulin lispro  0-18 Units SubCUTAneous Q4H    insulin NPH  15 Units SubCUTAneous QAM    insulin glargine  30 Units SubCUTAneous Nightly    furosemide  80 mg IntraVENous TID    chlorothiazide (DIURIL) IVPB  500 mg IntraVENous Q12H    [START ON 2/18/2022] digoxin  125 mcg IntraVENous Q48H    pantoprazole  40 mg IntraVENous Daily    cefepime  1,000 mg IntraVENous Q24H    sodium chloride  500 mL IntraVENous Once    albuterol sulfate HFA  4 puff Inhalation Q4H    And    ipratropium  4 puff Inhalation Q4H    chlorhexidine  15 mL Mouth/Throat BID    famotidine (PEPCID) injection  20 mg IntraVENous Daily    sennosides  5 mL Oral BID    metoprolol succinate  50 mg Oral BID    [Held by provider] insulin lispro  20 Units SubCUTAneous TID WC    magic (miracle) mouthwash  5 mL Swish & Swallow TID    baricitinib  1 mg Oral Daily    [Held by provider] guaiFENesin  200 mg Oral Q4H    sodium chloride flush  5-40 mL IntraVENous 2 times per day    apixaban  5 mg Oral BID    aspirin EC  81 mg Oral Daily Infusions:    norepinephrine Stopped (02/15/22 1149)    fentaNYL 25 mcg/hr (02/17/22 0631)    propofol 60 mcg/kg/min (02/17/22 0654)    dextrose      dextrose      sodium chloride 500 mL (02/15/22 1713)    dextrose       PRN Meds: LORazepam, 0.5 mg, Q6H PRN  dextrose, 100 mL/hr, PRN  glucose, 15 g, PRN  dextrose, 12.5 g, PRN  glucagon (rDNA), 1 mg, PRN  dextrose, 100 mL/hr, PRN  benzocaine-menthol, 1 lozenge, Q2H PRN  sodium chloride flush, 5-40 mL, PRN  sodium chloride, 25 mL, PRN  ondansetron, 4 mg, Q8H PRN   Or  ondansetron, 4 mg, Q6H PRN  polyethylene glycol, 17 g, Daily PRN  acetaminophen, 650 mg, Q6H PRN   Or  acetaminophen, 650 mg, Q6H PRN  dextrose, 100 mL/hr, PRN  acetaminophen, 500 mg, Q4H PRN  albuterol sulfate HFA, 2 puff, Q4H PRN  dextrose bolus (hypoglycemia), 125 mL, PRN   Or  dextrose bolus (hypoglycemia), 250 mL, PRN      Recent Labs     02/16/22  0545 02/14/22  1849 02/12/22  1620   WBC 6.1 10.7* 21.2*   HGB 8.0* 9.3* 11.3*   HCT 24.4* 29.5* 34.4*   MCV 87.8 88.6 86.9    242 423     Lab Results   Component Value Date     02/16/2022    K 4.1 02/16/2022    CL 92 02/16/2022    CO2 19 02/16/2022     02/16/2022    CREATININE 5.3 02/16/2022    GLUCOSE 211 02/16/2022    CALCIUM 7.2 02/16/2022      Lab Results   Component Value Date    INR 1.03 09/28/2017    INR 1.14 10/03/2016    INR 1.07 09/29/2016    PROTIME 11.7 09/28/2017    PROTIME 13.1 (H) 10/03/2016    PROTIME 12.2 09/29/2016           Electronically signed by Tonia Garcia MD on 2/17/2022 at 9:34 AM

## 2022-02-17 NOTE — PROGRESS NOTES
Cardiology Progress Note     Today's Plan sign off   Will be available if needed     Admit Date:  2/6/2022    Consult reason/ Seen today for: HR control    Subjective and  Overnight Events:  On vent- update from nursing staff- no cardiac issues over night      Telemetry Atrial fib   Rate is controlled       Assessment / Plan:     Atrial fib with RVR -rate is controlled after  lanoxin: On metoprolol: CCB stopped d/t hypotension:  on AC with eliquis: Respiratory failure secondary to COVID: continues to be vent: receiving Olumiant. VHD with moderate MS- continue to follow: CAD-with h/o CABG- echo 01/2022 no wall motion abnormality :on ASA and BB :  elevated BNP 14,123-- started on IV diuril and lasix per nephology : - urine output has improved: KAREEM /CKD - nephrology on board            History of Presenting Illness:    Chief complain on admission : 68 y. o.year old who is admitted for shortness of breath      Past medical history:    has a past medical history of Acid reflux, Arrhythmia, Arthritis, CAD (coronary artery disease), CHF (congestive heart failure) ([facility]), Chronic back pain, CKD (chronic kidney disease) stage 3, GFR 30-59 ml/min ([facility]), COPD (chronic obstructive pulmonary disease) ([facility] Diabetes mellitus ([facility] [address] Glaucoma, H/O 24 hour EKG monitoring, H/O cardiac catheterization, H/O cardiovascular stress test, H/O complete electrocardiogram, H/O Doppler ultrasound, H/O Doppler ultrasound (Lower extremity), H/O percutaneous left heart catheterization, Heat syncope, [place] (hard of hearing), Hx of echocardiogram, Hyperlipidemia, Hypertension, Macular degeneration, Other activity(E029.9), PAF (paroxysmal atrial fibrillation) ([facility] [address] PVD (peripheral vascular disease) ([facility] [address] S/P CABG x 3, Skin Cancer, SOB (shortness of breath) on exertion, Teeth missing, Ventricular ectopy, Wears dentures, and Wears glasses.   Past surgical history:   has a past surgical history that includes cyst removal (2000's); Thoracentesis (Left, 10/06/2016); Coronary artery bypass graft (10/03/2016); Skin cancer excision (2000's); eye surgery (Bilateral, 2015); Dental surgery; Colonoscopy (Last Done In 2000's); fracture surgery (Left, 01/11/2016); Cardiac surgery (10/03/2016); other surgical history (10/03/2017); and IR NONTUNNELED VASCULAR CATHETER > 5 YEARS (2/14/2022). Social History:   reports that he quit smoking about 27 years ago. His smoking use included cigarettes. He started smoking about 57 years ago. He has a 60.00 pack-year smoking history. He has never used smokeless tobacco. He reports that he does not drink alcohol and does not use drugs. Family history:  family history includes Breast Cancer in his sister; Cancer in his father, mother, sister, and son; Diabetes in his father; Early Death (age of onset: 48) in his mother; Other in his father. Allergies   Allergen Reactions    Aldactone [Spironolactone]      \"Developed Pain In The  Breasts And Knots In The Breasts\"    Crestor [Rosuvastatin Calcium]      \"Leg Cramps\"    Lipitor      \"Leg Cramps\"    Zocor [Simvastatin - High Dose]      \"Leg Cramps\"       Review of Systems:   All 14 systems were reviewed and are negative  Except for the positive findings which are documented : obtained per review of medical records :    /67   Pulse 72   Temp 98.2 °F (36.8 °C) (Rectal)   Resp 17   Ht 5' 7.99\" (1.727 m)   Wt 192 lb 3.9 oz (87.2 kg)   SpO2 93%   BMI 29.24 kg/m²       Intake/Output Summary (Last 24 hours) at 2/17/2022 1048  Last data filed at 2/17/2022 0835  Gross per 24 hour   Intake 1523.33 ml   Output 1935 ml   Net -411.67 ml       Physical Exam:  Physical Exam  Vitals reviewed. Constitutional:       Appearance: He is ill-appearing. Interventions: He is intubated.       Comments: Sedated on vent    HENT:      Mouth/Throat:      Comments: Oral ETT   Oral gastric tube   Cardiovascular: Rate and Rhythm: Normal rate. Rhythm irregular. Pulses: Normal pulses. Heart sounds: Normal heart sounds. Pulmonary:      Effort: Pulmonary effort is normal. He is intubated. Breath sounds: Examination of the right-lower field reveals decreased breath sounds. Examination of the left-lower field reveals decreased breath sounds. Decreased breath sounds present. Musculoskeletal:      Right lower leg: No edema. Left lower leg: No edema. Skin:     General: Skin is warm and dry. Capillary Refill: Capillary refill takes less than 2 seconds.    Neurological:      Comments: Sedated on vent          Medications:    insulin lispro  0-18 Units SubCUTAneous Q4H    insulin NPH  15 Units SubCUTAneous QAM    insulin glargine  30 Units SubCUTAneous Nightly    furosemide  80 mg IntraVENous TID    chlorothiazide (DIURIL) IVPB  500 mg IntraVENous Q12H    [START ON 2/18/2022] digoxin  125 mcg IntraVENous Q48H    pantoprazole  40 mg IntraVENous Daily    cefepime  1,000 mg IntraVENous Q24H    sodium chloride  500 mL IntraVENous Once    albuterol sulfate HFA  4 puff Inhalation Q4H    And    ipratropium  4 puff Inhalation Q4H    chlorhexidine  15 mL Mouth/Throat BID    famotidine (PEPCID) injection  20 mg IntraVENous Daily    sennosides  5 mL Oral BID    metoprolol succinate  50 mg Oral BID    [Held by provider] insulin lispro  20 Units SubCUTAneous TID WC    magic (miracle) mouthwash  5 mL Swish & Swallow TID    baricitinib  1 mg Oral Daily    [Held by provider] guaiFENesin  200 mg Oral Q4H    sodium chloride flush  5-40 mL IntraVENous 2 times per day    apixaban  5 mg Oral BID    aspirin EC  81 mg Oral Daily      norepinephrine Stopped (02/15/22 1149)    fentaNYL 25 mcg/hr (02/17/22 0937)    propofol 60 mcg/kg/min (02/17/22 0936)    dextrose      dextrose      sodium chloride 500 mL (02/15/22 0453)    dextrose       LORazepam, dextrose, glucose, dextrose, glucagon (rDNA), dextrose, benzocaine-menthol, sodium chloride flush, sodium chloride, ondansetron **OR** ondansetron, polyethylene glycol, acetaminophen **OR** acetaminophen, dextrose, acetaminophen, albuterol sulfate HFA, dextrose bolus (hypoglycemia) **OR** dextrose bolus (hypoglycemia)    Lab Data:  CBC:   Recent Labs     22  1849 22  0545   WBC 10.7* 6.1   HGB 9.3* 8.0*   HCT 29.5* 24.4*   MCV 88.6 87.8    223     BMP:   Recent Labs     22  1849 02/15/22  0538 22  0545    136 135   K 4.3 4.3 4.1   CL 96* 93* 92*   CO2 24 22 19*   PHOS  --  7.5* 8.7*   BUN 97* 114* 140*   CREATININE 3.6* 4.4* 5.3*     PT/INR: No results for input(s): PROTIME, INR in the last 72 hours. BNP:    Recent Labs     22  0545   PROBNP 14,123*     TROPONIN: No results for input(s): TROPONINT in the last 72 hours. Impression:  Principal Problem:    Pneumonia due to COVID-19 virus  Active Problems:    Hypoxia  Resolved Problems:    * No resolved hospital problems. *       All labs, medications and tests reviewed by myself, continue all other medications of all above medical condition listed as is except for changes mentioned above. Thank you   Please call with questions. Electronically signed by RAMY Arnett CNP on 2022 at 10:48 AM     I have seen ,spoken to  and examined this patient personally, independently of the TENNILLE. I have reviewed the hospital care given to date and reviewed all pertinent labs and imaging. I have spoken with patient, nursing staff and provided written and verbal instructions . The above note has been reviewed     CARDIOLOGY ATTENDING ADDENDUM    HPI:  I have reviewed the above HPI  And agree with above     Pulse Range: Pulse  Av.8  Min: 72  Max: 128    Blood Presuure Range:  Systolic (05PUR), MGS:738 , Min:119 , XCL:158   ; Diastolic (12CLW), FXK:60, Min:51, Max:79      Pulse ox Range: SpO2  Av.1 %  Min: 92 %  Max: 100 %    24hr I & O: Intake/Output Summary (Last 24 hours) at 2/17/2022 1110  Last data filed at 2/17/2022 0835  Gross per 24 hour   Intake 1523.33 ml   Output 1935 ml   Net -411.67 ml         /67   Pulse 72   Temp 98.1 °F (36.7 °C) (Rectal)   Resp 17   Ht 5' 7.99\" (1.727 m)   Wt 192 lb 3.9 oz (87.2 kg)   SpO2 93%   BMI 29.24 kg/m²       Physical Exam:  General:  On vent  Head:normal  Eye:normal  Neck:  No JVD   Chest:  Clear to auscultation, respiration easy  Cardiovascular:  RRR S1S2  Abdomen:   nontender  Extremities:  tr edema  Pulses; palpable  Neuro: On vent      MEDICAL DECISION MAKING;    Pt interviewed,examined , all available data was reviewed, following is the plan which was discussed with TENNILLE as well: 1.     Patient patient is in A. fib heart rate is better now on Eliquis already, today was loaded with digoxin which is controlled the heart rate, patient is ventricular rate is very well controlled today I will obtain EKG  for further evaluation, the calcium channel blocker was stopped given that the patient was getting hypotensive  2. I will discontinue digoxin given that the patient has CKD and can get dig toxic if he continues however we will continue the beta-blocker for now  3. Respiratory failure from Covid patient is on ventilator and is being treated with baricitinib  4. Sepsis on antibiotics  5. Diabetes mellitus on insulin coverage blood glucose level well controlled fascia  6. CKD creatinine of around 4 followed by nephrology  7. Valvular heart disease has moderate mitral stenosis will be addressed once he is extubated  8.  Coronary artery disease had CABG with 3 bypass graft done in 2016, last nuclear scintigraphic study in 2018 was unremarkable     9.  HFpEF which is probably secondary to her valvular heart disease which will be addressed once he is extubated and stronger, will probably need a GERALD for further evaluation        Devin Zhou MD [facility]

## 2022-02-17 NOTE — PROGRESS NOTES
Progress Note( Dr. Torey Isabel)  2/16/2022  Subjective:   Admit Date: 2/6/2022  PCP: Denise Maya MD    Admitted For :Shortness of breath, hypoxia and Covid pneumonia       Consulted For: Better control of blood glucose     Interval History:    Pt is sedated intubated and placed on ventilator also has tube feeding going         Intake/Output Summary (Last 24 hours) at 2/16/2022 2147  Last data filed at 2/16/2022 1700  Gross per 24 hour   Intake 714 ml   Output 1185 ml   Net -471 ml       DATA    CBC:   Recent Labs     02/14/22  1849 02/16/22  0545   WBC 10.7* 6.1   HGB 9.3* 8.0*    223    CMP:  Recent Labs     02/14/22  1849 02/15/22  0538 02/16/22  0545    136 135   K 4.3 4.3 4.1   CL 96* 93* 92*   CO2 24 22 19*   BUN 97* 114* 140*   CREATININE 3.6* 4.4* 5.3*   CALCIUM 7.5* 7.6* 7.2*   PROT 4.8*  --  5.1*   LABALBU 2.4* 2.5* 2.4*   BILITOT 0.7  --  0.4   ALKPHOS 81  --  73   AST 22  --  10*   ALT 22  --  15     Lipids:   Lab Results   Component Value Date    CHOL 151 10/04/2021    CHOL 170 12/27/2016    HDL 41 10/04/2021    TRIG 260 02/15/2022     Glucose:  Recent Labs     02/16/22  1421 02/16/22  1619 02/16/22 2034   POCGLU 264* 254* 226*     [id number]:  Lab Results   Component Value Date    LABA1C 8.9 02/07/2022     High Sensitivity TSH:   Lab Results   Component Value Date    TSHHS 2.240 01/17/2022     Free T3: No results found for: FT3  Free T4:  Lab Results   Component Value Date    T4FREE 1.26 10/26/2021       XR CHEST PORTABLE   Final Result   The ET tube was 3.1 cm above the karan. The NG tube was at least to the   gastric body. A right jugular central venous line was noted with the tip in   the superior vena cava. No pneumothorax was identified. CABG with   prosthetic mitral valve. Mild cardiomegaly was noted with patchy alveolar   pneumonia in both lower lobes. Slight improvement has occurred bilaterally   since 02/15/2022.          XR CHEST PORTABLE   Final Result   Bibasilar pulmonary infiltrates without significant change. XR CHEST PORTABLE   Final Result   Endotracheal tube projects 2.6 cm above the karan. XR CHEST PORTABLE   Final Result   Right central venous catheter at the expected location of the SVC. No   pneumothorax. Persistent consolidations in the lung bases. XR ABDOMEN (KUB) (SINGLE AP VIEW)   Final Result   Endotracheal tube tip overlies the peripyloric region. IR NONTUNNELED VASCULAR CATHETER > 5 YEARS   Final Result   Successful ultrasound guided non-tunneled 7 [place]an triple-lumen central   venous access catheter placement via right internal jugular venous approach. XR CHEST PORTABLE   Final Result   Worsening bibasilar pulmonary infiltrates. XR CHEST PORTABLE   Final Result   1. Nonspecific pulmonary infiltrates can be seen with atypical/viral   pneumonia. 2. Left basilar consolidation and pleural effusion; superimposed   community-acquired or nosocomial pneumonia are considerations. Developing   ARDS is a consideration. 3. Calcific atherosclerosis aorta. 4. Cardiomegaly.          XR CHEST PORTABLE   Final Result   Stable chest.         XR CHEST PORTABLE    (Results Pending)        Scheduled Medicines   Medications:    insulin glargine  30 Units SubCUTAneous Nightly    furosemide  80 mg IntraVENous TID    chlorothiazide (DIURIL) IVPB  500 mg IntraVENous Q12H    [START ON 2/18/2022] digoxin  125 mcg IntraVENous Q48H    pantoprazole  40 mg IntraVENous Daily    cefepime  1,000 mg IntraVENous Q24H    sodium chloride  500 mL IntraVENous Once    albuterol sulfate HFA  4 puff Inhalation Q4H    And    ipratropium  4 puff Inhalation Q4H    chlorhexidine  15 mL Mouth/Throat BID    famotidine (PEPCID) injection  20 mg IntraVENous Daily    sennosides  5 mL Oral BID    insulin lispro  0-12 Units SubCUTAneous Q4H    [Held by provider] insulin NPH  25 Units SubCUTAneous QAM    metoprolol succinate  50 mg Oral BID    [Held by provider] insulin lispro  20 Units SubCUTAneous TID WC    magic (miracle) mouthwash  5 mL Swish & Swallow TID    baricitinib  1 mg Oral Daily    [Held by provider] guaiFENesin  200 mg Oral Q4H    sodium chloride flush  5-40 mL IntraVENous 2 times per day    apixaban  5 mg Oral BID    aspirin EC  81 mg Oral Daily    cyclobenzaprine  10 mg Oral Nightly      Infusions:    norepinephrine Stopped (02/15/22 1149)    fentaNYL 25 mcg/hr (02/15/22 1917)    propofol 65 mcg/kg/min (02/16/22 1943)    dextrose      dextrose      sodium chloride 500 mL (02/15/22 1713)    dextrose           Objective:   Vitals: BP (!) 140/72   Pulse 86   Temp 99.1 °F (37.3 °C) (Rectal)   Resp 22   Ht 5' 7.99\" (1.727 m)   Wt 192 lb 3.9 oz (87.2 kg)   SpO2 95%   BMI 29.24 kg/m²   General appearance:  This is sedated intubated and on ventilator  Neck: no JVD or bruit  Thyroid : Normal lobes   Lungs: Has Vesicular Breath sounds some wheezing and some rales  Intubated and on ventilator  Heart:  regular rate and rhythm  Abdomen: soft, non-tender; bowel sounds normal; no masses,  no organomegaly  Musculoskeletal: Normal  Extremities: extremities normal, , no edema  Neurologic: Changes sedated intubated on ventilator    Assessment:     Patient Active Problem List:     Palpitations     PAF (paroxysmal atrial fibrillation) ([facility])     DJD (degenerative joint disease), multiple sites     History of colonic polyps     Family history of colon cancer     Statin intolerance     SOB (shortness of breath) on exertion     Type 2 diabetes mellitus with complication ([facility])     Essential hypertension     Dyslipidemia     S/P CABG x 3     Anemia     Cardiomyopathy (HCC)     Chronic kidney disease (CKD) stage G4/A3, severely decreased glomerular filtration rate (GFR) between 15-29 mL/min/1.73 square meter and albuminuria creatinine ratio greater than 300 mg/g (HCC)     DM (diabetes mellitus) ([facility])     CAD (coronary artery disease) 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     Proteinuria     Closed fracture of left ankle with routine healing     Closed nondisplaced fracture of third metatarsal bone of left foot     Closed nondisplaced fracture of second metatarsal bone of left foot     Closed nondisplaced fracture of fourth metatarsal bone of left foot     Debility     Physical debility     Hypoxia     Acute hypoxemic respiratory failure due to COVID-19 ([facility] [address]     Pneumonia due to COVID-19 virus      Plan:     1. Reviewed POC blood glucose . Labs and X ray results   2. On meal/ Correction bolus Humalog/ Basal Lantus Insulin regime /   3. Monitor Blood glucose frequently   4. Modified  the dose of Insulin/ other medicines as needed   5. Will follow     .      Nona Vega MD, MD

## 2022-02-17 NOTE — PROGRESS NOTES
02/17/22 0715   Vent Information   $Ventilation $Subsequent Day   Vent Type 980   Vent Mode AC/PC   Vt Ordered 0 mL   Pressure Ordered 15   Rate Set 16 bmp   Peak Flow 0 L/min   Pressure Support 0 cmH20   FiO2  50 %   SpO2 94 %   SpO2/FiO2 ratio 188   Sensitivity 3   PEEP/CPAP 10   I Time/ I Time % 1 s   Humidification Source HME   Nitric Oxide/Epoprostenol In Use? No   Vent Patient Data   High Peep/I Pressure 15   Peak Inspiratory Pressure 26 cmH2O   Mean Airway Pressure 16 cmH20   Rate Measured 23 br/min   Vt Exhaled 610 mL   Minute Volume 14.4 Liters   I:E Ratio 1:1.50   Plateau Pressure 24 [id number]   Static Compliance 38 mL/cmH2O   Total PEEP 9.9 cmH20   Auto PEEP 0 cmH20   Cough/Sputum   Sputum How Obtained Endotracheal;Suctioned   $Obtained Sample $Induced Sputum   Cough Productive   Frequency Infrequent   Sputum Amount Small   Sputum Color Dark red   Tenacity Thick   Spontaneous Breathing Trial (SBT) RT Doc   Pulse 83   Breath Sounds   Right Upper Lobe Diminished   Right Middle Lobe Diminished   Right Lower Lobe Diminished   Left Upper Lobe Diminished   Left Lower Lobe Diminished   Additional Respiratory  Assessments   Resp 24   Position Semi-Starr's   Oral Care Mouth suctioned   Alarm Settings   High Pressure Alarm 50 cmH2O   Delay Alarm 20 sec(s)   Low Minute Volume Alarm 2.5 L/min   Apnea (secs) 20 secs   High Respiratory Rate 40 br/min   Low Exhaled Vt  250 mL   ETT (adult)   Placement Date/Time: 02/14/22 (c) 1968   Preoxygenation: Yes  Mask Ventilation: Ventilated by mask (1)  Technique: Video laryngoscopy  Tube Size: 7.5 mm  Blade Size: 3  Location: Oral  Grade View: Full view of the glottis  Insertion attempts: 1  Place. ..    Secured at 27 cm   Measured From [address] [place] [address],[address] By Commercial tube low   Site Condition Dry

## 2022-02-18 NOTE — PROGRESS NOTES
Nephrology Progress Note  2/18/2022 9:06 AM        Subjective:   Admit Date: 2/6/2022  PCP: Cheryle Felts, MD    Interval History: Remains on mechanical ventilation  Today's his birthday    Diet: Tube feed is on hold this morning    ROS: With assist control mode, FiO2 45%, PEEP of 10 and peak pressure of 25  Sedated with propofol and fentanyl  Off vasopressor  Urine output recorded about 2 L for the last 24 hours  No fever    Data:     Current meds:    insulin lispro  0-18 Units SubCUTAneous Q4H    insulin NPH  15 Units SubCUTAneous QAM    insulin glargine  30 Units SubCUTAneous Nightly    furosemide  80 mg IntraVENous TID    chlorothiazide (DIURIL) IVPB  500 mg IntraVENous Q12H    pantoprazole  40 mg IntraVENous Daily    cefepime  1,000 mg IntraVENous Q24H    sodium chloride  500 mL IntraVENous Once    albuterol sulfate HFA  4 puff Inhalation Q4H    And    ipratropium  4 puff Inhalation Q4H    chlorhexidine  15 mL Mouth/Throat BID    famotidine (PEPCID) injection  20 mg IntraVENous Daily    sennosides  5 mL Oral BID    metoprolol succinate  50 mg Oral BID    [Held by provider] insulin lispro  20 Units SubCUTAneous TID WC    magic (miracle) mouthwash  5 mL Swish & Swallow TID    baricitinib  1 mg Oral Daily    [Held by provider] guaiFENesin  200 mg Oral Q4H    sodium chloride flush  5-40 mL IntraVENous 2 times per day    apixaban  5 mg Oral BID    aspirin EC  81 mg Oral Daily      norepinephrine Stopped (02/15/22 1149)    fentaNYL 50 mcg/hr (02/18/22 0600)    propofol 70 mcg/kg/min (02/18/22 0844)    dextrose      dextrose      sodium chloride 500 mL (02/15/22 1713)    dextrose           I/O last 3 completed shifts:   In: 4260.9 [I.V.:2160.2; NG/GT:1759; IV Piggyback:341.6]  Out: 8661 [Urine:2950]    CBC:   Recent Labs     02/16/22  0545 02/17/22  1000 02/18/22  1130   WBC 6.1 6.9 8.2   HGB 8.0* 7.5* 7.8*    210 205          Recent Labs     02/16/22  0545 02/17/22  1000

## 2022-02-18 NOTE — PROGRESS NOTES
Follow up visit for palliative care. Patient is sedated on full vent support with FIO2 of 60% and peep of 10. His vial signs are stable and no signs of distress. Today is patient's birthday. I have called to speak with his son Devi Lamas. He did not answer. I left a HIPPA compliant  for him to return my call.       -[phone] Spoke to patient's son Devi Lamas after he received update from primary RN Marialuisa. Devi Lamas inquired about Dialysis. I explained dialysis to Devi Lamas and he would like to talk with his aunt to make a decision to proceed with dialysis if needed. I confirmed with Devi Lamas that he wants his dad to remain a full code and does not want to withdraw care at this time. He does not want his dad to have a tracheostomy and peg tube. I will continue to be available for goals of care.     -[address] and patient's sister Thierry Mota called me back via group call. After [address] talked with each other it was decided to make patient a [facility]. They understand that this means no CPR,no shocking of his heart, no medications to artificially support patients BP,No resuscitative medications,no re-intubation. Jaciel Richardson had mentioned potential dialysis and family has decided that if patient's kidneys decline enough for dialysis that they do not want him to have dialysis. They just want nature to takes it course. I asked that they take a few days before withdraw of care as it is patient's birthday today. Devi Lamas stated that he was thinking about withdraw of care on Monday. Thierry Mota is in agreement with this decision. They would like to be informed if patient has a change in status over the weekend. I made sure they understood that the decision to withdraw care is not final and they can change their minds if they decide otherwise. Explained compassionate extubation process and questions answered to their satisfaction. I have updated Gaston Bahena regarding outcome of conversation.  Kristi Frost RN has confirmed code status change with patient's SOL/son Yasmin Marte JR(Luis). Grey Fregoso will change code status to reflect families wishes. I have also updated Dilshad Wing.

## 2022-02-18 NOTE — PROGRESS NOTES
Progress Note( Dr. Roseanne Louis)  2/17/2022  Subjective:   Admit Date: 2/6/2022  PCP: Marysol Gil MD    Admitted For :Shortness of breath, hypoxia and Covid pneumonia       Consulted For: Better control of blood glucose     Interval History:    Pt is sedated intubated and placed on ventilator also has tube feeding going         Intake/Output Summary (Last 24 hours) at 2/17/2022 2158  Last data filed at 2/17/2022 2100  Gross per 24 hour   Intake 2603.33 ml   Output 2175 ml   Net 428.33 ml       DATA    CBC:   Recent Labs     02/16/22  0545 02/17/22  1000   WBC 6.1 6.9   HGB 8.0* 7.5*    210    CMP:  Recent Labs     02/15/22  0538 02/16/22  0545 02/17/22  1000    135 128*   K 4.3 4.1 4.7   CL 93* 92* 86*   CO2 22 19* 18*   * 140* 150*   CREATININE 4.4* 5.3* 5.0*   CALCIUM 7.6* 7.2* 5.6*   PROT  --  5.1*  --    LABALBU 2.5* 2.4*  --    BILITOT  --  0.4  --    ALKPHOS  --  73  --    AST  --  10*  --    ALT  --  15  --      Lipids:   Lab Results   Component Value Date    CHOL 151 10/04/2021    CHOL 170 12/27/2016    HDL 41 10/04/2021    TRIG 260 02/15/2022     Glucose:  Recent Labs     02/17/22  0817 02/17/22  1150 02/17/22  2113   POCGLU 174* 180* 175*     BhnpiigbhiT2O:  Lab Results   Component Value Date    LABA1C 8.9 02/07/2022     High Sensitivity TSH:   Lab Results   Component Value Date    TSHHS 2.240 01/17/2022     Free T3: No results found for: FT3  Free T4:  Lab Results   Component Value Date    T4FREE 1.26 10/26/2021       XR CHEST PORTABLE   Final Result   Bibasilar pulmonary consolidations are seen, concerning for pneumonia. No   significant change. Pulmonary vascular congestion. XR CHEST PORTABLE   Final Result   The ET tube was 3.1 cm above the karan. The NG tube was at least to the   gastric body. A right jugular central venous line was noted with the tip in   the superior vena cava. No pneumothorax was identified. CABG with   prosthetic mitral valve.   Mild cardiomegaly was noted with patchy alveolar   pneumonia in both lower lobes. Slight improvement has occurred bilaterally   since 02/15/2022. XR CHEST PORTABLE   Final Result   Bibasilar pulmonary infiltrates without significant change. XR CHEST PORTABLE   Final Result   Endotracheal tube projects 2.6 cm above the karan. XR CHEST PORTABLE   Final Result   Right central venous catheter at the expected location of the SVC. No   pneumothorax. Persistent consolidations in the lung bases. XR ABDOMEN (KUB) (SINGLE AP VIEW)   Final Result   Endotracheal tube tip overlies the peripyloric region. IR NONTUNNELED VASCULAR CATHETER > 5 YEARS   Final Result   Successful ultrasound guided non-tunneled 7 Wolof triple-lumen central   venous access catheter placement via right internal jugular venous approach. XR CHEST PORTABLE   Final Result   Worsening bibasilar pulmonary infiltrates. XR CHEST PORTABLE   Final Result   1. Nonspecific pulmonary infiltrates can be seen with atypical/viral   pneumonia. 2. Left basilar consolidation and pleural effusion; superimposed   community-acquired or nosocomial pneumonia are considerations. Developing   ARDS is a consideration. 3. Calcific atherosclerosis aorta. 4. Cardiomegaly.          XR CHEST PORTABLE   Final Result   Stable chest.         XR CHEST PORTABLE    (Results Pending)        Scheduled Medicines   Medications:    insulin lispro  0-18 Units SubCUTAneous Q4H    insulin NPH  15 Units SubCUTAneous QAM    insulin glargine  30 Units SubCUTAneous Nightly    furosemide  80 mg IntraVENous TID    chlorothiazide (DIURIL) IVPB  500 mg IntraVENous Q12H    pantoprazole  40 mg IntraVENous Daily    cefepime  1,000 mg IntraVENous Q24H    sodium chloride  500 mL IntraVENous Once    albuterol sulfate HFA  4 puff Inhalation Q4H    And    ipratropium  4 puff Inhalation Q4H    chlorhexidine  15 mL Mouth/Throat BID    famotidine (PEPCID) injection  20 mg IntraVENous Daily    sennosides  5 mL Oral BID    metoprolol succinate  50 mg Oral BID    [Held by provider] insulin lispro  20 Units SubCUTAneous TID WC    magic (miracle) mouthwash  5 mL Swish & Swallow TID    baricitinib  1 mg Oral Daily    [Held by provider] guaiFENesin  200 mg Oral Q4H    sodium chloride flush  5-40 mL IntraVENous 2 times per day    apixaban  5 mg Oral BID    aspirin EC  81 mg Oral Daily      Infusions:    norepinephrine Stopped (02/15/22 1149)    fentaNYL 25 mcg/hr (02/17/22 0937)    propofol 64.952 mcg/kg/min (02/17/22 2007)    dextrose      dextrose      sodium chloride 500 mL (02/15/22 1713)    dextrose           Objective:   Vitals: BP (!) 118/54   Pulse 77   Temp 97.5 °F (36.4 °C) (Rectal)   Resp 17   Ht 5' 7.99\" (1.727 m)   Wt 192 lb 3.9 oz (87.2 kg)   SpO2 96%   BMI 29.24 kg/m²   General appearance:  This is sedated intubated and on ventilator  Neck: no JVD or bruit  Thyroid : Normal lobes   Lungs: Has Vesicular Breath sounds some wheezing and some rales  Intubated and on ventilator  Heart:  regular rate and rhythm  Abdomen: soft, non-tender; bowel sounds normal; no masses,  no organomegaly  Musculoskeletal: Normal  Extremities: extremities normal, , no edema  Neurologic: Changes sedated intubated on ventilator    Assessment:     Patient Active Problem List:     Palpitations     PAF (paroxysmal atrial fibrillation) (HCC)     DJD (degenerative joint disease), multiple sites     History of colonic polyps     Family history of colon cancer     Statin intolerance     SOB (shortness of breath) on exertion     Type 2 diabetes mellitus with complication (HCC)     Essential hypertension     Dyslipidemia     S/P CABG x 3     Anemia     Cardiomyopathy (HCC)     Chronic kidney disease (CKD) stage G4/A3, severely decreased glomerular filtration rate (GFR) between 15-29 mL/min/1.73 square meter and albuminuria creatinine ratio greater than 300 mg/g (HCC)     DM (diabetes mellitus) ([facility] [address]     CAD (coronary artery disease)     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 Reviewed POC blood glucose . Labs and X ray results   2. On meal/ Correction bolus Humalog/ Basal Lantus Insulin regime /   3. Monitor Blood glucose frequently   4. Modified  the dose of Insulin/ other medicines as needed   5. Will follow     .      Macy Crum MD, MD

## 2022-02-18 NOTE — PROGRESS NOTES
Hospitalist Progress Note      Name:  Mareil Robin /Age/Sex: 1945  (68 y.o. male)   MRN & CSN:  [id number] & [id number] Admission Date/Time: 2022  7:43 PM   Location:  -A PCP: Sahma Cortes MD         Hospital Day: 13    Assessment and Plan:   Mariel Robin is a 68 y.o.  male hypertension, chronic diastolic CHF, CKD 4 due to diabetic nephropathy, A. fib on chronic anticoagulation with Eliquis, type 2 diabetes, dyslipidemia, who presented to the ED on 2022 with shortness of breath and cough and was diagnosed with Pneumonia due to COVID-19 virus. He is fully vaccinated against Covid. He deteriorated  On 22 and night requiring AirVo at Fio2 % and endeded  Up getting intubated on 22.      Acute on chronic hypoxic respiratory faiure  Sepsis secondary to bacterial pneumonia/ARDS from [address] [place] [address]  -Intubated on  and remains on  vent support  -On empiric  IV cefepime Vancomycin stopped. -Wean vent per pulmonary        KAREEM on CKD 4:   -Diabetic Nephropathy.   -Creatinine 2.5 at baseline, creatinien 4.4>5.3>5 today  -Likely cardiorenal vs ATN  -BNP 14K and cardio added high dose lasix  -Avoid nephrotoxic agents   -Renal following         Sepsis due to COVID-19 pneumonia: POA. - now resolved  - Cont steroid  -S/p baricitinib therapy  -Pt Received Cefepime and Vancomycin.   -Currently on Cefepime Renally adjusted. Appears to be day 4.   -Check procal.         DM2 with hyperosmolar state- improved  -Off  insulin gtt. Transitioned to lantus and SSI  -A1c 8.9 from 22  -AG closed and BS well controlled currently  -Endocrine following     Hypertension:   -Now hypotension.  Monitor.      A fib, paroxysmal   -Rate uncontrolled  -BB with holding parameters  -cardizem stopped by cardio due to hypotension  -Digoxin stopped as well for possible nephrotoxicity.   -Continue Eliquis.       Sacral decub ulcer,stage II, POA  -C/w local wound care     Anemia of chronic disease  -Hb 7.8 today ( it was 9.3 on 2/14)  -Monitor and transfuse for Hb <7    Hyponatremia  -129 today   -Nephrology onboard.        Cont Tube feeds  Pt Full code    Diet Diet NPO  ADULT TUBE FEEDING; Orogastric; Peptide Based High Protein; Continuous; 10; Yes; 10; Q 4 hours; 60; 500; Q 6 hours   DVT Prophylaxis [] Lovenox, []  Heparin, [] SCDs, [] Ambulation   GI Prophylaxis [x] PPI,  [] H2 Blocker,  [] Carafate,  [] Diet/Tube Feeds   Code Status Full Code   Disposition Patient requires continued admission due to Intubated due to COVID>    MDM [] Low, [] Moderate,[x]  High  Patient's risk as above due to Intubated due to COVID>      History of Present Illness:     Chief Complaint:     The pt is intubated and sedated. The pt in no acute distress. Ten point ROS reviewed negative, unless as noted above    Objective: Intake/Output Summary (Last 24 hours) at 2/18/2022 0827  Last data filed at 2/18/2022 0600  Gross per 24 hour   Intake 2737.53 ml   Output 2000 ml   Net 737.53 ml      Vitals:   Vitals:    02/18/22 0750   BP: (!) 119/56   Pulse: 71   Resp: 17   Temp:    SpO2: 94%     Physical Exam:   GEN:   Sedated on vent support, no distress  HEENT: Normal   RESP: Diminished BS bilaterally. Symmetric chest movement while on o2 via NC  CVS: RRR, S1, S2  GI/: Abdomen is nontender, no organomegaly. . Bowel sounds normal, rectal exam deferred. MSK:   No gross joint deformities  SKIN:   Normal coloration, warm, dry. NEURO: limited due to sedation  PSYCH:  Limited due to sedation.     Medications:   Medications:    insulin lispro  0-18 Units SubCUTAneous Q4H    insulin NPH  15 Units SubCUTAneous QAM    insulin glargine  30 Units SubCUTAneous Nightly    furosemide  80 mg IntraVENous TID    chlorothiazide (DIURIL) IVPB  500 mg IntraVENous Q12H    pantoprazole  40 mg IntraVENous Daily    cefepime  1,000 mg IntraVENous Q24H    sodium chloride  500 mL IntraVENous Once    albuterol sulfate HFA  4 puff Inhalation Q4H    And    ipratropium  4 puff Inhalation Q4H    chlorhexidine  15 mL Mouth/Throat BID    famotidine (PEPCID) injection  20 mg IntraVENous Daily    sennosides  5 mL Oral BID    metoprolol succinate  50 mg Oral BID    [Held by provider] insulin lispro  20 Units SubCUTAneous TID WC    magic (miracle) mouthwash  5 mL Swish & Swallow TID    baricitinib  1 mg Oral Daily    [Held by provider] guaiFENesin  200 mg Oral Q4H    sodium chloride flush  5-40 mL IntraVENous 2 times per day    apixaban  5 mg Oral BID    aspirin EC  81 mg Oral Daily      Infusions:    norepinephrine Stopped (02/15/22 1149)    fentaNYL 50 mcg/hr (02/18/22 0600)    propofol 64.952 mcg/kg/min (02/18/22 0600)    dextrose      dextrose      sodium chloride 500 mL (02/15/22 1713)    dextrose       PRN Meds: LORazepam, 0.5 mg, Q6H PRN  dextrose, 100 mL/hr, PRN  glucose, 15 g, PRN  dextrose, 12.5 g, PRN  glucagon (rDNA), 1 mg, PRN  dextrose, 100 mL/hr, PRN  benzocaine-menthol, 1 lozenge, Q2H PRN  sodium chloride flush, 5-40 mL, PRN  sodium chloride, 25 mL, PRN  ondansetron, 4 mg, Q8H PRN   Or  ondansetron, 4 mg, Q6H PRN  polyethylene glycol, 17 g, Daily PRN  acetaminophen, 650 mg, Q6H PRN   Or  acetaminophen, 650 mg, Q6H PRN  dextrose, 100 mL/hr, PRN  acetaminophen, 500 mg, Q4H PRN  albuterol sulfate HFA, 2 puff, Q4H PRN  dextrose bolus (hypoglycemia), 125 mL, PRN   Or  dextrose bolus (hypoglycemia), 250 mL, PRN          Patient is still admitted because Intubated. The anticipated discharge is in greater than 24 hours.      Electronically signed by Vincenzo Mandel MD on 2/18/2022 at 8:27 AM

## 2022-02-18 NOTE — PROGRESS NOTES
This note also relates to the following rows which could not be included:  SpO2 - Cannot attach notes to unvalidated device data       02/18/22 0406   Vent Information   Vent Type 980   Vent Mode AC/PC   Vt Ordered 0 mL   Pressure Ordered 15   Rate Set 16 bmp   Peak Flow 0 L/min   Pressure Support 0 cmH20   FiO2  45 %   Sensitivity 3   PEEP/CPAP 10   I Time/ I Time % 1 s   Humidification Source HME   Nitric Oxide/Epoprostenol In Use? No   Vent Patient Data   High Peep/I Pressure 15   Peak Inspiratory Pressure 26 cmH2O   Mean Airway Pressure 17 cmH20   Rate Measured 27 br/min   Vt Exhaled 479 mL   Minute Volume 12.9 Liters   I:E Ratio 1:1.30   Cough/Sputum   Sputum How Obtained Suctioned;Oral;Endotracheal   $Obtained Sample $Induced Sputum   Cough Moist;Productive   Frequency Infrequent   Sputum Amount Small   Sputum Color Bright red   Tenacity Thin   Spontaneous Breathing Trial (SBT) RT Doc   Pulse 84   Breath Sounds   Right Upper Lobe Diminished   Right Middle Lobe Diminished   Right Lower Lobe Diminished   Left Upper Lobe Diminished   Left Lower Lobe Diminished   Additional Respiratory  Assessments   Resp 26   Position Semi-Starr's   Oral Care Completed? No   Alarm Settings   High Pressure Alarm 50 cmH2O   ETT (adult)   Placement Date/Time: 02/14/22 (c) 9603   Preoxygenation: Yes  Mask Ventilation: Ventilated by mask (1)  Technique: Video laryngoscopy  Tube Size: 7.5 mm  Blade Size: 3  Location: Oral  Grade View: Full view of the glottis  Insertion attempts: 1  Place. ..    Secured at 27 cm   Measured From Lips   ET Placement Left   Secured By Commercial tube low   Site Condition Dry

## 2022-02-18 NOTE — PROGRESS NOTES
Armani Madrigal and this RN (Thea Rincon) spoke with son Yobany Weber and he stated that he wants code status changed to a [facility], NO dialysis catheter placed, and compasionate wean on Monday.

## 2022-02-18 NOTE — PROGRESS NOTES
Pulmonary and Critical Care  Progress Note      VITALS:  BP (!) 142/63   Pulse 73   Temp 98.1 °F (36.7 °C) (Rectal)   Resp 16   Ht 5' 7.99\" (1.727 m)   Wt 192 lb 3.9 oz (87.2 kg)   SpO2 94%   BMI 29.24 kg/m²     Subjective:   CHIEF COMPLAINT :SOB     HPI:                The patient is on the vent and sedated. He is on 45% fio2. He is responding to painful stimuli    Objective:   PHYSICAL EXAM:    LUNGS:Occasional basal crackles  Abd-distended, soft, BS+,NT  Ext- no pedal edema  CVS-s1s2, no murmurs      DATA:    CBC:  Recent Labs     02/16/22  0545 02/17/22  1000 02/18/22  1130   WBC 6.1 6.9 8.2   RBC 2.78* 2.69* 2.77*   HGB 8.0* 7.5* 7.8*   HCT 24.4* 23.7* 24.5*    210 205   MCV 87.8 88.1 88.4   MCH 28.8 27.9 28.2   MCHC 32.8 31.6* 31.8*   RDW 14.7 14.8 14.8   SEGSPCT 75.0* 86.7* 88.9*   BANDSPCT 15*  --   --       BMP:  Recent Labs     02/16/22  0545 02/17/22  1000 02/18/22  0430    128* 129*   K 4.1 4.7 3.7   CL 92* 86* 87*   CO2 19* 18* 18*   * 150* 153*   CREATININE 5.3* 5.0* 5.0*   CALCIUM 7.2* 5.6* 6.3*   GLUCOSE 211* 153* 158*      ABG:  Recent Labs     02/16/22  1230 02/17/22  0600 02/18/22  0600   PH 7.44 7.45 7.37   PO2ART 65* 60* 62*   IBG5EOE 28.0* 27.0* 30.0*   O2SAT 91.6* 90.5* 90.4*     BNP  Lab Results   Component Value Date    BNP 79.22 11/25/2015      D-Dimer:  Lab Results   Component Value Date    DDIMER 4732 (H) 02/08/2022      Radiology: The ET tube was 2.9 cm above the karan.  The NG tube was at least to the   gastric fundus.  A right subclavian line was noted with the tip in the   superior vena cava at its junction with the right atrium.  No pneumothorax   was identified.  Mild cardiomegaly was noted with patchy pneumonia in both   lower lobes and the right middle lobe with small bilateral pleural effusions. Slight improvement has occurred bilaterally since 02/17/2022, 0514 hours.      1.       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 2017    Chronic kidney disease (CKD) stage G4/A3, severely decreased glomerular filtration rate (GFR) between 15-29 mL/min/1.73 square meter and albuminuria creatinine ratio greater than 300 mg/g ([facility] [address] 2017    DM [facility]diabetes mellitus) ([facility] 2017    CAD (coronary artery disease) 2017    Cardiomyopathy ([facility] 2017    S/P CABG x 3 10/25/2016     Overview Note:     10/3/16 LIMA-LAD, SVG-PDA, SVG-Cx + Maze+Appendage resection Dr Whitney Bigger Anemia 10/25/2016     Overview Note:     Post op      Essential hypertension      Overview Note:     On losartan 100 mg a day and Demadex 20 mg a day and amlodipine 5 mg a day and Coreg 12.5 twice a day      Dyslipidemia     Type 2 diabetes mellitus with complication (HCC)     SOB (shortness of breath) on exertion 2014    Statin intolerance 2014    History of colonic polyps 10/11/2013    Family history of colon cancer 10/11/2013     Overview Note:     Father  of a GI malignancy and a Niece  at age 39 of Colon CA      DJD (degenerative joint disease), multiple sites 2013    PAF (paroxysmal atrial fibrillation) (HCC)      Overview Note:     S/p surgical Maze 10/2016      Palpitations 2012   Acute Hypoxic resp failure  Bilateral Pneumonia Sec to COVID-19  VDRF  Leukocytosis  Moderate Mitral Stenosis  Afib rate controlled  CKD       1. CKD per renal  2. Decadron  3. Insulin  4. Eliquis  5. Inhalers  6. Tube feeds  7. SAT and SBT as tolerated  8.  C/w present management    Electronically signed by Toshia Payan MD on 2/18/2022 at 9:06 AM

## 2022-02-18 NOTE — PROGRESS NOTES
improvement has occurred bilaterally since 02/17/2022, 0514 hours. XR CHEST PORTABLE   Final Result   Bibasilar pulmonary consolidations are seen, concerning for pneumonia. No   significant change. Pulmonary vascular congestion. XR CHEST PORTABLE   Final Result   The ET tube was 3.1 cm above the karan. The NG tube was at least to the   gastric body. A right jugular central venous line was noted with the tip in   the superior vena cava. No pneumothorax was identified. CABG with   prosthetic mitral valve. Mild cardiomegaly was noted with patchy alveolar   pneumonia in both lower lobes. Slight improvement has occurred bilaterally   since 02/15/2022. XR CHEST PORTABLE   Final Result   Bibasilar pulmonary infiltrates without significant change. XR CHEST PORTABLE   Final Result   Endotracheal tube projects 2.6 cm above the karan. XR CHEST PORTABLE   Final Result   Right central venous catheter at the expected location of the SVC. No   pneumothorax. Persistent consolidations in the lung bases. XR ABDOMEN (KUB) (SINGLE AP VIEW)   Final Result   Endotracheal tube tip overlies the peripyloric region. IR NONTUNNELED VASCULAR CATHETER > 5 YEARS   Final Result   Successful ultrasound guided non-tunneled 7 Dutch triple-lumen central   venous access catheter placement via right internal jugular venous approach. XR CHEST PORTABLE   Final Result   Worsening bibasilar pulmonary infiltrates. XR CHEST PORTABLE   Final Result   1. Nonspecific pulmonary infiltrates can be seen with atypical/viral   pneumonia. 2. Left basilar consolidation and pleural effusion; superimposed   community-acquired or nosocomial pneumonia are considerations. Developing   ARDS is a consideration. 3. Calcific atherosclerosis aorta. 4. Cardiomegaly.          XR CHEST PORTABLE   Final Result   Stable chest.         XR CHEST PORTABLE    (Results Pending) Scheduled Medicines   Medications:    insulin lispro  0-18 Units SubCUTAneous Q4H    insulin NPH  15 Units SubCUTAneous QAM    insulin glargine  30 Units SubCUTAneous Nightly    furosemide  80 mg IntraVENous TID    chlorothiazide (DIURIL) IVPB  500 mg IntraVENous Q12H    pantoprazole  40 mg IntraVENous Daily    cefepime  1,000 mg IntraVENous Q24H    sodium chloride  500 mL IntraVENous Once    albuterol sulfate HFA  4 puff Inhalation Q4H    And    ipratropium  4 puff Inhalation Q4H    chlorhexidine  15 mL Mouth/Throat BID    famotidine (PEPCID) injection  20 mg IntraVENous Daily    sennosides  5 mL Oral BID    metoprolol succinate  50 mg Oral BID    [Held by provider] insulin lispro  20 Units SubCUTAneous TID WC    magic (miracle) mouthwash  5 mL Swish & Swallow TID    baricitinib  1 mg Oral Daily    [Held by provider] guaiFENesin  200 mg Oral Q4H    sodium chloride flush  5-40 mL IntraVENous 2 times per day    apixaban  5 mg Oral BID    aspirin EC  81 mg Oral Daily      Infusions:    norepinephrine Stopped (02/15/22 1149)    fentaNYL 50 mcg/hr (02/18/22 1402)    propofol 70 mcg/kg/min (02/18/22 1651)    dextrose      dextrose      sodium chloride 500 mL (02/15/22 1713)    dextrose           Objective:   Vitals: BP (!) 124/55   Pulse 72   Temp 98.4 °F (36.9 °C) (Rectal)   Resp 15   Ht 5' 7.99\" (1.727 m)   Wt 192 lb 3.9 oz (87.2 kg)   SpO2 93%   BMI 29.24 kg/m²   General appearance:  This is sedated intubated and on ventilator  Neck: no JVD or bruit  Thyroid : Normal lobes   Lungs: Has Vesicular Breath sounds some wheezing and some rales  Intubated and on ventilator  Heart:  regular rate and rhythm  Abdomen: soft, non-tender; bowel sounds normal; no masses,  no organomegaly  Musculoskeletal: Normal  Extremities: extremities normal, , no edema  Neurologic: Changes sedated intubated on ventilator    Assessment:     Patient Active Problem List:     Palpitations     PAF (paroxysmal atrial fibrillation) (HCC)     DJD (degenerative joint disease), multiple sites     History of colonic polyps     Family history of colon cancer     Statin intolerance     SOB (shortness of breath) on exertion     Type 2 diabetes mellitus with complication (HCC)     Essential hypertension     Dyslipidemia     S/P CABG x 3     Anemia     Cardiomyopathy (HCC)     Chronic kidney disease (CKD) stage G4/A3, severely decreased glomerular filtration rate (GFR) between 15-29 mL/min/1.73 square meter and albuminuria creatinine ratio greater than 300 mg/g (HCC)     DM (diabetes mellitus) (HCC)     CAD (coronary artery disease)     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     Proteinuria     Closed fracture of left ankle with routine healing     Closed nondisplaced fracture of third metatarsal bone of left foot     Closed nondisplaced fracture of second metatarsal bone of left foot     Closed nondisplaced fracture of fourth metatarsal bone of left foot     Debility     Physical debility     Hypoxia     Acute hypoxemic respiratory failure due to COVID-19 ([facility] [address]     Pneumonia due to COVID-19 virus      Plan:     1. Reviewed POC blood glucose . Labs and X ray results   2. On meal/ Correction bolus Humalog/ Basal Lantus Insulin regime /   3. Monitor Blood glucose frequently   4. Modified  the dose of Insulin/ other medicines as needed   5. Family is seriously considering the CODE STATUS and possibly withdraw the care  6. Will follow     .      Chavo Tate MD, MD

## 2022-02-18 NOTE — PROGRESS NOTES
This note also relates to the following rows which could not be included:  SpO2 - Cannot attach notes to unvalidated device data  Pulse - Cannot attach notes to unvalidated device data  Resp - Cannot attach notes to unvalidated device data       02/18/22 0723   Vent Information   $Ventilation $Subsequent Day   Equipment Changed HME   Vent Type 980   Vent Mode AC/PC   Vt Ordered 0 mL   Rate Set 16 bmp   Peak Flow 0 L/min   Pressure Support 0 cmH20   FiO2  45 %   Sensitivity 3   PEEP/CPAP 10   I Time/ I Time % 1 s   Vent Patient Data   High Peep/I Pressure 15   Peak Inspiratory Pressure 25 cmH2O   Mean Airway Pressure 15 cmH20   Rate Measured 18 br/min   Vt Exhaled 657 mL   Minute Volume 11.9 Liters   I:E Ratio 1:2.10   Plateau Pressure 23 HMQ74   Static Compliance 34 mL/cmH2O   Cough/Sputum   Sputum How Obtained Endotracheal;Suctioned   $Obtained Sample $Induced Sputum   Cough Productive   Sputum Amount Moderate   Sputum Color Dark red   Tenacity Thick   Breath Sounds   Right Upper Lobe Clear   Right Middle Lobe Diminished   Right Lower Lobe Diminished   Left Upper Lobe Clear   Left Lower Lobe Diminished   Alarm Settings   High Pressure Alarm 50 cmH2O   Low Minute Volume Alarm 2.5 L/min   Apnea (secs) 20 secs   High Respiratory Rate 45 br/min   Low Exhaled Vt  250 mL   ETT (adult)   Placement Date/Time: 02/14/22 (c) 6202   Preoxygenation: Yes  Mask Ventilation: Ventilated by mask (1)  Technique: Video laryngoscopy  Tube Size: 7.5 mm  Blade Size: 3  Location: Oral  Grade View: Full view of the glottis  Insertion attempts: 1  Place. ..    Secured at 27 cm   Measured From Lips   ET Placement Left   Secured By Commercial tube low

## 2022-02-19 NOTE — PROGRESS NOTES
Nephrology Progress Note  2/19/2022 11:20 AM        Subjective:   Admit Date: 2/6/2022  PCP: Ventura Stevens MD    Interval History: Remains on mechanical ventilation    Diet: Tube feed at 50 mils an hour per orogastric tube    ROS: Maintain assist-control mode, FiO2 50%, PEEP of 12 and peak pressure of 28  Sedated with propofol and fentanyl  Off vasopressor mainly norepinephrine now  Urine output recorded only 1.3 L for the last 24 hours  No fever    Data:     Current meds:    insulin lispro  0-18 Units SubCUTAneous Q4H    insulin NPH  15 Units SubCUTAneous QAM    insulin glargine  30 Units SubCUTAneous Nightly    furosemide  80 mg IntraVENous TID    chlorothiazide (DIURIL) IVPB  500 mg IntraVENous Q12H    pantoprazole  40 mg IntraVENous Daily    cefepime  1,000 mg IntraVENous Q24H    sodium chloride  500 mL IntraVENous Once    albuterol sulfate HFA  4 puff Inhalation Q4H    And    ipratropium  4 puff Inhalation Q4H    chlorhexidine  15 mL Mouth/Throat BID    famotidine (PEPCID) injection  20 mg IntraVENous Daily    sennosides  5 mL Oral BID    metoprolol succinate  50 mg Oral BID    [Held by provider] insulin lispro  20 Units SubCUTAneous TID WC    magic (miracle) mouthwash  5 mL Swish & Swallow TID    baricitinib  1 mg Oral Daily    [Held by provider] guaiFENesin  200 mg Oral Q4H    sodium chloride flush  5-40 mL IntraVENous 2 times per day    apixaban  5 mg Oral BID    aspirin EC  81 mg Oral Daily      norepinephrine Stopped (02/15/22 1149)    fentaNYL 50 mcg/hr (02/19/22 1036)    propofol 67 mcg/kg/min (02/19/22 0932)    dextrose      dextrose      sodium chloride 500 mL (02/15/22 1713)    dextrose           I/O last 3 completed shifts:   In: 2433.2 [I.V.:1297.5; NG/GT:944; IV Piggyback:191.6]  Out: 8656 [Urine:2275]    CBC:   Recent Labs     02/17/22  1000 02/18/22  1130 02/19/22  0410   WBC 6.9 8.2 8.5   HGB 7.5* 7.8* 7.3*    205 186          Recent Labs     02/17/22  1000 02/18/22  0430 02/19/22  0410   * 129* 125*   K 4.7 3.7 3.4*   CL 86* 87* 83*   CO2 18* 18* 15*   * 153* 154*   CREATININE 5.0* 5.0* 4.9*   GLUCOSE 153* 158* 110*       Lab Results   Component Value Date    CALCIUM 6.0 (LL) 02/19/2022    PHOS 8.7 (H) 02/16/2022       Objective:     Vitals: BP (!) 136/52   Pulse 66   Temp 97.9 °F (36.6 °C) (Rectal)   Resp 15   Ht 5' 7.99\" (1.727 m)   Wt 192 lb 3.9 oz (87.2 kg)   SpO2 98%   BMI 29.24 kg/m²     General appearance: Intubated, sedated  HEENT: Positive conjunctival pallor  Neck: Right IJ central venous catheter  Lungs: Positive admission breath sound  Heart: Irregular, scar from previous sternotomy  Abdomen: Soft, positive bowel sound  Extremities: No overt edema  He does have a Reyes catheter        Problem List :         Impression :     1. Acute kidney injury with underlying CKD stage IV A3-nonoliguric but urine output dropped-BUN/creatinine +2 but has acidosis, hypokalemia  2. Hyponatremia likely from-excess total body water also could be from obligatory hypotonic solution -but will hold off thiazide for now  3. Breakthrough COVID-19, ARDS and multiorgan dysfunction  4. Underlying diabetes and coronary artery disease with proteinuria  5. Multifactoral anemia   6. Mixed respiratory alkalosis and metabolic acidosis    Recommendation/Plan  :     1. Discontinue IV thiazide  2. Keep the loop for now  3. proBNP level tomorrow morning  4. He is a DNR comfort care  5. Do not see any compelling reason to dialyze him-family apparently decided not to do dialysis  6.  I will try to call them today or tomorrow-make sure, we all are at the same page      Adeline Escobar MD MD

## 2022-02-19 NOTE — PLAN OF CARE
Nutrition Problem #1: Inadequate oral intake  Intervention: Food and/or Nutrient Delivery: Modify Tube Feeding  Nutritional Goals: New Goal: Pt will meet 75% estimated needs with nutrition support until able to transition back to PO diet

## 2022-02-19 NOTE — PROGRESS NOTES
02/19/22 0709   Vent Information   Vent Type 980   Vent Mode AC/PC   Vt Ordered 0 mL   Pressure Ordered 15   Rate Set 16 bmp   Peak Flow 0 L/min   Pressure Support 0 cmH20   FiO2  50 %   SpO2 96 %   SpO2/FiO2 ratio 192   Sensitivity 3   PEEP/CPAP 12   I Time/ I Time % 1 s   Humidification Source HME   Nitric Oxide/Epoprostenol In Use? No   Vent Patient Data   High Peep/I Pressure 15   Peak Inspiratory Pressure 27 cmH2O   Mean Airway Pressure 18 cmH20   Rate Measured 20 br/min   Vt Exhaled 441 mL   Minute Volume 7.96 Liters   I:E Ratio 1:2.80   Plateau Pressure 28 NYD65   Static Compliance 45 mL/cmH2O   Total PEEP 12 cmH20   Auto PEEP 0 cmH20   Cough/Sputum   Sputum How Obtained Endotracheal;Suctioned   $Obtained Sample $Induced Sputum   Cough Non-productive   Frequency Infrequent   Sputum Amount None   Sputum Color None   Tenacity None   Spontaneous Breathing Trial (SBT) RT Doc   Pulse 64   Breath Sounds   Right Upper Lobe Diminished   Right Middle Lobe Diminished   Right Lower Lobe Diminished   Left Upper Lobe Diminished   Left Lower Lobe Diminished   Additional Respiratory  Assessments   Resp 15   Position Semi-Starr's   Oral Care Mouth suctioned   Alarm Settings   High Pressure Alarm 45 cmH2O   Delay Alarm 20 sec(s)   Low Minute Volume Alarm 2.5 L/min   Apnea (secs) 20 secs   High Respiratory Rate 45 br/min   Low Exhaled Vt  250 mL   ETT (adult)   Placement Date/Time: 02/14/22 (c) 1817   Preoxygenation: Yes  Mask Ventilation: Ventilated by mask (1)  Technique: Video laryngoscopy  Tube Size: 7.5 mm  Blade Size: 3  Location: Oral  Grade View: Full view of the glottis  Insertion attempts: 1  Place. ..    Secured at 27 cm   Measured From [address] [place] [address],[address] By Commercial tube low   Site Condition Dry

## 2022-02-19 NOTE — PROGRESS NOTES
Comprehensive Nutrition Assessment    Type and Reason for Visit:  Reassess    Nutrition Recommendations/Plan:     Will decrease goal rate of EN to 40 mL/hr: Vital High Protein with goal rate 40 mL/hr will provide: 960 kcal, 84 grams of protein and 802 mL free water. Add protein modular BID to provide additional 52 grams of protein and 208 kcal.     Combined nutrition support (EN, Protein, propofol) will provide 2139 kcal and 136 grams of protein     Nutrition Assessment:  Pt on vital high protein, documented running at 50 mL/hr per flowsheets. Reviewed current progress notes. Pt continues at high nutrition risk. Noted code status change, continued current care at this time. Malnutrition Assessment:  Malnutrition Status:  Insufficient data    Context:  Acute Illness       Estimated Daily Nutrient Needs:  Energy (kcal):  2062; Weight Used for Energy Requirements:  Current     Protein (g):  140+ (2+ g/kg IBW); Weight Used for Protein Requirements:  Ideal        Fluid (ml/day):  1935-8391; Method Used for Fluid Requirements:  1 ml/kcal      Nutrition Related Findings:  significant non-pitting edema.  Na 129, Glu 158, Ca 6.3      Wounds:  Pressure Injury,Stage II       Current Nutrition Therapies:    Diet NPO  ADULT TUBE FEEDING; Orogastric; Peptide Based High Protein; Continuous; 10; Yes; 10; Q 4 hours; 60; 500; Q 6 hours  Current Tube Feeding (TF) Orders:  · Feeding Route: Orogastric  · Formula: Peptide Based High Protein (vital high protein)  · Schedule: Continuous  · Additives/Modulars:  none   · Water Flushes: per nursing protocol; per nephrology  · Current TF & Flush Orders Provides: 1440 kcal (4858 with current propofol), 126 grams of protein and 1203 mL free water    Additional Calorie Sources:   Propofol providing 971 lipid kcal    Anthropometric Measures:  · Height: 5' 7.99\" (172.7 cm)  · Current Body Weight: 192 lb 14.4 oz (87.5 kg) (2/7)   · Admission Body Weight:  (n/a)    · Usual Body Weight: 187 lb 13.3 oz (85.2 kg) ((1/15/22)     · Ideal Body Weight: 154 lbs; % Ideal Body Weight 125.3 %   · BMI: 29.3  · BMI Categories: Overweight (BMI 25.0-29. 9)       Nutrition Diagnosis:   · Inadequate oral intake related to acute injury/trauma as evidenced by NPO or clear liquid status due to medical condition,intubation      Nutrition Interventions:   Food and/or Nutrient Delivery:  Modify Tube Feeding  Nutrition Education/Counseling:  No recommendation at this time   Coordination of Nutrition Care:  Continue to monitor while inpatient    Goals:  New Goal: Pt will meet 75% estimated needs with nutrition support until able to transition back to PO diet       Nutrition Monitoring and Evaluation:   Behavioral-Environmental Outcomes:  None Identified   Food/Nutrient Intake Outcomes:  Supplement Intake,Food and Nutrient Intake  Physical Signs/Symptoms Outcomes:  Biochemical Data,GI Status,Hemodynamic Status,Fluid Status or Edema,Weight,Skin     Discharge Planning:     Too soon to determine     Electronically signed by Stacey Mercer RD, LD on 2/18/22 at 8:11 PM EST    Contact: [phone]

## 2022-02-19 NOTE — PROGRESS NOTES
02/19/22 1120   Vent Information   Equipment Changed HME   Vent Type 980   Vent Mode AC/PC   Vt Ordered 0 mL   Pressure Ordered 15   Rate Set 16 bmp   Peak Flow 0 L/min   Pressure Support 0 cmH20   FiO2  50 %   SpO2 98 %   SpO2/FiO2 ratio 196   Sensitivity 3   PEEP/CPAP 12   I Time/ I Time % 1 s   Humidification Source HME   Nitric Oxide/Epoprostenol In Use? No   Vent Patient Data   High Peep/I Pressure 15   Peak Inspiratory Pressure 27 cmH2O   Mean Airway Pressure 17 cmH20   Rate Measured 18 br/min   Vt Exhaled 597 mL   Minute Volume 12.1 Liters   I:E Ratio 1:2.20   Spontaneous Breathing Trial (SBT) RT Doc   Pulse 68   Breath Sounds   Right Upper Lobe Diminished   Right Middle Lobe Diminished   Right Lower Lobe Diminished   Left Upper Lobe Diminished   Left Lower Lobe Diminished   Additional Respiratory  Assessments   Resp 16   Position Semi-Starr's   Alarm Settings   High Pressure Alarm 45 cmH2O   Delay Alarm 20 sec(s)   Low Minute Volume Alarm 2.5 L/min   Apnea (secs) 20 secs   High Respiratory Rate 45 br/min   Low Exhaled Vt  250 mL   ETT (adult)   Placement Date/Time: 02/14/22 (c) 8289   Preoxygenation: Yes  Mask Ventilation: Ventilated by mask (1)  Technique: Video laryngoscopy  Tube Size: 7.5 mm  Blade Size: 3  Location: Oral  Grade View: Full view of the glottis  Insertion attempts: 1  Place. ..    Secured at 27 cm   Measured From [address] [place] [address],[address] By Commercial tube low   Site Condition Dry

## 2022-02-19 NOTE — PROGRESS NOTES
Hospitalist Progress Note      Name:  Syeda Oliva /Age/Sex: 1945  (68 y.o. male)   MRN & CSN:  [id number] & [id number] Admission Date/Time: 2022  7:43 PM   Location:  -A PCP: Sharona Castillo MD         Hospital Day:     Assessment and Plan:   Syeda Oliva is a 68 y.o.  male hypertension, chronic diastolic CHF, CKD 4 due to diabetic nephropathy, A. fib on chronic anticoagulation with Eliquis, type 2 diabetes, dyslipidemia, who presented to the ED on 2022 with shortness of breath and cough and was diagnosed with Pneumonia due to COVID-19 virus. He is fully vaccinated against Covid. He deteriorated  On 22 and night requiring AirVo at Fio2 % and endeded  Up getting intubated on 22. No patient requires dialysis and family wants to decide. Palliative care consulted. The family does not want trach or PEG. CODE STATUS was changed to DNR CC per family's wishes. Compassionate extubation planned on Monday.     Acute on chronic hypoxic respiratory faiure  Sepsis secondary to bacterial pneumonia/ARDS from [address]  -Intubated on  and remains on  vent support  -On empiric cefepime  -Vancomycin discontinued  - Cont steroid  -S/p baricitinib therapy  -Chest x-ray this morning with persistent patchy infiltrates  -CODE STATUS DNR CC. Terminal extubation on Monday per family's wishes        KAREEM on CKD 4:   -Diabetic Nephropathy.   -Creatinine 2.5 at baseline, creatinien 4.4>5.3>5 possible RRT per nephrology.  -Likely cardiorenal vs ATN  -BNP 14K and cardio added high dose lasix  -Avoid nephrotoxic agents   -Renal following   -Currently on Lasix IV.     DM2 with hyperosmolar state- improved  -Off  insulin gtt. Transitioned to lantus and SSI  -A1c 8.9 from 22  -AG closed and BS well controlled currently  -Endocrine following     Hypertension:   -Now hypotension.  Monitor.      A fib, paroxysmal   -Rate uncontrolled  -BB with holding parameters  -cardizem stopped by cardio due to hypotension  -Digoxin stopped as well for possible nephrotoxicity.   -Continue Eliquis.       Sacral decub ulcer,stage II, POA  -C/w local wound care     Anemia of chronic disease  -Hb 7.3 today ( it was 9.3 on 2/14)  -Monitor and transfuse for Hb <7    Hyponatremia  -125 today worsening  -Nephrology onboard.        Cont Tube feeds  Patient is DNR CC    Diet Diet NPO  ADULT TUBE FEEDING; Orogastric; Peptide Based High Protein; Continuous; 10; Yes; 10; Q 4 hours; 40; 500; Q 6 hours; Protein; protein modular, proteinex, BID   DVT Prophylaxis [] Lovenox, []  Heparin, [] SCDs, [] Ambulation   GI Prophylaxis [x] PPI,  [] H2 Blocker,  [] Carafate,  [] Diet/Tube Feeds   Code Status DNR-CC   Disposition Patient requires continued admission due to Intubated due to COVID>    MDM [] Low, [] Moderate,[x]  High  Patient's risk as above due to Intubated due to COVID>      History of Present Illness:     Chief Complaint:     The pt is intubated and sedated. The pt in no acute distress. Ten point ROS reviewed negative, unless as noted above    Objective: Intake/Output Summary (Last 24 hours) at 2/19/2022 1114  Last data filed at 2/19/2022 0022  Gross per 24 hour   Intake 715.63 ml   Output 1250 ml   Net -534.37 ml      Vitals:   Vitals:    02/19/22 0945   BP: (!) 136/52   Pulse: 66   Resp: 15   Temp: 97.9 °F (36.6 °C)   SpO2: 98%     Physical Exam:   GEN:   Sedated on vent support, no distress  HEENT: Normal   RESP: Diminished BS bilaterally. Symmetric chest movement while on o2 via NC  CVS: RRR, S1, S2  GI/: Abdomen is nontender, no organomegaly. . Bowel sounds normal, rectal exam deferred. MSK:   No gross joint deformities  SKIN:   Normal coloration, warm, dry. NEURO: limited due to sedation  PSYCH:  Limited due to sedation.     Medications:   Medications:    insulin lispro  0-18 Units SubCUTAneous Q4H    insulin NPH  15 Units SubCUTAneous QAM    insulin glargine  30 Units SubCUTAneous Nightly    furosemide  80 mg IntraVENous TID    chlorothiazide (DIURIL) IVPB  500 mg IntraVENous Q12H    pantoprazole  40 mg IntraVENous Daily    cefepime  1,000 mg IntraVENous Q24H    sodium chloride  500 mL IntraVENous Once    albuterol sulfate HFA  4 puff Inhalation Q4H    And    ipratropium  4 puff Inhalation Q4H    chlorhexidine  15 mL Mouth/Throat BID    famotidine (PEPCID) injection  20 mg IntraVENous Daily    sennosides  5 mL Oral BID    metoprolol succinate  50 mg Oral BID    [Held by provider] insulin lispro  20 Units SubCUTAneous TID WC    magic (miracle) mouthwash  5 mL Swish & Swallow TID    baricitinib  1 mg Oral Daily    [Held by provider] guaiFENesin  200 mg Oral Q4H    sodium chloride flush  5-40 mL IntraVENous 2 times per day    apixaban  5 mg Oral BID    aspirin EC  81 mg Oral Daily      Infusions:    norepinephrine Stopped (02/15/22 1149)    fentaNYL 50 mcg/hr (02/19/22 1036)    propofol 67 mcg/kg/min (02/19/22 0932)    dextrose      dextrose      sodium chloride 500 mL (02/15/22 1713)    dextrose       PRN Meds: LORazepam, 0.5 mg, Q6H PRN  dextrose, 100 mL/hr, PRN  glucose, 15 g, PRN  dextrose, 12.5 g, PRN  glucagon (rDNA), 1 mg, PRN  dextrose, 100 mL/hr, PRN  benzocaine-menthol, 1 lozenge, Q2H PRN  sodium chloride flush, 5-40 mL, PRN  sodium chloride, 25 mL, PRN  ondansetron, 4 mg, Q8H PRN   Or  ondansetron, 4 mg, Q6H PRN  polyethylene glycol, 17 g, Daily PRN  acetaminophen, 650 mg, Q6H PRN   Or  acetaminophen, 650 mg, Q6H PRN  dextrose, 100 mL/hr, PRN  acetaminophen, 500 mg, Q4H PRN  albuterol sulfate HFA, 2 puff, Q4H PRN  dextrose bolus (hypoglycemia), 125 mL, PRN   Or  dextrose bolus (hypoglycemia), 250 mL, PRN          Patient is still admitted because Intubated. The anticipated discharge is in greater than 24 hours.      Electronically signed by Bc Kaye MD on 2/19/2022 at 11:14 AM

## 2022-02-19 NOTE — PROGRESS NOTES
Pulmonary and Critical Care  Progress Note    Subjective: The patient is sedated on vent. Shortness of breath none  Chest pain none  Addressing respiratory complaints Patient is negative for  hemoptysis and cyanosis  CONSTITUTIONAL:  negative for fevers and chills      Past Medical History:     has a past medical history of Acid reflux, Arrhythmia, Arthritis, CAD (coronary artery disease), CHF (congestive heart failure) ([facility]), Chronic back pain, CKD (chronic kidney disease) stage 3, GFR 30-59 ml/min ([facility]), COPD (chronic obstructive pulmonary disease) ([facility] [address] Diabetes mellitus [facility] Glaucoma, H/O 24 hour EKG monitoring, H/O cardiac catheterization, H/O cardiovascular stress test, H/O complete electrocardiogram, H/O Doppler ultrasound, H/O Doppler ultrasound (Lower extremity), H/O percutaneous left heart catheterization, Heat syncope, [place] (hard of hearing), Hx of echocardiogram, Hyperlipidemia, Hypertension, Macular degeneration, Other activity(E029.9), PAF (paroxysmal atrial fibrillation) [facility] PVD (peripheral vascular disease) ([facility] S/P CABG x 3, Skin Cancer, SOB (shortness of breath) on exertion, Teeth missing, Ventricular ectopy, Wears dentures, and Wears glasses. has a past surgical history that includes cyst removal (2000's); Thoracentesis (Left, 10/06/2016); Coronary artery bypass graft (10/03/2016); Skin cancer excision (2000's); eye surgery (Bilateral, 2015); Dental surgery; Colonoscopy (Last Done In 2000's); fracture surgery (Left, 01/11/2016); Cardiac surgery (10/03/2016); other surgical history (10/03/2017); and IR NONTUNNELED VASCULAR CATHETER > 5 YEARS (2/14/2022). reports that he quit smoking about 27 years ago. His smoking use included cigarettes. He started smoking about 57 years ago. He has a 60.00 pack-year smoking history. He has never used smokeless tobacco. He reports that he does not drink alcohol and does not use drugs.   Family history:  family history includes Breast Cancer in his for: [facility]    Radiology Review:  Pertinent images / reports were reviewed as a part of this visit. Assessment:     Patient Active Problem List   Diagnosis    Palpitations    PAF (paroxysmal atrial fibrillation) ([facility])    DJD (degenerative joint disease), multiple sites    History of colonic polyps    Family history of colon cancer    Statin intolerance    SOB (shortness of breath) on exertion    Type 2 diabetes mellitus with complication ([facility])    Essential hypertension    Dyslipidemia    S/P CABG x 3    Anemia    Cardiomyopathy ([facility]    Chronic kidney disease (CKD) stage G4/A3, severely decreased glomerular filtration rate (GFR) between 15-29 mL/min/1.73 square meter and albuminuria creatinine ratio greater than 300 mg/g ([facility])    DM (diabetes mellitus) ([facility]    CAD (coronary artery disease)    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    Proteinuria    Closed fracture of left ankle with routine healing    Closed nondisplaced fracture of third metatarsal bone of left foot    Closed nondisplaced fracture of second metatarsal bone of left foot    Closed nondisplaced fracture of fourth metatarsal bone of left foot    Debility    Physical debility    Hypoxia    Acute hypoxemic respiratory failure due to COVID-19 ([facility]    Pneumonia due to COVID-19 virus       Plan:   1. Cont full vent support. 2. Wean FiO2.       Donna Matrin MD  2/19/2022  11:46 AM

## 2022-02-19 NOTE — PROGRESS NOTES
Progress Note( Dr. Ramana Metcalf)  2/19/2022  Subjective:   Admit Date: 2/6/2022  PCP: Lesvia Almazan MD    Admitted For :Shortness of breath, hypoxia and Covid pneumonia       Consulted For: Better control of blood glucose     Interval History:    Pt is sedated intubated and placed on ventilator also has tube feeding going  Rhythm remains around 7.8       Intake/Output Summary (Last 24 hours) at 2/19/2022 1659  Last data filed at 2/19/2022 0022  Gross per 24 hour   Intake 715.63 ml   Output 750 ml   Net -34.37 ml       DATA    CBC:   Recent Labs     02/17/22  1000 02/18/22  1130 02/19/22  0410   WBC 6.9 8.2 8.5   HGB 7.5* 7.8* 7.3*    205 186    CMP:  Recent Labs     02/17/22  1000 02/18/22  0430 02/19/22  0410   * 129* 125*   K 4.7 3.7 3.4*   CL 86* 87* 83*   CO2 18* 18* 15*   * 153* 154*   CREATININE 5.0* 5.0* 4.9*   CALCIUM 5.6* 6.3* 6.0*   PROT  --  5.0* 5.3*   LABALBU  --  2.3* 2.4*   BILITOT  --  0.3 0.3   ALKPHOS  --  105 117   AST  --  14* 20   ALT  --  13 15     Lipids:   Lab Results   Component Value Date    CHOL 151 10/04/2021    CHOL 170 12/27/2016    HDL 41 10/04/2021    TRIG 464 02/19/2022     Glucose:  Recent Labs     02/19/22  0413 02/19/22  0814 02/19/22  1314   POCGLU 129* 183* 215*     [id number]:  Lab Results   Component Value Date    LABA1C 8.9 02/07/2022     High Sensitivity TSH:   Lab Results   Component Value Date    TSHHS 2.240 01/17/2022     Free T3: No results found for: FT3  Free T4:  Lab Results   Component Value Date    T4FREE 1.26 10/26/2021       XR CHEST PORTABLE   Final Result      XR CHEST PORTABLE   Final Result   The ET tube was 2.9 cm above the karan. The NG tube was at least to the   gastric fundus. A right subclavian line was noted with the tip in the   superior vena cava at its junction with the right atrium. No pneumothorax   was identified.   Mild cardiomegaly was noted with patchy pneumonia in both   lower lobes and the right middle lobe with small bilateral pleural effusions. Slight improvement has occurred bilaterally since 02/17/2022, 0514 hours. XR CHEST PORTABLE   Final Result   Bibasilar pulmonary consolidations are seen, concerning for pneumonia. No   significant change. Pulmonary vascular congestion. XR CHEST PORTABLE   Final Result   The ET tube was 3.1 cm above the karan. The NG tube was at least to the   gastric body. A right jugular central venous line was noted with the tip in   the superior vena cava. No pneumothorax was identified. CABG with   prosthetic mitral valve. Mild cardiomegaly was noted with patchy alveolar   pneumonia in both lower lobes. Slight improvement has occurred bilaterally   since 02/15/2022. XR CHEST PORTABLE   Final Result   Bibasilar pulmonary infiltrates without significant change. XR CHEST PORTABLE   Final Result   Endotracheal tube projects 2.6 cm above the karan. XR CHEST PORTABLE   Final Result   Right central venous catheter at the expected location of the SVC. No   pneumothorax. Persistent consolidations in the lung bases. XR ABDOMEN (KUB) (SINGLE AP VIEW)   Final Result   Endotracheal tube tip overlies the peripyloric region. IR NONTUNNELED VASCULAR CATHETER > 5 YEARS   Final Result   Successful ultrasound guided non-tunneled 7 Welsh triple-lumen central   venous access catheter placement via right internal jugular venous approach. XR CHEST PORTABLE   Final Result   Worsening bibasilar pulmonary infiltrates. XR CHEST PORTABLE   Final Result   1. Nonspecific pulmonary infiltrates can be seen with atypical/viral   pneumonia. 2. Left basilar consolidation and pleural effusion; superimposed   community-acquired or nosocomial pneumonia are considerations. Developing   ARDS is a consideration. 3. Calcific atherosclerosis aorta. 4. Cardiomegaly.          XR CHEST PORTABLE   Final Result   Stable chest.         XR CHEST PORTABLE    (Results Pending)        Scheduled Medicines   Medications:    insulin lispro  0-18 Units SubCUTAneous Q4H    insulin NPH  15 Units SubCUTAneous QAM    insulin glargine  30 Units SubCUTAneous Nightly    furosemide  80 mg IntraVENous TID    pantoprazole  40 mg IntraVENous Daily    cefepime  1,000 mg IntraVENous Q24H    sodium chloride  500 mL IntraVENous Once    albuterol sulfate HFA  4 puff Inhalation Q4H    And    ipratropium  4 puff Inhalation Q4H    chlorhexidine  15 mL Mouth/Throat BID    famotidine (PEPCID) injection  20 mg IntraVENous Daily    sennosides  5 mL Oral BID    metoprolol succinate  50 mg Oral BID    [Held by provider] insulin lispro  20 Units SubCUTAneous TID WC    magic (miracle) mouthwash  5 mL Swish & Swallow TID    baricitinib  1 mg Oral Daily    [Held by provider] guaiFENesin  200 mg Oral Q4H    sodium chloride flush  5-40 mL IntraVENous 2 times per day    apixaban  5 mg Oral BID    aspirin EC  81 mg Oral Daily      Infusions:    norepinephrine Stopped (02/15/22 1149)    fentaNYL 50 mcg/hr (02/19/22 1036)    propofol 67 mcg/kg/min (02/19/22 1538)    dextrose      dextrose      sodium chloride 500 mL (02/15/22 1713)    dextrose           Objective:   Vitals: BP (!) 136/52   Pulse 86   Temp 97.9 °F (36.6 °C) (Rectal)   Resp 22   Ht 5' 7.99\" (1.727 m)   Wt 192 lb 3.9 oz (87.2 kg)   SpO2 99%   BMI 29.24 kg/m²   General appearance:  This is sedated intubated and on ventilator  Neck: no JVD or bruit  Thyroid : Normal lobes   Lungs: Has Vesicular Breath sounds some wheezing and some rales  Intubated and on ventilator  Heart:  regular rate and rhythm  Abdomen: soft, non-tender; bowel sounds normal; no masses,  no organomegaly  Musculoskeletal: Normal  Extremities: extremities normal, , no edema  Neurologic: Changes sedated intubated on ventilator    Assessment:     Patient Active Problem List:     Palpitations     PAF (paroxysmal atrial fibrillation) ([facility]     DJD (degenerative joint disease), multiple sites     History of colonic polyps     Family history of colon cancer     Statin intolerance     SOB (shortness of breath) on exertion     Type 2 diabetes mellitus with complication (HCC)     Essential hypertension     Dyslipidemia     S/P CABG x 3     Anemia     Cardiomyopathy (HCC)     Chronic kidney disease (CKD) stage G4/A3, severely decreased glomerular filtration rate (GFR) between 15-29 mL/min/1.73 square meter and albuminuria creatinine ratio greater than 300 mg/g (HCC)     DM (diabetes mellitus) ([facility]     CAD (coronary artery disease)     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     Proteinuria     Closed fracture of left ankle with routine healing     Closed nondisplaced fracture of third metatarsal bone of left foot     Closed nondisplaced fracture of second metatarsal bone of left foot     Closed nondisplaced fracture of fourth metatarsal bone of left foot     Debility     Physical debility     Hypoxia     Acute hypoxemic respiratory failure due to COVID-19 ([facility]     Pneumonia due to COVID-19 virus      Plan:     1. Reviewed POC blood glucose . Labs and X ray results   2. On meal/ Correction bolus Humalog/ Basal Lantus Insulin regime /   3. Monitor Blood glucose frequently   4. Modified  the dose of Insulin/ other medicines as needed   5. Family is seriously considering the CODE STATUS and possibly withdraw the care  6. Will follow     .      Danielle Doan MD, MD

## 2022-02-19 NOTE — PROGRESS NOTES
02/19/22 1511   Vent Information   Vent Type 980   Vent Mode AC/PC   Vt Ordered 0 mL   Pressure Ordered 15   Rate Set 16 bmp   Peak Flow 0 L/min   Pressure Support 0 cmH20   FiO2  50 %   SpO2 99 %   SpO2/FiO2 ratio 198   Sensitivity 3   PEEP/CPAP 12   I Time/ I Time % 1 s   Humidification Source HME   Nitric Oxide/Epoprostenol In Use? No   Vent Patient Data   High Peep/I Pressure 15   Peak Inspiratory Pressure 29 cmH2O   Mean Airway Pressure 18 cmH20   Rate Measured 22 br/min   Vt Exhaled 726 mL   Minute Volume 15.1 Liters   I:E Ratio 1:1.90   Cough/Sputum   Sputum How Obtained Endotracheal;Stoma   $Obtained Sample $Induced Sputum   Cough Productive   Frequency Infrequent   Sputum Amount Moderate   Sputum Color Dark red;Cloudy   Tenacity Thick   Spontaneous Breathing Trial (SBT) RT Doc   Pulse 86   Breath Sounds   Right Upper Lobe Diminished   Right Middle Lobe Diminished   Right Lower Lobe Diminished   Left Upper Lobe Diminished   Left Lower Lobe Diminished   Additional Respiratory  Assessments   Resp 22   Position Semi-Starr's   Alarm Settings   High Pressure Alarm 45 cmH2O   Delay Alarm 20 sec(s)   Low Minute Volume Alarm 2.5 L/min   Apnea (secs) 20 secs   High Respiratory Rate 45 br/min   Low Exhaled Vt  250 mL   ETT (adult)   Placement Date/Time: 02/14/22 (c) 2384   Preoxygenation: Yes  Mask Ventilation: Ventilated by mask (1)  Technique: Video laryngoscopy  Tube Size: 7.5 mm  Blade Size: 3  Location: Oral  Grade View: Full view of the glottis  Insertion attempts: 1  Place. ..    Secured at 27 cm   Measured From Lips   ET Placement Left   Secured By Commercial tube low   Site Condition Dry

## 2022-02-20 NOTE — PROGRESS NOTES
Nephrology Progress Note  2/20/2022 10:27 AM        Subjective:   Admit Date: 2/6/2022  PCP: Rachel Kent MD    Interval History: Remains on mechanical ventilation    Diet: Tube feed    ROS: On assist control mode, FiO2 50%, PEEP of 12 and peak pressure of 29  Sedated with appropriate dose of propofol as well as fentanyl  No vasopressor  Urine output recorded 2.1 L for the last 24 hours  Low-grade fever per rectal 100      Data:     Current meds:    insulin glargine  35 Units SubCUTAneous Nightly    calcium gluconate IVPB  8,000 mg IntraVENous Once    insulin lispro  0-18 Units SubCUTAneous Q4H    insulin NPH  15 Units SubCUTAneous QAM    furosemide  80 mg IntraVENous TID    pantoprazole  40 mg IntraVENous Daily    cefepime  1,000 mg IntraVENous Q24H    sodium chloride  500 mL IntraVENous Once    albuterol sulfate HFA  4 puff Inhalation Q4H    And    ipratropium  4 puff Inhalation Q4H    chlorhexidine  15 mL Mouth/Throat BID    famotidine (PEPCID) injection  20 mg IntraVENous Daily    sennosides  5 mL Oral BID    metoprolol succinate  50 mg Oral BID    [Held by provider] insulin lispro  20 Units SubCUTAneous TID WC    magic (miracle) mouthwash  5 mL Swish & Swallow TID    [Held by provider] guaiFENesin  200 mg Oral Q4H    sodium chloride flush  5-40 mL IntraVENous 2 times per day    apixaban  5 mg Oral BID    aspirin EC  81 mg Oral Daily      norepinephrine Stopped (02/15/22 1149)    fentaNYL 75 mcg/hr (02/20/22 0640)    propofol 67 mcg/kg/min (02/20/22 0641)    dextrose      dextrose      sodium chloride 500 mL (02/15/22 1713)    dextrose           I/O last 3 completed shifts:   In: 715.6 [I.V.:510.6; NG/GT:205]  Out: 2850 [Urine:2850]    CBC:   Recent Labs     02/18/22  1130 02/19/22  0410 02/20/22  0610   WBC 8.2 8.5 13.5*   HGB 7.8* 7.3* 7.9*    186 196          Recent Labs     02/18/22  0430 02/19/22  0410 02/20/22  0610   * 125* 121*   K 3.7 3.4* 3.6   CL 87* 83* 77* CO2 18* 15* 14*   * 154* 161*   CREATININE 5.0* 4.9* 4.9*   GLUCOSE 158* 110* 188*       Lab Results   Component Value Date    CALCIUM 6.0 (LL) 02/20/2022    PHOS 8.7 (H) 02/16/2022       Objective:     Vitals: BP (!) 151/53   Pulse 94   Temp 100 °F (37.8 °C) (Rectal)   Resp 22   Ht 5' 7.99\" (1.727 m)   Wt 192 lb 3.9 oz (87.2 kg)   SpO2 93%   BMI 29.24 kg/m²     General appearance: Intubated, sedated  HEENT: At least 2+ conjunctival pallor  Neck: Right IJ central venous catheter  Lungs: Positive adventitious breath sound  Heart: Tachycardia during my exam, well-healed scar from previous sternotomy  Abdomen: Soft  Extremities: No overt lower extremity edema  Has Reyes catheter      Problem List :         Impression :     1. Acute kidney injury with underlying CKD stage IV [id number]-[id number] not good solute clearance-still has metabolic acidosis and hyponatremia-despite IV loop and good urine output-is likely not clearing much solute and also not secreting much hydration I am  2. Breakthrough COVID-19 with ARDS and multiorgan dysfunction  3. Underlying coronary artery disease, diabetes and proteinuria  4. Low calcium from acute kidney injury    Recommendation/Plan  :     1. I will give his son a call make sure we are all on the same page  2. Keep the IV loop and replace continuous IV calcium  3. Although he may not need imminent dialysis-but I am unsure what direction his kidney function will go  4. Also will talk to pulmonologist-to see if there is still hope of de-escalating FiO2 or extubation  5. If we will think, his chances of meaningful recovery is low-would be appropriate to wean him from life supporting therapy-after of course discussion with the family  6.  Watch for iatrogenic nosocomial complication follow clinically and biochemically      Lorraine Valentin MD MD

## 2022-02-20 NOTE — PROGRESS NOTES
Progress Note( Dr. Bailee Lora)  2/20/2022  Subjective:   Admit Date: 2/6/2022  PCP: Bennie Rodrigez MD    Admitted For :Shortness of breath, hypoxia and Covid pneumonia       Consulted For: Better control of blood glucose     Interval History:    Pt is sedated intubated and placed on ventilator also has tube feeding going  Hb/ Hct remains around 7.9       Intake/Output Summary (Last 24 hours) at 2/20/2022 1528  Last data filed at 2/20/2022 0503  Gross per 24 hour   Intake    Output 2100 ml   Net -2100 ml       DATA    CBC:   Recent Labs     02/18/22  1130 02/19/22  0410 02/20/22  0610   WBC 8.2 8.5 13.5*   HGB 7.8* 7.3* 7.9*    186 196    CMP:  Recent Labs     02/18/22  0430 02/19/22  0410 02/20/22  0610   * 125* 121*   K 3.7 3.4* 3.6   CL 87* 83* 77*   CO2 18* 15* 14*   * 154* 161*   CREATININE 5.0* 4.9* 4.9*   CALCIUM 6.3* 6.0* 6.0*   PROT 5.0* 5.3* 5.8*   LABALBU 2.3* 2.4* 2.6*   BILITOT 0.3 0.3 0.4   ALKPHOS 105 117 174*   AST 14* 20 30   ALT 13 15 24     Lipids:   Lab Results   Component Value Date    CHOL 151 10/04/2021    CHOL 170 12/27/2016    HDL 41 10/04/2021    TRIG 464 02/19/2022     Glucose:  Recent Labs     02/20/22  0315 02/20/22  0755 02/20/22  1116   POCGLU 220* 243* 273*     BexwpbnbouS2M:  Lab Results   Component Value Date    LABA1C 8.9 02/07/2022     High Sensitivity TSH:   Lab Results   Component Value Date    TSHHS 2.240 01/17/2022     Free T3: No results found for: FT3  Free T4:  Lab Results   Component Value Date    T4FREE 1.26 10/26/2021       XR CHEST PORTABLE   Final Result   Interval progression in bibasilar edema and/or multifocal infiltrates. More   confluent opacification present within the right lower lateral lung zone. XR CHEST PORTABLE   Final Result      XR CHEST PORTABLE   Final Result   The ET tube was 2.9 cm above the karan. The NG tube was at least to the   gastric fundus.   A right subclavian line was noted with the tip in the   superior vena cava at its junction with the right atrium. No pneumothorax   was identified. Mild cardiomegaly was noted with patchy pneumonia in both   lower lobes and the right middle lobe with small bilateral pleural effusions. Slight improvement has occurred bilaterally since 02/17/2022, 0514 hours. XR CHEST PORTABLE   Final Result   Bibasilar pulmonary consolidations are seen, concerning for pneumonia. No   significant change. Pulmonary vascular congestion. XR CHEST PORTABLE   Final Result   The ET tube was 3.1 cm above the karan. The NG tube was at least to the   gastric body. A right jugular central venous line was noted with the tip in   the superior vena cava. No pneumothorax was identified. CABG with   prosthetic mitral valve. Mild cardiomegaly was noted with patchy alveolar   pneumonia in both lower lobes. Slight improvement has occurred bilaterally   since 02/15/2022. XR CHEST PORTABLE   Final Result   Bibasilar pulmonary infiltrates without significant change. XR CHEST PORTABLE   Final Result   Endotracheal tube projects 2.6 cm above the karan. XR CHEST PORTABLE   Final Result   Right central venous catheter at the expected location of the SVC. No   pneumothorax. Persistent consolidations in the lung bases. XR ABDOMEN (KUB) (SINGLE AP VIEW)   Final Result   Endotracheal tube tip overlies the peripyloric region. IR NONTUNNELED VASCULAR CATHETER > 5 YEARS   Final Result   Successful ultrasound guided non-tunneled 7 [place] triple-lumen central   venous access catheter placement via right internal jugular venous approach. XR CHEST PORTABLE   Final Result   Worsening bibasilar pulmonary infiltrates. XR CHEST PORTABLE   Final Result   1. Nonspecific pulmonary infiltrates can be seen with atypical/viral   pneumonia.    2. Left basilar consolidation and pleural effusion; superimposed   community-acquired or nosocomial pneumonia are considerations. Developing   ARDS is a consideration. 3. Calcific atherosclerosis aorta. 4. Cardiomegaly. XR CHEST PORTABLE   Final Result   Stable chest.         XR CHEST PORTABLE    (Results Pending)        Scheduled Medicines   Medications:    insulin glargine  35 Units SubCUTAneous Nightly    calcium gluconate IVPB  8,000 mg IntraVENous Once    vancomycin  1,000 mg IntraVENous Once    insulin lispro  0-18 Units SubCUTAneous Q4H    insulin NPH  15 Units SubCUTAneous QAM    furosemide  80 mg IntraVENous TID    pantoprazole  40 mg IntraVENous Daily    cefepime  1,000 mg IntraVENous Q24H    sodium chloride  500 mL IntraVENous Once    albuterol sulfate HFA  4 puff Inhalation Q4H    And    ipratropium  4 puff Inhalation Q4H    chlorhexidine  15 mL Mouth/Throat BID    famotidine (PEPCID) injection  20 mg IntraVENous Daily    sennosides  5 mL Oral BID    metoprolol succinate  50 mg Oral BID    [Held by provider] insulin lispro  20 Units SubCUTAneous TID WC    magic (miracle) mouthwash  5 mL Swish & Swallow TID    [Held by provider] guaiFENesin  200 mg Oral Q4H    sodium chloride flush  5-40 mL IntraVENous 2 times per day    apixaban  5 mg Oral BID    aspirin EC  81 mg Oral Daily      Infusions:    norepinephrine Stopped (02/15/22 1149)    fentaNYL 100 mcg/hr (02/20/22 1045)    propofol 67 mcg/kg/min (02/20/22 1454)    dextrose      dextrose      sodium chloride 500 mL (02/15/22 1713)    dextrose           Objective:   Vitals: BP (!) 132/58   Pulse 79   Temp 97.9 °F (36.6 °C) (Rectal)   Resp 16   Ht 5' 7.99\" (1.727 m)   Wt 192 lb 3.9 oz (87.2 kg)   SpO2 94%   BMI 29.24 kg/m²   General appearance:  This is sedated intubated and on ventilator  Neck: no JVD or bruit  Thyroid : Normal lobes   Lungs: Has Vesicular Breath sounds some wheezing and some rales  Intubated and on ventilator  Heart:  regular rate and rhythm  Abdomen: soft, non-tender; bowel sounds normal; no masses,  no organomegaly  Musculoskeletal: Normal  Extremities: extremities normal, , no edema  Neurologic: Changes sedated intubated on ventilator    Assessment:     Patient Active Problem List:     Palpitations     PAF (paroxysmal atrial fibrillation) (formerly [facility])     DJD (degenerative joint disease), multiple sites     History of colonic polyps     Family history of colon cancer     Statin intolerance     SOB (shortness of breath) on exertion     Type 2 diabetes mellitus with complication (formerly [facility])     Essential hypertension     Dyslipidemia     S/P CABG x 3     Anemia     Cardiomyopathy (formerly [facility])     Chronic kidney disease (CKD) stage G4/A3, severely decreased glomerular filtration rate (GFR) between 15-29 mL/min/1.73 square meter and albuminuria creatinine ratio greater than 300 mg/g (formerly [facility])     DM (diabetes mellitus) (formerly [facility])     CAD (coronary artery disease)     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 Reviewed POC blood glucose . Labs and X ray results   2. On meal/ Correction bolus Humalog/ Basal Lantus Insulin regime /   3. Monitor Blood glucose frequently   4. Modified  the dose of Insulin/ other medicines as needed   5. Family is seriously considering the CODE STATUS and possibly withdraw the care  6. Will follow     .      Miya Ulrich MD, MD

## 2022-02-20 NOTE — PROGRESS NOTES
I spoke with the patient's son. they had discussion among family members  Decided against tracheostomy or PEG placement  Also wanted to discontinue life-sustaining therapy, if he does not have good chance of-regaining meaningful quality of life. I left a message for Dr. Paulette Reynaga.

## 2022-02-20 NOTE — PROGRESS NOTES
PHARMACY TO DOSE VANCOMYCIN PER DR Madisyn Viera    INFECTION BEING TREATED = Pneumonia  CULTURES = Respiratory on 2/18 is Staph Aureus pan sensitive  OTHER ABT'S = Cefepime    AGE = 68 y.o.     SEX = male  HEIGHT = 5' 7.99\" (1.727 m)  Wt Readings from Last 3 Encounters:   02/16/22 192 lb 3.9 oz (87.2 kg)   02/05/22 187 lb (84.8 kg)   01/19/22 187 lb (84.8 kg)     Estimated Creatinine Clearance: 14 mL/min (A) (based on SCr of 4.9 mg/dL (H)). Recent Labs     02/18/22  0430 02/18/22  1130 02/19/22  0410 02/20/22  0610   WBC  --  8.2 8.5 13.5*   *  --  154* 161*   CREATININE 5.0*  --  4.9* 4.9*     DOSING PLAN COMMENTS:  Will give vanco 1000mg x 1. Will check a random level tomorrow morning.          Amy Jernigan, [facility]  2/20/2022   12:47 PM  _______________________________________

## 2022-02-20 NOTE — PROGRESS NOTES
Hospitalist Progress Note      Name:  Bard Park /Age/Sex: 1945  (68 y.o. male)   MRN & CSN:  [id number] & [id number] Admission Date/Time: 2022  7:43 PM   Location:  -A PCP: Yahaira Anderson MD         Hospital Day: 15    Assessment and Plan:   Bard Park is a 68 y.o.  male hypertension, chronic diastolic CHF, CKD 4 due to diabetic nephropathy, A. fib on chronic anticoagulation with Eliquis, type 2 diabetes, dyslipidemia, who presented to the ED on 2022 with shortness of breath and cough and was diagnosed with Pneumonia due to COVID-19 virus. He is fully vaccinated against Covid. He deteriorated  On 22 and night requiring AirVo at Fio2 % and endeded  Up getting intubated on 22. No patient requires dialysis and family wants to decide. Palliative care consulted. The family does not want trach or PEG. CODE STATUS was changed to DNR CC per family's wishes. Compassionate extubation planned on Monday.     Acute on chronic hypoxic respiratory faiure  Sepsis secondary to bacterial pneumonia/ARDS from [address]  -Intubated on  and remains on  vent support  -On empiric cefepime  -Vancomycin discontinued  - Cont steroid  -S/p baricitinib therapy  -Chest x-ray this morning with persistent patchy infiltrates  -CODE STATUS DNR CC. Terminal extubation on Monday per family's wishes        KAREEM on CKD 4:   -Diabetic Nephropathy.   -Creatinine 2.5 at baseline, creatinien 4.4>5.3>5 possible RRT per nephrology.  -Likely cardiorenal vs ATN  -BNP 14K and cardio added high dose lasix  -Avoid nephrotoxic agents   -Renal following   -Currently on Lasix IV.  -No plans of dialysis due to code status per family wishes     DM2 with hyperosmolar state- improved  -Off  insulin gtt.   Transitioned to lantus and SSI  -A1c 8.9 from 22  -AG closed and BS improved  -Endocrine following     A fib, paroxysmal   -Cont Metoprolol  -cardizem stopped by cardio due to hypotension  -Digoxin stopped as well for possible nephrotoxicity.   -Continue Eliquis.       Sacral decub ulcer,stage II, POA  -C/w local wound care     Anemia of chronic disease  -Hb 7.9 today ( it was 9.3 on 2/14)  -Monitor and transfuse for Hb <7    Hyponatremia  -121 today worsening  -Nephrology onboard. Hypocalcemia  -Replace        Cont Tube feeds  Patient is DNR CC  Poor prognosis  Terminal wean planned for monday    Diet Diet NPO  ADULT TUBE FEEDING; Orogastric; Peptide Based High Protein; Continuous; 10; Yes; 10; Q 4 hours; 40; 500; Q 6 hours; Protein; protein modular, proteinex, BID   DVT Prophylaxis [] Lovenox, []  Heparin, [] SCDs, [] Ambulation   GI Prophylaxis [x] PPI,  [] H2 Blocker,  [] Carafate,  [] Diet/Tube Feeds   Code Status DNR-CC   Disposition Patient requires continued admission due to Intubated due to COVID>    MDM [] Low, [] Moderate,[x]  High  Patient's risk as above due to Intubated due to COVID>      History of Present Illness:     Chief Complaint:     The pt is intubated and sedated. The pt in no acute distress. Ten point ROS reviewed negative, unless as noted above    Objective: Intake/Output Summary (Last 24 hours) at 2/20/2022 1027  Last data filed at 2/20/2022 0503  Gross per 24 hour   Intake    Output 2100 ml   Net -2100 ml      Vitals:   Vitals:    02/20/22 0830   BP: (!) 151/53   Pulse: 94   Resp: 22   Temp: 100 °F (37.8 °C)   SpO2: 93%     Physical Exam:   GEN:   Sedated on vent support, no distress  HEENT: Normal   RESP: Diminished BS bilaterally. Symmetric chest movement while on o2 via NC  CVS: RRR, S1, S2  GI/: Abdomen is nontender, no organomegaly. . Bowel sounds normal, rectal exam deferred. MSK:   No gross joint deformities  SKIN:   Normal coloration, warm, dry. NEURO: limited due to sedation  PSYCH:  Limited due to sedation.     Medications:   Medications:    insulin glargine  35 Units SubCUTAneous Nightly    calcium gluconate IVPB  8,000 mg IntraVENous Once    insulin lispro  0-18 Units SubCUTAneous Q4H    insulin NPH  15 Units SubCUTAneous QAM    furosemide  80 mg IntraVENous TID    pantoprazole  40 mg IntraVENous Daily    cefepime  1,000 mg IntraVENous Q24H    sodium chloride  500 mL IntraVENous Once    albuterol sulfate HFA  4 puff Inhalation Q4H    And    ipratropium  4 puff Inhalation Q4H    chlorhexidine  15 mL Mouth/Throat BID    famotidine (PEPCID) injection  20 mg IntraVENous Daily    sennosides  5 mL Oral BID    metoprolol succinate  50 mg Oral BID    [Held by provider] insulin lispro  20 Units SubCUTAneous TID WC    magic (miracle) mouthwash  5 mL Swish & Swallow TID    [Held by provider] guaiFENesin  200 mg Oral Q4H    sodium chloride flush  5-40 mL IntraVENous 2 times per day    apixaban  5 mg Oral BID    aspirin EC  81 mg Oral Daily      Infusions:    norepinephrine Stopped (02/15/22 1149)    fentaNYL 75 mcg/hr (02/20/22 0640)    propofol 67 mcg/kg/min (02/20/22 0641)    dextrose      dextrose      sodium chloride 500 mL (02/15/22 1713)    dextrose       PRN Meds: LORazepam, 0.5 mg, Q6H PRN  dextrose, 100 mL/hr, PRN  glucose, 15 g, PRN  dextrose, 12.5 g, PRN  glucagon (rDNA), 1 mg, PRN  dextrose, 100 mL/hr, PRN  benzocaine-menthol, 1 lozenge, Q2H PRN  sodium chloride flush, 5-40 mL, PRN  sodium chloride, 25 mL, PRN  ondansetron, 4 mg, Q8H PRN   Or  ondansetron, 4 mg, Q6H PRN  polyethylene glycol, 17 g, Daily PRN  acetaminophen, 650 mg, Q6H PRN   Or  acetaminophen, 650 mg, Q6H PRN  dextrose, 100 mL/hr, PRN  acetaminophen, 500 mg, Q4H PRN  albuterol sulfate HFA, 2 puff, Q4H PRN  dextrose bolus (hypoglycemia), 125 mL, PRN   Or  dextrose bolus (hypoglycemia), 250 mL, PRN          Patient is still admitted because Intubated. The anticipated discharge is in greater than 24 hours.      Electronically signed by Radha Spring MD on 2/20/2022 at 10:27 AM

## 2022-02-20 NOTE — PROGRESS NOTES
Pulmonary and Critical Care  Progress Note      VITALS:  BP (!) 151/53   Pulse 94   Temp 99 °F (37.2 °C) (Rectal)   Resp 18   Ht 5' 7.99\" (1.727 m)   Wt 192 lb 3.9 oz (87.2 kg)   SpO2 93%   BMI 29.24 kg/m²     Subjective:   CHIEF COMPLAINT :SOB     HPI:                The patient is on the vent and sedated. He is not on any pressors. He has urine output. Objective:   PHYSICAL EXAM:    LUNGS:Occasional basal crackles  Abd-distended, soft, BS+,NT  Ext- no pedal edema  CVS-s1s2, no murmurs      DATA:    CBC:  Recent Labs     02/18/22  1130 02/19/22  0410 02/20/22  0610   WBC 8.2 8.5 13.5*   RBC 2.77* 2.61* 2.77*   HGB 7.8* 7.3* 7.9*   HCT 24.5* 23.6* 23.6*    186 196   MCV 88.4 90.4 85.2   MCH 28.2 28.0 28.5   MCHC 31.8* 30.9* 33.5   RDW 14.8 14.8 14.9   SEGSPCT 88.9* 85.1* 87.0*   BANDSPCT  --   --  6      BMP:  Recent Labs     02/18/22  0430 02/19/22  0410 02/20/22  0610   * 125* 121*   K 3.7 3.4* 3.6   CL 87* 83* 77*   CO2 18* 15* 14*   * 154* 161*   CREATININE 5.0* 4.9* 4.9*   CALCIUM 6.3* 6.0* 6.0*   GLUCOSE 158* 110* 188*      ABG:  Recent Labs     02/18/22  0600 02/19/22  0600 02/20/22  0600   PH 7.37 7.40 7.35   PO2ART 62* 56* 64*   DIQ2UUP 30.0* 28.0* 27.0*   O2SAT 90.4* 91.5* 89.8*     BNP  Lab Results   Component Value Date    BNP 79.22 11/25/2015      D-Dimer:  Lab Results   Component Value Date    DDIMER 2654 (H) 02/08/2022      Radiology:  Interval progression in bibasilar edema and/or multifocal infiltrates.  More   confluent opacification present within the right lower lateral lung zone     1.        Assessment/Plan     Patient Active Problem List    Diagnosis Date Noted    Hypoxia 02/05/2022    Acute hypoxemic respiratory failure due to COVID-19 [facility]) 02/05/2022    Pneumonia due to COVID-19 virus 02/05/2022    Physical debility 01/19/2022    Debility 01/15/2022    Closed nondisplaced fracture of second metatarsal bone of left foot 01/23/2019    Closed nondisplaced fracture of fourth metatarsal bone of left foot 2019    Closed fracture of left ankle with routine healing 2018    Closed nondisplaced fracture of third metatarsal bone of left foot 2018    Proteinuria 01/10/2018    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 2017    Chronic kidney disease (CKD) stage G4/A3, severely decreased glomerular filtration rate (GFR) between 15-29 mL/min/1.73 square meter and albuminuria creatinine ratio greater than 300 mg/g ([facility] 2017    DM (diabetes mellitus) ([facility] 2017    CAD (coronary artery disease) 2017    Cardiomyopathy ([facility] 2017    S/P CABG x 3 10/25/2016     Overview Note:     10/3/16 LIMA-LAD, SVG-PDA, SVG-Cx + Maze+Appendage resection Dr Stefany Simmons Anemia 10/25/2016     Overview Note:     Post op      Essential hypertension      Overview Note:     On losartan 100 mg a day and Demadex 20 mg a day and amlodipine 5 mg a day and Coreg 12.5 twice a day      Dyslipidemia     Type 2 diabetes mellitus with complication (HCC)     SOB (shortness of breath) on exertion 2014    Statin intolerance 2014    History of colonic polyps 10/11/2013    Family history of colon cancer 10/11/2013     Overview Note:     Father  of a GI malignancy and a Niece  at age 39 of Colon CA      DJD (degenerative joint disease), multiple sites 2013    PAF (paroxysmal atrial fibrillation) (HCC)      Overview Note:     S/p surgical Maze 10/2016      Palpitations 2012   Acute Hypoxic resp failure  Bilateral Pneumonia Sec to COVID-19  VDRF  Leukocytosis  Moderate Mitral Stenosis  Afib rate controlled  CKD  New RLL Pneumonia  Non Oliguic renal failure  AG Metabolic acidisos  Uremia       1. Possible HD for Acidosis and Uremia  2. Decadron  3. Insulin  4. Cefepime  5. Sputum C&S  6. Add vanco for now  7. Tube feeds  8. CXR in am  9. Daily SAT and SBT trial  10. Keep sats > 88%  11.  C/w present management    Electronically signed by Cindy Morales MD on 2/20/2022 at 12:28 PM

## 2022-02-20 NOTE — PROGRESS NOTES
02/20/22 0449   Vent Information   Vent Type 980   Vent Mode AC/PC   Vt Ordered 0 mL   Pressure Ordered 15   Rate Set 16 bmp   Peak Flow 0 L/min   Pressure Support 0 cmH20   FiO2  50 %   SpO2 94 %   SpO2/FiO2 ratio 188   Sensitivity 3   PEEP/CPAP 12   I Time/ I Time % 1 s   Humidification Source HME   Vent Patient Data   High Peep/I Pressure 15   Peak Inspiratory Pressure 28 cmH2O   Mean Airway Pressure 18 cmH20   Rate Measured 21 br/min   Vt Exhaled 860 mL   Minute Volume 17.1 Liters   I:E Ratio 1:1.90

## 2022-02-21 NOTE — ACP (ADVANCE CARE PLANNING)
Palliative Care follow up. Patient remains on full vent support FIO2 is 50%and peep of 10. His saturations are ranging from 88-90%. His renal function has continued to decline. Patient's son Maria Elena Mcduffie, sister BIMAL [facility], another sister, 2 sister in laws and 1 brother in law are present at bedside. Patient's brother in law is also a  and has provided pastoral care for patient at this time. Tissues available and provided emotional support to family. Orders were placed for compassionate extubation. Pending approval from pharmacy. Ignacio Edward RN will administer pre mediations once they are verified. I have explained the process to the family and they have verbalized understanding.

## 2022-02-21 NOTE — PROGRESS NOTES
V2.0  [facility] Hospitalist Progress Note      Name:  Aliya Mesa /Age/Sex: 1945  (68 y.o. male)   MRN & CSN:  [id number] & [id number] Encounter Date/Time: 2022 9:41 AM EST    Location:  -A PCP: Zo Lizarraga MD       Hospital Day: 16    Assessment and Plan:   Aliya Mesa is a 68 y.o. male with pmh of hypertension, chronic diastolic CHF, CKD 4 due to diabetic nephropathy, A. fib on chronic anticoagulation with Eliquis, type 2 diabetes, dyslipidemia who presents with Pneumonia due to COVID-19 virus. Hospital course complicated by progressive respiratory failure requiring intubation 22. Poor prognosis and family elected for [facility] with no plan for Trach or PEG. Compassionate extubation 22      Plan:  1. Acute on chronic respiratory failure with ARDS: Secondary to Covid/bacterial pneumonia. Patient on empiric cefepime and vancomycin discontinued. Intubated 2022 and remained on full ventilatory support. Completed baricitinib therapy and on steroids. Discussed with family with change to DNR CC and plan for terminal extubation. 2. KAREEM on CKD 4: Diabetic nephropathy Creatinine 2.5 at baseline and likely cardiorenal versus ATN. Nephrology following. Has been on IV Lasix. No plan for dialysis as CODE STATUS changes above. 3. Type 2 diabetes with hyperosmolar state: Improved and was transitioned off of insulin drip. On basal insulin plus SSI. A1c 8.9 2022. Endocrinology following. 4. Paroxysmal atrial fibrillation: Pressure, continue beta-blocker Cardizem stopped Procardia due to hypotension. Digoxin stopped as well due to nephrotoxicity. Continue Eliquis. 5. Stage II sacral decubitus ulcer: Continue local wound care. 6. Anemia of chronic disease: Hemoglobin down to 7.9 2022. No more blood draws due to plan for DNR CC with terminal extubation as above. 7. Hyponatremia: Ongoing issue and nephrology following.     Diet Diet NPO  ADULT TUBE FEEDING; Orogastric; Peptide Based High Protein; Continuous; 10; Yes; 10; Q 4 hours; 40; 500; Q 6 hours; Protein; protein modular, proteinex, BID   DVT Prophylaxis [] Lovenox, []  Heparin, [] SCDs, [] Ambulation,  [x] Eliquis, [] Xarelto   Code Status DNR-CC   Disposition Patient requires continued admission due to plan for terminal extubation   Surrogate Decision Maker/ EVA Cross     Subjective:     Chief Complaint: No chief complaint on file. Mariel Robin is a 68 y.o. male who presents with respiratory failure due to COVID, pneumonia and ARDS. No changes on vent. [facility] with plan for terminal extubation         Review of Systems:    Review of Systems    Unable to obtain due respiratory failure on vent    Objective: Intake/Output Summary (Last 24 hours) at 2/21/2022 0941  Last data filed at 2/21/2022 0600  Gross per 24 hour   Intake 3440 ml   Output 575 ml   Net 2865 ml        Vitals:   Vitals:    02/21/22 0758   BP: (!) 124/46   Pulse:    Resp:    Temp: 98.2 °F (36.8 °C)   SpO2:        Physical Exam:   General: sedated on vent, ill appearing  Eyes: PERRL  ENT: neck supple  Cardiovascular: Regular rate.   Respiratory: mechanical BS  Gastrointestinal: Soft, non tender  Genitourinary: no suprapubic tenderness  Musculoskeletal: No edema  Skin: warm, dry  Neuro: sedated  Psych: unable to assess     Medications:   Medications:    insulin glargine  35 Units SubCUTAneous Nightly    calcium gluconate IVPB  8,000 mg IntraVENous Once    insulin lispro  0-18 Units SubCUTAneous Q4H    insulin NPH  15 Units SubCUTAneous QAM    furosemide  80 mg IntraVENous TID    pantoprazole  40 mg IntraVENous Daily    cefepime  1,000 mg IntraVENous Q24H    sodium chloride  500 mL IntraVENous Once    albuterol sulfate HFA  4 puff Inhalation Q4H    And    ipratropium  4 puff Inhalation Q4H    chlorhexidine  15 mL Mouth/Throat BID    famotidine (PEPCID) injection  20 mg IntraVENous Daily    sennosides  5 mL Oral BID    metoprolol succinate 50 mg Oral BID    [Held by provider] insulin lispro  20 Units SubCUTAneous TID WC    magic (miracle) mouthwash  5 mL Swish & Swallow TID    [Held by provider] guaiFENesin  200 mg Oral Q4H    sodium chloride flush  5-40 mL IntraVENous 2 times per day    apixaban  5 mg Oral BID    aspirin EC  81 mg Oral Daily      Infusions:    norepinephrine Stopped (02/15/22 1149)    fentaNYL 75 mcg/hr (02/21/22 0540)    propofol 52 mcg/kg/min (02/21/22 0537)    dextrose      dextrose      sodium chloride 500 mL (02/15/22 1713)    dextrose       PRN Meds: LORazepam, 0.5 mg, Q6H PRN  dextrose, 100 mL/hr, PRN  glucose, 15 g, PRN  dextrose, 12.5 g, PRN  glucagon (rDNA), 1 mg, PRN  dextrose, 100 mL/hr, PRN  benzocaine-menthol, 1 lozenge, Q2H PRN  sodium chloride flush, 5-40 mL, PRN  sodium chloride, 25 mL, PRN  ondansetron, 4 mg, Q8H PRN   Or  ondansetron, 4 mg, Q6H PRN  polyethylene glycol, 17 g, Daily PRN  acetaminophen, 650 mg, Q6H PRN   Or  acetaminophen, 650 mg, Q6H PRN  dextrose, 100 mL/hr, PRN  acetaminophen, 500 mg, Q4H PRN  albuterol sulfate HFA, 2 puff, Q4H PRN  dextrose bolus (hypoglycemia), 125 mL, PRN   Or  dextrose bolus (hypoglycemia), 250 mL, PRN        Labs      Recent Results (from the past 24 hour(s))   POCT Glucose    Collection Time: 02/20/22 11:16 AM   Result Value Ref Range    POC Glucose 273 (H) 70 - 99 MG/DL   POCT Glucose    Collection Time: 02/20/22  4:02 PM   Result Value Ref Range    POC Glucose 249 (H) 70 - 99 MG/DL   POCT Glucose    Collection Time: 02/20/22  8:03 PM   Result Value Ref Range    POC Glucose 252 (H) 70 - 99 MG/DL   POCT Glucose    Collection Time: 02/20/22 11:44 PM   Result Value Ref Range    POC Glucose 174 (H) 70 - 99 MG/DL   POCT Glucose    Collection Time: 02/21/22  3:43 AM   Result Value Ref Range    POC Glucose 175 (H) 70 - 99 MG/DL   Comprehensive Metabolic Panel    Collection Time: 02/21/22  5:44 AM   Result Value Ref Range    Sodium 121 (L) 135 - 145 MMOL/L    Potassium 4.3 3.5 - 5.1 MMOL/L    Chloride 76 (L) 99 - 110 mMol/L    CO2 13 (L) 21 - 32 MMOL/L     (H) 6 - 23 MG/DL    CREATININE 5.6 (H) 0.9 - 1.3 MG/DL    Glucose 122 (H) 70 - 99 MG/DL    Calcium 7.4 (L) 8.3 - 10.6 MG/DL    Albumin 2.4 (L) 3.4 - 5.0 GM/DL    Total Protein 5.5 (L) 6.4 - 8.2 GM/DL    Total Bilirubin 0.4 0.0 - 1.0 MG/DL    ALT 28 10 - 40 U/L    AST 29 15 - 37 IU/L    Alkaline Phosphatase 192 (H) 40 - 128 IU/L    GFR Non-African American 10 (L) >60 mL/min/1.73m2    GFR  12 (L) >60 mL/min/1.73m2    Anion Gap 32 (H) 4 - 16   CBC with Auto Differential    Collection Time: 02/21/22  5:44 AM   Result Value Ref Range    WBC 14.1 (H) 4.0 - 10.5 K/CU MM    RBC 2.58 (L) 4.6 - 6.2 M/CU MM    Hemoglobin 7.2 (L) 13.5 - 18.0 GM/DL    Hematocrit 22.1 (L) 42 - 52 %    MCV 85.7 78 - 100 FL    MCH 27.9 27 - 31 PG    MCHC 32.6 32.0 - 36.0 %    RDW 15.1 (H) 11.7 - 14.9 %    Platelets 639 889 - 884 K/CU MM    MPV 12.0 (H) 7.5 - 11.1 FL    Bands Relative 3 (L) 5 - 11 %    Segs Relative 88.0 (H) 36 - 66 %    Lymphocytes % 3.0 (L) 24 - 44 %    Monocytes % 6.0 (H) 0 - 4 %    Bands Absolute 0.42 K/CU MM    Segs Absolute 12.5 K/CU MM    Lymphocytes Absolute 0.4 K/CU MM    Monocytes Absolute 0.8 K/CU MM    Differential Type MANUAL DIFFERENTIAL     Anisocytosis 1+    Vancomycin Level, Random    Collection Time: 02/21/22  5:44 AM   Result Value Ref Range    Vancomycin Rm 19.4 UG/ML    DOSE AMOUNT DOSE AMT.  GIVEN - random     DOSE TIME DOSE TIME GIVEN - random    Blood gas, arterial    Collection Time: 02/21/22  6:00 AM   Result Value Ref Range    pH, Bld 7.30 (L) 7.34 - 7.45    pCO2, Arterial 29.0 (L) 32 - 45 MMHG    pO2, Arterial 123 (H) 75 - 100 MMHG    Base Excess 11 (H) 0 - 3.3    HCO3, Arterial 14.3 (L) 18 - 23 MMOL/L    CO2 Content 15.2 (L) 19 - 24 MMOL/L    O2 Sat 96.9 96 - 97 %    Carbon Monoxide, Blood 2.1 0 - 5 %    Methemoglobin, Arterial 0.8 <1.5 %    Comment AC/PC, F16, P15, 50%, +12, SPO2 95%    POCT Glucose    Collection Time: 02/21/22  8:02 AM   Result Value Ref Range    POC Glucose 200 (H) 70 - 99 MG/DL        Imaging/Diagnostics Last 24 Hours   XR CHEST PORTABLE    Result Date: 2/21/2022  EXAMINATION: ONE XRAY VIEW OF THE CHEST 2/21/2022 3:17 am COMPARISON: 02/20/2022 HISTORY: ORDERING SYSTEM PROVIDED HISTORY: ventilator TECHNOLOGIST PROVIDED HISTORY: Reason for exam:->ventilator Reason for Exam: VENT FINDINGS: There is a change in lordotic position between the current and previous exam. The endotracheal tube is felt to measure approximately 2 cm above the karan. A right-sided central venous catheter terminates in the region the distal SVC/cavoatrial junction. Postoperative changes are seen in the chest. Atherosclerosis is identified within the aorta. No pneumothorax is identified. Patchy multifocal infiltrates are seen, predominantly within the mid and lower lung fields, mildly increased in the left perihilar region. Multifocal infiltrates seen throughout the lungs bilaterally, mildly increased in the left perihilar region. XR CHEST PORTABLE    Result Date: 2/20/2022  EXAMINATION: ONE XRAY VIEW OF THE CHEST 2/20/2022 5:43 am COMPARISON: 02/19/2022 HISTORY: ORDERING SYSTEM PROVIDED HISTORY: ventilator FINDINGS: Stable position of life-support devices. Stable size and configuration of the cardiomediastinal silhouette. Progression in bibasilar airspace and interstitial opacities with more confluent opacification involving the right lateral lung zone. Interval progression in bibasilar edema and/or multifocal infiltrates. More confluent opacification present within the right lower lateral lung zone.        Electronically signed by Denise Ro MD on 2/21/2022 at 9:41 AM

## 2022-02-21 NOTE — PROGRESS NOTES
02/21/22 0045   Vent Information   Vent Type 980   Vent Mode AC/PC   Vt Ordered 0 mL   Pressure Ordered 15   Rate Set 16 bmp   Peak Flow 0 L/min   Pressure Support 0 cmH20   FiO2  50 %   SpO2 95 %   SpO2/FiO2 ratio 190   Sensitivity 3   PEEP/CPAP 12   I Time/ I Time % 1 s   Humidification Source HME   Vent Patient Data   High Peep/I Pressure 15   Peak Inspiratory Pressure 27 cmH2O   Mean Airway Pressure 19 cmH20   Rate Measured 23 br/min   Vt Exhaled 314 mL   Minute Volume 5.84 Liters   I:E Ratio 1:2.80

## 2022-02-21 NOTE — PROGRESS NOTES
Palliative care notified of consult      VSS, pt not reponding to commands, insulin given per eMAR. BUN/Cr continue to be elevated, Ullah notified via perfect serve.   10 of peep  FIO2 50%  Afebrile

## 2022-02-21 NOTE — DISCHARGE SUMMARY
V2.0  Discharge Summary    Name:  Abad Novak /Age/Sex: 1945 (89 y.o. male)   Admit Date: 2022  Discharge Date: 22    MRN & CSN:  [id number] & [id number] Encounter Date and Time 22 12:06 PM EST    Attending:  Dontrell Palmer MD Discharging Provider: Dontrell Palmer MD       Hospital Course: Abad Novak is a 68 y.o. male with pmh of hypertension, chronic diastolic CHF, CKD 4 due to diabetic nephropathy, A. fib on chronic anticoagulation with Eliquis, type 2 diabetes, dyslipidemia who presents with Pneumonia due to COVID-19 virus. Hospital course complicated by progressive respiratory failure requiring intubation 22. Poor prognosis and family elected for [address] with no plan for Trach or PEG. Compassionate extubation 22 and pt passed at 1138.         Plan:  1. Acute on chronic respiratory failure with ARDS: Secondary to Covid/bacterial pneumonia. Patient on empiric cefepime and vancomycin discontinued. Intubated 2022 and remained on full ventilatory support. Completed baricitinib therapy and on steroids. Discussed with family with change to DNR CC s/p terminal extubation 22. 2. KAREEM on CKD 4: Diabetic nephropathy Creatinine 2.5 at baseline and likely cardiorenal versus ATN. Nephrology following. Has been on IV Lasix. No plan for dialysis as CODE STATUS changed above. 3. Type 2 diabetes with hyperosmolar state: Improved and was transitioned off of insulin drip. On basal insulin plus SSI. A1c 8.9 2022. Endocrinology followed. 4. Paroxysmal atrial fibrillation: Pressure, continue beta-blocker Cardizem stopped Procardia due to hypotension. Digoxin stopped as well due to nephrotoxicity. Was on Eliquis. 5. Stage II sacral decubitus ulcer:  local wound care. 6. Anemia of chronic disease: Hemoglobin down to 7.9 2022. No more blood draws due to plan for DNR CC with terminal extubation as above.   7. Hyponatremia: Ongoing issue and nephrology followed. The patient expressed appropriate understanding of, and agreement with the discharge recommendations, medications, and plan. Consults this admission:  IP CONSULT TO CARDIOLOGY  IP CONSULT TO NEPHROLOGY  IP CONSULT TO PHARMACY  IP CONSULT TO ENDOCRINOLOGY  IP CONSULT TO DIABETES EDUCATOR  IP CONSULT TO PULMONOLOGY  IP CONSULT TO INTERVENTIONAL RADIOLOGY  IP CONSULT TO DIETITIAN  IP CONSULT TO PALLIATIVE CARE    Discharge Diagnosis:   Pneumonia due to COVID-19 virus and respiratory failure as cause of death        Discharge Instruction:     Discharge Medications:             Labs and Imaging   XR CHEST PORTABLE    Result Date: 2/21/2022  EXAMINATION: ONE XRAY VIEW OF THE CHEST 2/21/2022 3:17 am COMPARISON: 02/20/2022 HISTORY: ORDERING SYSTEM PROVIDED HISTORY: ventilator TECHNOLOGIST PROVIDED HISTORY: Reason for exam:->ventilator Reason for Exam: VENT FINDINGS: There is a change in lordotic position between the current and previous exam. The endotracheal tube is felt to measure approximately 2 cm above the karan. A right-sided central venous catheter terminates in the region the distal SVC/cavoatrial junction. Postoperative changes are seen in the chest. Atherosclerosis is identified within the aorta. No pneumothorax is identified. Patchy multifocal infiltrates are seen, predominantly within the mid and lower lung fields, mildly increased in the left perihilar region. Multifocal infiltrates seen throughout the lungs bilaterally, mildly increased in the left perihilar region. XR CHEST PORTABLE    Result Date: 2/20/2022  EXAMINATION: ONE XRAY VIEW OF THE CHEST 2/20/2022 5:43 am COMPARISON: 02/19/2022 HISTORY: ORDERING SYSTEM PROVIDED HISTORY: ventilator FINDINGS: Stable position of life-support devices. Stable size and configuration of the cardiomediastinal silhouette.  Progression in bibasilar airspace and interstitial opacities with more confluent opacification involving the right lateral lung zone. Interval progression in bibasilar edema and/or multifocal infiltrates. More confluent opacification present within the right lower lateral lung zone. XR CHEST PORTABLE    Result Date: 2/19/2022  EXAMINATION: ONE XRAY VIEW OF THE CHEST 2/19/2022 4:42 am COMPARISON: 02/18/2022 HISTORY: ORDERING SYSTEM PROVIDED HISTORY: ventilator TECHNOLOGIST PROVIDED HISTORY: Reason for exam:->ventilator Reason for Exam: VENT FINDINGS: The endotracheal tube appears in appropriate position. Postoperative changes are seen within the chest.  Subclavian central venous catheter overlies the superior vena cava. Mild cardiomegaly is again identified. Vascular congestion. Patchy infiltrates seen throughout the lungs bilaterally, with mild improved aeration in the right upper lobe. Osseous structures appear similar. Cardiomegaly with vascular congestion and multifocal airspace disease bilaterally, mildly improved in the right upper lobe. XR CHEST PORTABLE    Result Date: 2/18/2022  EXAMINATION: ONE XRAY VIEW OF THE CHEST 2/18/2022 5:47 am COMPARISON: 02/17/2022, 0514 hours. HISTORY: ORDERING SYSTEM PROVIDED HISTORY: ventilator TECHNOLOGIST PROVIDED HISTORY: Reason for exam:->ventilator Reason for Exam: VENT FINDINGS: The ET tube is 2.9 cm above the karan. The NG tube was at least to the gastric fundus. A right subclavian line was noted with the tip in the superior vena cava at its junction with the right atrium. No pneumothorax was identified. Postoperative changes were noted from prior sternotomy with prosthetic mitral valve. Mild cardiomegaly was noted with patchy pneumonia in both lower lobes and the right middle lobe with small bilateral pleural effusions. No interstitial edema was noted. Slight improvement has was noted bilaterally since 02/17/2022, 0514 hours. The ET tube was 2.9 cm above the karan. The NG tube was at least to the gastric fundus.   A right subclavian line was noted with the tip in the superior vena cava at its junction with the right atrium. No pneumothorax was identified. Mild cardiomegaly was noted with patchy pneumonia in both lower lobes and the right middle lobe with small bilateral pleural effusions. Slight improvement has occurred bilaterally since 02/17/2022, 0514 hours. XR CHEST PORTABLE    Result Date: 2/17/2022  EXAMINATION: ONE XRAY VIEW OF THE CHEST 2/17/2022 6:23 am COMPARISON: Chest radiograph dated February 16, 2022 HISTORY: ORDERING SYSTEM PROVIDED HISTORY: ventilator TECHNOLOGIST PROVIDED HISTORY: Reason for exam:->ventilator Reason for Exam: VENT FINDINGS: Tip of the ET tube is 2.5 cm above the karan. The enteric tube extends below the diaphragm. Cardiomegaly is seen. A right-sided central line is seen. Diffuse interstitial prominence with bibasilar consolidations are seen without significant change. There is no evidence of a pneumothorax. Bibasilar pulmonary consolidations are seen, concerning for pneumonia. No significant change. Pulmonary vascular congestion. XR CHEST PORTABLE    Result Date: 2/16/2022  EXAMINATION: ONE XRAY VIEW OF THE CHEST 2/16/2022 6:43 am COMPARISON: 02/15/2022, 0415 hours. HISTORY: ORDERING SYSTEM PROVIDED HISTORY: ventilator TECHNOLOGIST PROVIDED HISTORY: Reason for exam:->ventilator Reason for Exam: vent FINDINGS: The ET tube was 3.1 cm above the karan. The NG tube was at least to the gastric body. A right jugular central venous line was noted with the tip in the superior vena cava. Postoperative changes were noted from prior sternotomy with prosthetic mitral valve. Mild cardiomegaly was noted with patchy alveolar pneumonia in both lower lobes. Slight improvement has occurred bilaterally since 02/15/2022. The ET tube was 3.1 cm above the karan. The NG tube was at least to the gastric body. A right jugular central venous line was noted with the tip in the superior vena cava. No pneumothorax was identified. CABG with prosthetic mitral valve. Mild cardiomegaly was noted with patchy alveolar pneumonia in both lower lobes. Slight improvement has occurred bilaterally since 02/15/2022. XR CHEST PORTABLE    Result Date: 2/15/2022  EXAMINATION: ONE XRAY VIEW OF THE CHEST 2/15/2022 5:57 am COMPARISON: Chest radiograph dated February 14, 2022 HISTORY: ORDERING SYSTEM PROVIDED HISTORY: ventilator TECHNOLOGIST PROVIDED HISTORY: Reason for exam:->ventilator Reason for Exam: ventilator FINDINGS: Tip of the ET tube is 3.2 cm above the karan. The enteric tube extends below the diaphragm. The cardiomediastinal silhouette is stable. Bibasilar pulmonary infiltrates are seen without significant change. A right central venous catheter is seen. Bibasilar pulmonary infiltrates without significant change. CBC:   Recent Labs     02/19/22  0410 02/20/22  0610 02/21/22  0544   WBC 8.5 13.5* 14.1*   HGB 7.3* 7.9* 7.2*    196 183     BMP:    Recent Labs     02/19/22  0410 02/20/22  0610 02/21/22  0544   * 121* 121*   K 3.4* 3.6 4.3   CL 83* 77* 76*   CO2 15* 14* 13*   * 161* 173*   CREATININE 4.9* 4.9* 5.6*   GLUCOSE 110* 188* 122*     Hepatic:   Recent Labs     02/19/22  0410 02/20/22  0610 02/21/22  0544   AST 20 30 29   ALT 15 24 28   BILITOT 0.3 0.4 0.4   ALKPHOS 117 174* 192*     Lipids:   Lab Results   Component Value Date    CHOL 151 10/04/2021    CHOL 170 12/27/2016    HDL 41 10/04/2021    TRIG 464 02/19/2022     Hemoglobin A1C:   Lab Results   Component Value Date    LABA1C 8.9 02/07/2022     TSH: No results found for: TSH  Troponin:   Lab Results   Component Value Date    TROPONINT 0.016 02/06/2022    TROPONINT 0.013 02/06/2022    TROPONINT 0.041 02/05/2022     Lactic Acid: No results for input(s): LACTA in the last 72 hours. BNP: No results for input(s): PROBNP in the last 72 hours.   UA:  Lab Results   Component Value Date    NITRU NEGATIVE 02/15/2022    COLORU YELLOW 02/15/2022    WBCUA 3 02/15/2022    RBCUA 1 02/15/2022    MUCUS RARE 02/15/2022    TRICHOMONAS NONE SEEN 09/28/2017    YEAST OCCASIONAL 02/13/2016    BACTERIA NEGATIVE 02/15/2022    CLARITYU SLIGHTLY CLOUDY 02/15/2022    SPECGRAV >1.030 02/15/2022    LEUKOCYTESUR NEGATIVE 02/15/2022    UROBILINOGEN 0.2 02/15/2022    BILIRUBINUR NEGATIVE 02/15/2022    BLOODU NEGATIVE 02/15/2022    KETUA 15 02/15/2022     Urine Cultures: No results found for: Benjamin Mckinney  Blood Cultures: No results found for: BC  No results found for: BLOODCULT2  Organism:   Lab Results   Component Value Date    ORG ENC 11/26/2018       Time Spent Discharging patient 20 minutes    Electronically signed by Yoli Schneider MD on 2/21/2022 at 12:06 PM

## 2022-02-21 NOTE — PROGRESS NOTES
Pt compassionately weaned today at 36, TOD 1138 am verified by 2 RN's Nadia Montague RN . All physicans notified via perfect serve.  made aware and donor referral contacted.   Family at bedside

## 2022-02-21 NOTE — PROGRESS NOTES
02/21/22 0721   Vent Information   $Ventilation $Subsequent Day   Vent Type 980   Vent Mode AC/PC   Vt Ordered 0 mL   Pressure Ordered 15   Rate Set 16 bmp   Peak Flow 0 L/min   Pressure Support 0 cmH20   FiO2  50 %   SpO2 93 %   SpO2/FiO2 ratio 186   Sensitivity 3   PEEP/CPAP 10   I Time/ I Time % 1 s   Humidification Source HME   Nitric Oxide/Epoprostenol In Use? No   Vent Patient Data   High Peep/I Pressure 15   Peak Inspiratory Pressure 25 cmH2O   Mean Airway Pressure 15 cmH20   Rate Measured 18 br/min   Vt Exhaled 792 mL   Minute Volume 12.2 Liters   I:E Ratio 1:2.80   Plateau Pressure 26 XVQ00   Static Compliance 41 mL/cmH2O   Total PEEP 11 cmH20   Auto PEEP 0.6 cmH20   Cough/Sputum   Sputum How Obtained Endotracheal;Suctioned   $Obtained Sample $Induced Sputum   Cough Productive   Frequency Infrequent   Sputum Amount Moderate   Sputum Color Bright red   Tenacity Thick   Spontaneous Breathing Trial (SBT) RT Doc   Pulse 84   Breath Sounds   Right Upper Lobe Diminished   Right Middle Lobe Diminished   Right Lower Lobe Diminished   Left Upper Lobe Diminished   Left Lower Lobe Diminished   Additional Respiratory  Assessments   Resp 16   Position Semi-Starr's   Oral Care Mouth suctioned   Alarm Settings   High Pressure Alarm 45 cmH2O   Delay Alarm 20 sec(s)   Low Minute Volume Alarm 2.5 L/min   Apnea (secs) 20 secs   High Respiratory Rate 45 br/min   Low Exhaled Vt  250 mL   ETT (adult)   Placement Date/Time: 02/14/22 (c) 3905   Preoxygenation: Yes  Mask Ventilation: Ventilated by mask (1)  Technique: Video laryngoscopy  Tube Size: 7.5 mm  Blade Size: 3  Location: Oral  Grade View: Full view of the glottis  Insertion attempts: 1  Place. ..    Secured at 27 cm   Measured From Lips   ET Placement Left   Secured By Commercial tube low   Site Condition Dry

## 2022-02-21 NOTE — PROGRESS NOTES
Family meeting with palliative care nurse present. Family understands poor prognosis and wishes to proceed with Terminal extubation per patient wishes. Orders placed.

## 2022-02-21 NOTE — CARE COORDINATION
Received call from [facility] patient's  from Luther Rodríguez requesting and update on patient .  CM provided update for her and she voiced understanding

## 2022-02-21 NOTE — PROGRESS NOTES
02/21/22 1109   Vent Information   Vent Type 980   Vent Mode AC/PC   Vt Ordered 0 mL   Pressure Ordered 15   Rate Set 16 bmp   Peak Flow 0 L/min   Pressure Support 0 cmH20   FiO2  50 %   SpO2 (!) 88 %   SpO2/FiO2 ratio 176   Sensitivity 3   PEEP/CPAP 10   I Time/ I Time % 1 s   Vent Patient Data   High Peep/I Pressure 15   Peak Inspiratory Pressure 26 cmH2O   Mean Airway Pressure 17 cmH20   Rate Measured 26 br/min   Vt Exhaled 564 mL   Minute Volume 14.2 Liters   I:E Ratio 1:1.20   Spontaneous Breathing Trial (SBT) RT Doc   Pulse 96   Breath Sounds   Right Upper Lobe Diminished   Right Middle Lobe Diminished   Right Lower Lobe Diminished   Left Upper Lobe Diminished   Left Lower Lobe Diminished   Additional Respiratory  Assessments   Resp 26   Position Semi-Starr's   Alarm Settings   High Pressure Alarm 45 cmH2O   Delay Alarm 20 sec(s)   Low Minute Volume Alarm 2.5 L/min   Apnea (secs) 20 secs   High Respiratory Rate 45 br/min   Low Exhaled Vt  250 mL   ETT (adult)   Placement Date/Time: 02/14/22 (c) 0308   Preoxygenation: Yes  Mask Ventilation: Ventilated by mask (1)  Technique: Video laryngoscopy  Tube Size: 7.5 mm  Blade Size: 3  Location: Oral  Grade View: Full view of the glottis  Insertion attempts: 1  Place. ..    Secured at 27 cm   Measured From Lips   ET Placement Left   Secured By Commercial tube low   Site Condition Dry

## 2022-02-21 NOTE — SIGNIFICANT EVENT
Patient was extubated by RT at 1130. Family was at bedside and patient appeared to be comfortable post extubation. He alarmed asystole on monitor and absent of apical pulse and respirations at 1138. Eunice ALEJANDRO verified absent apical pulse and family was notified of patient's TOD to be [phone].

## 2022-02-21 NOTE — PROGRESS NOTES
Nephrology Progress Note  2/21/2022 7:56 AM        Subjective:   Admit Date: 2/6/2022  PCP: Kemp Saint, MD    Interval History: Remains on mechanical ventilation    Diet: Tube feed    ROS: On assist control mode, FiO2 50%, PEEP of 12 and peak pressure of 28  Sedated with propofol and fentanyl  Urine output dropped, less than a liter per day  No fever    Data:     Current meds:    insulin glargine  35 Units SubCUTAneous Nightly    calcium gluconate IVPB  8,000 mg IntraVENous Once    insulin lispro  0-18 Units SubCUTAneous Q4H    insulin NPH  15 Units SubCUTAneous QAM    furosemide  80 mg IntraVENous TID    pantoprazole  40 mg IntraVENous Daily    cefepime  1,000 mg IntraVENous Q24H    sodium chloride  500 mL IntraVENous Once    albuterol sulfate HFA  4 puff Inhalation Q4H    And    ipratropium  4 puff Inhalation Q4H    chlorhexidine  15 mL Mouth/Throat BID    famotidine (PEPCID) injection  20 mg IntraVENous Daily    sennosides  5 mL Oral BID    metoprolol succinate  50 mg Oral BID    [Held by provider] insulin lispro  20 Units SubCUTAneous TID WC    magic (miracle) mouthwash  5 mL Swish & Swallow TID    [Held by provider] guaiFENesin  200 mg Oral Q4H    sodium chloride flush  5-40 mL IntraVENous 2 times per day    apixaban  5 mg Oral BID    aspirin EC  81 mg Oral Daily      norepinephrine Stopped (02/15/22 1149)    fentaNYL 75 mcg/hr (02/21/22 0540)    propofol 52 mcg/kg/min (02/21/22 0537)    dextrose      dextrose      sodium chloride 500 mL (02/15/22 1713)    dextrose           I/O last 3 completed shifts:   In: 3542 [I.V.:2292.3; NG/GT:960; IV Piggyback:187.7]  Out: 8469 [Urine:1575]    CBC:   Recent Labs     02/19/22  0410 02/20/22  0610 02/21/22  0544   WBC 8.5 13.5* 14.1*   HGB 7.3* 7.9* 7.2*    196 183          Recent Labs     02/19/22  0410 02/20/22  0610 02/21/22  0544   * 121* 121*   K 3.4* 3.6 4.3   CL 83* 77* 76*   CO2 15* 14* 13*   * 161* 173* CREATININE 4.9* 4.9* 5.6*   GLUCOSE 110* 188* 122*       Lab Results   Component Value Date    CALCIUM 7.4 (L) 02/21/2022    PHOS 8.7 (H) 02/16/2022       Objective:     Vitals: BP (!) 142/43   Pulse 84   Temp 98.3 °F (36.8 °C) (Rectal)   Resp 16   Ht 5' 7.99\" (1.727 m)   Wt 192 lb 3.9 oz (87.2 kg)   SpO2 93%   BMI 29.24 kg/m²     General appearance: Intubated and sedated  HEENT: Positive conjunctival pallor  Neck: Right IJ central venous catheter  Lungs: Positive admission breath sound  Heart: Seems regular in rhythm, well-healed incision from previous sternotomy   Abdomen: Soft,   Extremities: No overt edema  He does have a Reyes catheter      Problem List :         Impression :     1. Acute kidney injury with underlying CKD stage IV A3-urine output dropped-BUN/creatinine up again-could have multiple etiology, including acute interstitial nephritis direct and indirect effect from SARS-CoV-2,, as well as cardiorenal syndrome, and finally medication toxicity  2. Low sodium-likely from excess total body water  3. Reactive COVID-19 with multiorgan dysfunction, with underlying coronary artery disease, as well as diabetes    Recommendation/Plan  :     1. I did discuss with his son. 2. I been knowing him for the last 10 to 12 years  3. He is overall quality of life was declining prior to his illness. 4. He also had progressive kidney disease, now with gross edema multiorgan dysfunction-  5. If the family decided to withdraw care, probably not clinically unreasonable in my mind. As even with recovery, will be a long way for him to go, with pre-existing poor quality of life, may not regained much quality ,that he would love to have. And knowing him, he probably would not like that  6.  I will wait for the family's final decision      Anastasiya Kahn MD MD

## 2022-02-21 NOTE — PROGRESS NOTES
[id number] [facility]  consulted by Dr. Paulette Reynaga for monitoring and adjustment. Indication for treatment: Pneumonia  Goal trough: [] 10-15 mcg/mL or [x] 15-20 mcg/ml  AUC/BILL: [] <500 or [x] 400-600    Pertinent Laboratory Values:   Temp Readings from Last 3 Encounters:   02/21/22 98.2 °F (36.8 °C) (Rectal)   02/06/22 97.6 °F (36.4 °C) (Oral)   01/26/22 98.6 °F (37 °C) (Infrared)     Recent Labs     02/19/22  0410 02/20/22  0610 02/21/22  0544   WBC 8.5 13.5* 14.1*     Recent Labs     02/19/22  0410 02/20/22  0610 02/21/22  0544   * 161* 173*   CREATININE 4.9* 4.9* 5.6*     Estimated Creatinine Clearance: 12 mL/min (A) (based on SCr of 5.6 mg/dL (H)). Intake/Output Summary (Last 24 hours) at 2/21/2022 1556  Last data filed at 2/21/2022 1130  Gross per 24 hour   Intake 3683.01 ml   Output 575 ml   Net 3108.01 ml       Pertinent Cultures:  Date    Source    Results  2/21   MRSA nasal   Ordered    Vancomycin level:   TROUGH:  No results for input(s): VANCOTROUGH in the last 72 hours.   RANDOM:    Recent Labs     02/21/22  0544   VANCORANDOM 19.4       Assessment:  · SCr, BUN, and urine output:   · KAREEM, not requiring RRT  · +UOP  · Day(s) of therapy: 2 (restarted)  · Vancomycin concentration:  · 2/21: 19.4, collected 16h post-dose, appropriate to re-dose    Plan:  · Intermittent vancomycin dosing based on levels  · Based on level, re-dose with 15 mg/kg  · Repeat random level in ~36h (anticipate >24h interval)  · Pharmacy will continue to monitor patient and adjust therapy as indicated    VANCOMYCIN CONCENTRATION SCHEDULED FOR 2/23 @ 0600    Thank you for the consult,  MOODY Cuevas [facility], PharmD  2/21/2022 3:56 PM

## 2022-02-21 NOTE — PLAN OF CARE
Problem: Airway Clearance - Ineffective  Goal: Achieve or maintain patent airway  Outcome: Ongoing     Problem: Gas Exchange - Impaired  Goal: Absence of hypoxia  Outcome: Ongoing  Goal: Promote optimal lung function  Outcome: Ongoing     Problem: Breathing Pattern - Ineffective  Goal: Ability to achieve and maintain a regular respiratory rate  Outcome: Ongoing     Problem:  Body Temperature -  Risk of, Imbalanced  Goal: Ability to maintain a body temperature within defined limits  Outcome: Ongoing  Goal: Will regain or maintain usual level of consciousness  Outcome: Ongoing  Goal: Complications related to the disease process, condition or treatment will be avoided or minimized  Outcome: Ongoing     Problem: Isolation Precautions - Risk of Spread of Infection  Goal: Prevent transmission of infection  Outcome: Ongoing     Problem: Nutrition Deficits  Goal: Optimize nutritional status  Outcome: Ongoing     Problem: Risk for Fluid Volume Deficit  Goal: Maintain normal heart rhythm  Outcome: Ongoing  Goal: Maintain absence of muscle cramping  Outcome: Ongoing  Goal: Maintain normal serum potassium, sodium, calcium, phosphorus, and pH  Outcome: Ongoing     Problem: Loneliness or Risk for Loneliness  Goal: Demonstrate positive use of time alone when socialization is not possible  Outcome: Ongoing     Problem: Fatigue  Goal: Verbalize increase energy and improved vitality  Outcome: Ongoing     Problem: Patient Education: Go to Patient Education Activity  Goal: Patient/Family Education  Outcome: Ongoing     Problem: Falls - Risk of:  Goal: Will remain free from falls  Description: Will remain free from falls  Outcome: Ongoing  Goal: Absence of physical injury  Description: Absence of physical injury  Outcome: Ongoing     Problem: Nutrition  Goal: Optimal nutrition therapy  Outcome: Ongoing     Problem: Skin Integrity:  Goal: Will show no infection signs and symptoms  Description: Will show no infection signs and symptoms  Outcome: Ongoing  Goal: Absence of new skin breakdown  Description: Absence of new skin breakdown  Outcome: Ongoing     Problem: Pain:  Goal: Pain level will decrease  Description: Pain level will decrease  Outcome: Ongoing  Goal: Control of acute pain  Description: Control of acute pain  Outcome: Ongoing  Goal: Control of chronic pain  Description: Control of chronic pain  Outcome: Ongoing     Problem: Discharge Planning:  Goal: Participates in care planning  Description: Participates in care planning  Outcome: Ongoing  Goal: Discharged to appropriate level of care  Description: Discharged to appropriate level of care  Outcome: Ongoing     Problem: Anxiety/Stress:  Goal: Level of anxiety will decrease  Description: Level of anxiety will decrease  Outcome: Ongoing     Problem: Aspiration:  Goal: Absence of aspiration  Description: Absence of aspiration  Outcome: Ongoing     Problem:  Bowel Function - Altered:  Goal: Bowel elimination is within specified parameters  Description: Bowel elimination is within specified parameters  Outcome: Ongoing     Problem: Cardiac Output - Decreased:  Goal: Hemodynamic stability will improve  Description: Hemodynamic stability will improve  Outcome: Ongoing